# Patient Record
Sex: FEMALE | Race: WHITE | NOT HISPANIC OR LATINO | Employment: UNEMPLOYED | URBAN - METROPOLITAN AREA
[De-identification: names, ages, dates, MRNs, and addresses within clinical notes are randomized per-mention and may not be internally consistent; named-entity substitution may affect disease eponyms.]

---

## 2018-06-28 ENCOUNTER — DOCUMENTATION ONLY (OUTPATIENT)
Dept: TRANSPLANT | Facility: CLINIC | Age: 53
End: 2018-06-28

## 2018-06-28 NOTE — LETTER
June 28, 2018    Gabi Newton  [address] [facility] [address]      Dear Gabi Newton:    Your doctor has referred you to the [facility]. You will be contacted by our office and an initial appointment will then be scheduled for you.    We look forward to seeing you soon. If you have any further questions, please contact us at [phone].       Sincerely,        [facility]   [address] [place] [id number]  [phone]

## 2018-06-28 NOTE — NURSING
Pt records reviewed.  Pt will be referred to Hepatology due to fatty liver  Initial referral received  from Dr.Diana Bowling  Referral letter sent to provider and patient.

## 2018-07-02 PROBLEM — R10.9 ABDOMINAL PAIN: Status: ACTIVE | Noted: 2018-07-02

## 2018-07-24 ENCOUNTER — INITIAL CONSULT (OUTPATIENT)
Dept: HEPATOLOGY | Facility: CLINIC | Age: 53
End: 2018-07-24
Payer: MEDICAID

## 2018-07-24 VITALS
BODY MASS INDEX: 32.1 KG/M2 | RESPIRATION RATE: 18 BRPM | HEART RATE: 81 BPM | TEMPERATURE: 98 F | SYSTOLIC BLOOD PRESSURE: 131 MMHG | WEIGHT: 170 LBS | HEIGHT: 61 IN | DIASTOLIC BLOOD PRESSURE: 63 MMHG | OXYGEN SATURATION: 98 %

## 2018-07-24 DIAGNOSIS — K76.0 FATTY LIVER: ICD-10-CM

## 2018-07-24 DIAGNOSIS — E78.5 HYPERLIPIDEMIA, UNSPECIFIED HYPERLIPIDEMIA TYPE: ICD-10-CM

## 2018-07-24 DIAGNOSIS — I10 HYPERTENSION, UNSPECIFIED TYPE: ICD-10-CM

## 2018-07-24 DIAGNOSIS — R16.1 SPLENOMEGALY: ICD-10-CM

## 2018-07-24 DIAGNOSIS — K59.00 CONSTIPATION, UNSPECIFIED CONSTIPATION TYPE: ICD-10-CM

## 2018-07-24 PROCEDURE — 99214 OFFICE O/P EST MOD 30 MIN: CPT | Mod: PBBFAC,PN | Performed by: INTERNAL MEDICINE

## 2018-07-24 PROCEDURE — 99999 PR PBB SHADOW E&M-EST. PATIENT-LVL IV: CPT | Mod: PBBFAC,,, | Performed by: INTERNAL MEDICINE

## 2018-07-24 PROCEDURE — 99204 OFFICE O/P NEW MOD 45 MIN: CPT | Mod: S$PBB,,, | Performed by: INTERNAL MEDICINE

## 2018-07-24 NOTE — PROGRESS NOTES
HEPATOLOGY CONSULTATION    Referring Physician: Yanet Yeung MD  Current Corresponding Physician: Yanet Yeung MD    Reason for Consultation: Consultation for evaluation of Fatty Liver and splenomegaly    History of Present Illness: Gabi Newton is a 52 y.o. female who presents for evaluation of underwent a cholecystecomty in 2016 for RUQ pain. Since then she has had continued abdominal discomfort in the periumbilical area (different than the pain that led to her gallbladder surgery). As part of the w/u for this pain she has had imaging done:  CT scan 6/11/18: normal liver but the spleen is slightly enlarged.  MRI 6/19/18: Diffuse hepatic steatosis without evidence of suspicious focal lesions and splenomegaly but no asictes.    Liver enzymes are normal.In addition her Tbil, albumin are normal. Plts are borderline (169, 193) The patient does not drink alcohol heavily and has no history of DM. Her BMI is 32. She has not been screened for HCV (she was born in 1965 and is a babyboomer). The patient denies any symptoms of decompensated cirrhosis, including no ascites or edema, cognitive problems that would suggest hepatic encephalopathy, or GI bleeding from varices.     Chief Complaint   Patient presents with    Fatty Liver    splenomegaly       Past Medical History:   Diagnosis Date    Abdominal pain 2018    Back pain     Fatty liver 7/24/2018    GERD (gastroesophageal reflux disease)     Hyperlipidemia     Hypertension      Outpatient Encounter Prescriptions as of 7/24/2018   Medication Sig Dispense Refill    aspirin 81 MG Chew aspirin 81 mg chewable tablet   Chew 1 tablet every day by oral route.      lisinopril 10 MG tablet lisinopril 10 mg tablet   Take 1 tablet every day by oral route.      methadone (DOLOPHINE) 10 MG tablet Take 40 mg by mouth.       pantoprazole (PROTONIX) 40 MG tablet pantoprazole 40 mg tablet,delayed release      simvastatin (ZOCOR) 40 MG tablet Take 40 mg by mouth every  evening.       [DISCONTINUED] amLODIPine (NORVASC) 5 MG tablet Take 5 mg by mouth once daily.      [DISCONTINUED] gemfibrozil (LOPID) 600 MG tablet Take 600 mg by mouth 2 (two) times daily before meals.      [DISCONTINUED] isosorbide mononitrate (IMDUR) 60 MG 24 hr tablet Take 60 mg by mouth once daily.       No facility-administered encounter medications on file as of 7/24/2018.      Review of patient's allergies indicates:   Allergen Reactions    Zithromax [azithromycin] Anaphylaxis     Family History   Problem Relation Age of Onset    Breast cancer Maternal Grandfather     Dementia Mother     Aneurysm Father        Social History     Social History    Marital status:      Spouse name: N/A    Number of children: N/A    Years of education: N/A     Occupational History    Not on file.     Social History Main Topics    Smoking status: Current Every Day Smoker     Packs/day: 1.00     Types: Cigarettes    Smokeless tobacco: Never Used    Alcohol use No    Drug use: No      Comment: former opioid addiction  quit 8 yrs ago    Sexual activity: Not on file     Other Topics Concern    Not on file     Social History Narrative    No narrative on file     Review of Systems   Constitutional: Negative.    HENT: Negative.    Eyes: Negative.    Respiratory: Negative.    Cardiovascular: Negative.    Gastrointestinal: Positive for abdominal pain and constipation.   Genitourinary: Negative.    Musculoskeletal: Negative.    Skin: Negative.    Neurological: Negative.    Psychiatric/Behavioral: Negative.      Vitals:    07/24/18 1004   BP: 131/63   Pulse: 81   Resp: 18   Temp: 98 °F (36.7 °C)       Physical Exam   Constitutional: She is oriented to person, place, and time. She appears well-developed and well-nourished.   HENT:   Head: Normocephalic and atraumatic.   Eyes: Conjunctivae and EOM are normal. Pupils are equal, round, and reactive to light. No scleral icterus.   Neck: Normal range of motion. Neck  supple. No thyromegaly present.   Cardiovascular: Normal rate, regular rhythm and normal heart sounds.    Pulmonary/Chest: Effort normal and breath sounds normal. She has no rales.   Abdominal: Soft. Bowel sounds are normal. She exhibits no distension and no mass. There is no tenderness.   Musculoskeletal: Normal range of motion. She exhibits no edema.   Neurological: She is alert and oriented to person, place, and time.   Skin: Skin is warm and dry. No rash noted.   Psychiatric: She has a normal mood and affect.   Vitals reviewed.    MELD-Na score: 6 at 7/24/2018 11:36 AM  MELD score: 6 at 7/24/2018 11:36 AM  Calculated from:  Serum Creatinine: 0.7 mg/dL (Rounded to 1) at 7/24/2018 11:36 AM  Serum Sodium: 137 mmol/L at 7/24/2018 11:36 AM  Total Bilirubin: 0.7 mg/dL (Rounded to 1) at 7/24/2018 11:36 AM  INR(ratio): 0.9 (Rounded to 1) at 7/24/2018 11:36 AM  Age: 52 years    Lab Results   Component Value Date     06/10/2018    BUN 12 06/10/2018    CREATININE 0.8 06/10/2018    CALCIUM 9.1 06/10/2018     06/10/2018    K 3.8 06/10/2018     06/10/2018    PROT 7.0 06/10/2018    CO2 30 (H) 06/10/2018    ANIONGAP 6 (L) 06/10/2018    WBC 7.20 06/10/2018    RBC 4.47 06/10/2018    HGB 13.5 06/10/2018    HCT 40.0 06/10/2018    MCV 90 06/10/2018    MCH 30.2 06/10/2018    MCHC 33.7 06/10/2018     Lab Results   Component Value Date    RDW 15.1 (H) 06/10/2018     06/10/2018    MPV 8.6 (L) 06/10/2018    GRAN 6.5 06/10/2018    GRAN 89.7 (H) 06/10/2018    LYMPH 0.4 (L) 06/10/2018    LYMPH 6.2 (L) 06/10/2018    MONO 0.1 (L) 06/10/2018    MONO 1.8 (L) 06/10/2018    EOSINOPHIL 2.1 06/10/2018    BASOPHIL 0.2 06/10/2018    EOS 0.2 06/10/2018    BASO 0.00 06/10/2018    ALBUMIN 3.6 06/10/2018    AST 21 06/10/2018    ALT 19 06/10/2018    ALKPHOS 86 06/10/2018       Assessment and Plan:  Gabi Newton is a 52 y.o. female with Fatty Liver and splenomegaly  The concern is whether or not she has significant fibrosis or  cirrhosis given the finding of splenomegaly and borderline platelets. My current recommendations:  1. Fatty liver. I have recommended a fibroscan to evaluate for fibrosis. [facility] will do this at [facility]. Should this suggest significant fibrosis or cirrhosis then she will need HCC screening every 6 months with an US and ASP and I will monitor liver function every 4-6 months for decompensation. I will refer her to clinical trials for tx of OSBORNE if this is the case as well. I will also screen her for other etiologies of liver disease including HCV since she is a babyboomer. She will need to be vaccinated for HBV and HAV if she is not immune. I have ordered these serologies as well.  Return after fibroscan and bloodwork.

## 2018-07-24 NOTE — LETTER
July 24, 2018      Yanet Yeung MD  [address] [facility]  [address]  [place] [address]           [facility]  [address]  [place] [address]  Phone: [phone]  Fax: [phone]          Patient: Gabi Newton   MR Number: [id number]   YOB: 1965   Date of Visit: 7/24/2018       Dear Dr. Yanet Yeung:    Thank you for referring Gabi Newton to me for evaluation. Attached you will find relevant portions of my assessment and plan of care.    If you have questions, please do not hesitate to call me. I look forward to following Gabi Newton along with you.    Sincerely,    Melanie Cedeno MD    Enclosure  CC:  No Recipients    If you would like to receive this communication electronically, please contact [email or web address] or [phone] to request more information on [employer] Link access.    For providers and/or their staff who would like to refer a patient to [facility], please contact us through our one-stop-shop provider referral line, [facility], at [phone].    If you feel you have received this communication in error or would no longer like to receive these types of communications, please e-mail [email or web address]

## 2018-07-25 ENCOUNTER — TELEPHONE (OUTPATIENT)
Dept: TRANSPLANT | Facility: CLINIC | Age: 53
End: 2018-07-25

## 2018-07-25 DIAGNOSIS — K76.0 FATTY LIVER: Primary | ICD-10-CM

## 2018-07-30 ENCOUNTER — TELEPHONE (OUTPATIENT)
Dept: HEPATOLOGY | Facility: CLINIC | Age: 53
End: 2018-07-30

## 2018-07-31 ENCOUNTER — TELEPHONE (OUTPATIENT)
Dept: HEPATOLOGY | Facility: CLINIC | Age: 53
End: 2018-07-31

## 2018-07-31 NOTE — TELEPHONE ENCOUNTER
----- Message from Enriqueta Reeder MD sent at 7/30/2018  5:57 PM CDT -----  Please inform thee patient that she does not have evidence of other causes of liver disease outside of fatty liver.  Recommended for hepatitis A and B vaccination.

## 2018-07-31 NOTE — TELEPHONE ENCOUNTER
MA called patient inform her of her results below she understood and stated that she will do her vaccines locally.

## 2018-08-08 ENCOUNTER — CLINICAL SUPPORT (OUTPATIENT)
Dept: INFECTIOUS DISEASES | Facility: CLINIC | Age: 53
End: 2018-08-08
Payer: MEDICAID

## 2018-08-08 ENCOUNTER — PROCEDURE VISIT (OUTPATIENT)
Dept: HEPATOLOGY | Facility: CLINIC | Age: 53
End: 2018-08-08
Attending: INTERNAL MEDICINE
Payer: MEDICAID

## 2018-08-08 DIAGNOSIS — K76.0 FATTY LIVER: ICD-10-CM

## 2018-08-08 PROCEDURE — 91200 LIVER ELASTOGRAPHY: CPT | Mod: PBBFAC | Performed by: INTERNAL MEDICINE

## 2018-08-08 PROCEDURE — 91200 LIVER ELASTOGRAPHY: CPT | Mod: 26,S$PBB,, | Performed by: INTERNAL MEDICINE

## 2018-08-08 PROCEDURE — 90471 IMMUNIZATION ADMIN: CPT | Mod: PBBFAC

## 2018-08-08 NOTE — PROGRESS NOTES
Pt received the Hepatitis A/B vaccination. Pt tolerated the injection well. Pt left the unit in [place]. Return appt provided.

## 2018-08-10 NOTE — PROCEDURES
Gabi Newton is a 52 y.o. female patient.            Procedures Fibroscan Procedure     Name: Gabi Newton  Date of Procedure : 08/10/2018   :: Melanie Cedeno MD  Diagnosis: NAFLD    Probe: XL    Fibroscan reading: 3.5 KPa    Fibrosis:F 0-1     CAP readin dB/m    Steatosis: :S3       Melanie Cedeno  8/10/2018

## 2018-08-14 ENCOUNTER — OFFICE VISIT (OUTPATIENT)
Dept: HEPATOLOGY | Facility: CLINIC | Age: 53
End: 2018-08-14
Payer: MEDICAID

## 2018-08-14 VITALS
HEIGHT: 61 IN | BODY MASS INDEX: 32.85 KG/M2 | HEART RATE: 75 BPM | SYSTOLIC BLOOD PRESSURE: 135 MMHG | OXYGEN SATURATION: 98 % | WEIGHT: 174 LBS | RESPIRATION RATE: 18 BRPM | DIASTOLIC BLOOD PRESSURE: 75 MMHG | TEMPERATURE: 98 F

## 2018-08-14 DIAGNOSIS — E61.1 IRON DEFICIENCY: Primary | ICD-10-CM

## 2018-08-14 PROCEDURE — 99214 OFFICE O/P EST MOD 30 MIN: CPT | Mod: PBBFAC,PN | Performed by: INTERNAL MEDICINE

## 2018-08-14 PROCEDURE — 99999 PR PBB SHADOW E&M-EST. PATIENT-LVL IV: CPT | Mod: PBBFAC,,, | Performed by: INTERNAL MEDICINE

## 2018-08-14 PROCEDURE — 99214 OFFICE O/P EST MOD 30 MIN: CPT | Mod: S$PBB,,, | Performed by: INTERNAL MEDICINE

## 2018-08-14 RX ORDER — DOCUSATE SODIUM 100 MG/1
100 CAPSULE, LIQUID FILLED ORAL DAILY
COMMUNITY
End: 2019-10-14

## 2018-08-14 RX ORDER — GEMFIBROZIL 600 MG/1
600 TABLET, FILM COATED ORAL
COMMUNITY
End: 2019-10-14

## 2018-08-14 NOTE — PATIENT INSTRUCTIONS
1. Blood tests for celiac sprue  2. Return 1 year with US and blood tests and fibroscan  3. Complete vaccination

## 2018-08-14 NOTE — PROGRESS NOTES
HEPATOLOGY FOLLOW UP    Referring Physician: Yanet Anderson MD  Current Corresponding Physician: Yanet Anderson MD    Gabi Newton is here for follow up of Fatty Liver    HPI  She is a 52 y.o. female who underwent a cholecystecomty in 2016 for RUQ pain. Since then she has had continued abdominal discomfort in the periumbilical area (different than the pain that led to her gallbladder surgery). As part of the w/u for this pain she has had imaging done:  CT scan 6/11/18: normal liver but the spleen slightly enlarged.  MRI 6/19/18: Diffuse hepatic steatosis without evidence of suspicious focal lesions and splenomegaly but no asictes.     Liver enzymes are normal. In addition her Tbil, albumin are normal. Plts are borderline (169, 193) The patient does not drink alcohol heavily and has no history of DM. Her BMI is 32. She has not been screened for HCV (she was born in 1965 and is a babyboomer). The patient denies any symptoms of decompensated cirrhosis, including no ascites or edema, cognitive problems that would suggest hepatic encephalopathy, or GI bleeding from varices.     Interval History  Since Gabi Newton's last visit she underwent a fibroscan. This did reveal significant steatosis (S3=>67% fat) but only F0/1 fibrosis.   Serologies were done:  Negative for hepatitis B and C. Should get vaccinated for both A and B.  Autoimmune markers negative  alpha1 antitrypsin level normal  Iron studies: no overload; actually deficient in iron    She did have an EGD in July but no small bowel biopsies were done to evaluate iron deficiency. Colonoscopy was done ~2 years ago and therefore was not repeated.    Outpatient Encounter Medications as of 8/14/2018   Medication Sig Dispense Refill    aspirin 81 MG Chew aspirin 81 mg chewable tablet   Chew 1 tablet every day by oral route.      lisinopril 10 MG tablet lisinopril 10 mg tablet   Take 1 tablet every day by oral route.      methadone (DOLOPHINE) 10 MG tablet Take 40  mg by mouth.       pantoprazole (PROTONIX) 40 MG tablet pantoprazole 40 mg tablet,delayed release      simvastatin (ZOCOR) 40 MG tablet Take 40 mg by mouth every evening.       [DISCONTINUED] amLODIPine (NORVASC) 5 MG tablet Take 5 mg by mouth once daily.      [DISCONTINUED] gemfibrozil (LOPID) 600 MG tablet Take 600 mg by mouth 2 (two) times daily before meals.      [DISCONTINUED] isosorbide mononitrate (IMDUR) 60 MG 24 hr tablet Take 60 mg by mouth once daily.       No facility-administered encounter medications on file as of 8/14/2018.      Review of patient's allergies indicates:   Allergen Reactions    Zithromax [azithromycin] Anaphylaxis     Past Medical History:   Diagnosis Date    Abdominal pain 2018    Back pain     Fatty liver 7/24/2018    GERD (gastroesophageal reflux disease)     Hyperlipidemia     Hypertension     Splenomegaly 7/24/2018       Review of Systems   Constitutional: Negative.    HENT: Negative.    Eyes: Negative.    Respiratory: Negative.    Cardiovascular: Negative.    Gastrointestinal: Positive for abdominal distention and constipation.   Genitourinary: Negative.    Musculoskeletal: Negative.    Skin: Negative.    Neurological: Negative.    Psychiatric/Behavioral: Negative.      There were no vitals filed for this visit.    Physical Exam   Constitutional: She is oriented to person, place, and time. She appears well-developed and well-nourished.   HENT:   Head: Normocephalic and atraumatic.   Eyes: Conjunctivae and EOM are normal. Pupils are equal, round, and reactive to light. No scleral icterus.   Neck: Normal range of motion. Neck supple. No thyromegaly present.   Cardiovascular: Normal rate, regular rhythm and normal heart sounds.   Pulmonary/Chest: Effort normal and breath sounds normal. She has no rales.   Abdominal: Soft. Bowel sounds are normal. She exhibits no distension and no mass. There is no tenderness.   Musculoskeletal: Normal range of motion. She exhibits no  edema.   Neurological: She is alert and oriented to person, place, and time.   Skin: Skin is warm and dry. No rash noted.   Psychiatric: She has a normal mood and affect.   Vitals reviewed.      MELD-Na score: 6 at 7/24/2018 11:36 AM  MELD score: 6 at 7/24/2018 11:36 AM  Calculated from:  Serum Creatinine: 0.7 mg/dL (Rounded to 1 mg/dL) at 7/24/2018 11:36 AM  Serum Sodium: 137 mmol/L at 7/24/2018 11:36 AM  Total Bilirubin: 0.7 mg/dL (Rounded to 1 mg/dL) at 7/24/2018 11:36 AM  INR(ratio): 0.9 (Rounded to 1) at 7/24/2018 11:36 AM  Age: 52 years    Lab Results   Component Value Date     07/24/2018    BUN 9 07/24/2018    CREATININE 0.7 07/24/2018    CALCIUM 9.1 07/24/2018     07/24/2018    K 4.0 07/24/2018    CL 99 (L) 07/24/2018    PROT 7.5 07/24/2018    CO2 28 07/24/2018    ANIONGAP 10 07/24/2018    WBC 4.40 07/24/2018    RBC 4.59 07/24/2018    HGB 13.9 07/24/2018    HCT 41.3 07/24/2018    MCV 90 07/24/2018    MCH 30.2 07/24/2018    MCHC 33.5 07/24/2018     Lab Results   Component Value Date    RDW 15.4 (H) 07/24/2018     07/24/2018    MPV 8.8 (L) 07/24/2018    GRAN 3.3 07/24/2018    GRAN 76.3 (H) 07/24/2018    LYMPH 0.5 (L) 07/24/2018    LYMPH 12.5 (L) 07/24/2018    MONO 0.2 (L) 07/24/2018    MONO 5.4 07/24/2018    EOSINOPHIL 5.1 07/24/2018    BASOPHIL 0.7 07/24/2018    EOS 0.2 07/24/2018    BASO 0.00 07/24/2018    ALBUMIN 4.0 07/24/2018    AST 22 07/24/2018    ALT 18 07/24/2018    ALKPHOS 98 07/24/2018    LABPROT 9.6 (L) 07/24/2018    INR 0.9 07/24/2018       Assessment and Plan:  Patient Active Problem List   Diagnosis    Abdominal pain    Fatty liver    Hypertension    Hyperlipidemia    Constipation    Splenomegaly     Gabi Newton is a 52 y.o. female with Fatty Liver  The fibroscan suggests she has minimal fibrosis. Her spleen is not markedly enlarged and her plts remain normal. Her liver enzymes are normal and US and other blood tests otherwise do not suggest significant fibrosis. Taken  together, she appears to have minimal disease. She has started the vaccination series for hepatitis A and B. I am recommending that she return in one year with a repeat US, blood tests and fibroscan. She should complete her vaccination series.    For the iron deficiency, I will screen her for celiac sprue with at TTG IgA and IgA level.  Return one year.

## 2018-08-20 ENCOUNTER — TELEPHONE (OUTPATIENT)
Dept: HEPATOLOGY | Facility: CLINIC | Age: 53
End: 2018-08-20

## 2018-09-11 ENCOUNTER — CLINICAL SUPPORT (OUTPATIENT)
Dept: INFECTIOUS DISEASES | Facility: CLINIC | Age: 53
End: 2018-09-11
Payer: MEDICAID

## 2018-09-11 DIAGNOSIS — K76.0 FATTY LIVER: ICD-10-CM

## 2018-09-11 PROCEDURE — 90636 HEP A/HEP B VACC ADULT IM: CPT | Mod: PBBFAC

## 2018-09-11 NOTE — PROGRESS NOTES
Ms. Newton received 2nd dose Hep A/B vaccine and tolerated it well. She scheduled followed up and left the unit in [facility].

## 2018-10-16 PROBLEM — I87.2 CHRONIC VENOUS INSUFFICIENCY: Status: ACTIVE | Noted: 2018-10-16

## 2018-10-16 PROBLEM — R60.0 PERIPHERAL EDEMA: Status: ACTIVE | Noted: 2018-10-16

## 2019-02-05 ENCOUNTER — CLINICAL SUPPORT (OUTPATIENT)
Dept: INFECTIOUS DISEASES | Facility: CLINIC | Age: 54
End: 2019-02-05
Payer: MEDICAID

## 2019-02-05 DIAGNOSIS — K76.0 FATTY LIVER: ICD-10-CM

## 2019-02-05 PROCEDURE — 90471 IMMUNIZATION ADMIN: CPT | Mod: PBBFAC

## 2019-07-08 ENCOUNTER — TELEPHONE (OUTPATIENT)
Dept: HEPATOLOGY | Facility: CLINIC | Age: 54
End: 2019-07-08

## 2019-07-08 NOTE — TELEPHONE ENCOUNTER
----- Message from Melissa Lanza sent at 7/8/2019  8:52 AM CDT -----  Regarding: RE: External referral  This is an ep of Dr. Cedeno. I will send to her staff for he to josefa her a f/u appt  ----- Message -----  From: Ingrid Felder  Sent: 7/8/2019   8:41 AM  To: [facility] Referral Pool  Subject: External referral                                Good morning,    Patient being referred to Hepatology for steatosis of liver. Referral and records scanned into .    Thanks!  Ingrid

## 2019-07-08 NOTE — TELEPHONE ENCOUNTER
MA called patient to inform her that we have schedule her follow up visit to see Dr. Cedeno at [facility] on  7/16/19 at 4:00 pm. She is unable to reached left her VM to please give us a callback.

## 2019-07-16 ENCOUNTER — OFFICE VISIT (OUTPATIENT)
Dept: HEPATOLOGY | Facility: CLINIC | Age: 54
End: 2019-07-16
Payer: MEDICAID

## 2019-07-16 VITALS
BODY MASS INDEX: 35.78 KG/M2 | WEIGHT: 194.44 LBS | OXYGEN SATURATION: 97 % | RESPIRATION RATE: 18 BRPM | HEIGHT: 62 IN | TEMPERATURE: 97 F | HEART RATE: 79 BPM | DIASTOLIC BLOOD PRESSURE: 72 MMHG | SYSTOLIC BLOOD PRESSURE: 123 MMHG

## 2019-07-16 DIAGNOSIS — R16.1 SPLENOMEGALY: ICD-10-CM

## 2019-07-16 DIAGNOSIS — R60.0 PERIPHERAL EDEMA: ICD-10-CM

## 2019-07-16 DIAGNOSIS — K76.0 FATTY LIVER: Primary | ICD-10-CM

## 2019-07-16 PROCEDURE — 99999 PR PBB SHADOW E&M-EST. PATIENT-LVL IV: ICD-10-PCS | Mod: PBBFAC,,, | Performed by: INTERNAL MEDICINE

## 2019-07-16 PROCEDURE — 99999 PR PBB SHADOW E&M-EST. PATIENT-LVL IV: CPT | Mod: PBBFAC,,, | Performed by: INTERNAL MEDICINE

## 2019-07-16 PROCEDURE — 99214 OFFICE O/P EST MOD 30 MIN: CPT | Mod: S$PBB,,, | Performed by: INTERNAL MEDICINE

## 2019-07-16 PROCEDURE — 99214 PR OFFICE/OUTPT VISIT, EST, LEVL IV, 30-39 MIN: ICD-10-PCS | Mod: S$PBB,,, | Performed by: INTERNAL MEDICINE

## 2019-07-16 PROCEDURE — 99214 OFFICE O/P EST MOD 30 MIN: CPT | Mod: PBBFAC,PN | Performed by: INTERNAL MEDICINE

## 2019-07-16 RX ORDER — NITROGLYCERIN 0.4 MG/1
0.4 TABLET SUBLINGUAL EVERY 5 MIN PRN
COMMUNITY

## 2019-07-16 NOTE — PROGRESS NOTES
HEPATOLOGY FOLLOW UP    Referring Physician: Yanet Anderson MD  Current Corresponding Physician: Yanet Anderson MD    Gabi Newton is here for follow up of Fatty Liver    HPI  She is a 52 y.o. female who underwent a cholecystecomty in 2016 for RUQ pain. Since then she has had continued abdominal discomfort in the periumbilical area (different than the pain that led to her gallbladder surgery). As part of the w/u for this pain she has had imaging done:  CT scan 6/11/18: normal liver but the spleen slightly enlarged.  MRI 6/19/18: Diffuse hepatic steatosis without evidence of suspicious focal lesions and splenomegaly but no asictes.     Liver enzymes are normal. In addition her Tbil, albumin are normal. Plts are borderline (169, 193) The patient does not drink alcohol heavily and has no history of DM. BMI 32.     Interval History   Gabi Newton had a fibroscan 8/10/18. This did reveal significant steatosis (S3=>67% fat) but only F0/1 fibrosis.   Serologies were done previously:  Negative for hepatitis B and C. Should get vaccinated for both A and B.  Autoimmune markers negative  alpha1 antitrypsin level normal  Iron studies: no overload; actually deficient in iron; serologies for celiac sprue were negative;     EGD in July/18: no EV. She has completed vaccination for hepatitis A and B.    She denies any cognitive issues that would suggest HE. She has had lower extremity swelling that is thought to be due to venous insufficiency. 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 Initially her edema improved but has recurred. She now sees providers at an outside vein clinic. The edema causes significant pruritus. She still complains of abdominal distention. Abdominal imaging a year ago did not reveal ascites.    Outpatient Encounter Medications as of 7/16/2019   Medication Sig Dispense Refill    aspirin 81 MG Chew aspirin 81 mg chewable tablet   Chew 1 tablet every day by oral  route.      gemfibrozil (LOPID) 600 MG tablet Take 600 mg by mouth 2 (two) times daily before meals.      lisinopril 10 MG tablet lisinopril 10 mg tablet   Take 1 tablet every day by oral route.      methadone (DOLOPHINE) 10 MG tablet Take 40 mg by mouth.       nitroGLYCERIN (NITROSTAT) 0.4 MG SL tablet Place 0.4 mg under the tongue every 5 (five) minutes as needed for Chest pain.      rivaroxaban (XARELTO) 20 mg Tab Take 20 mg by mouth daily with dinner or evening meal.      simvastatin (ZOCOR) 40 MG tablet Take 40 mg by mouth every evening.       docusate sodium (COLACE) 100 MG capsule Take 100 mg by mouth once daily.      furosemide (LASIX) 20 MG tablet Take 20 mg by mouth once daily.      polyethylene glycol (GLYCOLAX) 17 gram/dose powder Miralax 17 gram/dose oral powder   1 capful mixed with 8oz of water every day      potassium chloride (KLOR-CON) 20 mEq Pack Take 20 mEq by mouth once.      [DISCONTINUED] amLODIPine (NORVASC) 5 MG tablet Take 5 mg by mouth once daily.      [DISCONTINUED] isosorbide mononitrate (IMDUR) 60 MG 24 hr tablet Take 60 mg by mouth once daily.       No facility-administered encounter medications on file as of 7/16/2019.      Review of patient's allergies indicates:   Allergen Reactions    Zithromax [azithromycin] Anaphylaxis     Past Medical History:   Diagnosis Date    Abdominal pain 2018    Back pain     Fatty liver 7/24/2018    GERD (gastroesophageal reflux disease)     Hyperlipidemia     Hypertension     Splenomegaly 7/24/2018       Review of Systems   Constitutional: Negative.    HENT: Negative.    Eyes: Negative.    Respiratory: Negative.    Cardiovascular: Negative.    Gastrointestinal: Positive for abdominal distention and constipation.   Genitourinary: Negative.    Musculoskeletal: Negative.    Skin: Negative.    Neurological: Negative.    Psychiatric/Behavioral: Negative.      Vitals:    07/16/19 1359   BP: 123/72   Pulse: 79   Resp: 18   Temp: 97.4 °F  (36.3 °C)       Physical Exam   Constitutional: She is oriented to person, place, and time. She appears well-developed and well-nourished.   HENT:   Head: Normocephalic and atraumatic.   Eyes: Pupils are equal, round, and reactive to light. Conjunctivae and EOM are normal. No scleral icterus.   Neck: Normal range of motion. Neck supple. No thyromegaly present.   Cardiovascular: Normal rate, regular rhythm and normal heart sounds.   Pulmonary/Chest: Effort normal and breath sounds normal. She has no rales.   Abdominal: Soft. Bowel sounds are normal. She exhibits no distension and no mass. There is no tenderness.   Musculoskeletal: Normal range of motion. She exhibits no edema.   Neurological: She is alert and oriented to person, place, and time.   Skin: Skin is warm and dry. No rash noted.   Psychiatric: She has a normal mood and affect.   Vitals reviewed.      MELD-Na score: 6 at 10/15/2018  1:46 PM  MELD score: 6 at 10/15/2018  1:46 PM  Calculated from:  Serum Creatinine: 0.8 mg/dL (Rounded to 1 mg/dL) at 10/15/2018  1:46 PM  Serum Sodium: 137 mmol/L at 10/15/2018  1:46 PM  Total Bilirubin: 0.6 mg/dL (Rounded to 1 mg/dL) at 10/15/2018  1:46 PM  INR(ratio): 0.9 (Rounded to 1) at 10/15/2018  1:46 PM  Age: 52 years    Lab Results   Component Value Date    GLU 96 10/15/2018    BUN 7 10/15/2018    CREATININE 0.8 10/15/2018    CALCIUM 9.0 10/15/2018     10/15/2018    K 4.2 10/15/2018    CL 98 (L) 10/15/2018    PROT 7.5 10/15/2018    CO2 31 (H) 10/15/2018    ANIONGAP 8 10/15/2018    WBC 4.90 10/15/2018    RBC 4.49 10/15/2018    HGB 13.4 10/15/2018    HCT 40.7 10/15/2018    MCV 91 10/15/2018    MCH 29.8 10/15/2018    MCHC 32.9 10/15/2018     Lab Results   Component Value Date    RDW 14.8 (H) 10/15/2018     10/15/2018    MPV 8.1 (L) 10/15/2018    GRAN 3.7 10/15/2018    GRAN 75.6 (H) 10/15/2018    LYMPH 0.7 (L) 10/15/2018    LYMPH 13.9 (L) 10/15/2018    MONO 0.2 (L) 10/15/2018    MONO 4.7 10/15/2018     EOSINOPHIL 5.1 10/15/2018    BASOPHIL 0.7 10/15/2018    EOS 0.3 10/15/2018    BASO 0.00 10/15/2018    BNP <10 08/30/2018    ALBUMIN 3.9 10/15/2018    AST 28 10/15/2018    ALT 28 10/15/2018    ALKPHOS 106 10/15/2018    MG 1.9 10/15/2018    LABPROT 9.4 (L) 10/15/2018    INR 0.9 10/15/2018       Assessment and Plan:    Gabi Newton is a 53 y.o. female with Fatty Liver  The fibroscan suggests she has minimal fibrosis. Her spleen is not markedly enlarged and her plts remain normal. Her liver enzymes are normal and US and other blood tests otherwise do not suggest significant fibrosis. Taken together, she appears to have minimal disease. However, she is having recurrent lower extremity edema. I would like to confirm we are not missing cirrhosis. I will repeat labs and US. I may elect to proceed with MRE to noninvasively evaluate for fibrosis. I will check bile acids since she has pruritus.    Return 4-6 weeks.    .

## 2019-07-16 NOTE — PATIENT INSTRUCTIONS
1. Labs today  2. US abdomen  3. Sign release to get EGD report from Dr Bryant (within the year)  Return 4-6 weeks

## 2019-08-20 ENCOUNTER — OFFICE VISIT (OUTPATIENT)
Dept: HEPATOLOGY | Facility: CLINIC | Age: 54
End: 2019-08-20
Payer: MEDICAID

## 2019-08-20 VITALS
HEIGHT: 61 IN | SYSTOLIC BLOOD PRESSURE: 128 MMHG | HEART RATE: 94 BPM | BODY MASS INDEX: 37.12 KG/M2 | DIASTOLIC BLOOD PRESSURE: 86 MMHG | WEIGHT: 196.63 LBS | TEMPERATURE: 98 F | OXYGEN SATURATION: 95 % | RESPIRATION RATE: 18 BRPM

## 2019-08-20 DIAGNOSIS — R16.1 SPLENOMEGALY: ICD-10-CM

## 2019-08-20 DIAGNOSIS — R60.0 PERIPHERAL EDEMA: ICD-10-CM

## 2019-08-20 DIAGNOSIS — K76.0 FATTY LIVER: Primary | ICD-10-CM

## 2019-08-20 DIAGNOSIS — R60.9 EDEMA, UNSPECIFIED TYPE: ICD-10-CM

## 2019-08-20 PROCEDURE — 99999 PR PBB SHADOW E&M-EST. PATIENT-LVL IV: CPT | Mod: PBBFAC,,, | Performed by: INTERNAL MEDICINE

## 2019-08-20 PROCEDURE — 99214 PR OFFICE/OUTPT VISIT, EST, LEVL IV, 30-39 MIN: ICD-10-PCS | Mod: S$PBB,,, | Performed by: INTERNAL MEDICINE

## 2019-08-20 PROCEDURE — 99214 OFFICE O/P EST MOD 30 MIN: CPT | Mod: PBBFAC,PN | Performed by: INTERNAL MEDICINE

## 2019-08-20 PROCEDURE — 99999 PR PBB SHADOW E&M-EST. PATIENT-LVL IV: ICD-10-PCS | Mod: PBBFAC,,, | Performed by: INTERNAL MEDICINE

## 2019-08-20 PROCEDURE — 99214 OFFICE O/P EST MOD 30 MIN: CPT | Mod: S$PBB,,, | Performed by: INTERNAL MEDICINE

## 2019-08-20 RX ORDER — FUROSEMIDE 20 MG/1
40 TABLET ORAL DAILY
Qty: 60 TABLET | Refills: 11 | Status: SHIPPED | OUTPATIENT
Start: 2019-08-20 | End: 2019-10-14

## 2019-08-20 RX ORDER — LORAZEPAM 1 MG/1
2 TABLET ORAL ONCE
Qty: 2 TABLET | Refills: 0 | Status: SHIPPED | OUTPATIENT
Start: 2019-08-20 | End: 2019-10-14

## 2019-08-20 RX ORDER — POTASSIUM CHLORIDE 20 MEQ/15ML
20 SOLUTION ORAL DAILY
Qty: 3800 ML | Refills: 3 | Status: SHIPPED | OUTPATIENT
Start: 2019-08-20 | End: 2019-10-14

## 2019-08-20 NOTE — PATIENT INSTRUCTIONS
1. MRE to r/o cirrhosis  2. Return after MRE  3. Increase lasix to 40 mg daily and continue potassium; labs in 2 weeks and 4 weeks

## 2019-08-20 NOTE — PROGRESS NOTES
HEPATOLOGY FOLLOW UP    Referring Physician: Yanet Anderson MD  Current Corresponding Physician: Yanet Anderson MD    Gabi Newton is here for follow up of Fatty Liver    HPI  She is a 52 y.o. female who underwent a cholecystecomty in 2016 for RUQ pain. Since then she has had continued abdominal discomfort in the periumbilical area (different than the pain that led to her gallbladder surgery). As part of the w/u for this pain she has had imaging done:  CT scan 6/11/18: normal liver but the spleen slightly enlarged.  MRI 6/19/18: Diffuse hepatic steatosis without evidence of suspicious focal lesions and splenomegaly but no asictes.     Liver enzymes are normal. In addition her Tbil, albumin are normal. Plts are borderline (169, 193) The patient does not drink alcohol heavily and has no history of DM. BMI 32.     Interval History   Gabi Newton had a fibroscan 8/10/18. This did reveal significant steatosis (S3=>67% fat) but only F0/1 fibrosis.   Serologies were done previously:  Negative for hepatitis B and C. Should get vaccinated for both A and B.  Autoimmune markers negative  alpha1 antitrypsin level normal  Iron studies: no overload; actually deficient in iron; serologies for celiac sprue were negative;     EGD in July/18: no EV. She has completed vaccination for hepatitis A and B.    She denies any cognitive issues that would suggest HE. She has had lower extremity swelling that is thought to be due to venous insufficiency.    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 Initially her edema improved but has recurred. And now she has been diagnosd with a lower extremity DVT and is on xarelto. She still has edema. The edema causes significant pruritus.     An US done 8/19/19 did not suggest obvious cirrhosis.    Outpatient Encounter Medications as of 8/20/2019   Medication Sig Dispense Refill    aspirin 81 MG Chew aspirin 81 mg chewable tablet   Chew 1 tablet every day  by oral route.      docusate sodium (COLACE) 100 MG capsule Take 100 mg by mouth once daily.      furosemide (LASIX) 20 MG tablet Take 20 mg by mouth once daily.      gemfibrozil (LOPID) 600 MG tablet Take 600 mg by mouth 2 (two) times daily before meals.      lisinopril 10 MG tablet lisinopril 10 mg tablet   Take 1 tablet every day by oral route.      methadone (DOLOPHINE) 10 MG tablet Take 40 mg by mouth.       nitroGLYCERIN (NITROSTAT) 0.4 MG SL tablet Place 0.4 mg under the tongue every 5 (five) minutes as needed for Chest pain.      polyethylene glycol (GLYCOLAX) 17 gram/dose powder Miralax 17 gram/dose oral powder   1 capful mixed with 8oz of water every day      potassium chloride (KLOR-CON) 20 mEq Pack Take 20 mEq by mouth once.      rivaroxaban (XARELTO) 20 mg Tab Take 20 mg by mouth daily with dinner or evening meal.      simvastatin (ZOCOR) 40 MG tablet Take 40 mg by mouth every evening.       [DISCONTINUED] amLODIPine (NORVASC) 5 MG tablet Take 5 mg by mouth once daily.      [DISCONTINUED] isosorbide mononitrate (IMDUR) 60 MG 24 hr tablet Take 60 mg by mouth once daily.       No facility-administered encounter medications on file as of 8/20/2019.      Review of patient's allergies indicates:   Allergen Reactions    Zithromax [azithromycin] Anaphylaxis     Past Medical History:   Diagnosis Date    Abdominal pain 2018    Back pain     Fatty liver 7/24/2018    GERD (gastroesophageal reflux disease)     Hyperlipidemia     Hypertension     Splenomegaly 7/24/2018       Review of Systems   Constitutional: Negative.    HENT: Negative.    Eyes: Negative.    Respiratory: Negative.    Cardiovascular: Negative.    Gastrointestinal: Positive for abdominal distention and constipation.   Genitourinary: Negative.    Musculoskeletal: Negative.    Skin: Negative.    Neurological: Negative.    Psychiatric/Behavioral: Negative.      Vitals:    08/20/19 1429   BP: 128/86   Pulse: 94   Resp: 18   Temp:  98.4 °F (36.9 °C)       Physical Exam   Constitutional: She is oriented to person, place, and time. She appears well-developed and well-nourished.   HENT:   Head: Normocephalic and atraumatic.   Eyes: Pupils are equal, round, and reactive to light. Conjunctivae and EOM are normal. No scleral icterus.   Neck: Normal range of motion. Neck supple. No thyromegaly present.   Cardiovascular: Normal rate, regular rhythm and normal heart sounds.   Pulmonary/Chest: Effort normal and breath sounds normal. She has no rales.   Abdominal: Soft. Bowel sounds are normal. She exhibits no distension and no mass. There is no tenderness.   Musculoskeletal: Normal range of motion. She exhibits no edema.   Neurological: She is alert and oriented to person, place, and time.   Skin: Skin is warm and dry. No rash noted.   Psychiatric: She has a normal mood and affect.   Vitals reviewed.      MELD-Na score: 6 at 7/16/2019  2:54 PM  MELD score: 6 at 7/16/2019  2:54 PM  Calculated from:  Serum Creatinine: 0.8 mg/dL (Rounded to 1 mg/dL) at 7/16/2019  2:54 PM  Serum Sodium: 140 mmol/L (Rounded to 137 mmol/L) at 7/16/2019  2:54 PM  Total Bilirubin: 0.4 mg/dL (Rounded to 1 mg/dL) at 7/16/2019  2:54 PM  INR(ratio): 1.0 at 7/16/2019  2:54 PM  Age: 53 years    Lab Results   Component Value Date    GLU 99 07/30/2019    BUN 7 07/30/2019    CREATININE 0.8 07/30/2019    CALCIUM 8.8 07/30/2019     07/30/2019    K 3.6 07/30/2019    CL 99 (L) 07/30/2019    PROT 7.1 07/30/2019    CO2 33 (H) 07/30/2019    ANIONGAP 8 07/30/2019    WBC 4.40 07/30/2019    RBC 4.44 07/30/2019    HGB 12.7 07/30/2019    HCT 38.3 07/30/2019    MCV 86 07/30/2019    MCH 28.7 07/30/2019    MCHC 33.2 07/30/2019     Lab Results   Component Value Date    RDW 15.9 (H) 07/30/2019     07/30/2019    MPV 8.7 (L) 07/30/2019    GRAN 3.4 07/30/2019    GRAN 75.3 (H) 07/30/2019    LYMPH 0.5 (L) 07/30/2019    LYMPH 12.3 (L) 07/30/2019    MONO 0.3 07/30/2019    MONO 5.8 07/30/2019     EOSINOPHIL 6.3 07/30/2019    BASOPHIL 0.3 07/30/2019    EOS 0.3 07/30/2019    BASO 0.00 07/30/2019    BNP 54 07/30/2019    ALBUMIN 3.7 07/30/2019    AST 29 07/30/2019    ALT 24 07/30/2019    ALKPHOS 98 07/30/2019    MG 1.9 10/15/2018    LABPROT 11.1 07/16/2019    INR 1.0 07/16/2019       Assessment and Plan:    Gabi Newton is a 53 y.o. female with Fatty Liver  The fibroscan suggests she has minimal fibrosis. Her spleen is not markedly enlarged and her plts remain normal. Her liver enzymes are normal and US and other blood tests otherwise do not suggest significant fibrosis. Taken together, she appears to have minimal disease. However, she is having recurrent lower extremity edema. I would like to confirm we are not missing cirrhosis. US did not reveal any cirrhosis. I am now recommending MRE to noninvasively evaluate for fibrosis. She may increase lasix to 40 mg daily and continue K. cmp will be checked in 2 and 4 weeks.  Return 4 weeks    Return 4-6 weeks.    . .

## 2019-09-24 ENCOUNTER — TELEPHONE (OUTPATIENT)
Dept: HEPATOLOGY | Facility: CLINIC | Age: 54
End: 2019-09-24

## 2019-09-24 ENCOUNTER — HOSPITAL ENCOUNTER (OUTPATIENT)
Dept: RADIOLOGY | Facility: HOSPITAL | Age: 54
Discharge: HOME | End: 2019-09-24
Attending: INTERNAL MEDICINE
Payer: MEDICAID

## 2019-09-24 DIAGNOSIS — R91.8 ABNORMAL FINDING ON LUNG IMAGING: Primary | ICD-10-CM

## 2019-09-24 DIAGNOSIS — K76.0 FATTY LIVER: ICD-10-CM

## 2019-09-24 PROCEDURE — 74181 MRI ABDOMEN WITHOUT CONTRAST: ICD-10-PCS | Mod: 26,,, | Performed by: RADIOLOGY

## 2019-09-24 PROCEDURE — 74181 MRI ABDOMEN W/O CONTRAST: CPT | Mod: TC

## 2019-09-24 PROCEDURE — 74181 MRI ABDOMEN W/O CONTRAST: CPT | Mod: 26,,, | Performed by: RADIOLOGY

## 2019-09-25 DIAGNOSIS — R19.8 ABNORMAL FINDINGS ON ESOPHAGOGASTRODUODENOSCOPY (EGD): Primary | ICD-10-CM

## 2019-09-27 ENCOUNTER — TELEPHONE (OUTPATIENT)
Dept: TRANSPLANT | Facility: CLINIC | Age: 54
End: 2019-09-27

## 2019-09-27 DIAGNOSIS — R91.8 LUNG MASS: Primary | ICD-10-CM

## 2019-09-27 NOTE — TELEPHONE ENCOUNTER
Needs urgent pulmonary evaluation for lung mass. Called pt- report on ct from 2017- mass appears to have grown. Pt will get old films and take to pulmonary evaluation.

## 2019-10-04 ENCOUNTER — OFFICE VISIT (OUTPATIENT)
Dept: PULMONOLOGY | Facility: CLINIC | Age: 54
End: 2019-10-04
Payer: MEDICAID

## 2019-10-04 VITALS — WEIGHT: 194.44 LBS | OXYGEN SATURATION: 93 % | HEIGHT: 61 IN | BODY MASS INDEX: 36.71 KG/M2 | RESPIRATION RATE: 16 BRPM

## 2019-10-04 DIAGNOSIS — R06.02 SHORTNESS OF BREATH: ICD-10-CM

## 2019-10-04 DIAGNOSIS — F17.200 SMOKER: Primary | ICD-10-CM

## 2019-10-04 DIAGNOSIS — R91.1 LUNG NODULE: ICD-10-CM

## 2019-10-04 PROCEDURE — 99204 OFFICE O/P NEW MOD 45 MIN: CPT | Mod: S$PBB,,, | Performed by: NURSE PRACTITIONER

## 2019-10-04 PROCEDURE — 99999 PR PBB SHADOW E&M-EST. PATIENT-LVL III: ICD-10-PCS | Mod: PBBFAC,,, | Performed by: NURSE PRACTITIONER

## 2019-10-04 PROCEDURE — 99204 PR OFFICE/OUTPT VISIT, NEW, LEVL IV, 45-59 MIN: ICD-10-PCS | Mod: S$PBB,,, | Performed by: NURSE PRACTITIONER

## 2019-10-04 PROCEDURE — 99213 OFFICE O/P EST LOW 20 MIN: CPT | Mod: PBBFAC,PN | Performed by: NURSE PRACTITIONER

## 2019-10-04 PROCEDURE — 99999 PR PBB SHADOW E&M-EST. PATIENT-LVL III: CPT | Mod: PBBFAC,,, | Performed by: NURSE PRACTITIONER

## 2019-10-04 RX ORDER — METHADONE HYDROCHLORIDE 40 MG/1
40 TABLET ORAL DAILY
COMMUNITY

## 2019-10-04 RX ORDER — TRIAMCINOLONE ACETONIDE 1 MG/G
OINTMENT TOPICAL
COMMUNITY
End: 2020-03-17

## 2019-10-04 RX ORDER — CLOTRIMAZOLE AND BETAMETHASONE DIPROPIONATE 10; .64 MG/G; MG/G
CREAM TOPICAL
COMMUNITY
End: 2020-03-17

## 2019-10-04 NOTE — ASSESSMENT & PLAN NOTE
Will review scans for comparison and consider referral to IR or t Dr. Quintero for bronch.  Unsure of next course of action at this time.

## 2019-10-04 NOTE — PROGRESS NOTES
Subjective:       Patient ID: Gabi Newton is a 53 y.o. female.    Chief Complaint: Pulmonary Nodules    HPI:   Gabi Newton is a 53 y.o. female who presents with evaluation for pulmonary nodules tat have been present for many years.  Patient of Dr. Cedeno  Has blood clots in RLE- On Xarelto. Followed by hepatology  for liver lesion. Current daily smoker.  Had chest pain in 2016 and they found a nodule that was supposed to be folowed annually.  Followed by Dr. Yeung.  It was a 45 minute scan at [facility].  Had multiple scans.  No lung problems as a child  No hospitalizations for lung problems  Started smoking at age 15 and smoked a pack a day, has tried patches with no help, has chantix and had some nausea.  Tapered her dose down   Reports decreased activity since her    Worked as a  for many years  Lasix- not currently on.  Tried liquid form of KCl as she was unable to swallow the pills but stopped.Has not needed her NTG tabs  Had a cardiac cath that showed some narrowing of valves.   Did have ANDRZEJ lap many years ago after an abnormal PAP smear  Did have calcifications in left breast with biopsy    Review of Systems   Constitutional: Positive for weight gain and night sweats. Negative for fever, activity change and fatigue.   HENT: Positive for postnasal drip, trouble swallowing (reports that it may be r/t to dental issue) and congestion. Negative for rhinorrhea.    Eyes: Negative for itching.   Respiratory: Positive for cough (occasional), choking, shortness of breath and dyspnea on extertion. Negative for hemoptysis, sputum production, chest tightness, wheezing and use of rescue inhaler.    Cardiovascular: Positive for leg swelling. Negative for chest pain and palpitations.   Genitourinary: Negative for difficulty urinating.   Endocrine: Negative for cold intolerance and heat intolerance.    Musculoskeletal: Negative for arthralgias.   Skin: Positive for rash.   Gastrointestinal: Positive for  abdominal distention. Negative for nausea, vomiting and acid reflux.   Neurological: Negative for dizziness and light-headedness.   Hematological: Negative for adenopathy.   Psychiatric/Behavioral: Negative for sleep disturbance.         Social History     Tobacco Use    Smoking status: Current Every Day Smoker     Packs/day: 1.00     Types: Cigarettes    Smokeless tobacco: Never Used   Substance Use Topics    Alcohol use: No       Review of patient's allergies indicates:   Allergen Reactions    Azithromycin Anaphylaxis     Other reaction(s): swelling to entire body  Swelling (tongue / lips)^      Ciprofloxacin Swelling     Throat swells    Codeine      Swelling (throat)^    Tolerates Morphine       Past Medical History:   Diagnosis Date    Abdominal pain 2018    Back pain     Fatty liver 2018    GERD (gastroesophageal reflux disease)     Hyperlipidemia     Hypertension     Splenomegaly 2018     Past Surgical History:   Procedure Laterality Date    CARDIAC CATHETERIZATION      CARDIAC CATHETERIZATION  2018    had chest pains . states vessels at the bottom of heart collapsed     SECTION      x3    CHOLECYSTECTOMY      COLONOSCOPY      ESOPHAGOGASTRODUODENOSCOPY      ESOPHAGOGASTRODUODENOSCOPY N/A 2018    Procedure: EGD (ESOPHAGOGASTRODUODENOSCOPY);  Surgeon: Ant Camejo MD;  [place] [facility] [place];  Service: Endoscopy;  Laterality: N/A;    HYSTERECTOMY      PHLEBOGRAPHY Bilateral 10/16/2018    Procedure: VENOGRAM;  Surgeon: Colin Mathis MD;  [place] [facility] [place];  Service: Cardiology;  Laterality: Bilateral;    RHINOPLASTY      TUBAL LIGATION      venogram Bilateral 10/16/2018     Current Outpatient Medications on File Prior to Visit   Medication Sig    clotrimazole-betamethasone 1-0.05% (LOTRISONE) cream clotrimazole-betamethasone 1 %-0.05 % topical cream    methadone (METHADOSE) 40 mg disintegrating tablet methadone 40 mg soluble tablet   Take 1 tablet every  day by oral route.    potassium chloride 10% (KAYCIEL) 20 mEq/15 mL oral solution Take 15 mLs (20 mEq total) by mouth once daily.    rivaroxaban (XARELTO) 20 mg Tab Take 20 mg by mouth daily with dinner or evening meal.    simvastatin (ZOCOR) 40 MG tablet Take 40 mg by mouth every evening.     triamcinolone acetonide 0.1% (KENALOG) 0.1 % ointment triamcinolone acetonide 0.1 % topical ointment   APPLY A THIN LAYER TO THE AFFECTED AREA(S) BY TOPICAL ROUTE 2 TIMES PER DAY    [DISCONTINUED] methadone (DOLOPHINE) 10 MG tablet Take 40 mg by mouth.     aspirin 81 MG Chew aspirin 81 mg chewable tablet   Chew 1 tablet every day by oral route.    docusate sodium (COLACE) 100 MG capsule Take 100 mg by mouth once daily.    furosemide (LASIX) 20 MG tablet Take 2 tablets (40 mg total) by mouth once daily. (Patient not taking: Reported on 10/4/2019)    gemfibrozil (LOPID) 600 MG tablet Take 600 mg by mouth 2 (two) times daily before meals.    lisinopril 10 MG tablet lisinopril 10 mg tablet   Take 1 tablet every day by oral route.    LORazepam (ATIVAN) 1 MG tablet Take 2 tablets (2 mg total) by mouth once. for 1 dose    nitroGLYCERIN (NITROSTAT) 0.4 MG SL tablet Place 0.4 mg under the tongue every 5 (five) minutes as needed for Chest pain.    polyethylene glycol (GLYCOLAX) 17 gram/dose powder Miralax 17 gram/dose oral powder   1 capful mixed with 8oz of water every day    [DISCONTINUED] amLODIPine (NORVASC) 5 MG tablet Take 5 mg by mouth once daily.    [DISCONTINUED] isosorbide mononitrate (IMDUR) 60 MG 24 hr tablet Take 60 mg by mouth once daily.     No current facility-administered medications on file prior to visit.        Objective:      Vitals:    10/04/19 0852   Resp: 16     Physical Exam   Constitutional: She is oriented to person, place, and time. She appears well-developed and well-nourished. No distress.   HENT:   Head: Normocephalic.   Neck: Normal range of motion. Neck supple.   Cardiovascular: Normal  rate and regular rhythm.   No murmur heard.  Pulmonary/Chest: Normal expansion, symmetric chest wall expansion and effort normal. No respiratory distress. She has decreased breath sounds. She has no wheezes. She has no rhonchi. She has no rales.   Abdominal: Soft. She exhibits distension. There is no hepatosplenomegaly. There is tenderness.   Musculoskeletal: Normal range of motion. She exhibits edema.   Lymphadenopathy:     She has no cervical adenopathy.   Neurological: She is alert and oriented to person, place, and time. Gait normal.   Skin: Skin is warm and dry. No cyanosis. Nails show no clubbing.   Psychiatric: She has a normal mood and affect.   Vitals reviewed.    Personal Diagnostic Review    Imaging personally reviewed with patient CT 9/25/19        Assessment:     Problem List Items Addressed This Visit        Pulmonary    Lung nodule    Overview     complex spiculated mass seen in the right lower lobe which measures 27.8 x 26.4 mm  There are few strand-like pleural extensions noted from the mass.  A small patchy infiltrate is seen in the right upper lobe, has been followed for many years.  Has images with her today.         Current Assessment & Plan     Will review scans for comparison and consider referral to IR or t Dr. Quintero for bronch.  Unsure of next course of action at this time.            Other    Smoker - Primary    Overview     Pack a day smoker since age 15.           Current Assessment & Plan     Must quit today.  Will refer to smoking cessation.         Shortness of breath    Overview     Ongoing.  Current daily smoker.          Current Assessment & Plan     Await PFTs to assess lung function

## 2019-10-04 NOTE — LETTER
October 4, 2019      Melanie Cedeno MD  [address] [facility] [address]           [facility]  [address], [facility] [address]  [facility] [address]  Phone: [phone]  Fax: [phone]          Patient: Gabi Newton   MR Number: [id number]   YOB: 1965   Date of Visit: 10/4/2019       Dear Dr. Melanie Cedeno:    Thank you for referring Gabi Newton to me for evaluation. Attached you will find relevant portions of my assessment and plan of care.    If you have questions, please do not hesitate to call me. I look forward to following Gabi Newton along with you.    Sincerely,    Shana Witt, [facility]    Enclosure  CC:  No Recipients    If you would like to receive this communication electronically, please contact [email or web address] or [phone] to request more information on [employer] Link access.    For providers and/or their staff who would like to refer a patient to [facility], please contact us through our one-stop-shop provider referral line, [facility], at [phone].    If you feel you have received this communication in error or would no longer like to receive these types of communications, please e-mail [email or web address]

## 2019-10-07 ENCOUNTER — PATIENT MESSAGE (OUTPATIENT)
Dept: PULMONOLOGY | Facility: CLINIC | Age: 54
End: 2019-10-07

## 2019-10-08 ENCOUNTER — PATIENT MESSAGE (OUTPATIENT)
Dept: PULMONOLOGY | Facility: CLINIC | Age: 54
End: 2019-10-08

## 2019-10-08 DIAGNOSIS — R91.1 LUNG NODULE: Primary | ICD-10-CM

## 2019-10-11 ENCOUNTER — CLINICAL SUPPORT (OUTPATIENT)
Dept: SMOKING CESSATION | Facility: CLINIC | Age: 54
End: 2019-10-11
Payer: COMMERCIAL

## 2019-10-11 DIAGNOSIS — F17.200 NICOTINE DEPENDENCE: Primary | ICD-10-CM

## 2019-10-11 PROCEDURE — 99404 PR PREVENT COUNSEL,INDIV,60 MIN: ICD-10-PCS | Mod: S$GLB,,, | Performed by: FAMILY MEDICINE

## 2019-10-11 PROCEDURE — 99404 PREV MED CNSL INDIV APPRX 60: CPT | Mod: S$GLB,,, | Performed by: FAMILY MEDICINE

## 2019-10-11 RX ORDER — IBUPROFEN 200 MG
1 TABLET ORAL DAILY
Qty: 14 PATCH | Refills: 0 | Status: SHIPPED | OUTPATIENT
Start: 2019-10-11 | End: 2019-11-25 | Stop reason: SDUPTHER

## 2019-10-11 RX ORDER — VARENICLINE TARTRATE 0.5 (11)-1
KIT ORAL
Qty: 1 PACKAGE | Refills: 0 | Status: SHIPPED | OUTPATIENT
Start: 2019-10-11 | End: 2019-11-25 | Stop reason: ALTCHOICE

## 2019-10-11 NOTE — Clinical Note
Pt seen at intake today. She currently smokes 20 cigs/day. Discussed tobacco cessation medication of chantix starter pack and 21 mg nicotine patch QD. Pt started on rate reduction and wait time of 15 min prior to smoking. Exhaled carbon monoxide level was 21 (0-6 non-smoker). Will see pt back in office in 1 wk.

## 2019-10-15 ENCOUNTER — TELEPHONE (OUTPATIENT)
Dept: ENDOSCOPY | Facility: HOSPITAL | Age: 54
End: 2019-10-15

## 2019-10-15 ENCOUNTER — TELEPHONE (OUTPATIENT)
Dept: PULMONOLOGY | Facility: CLINIC | Age: 54
End: 2019-10-15

## 2019-10-15 DIAGNOSIS — R91.1 LUNG NODULE: Primary | ICD-10-CM

## 2019-10-15 PROBLEM — K21.9 GERD (GASTROESOPHAGEAL REFLUX DISEASE): Status: ACTIVE | Noted: 2019-10-15

## 2019-10-15 NOTE — TELEPHONE ENCOUNTER
Had EGD today- was not told to hold Xarelto. Would be able to have CT done at [facility] if it is late morning.  Will call to schedule tomorrow as she had EGD today. Discussed PFT results

## 2019-10-15 NOTE — TELEPHONE ENCOUNTER
Disregard my earlier message. She decided to come to [place]. She is scheduled for 11/15 at 10:30 pending ok to hold Xarelto

## 2019-10-15 NOTE — TELEPHONE ENCOUNTER
Spoke with patient in regards to scheduling EGD/EUS. She has issues with transportation and would like to come on 10/29.. However, she is on Xarelto. [facility] [phone] in [place].

## 2019-10-15 NOTE — TELEPHONE ENCOUNTER
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     Bella- Please arrange for EGD/EUS in next 1-2 weeks. [facility] or [place]. 60 min case.

## 2019-10-18 ENCOUNTER — TELEPHONE (OUTPATIENT)
Dept: ENDOSCOPY | Facility: HOSPITAL | Age: 54
End: 2019-10-18

## 2019-10-25 ENCOUNTER — TELEPHONE (OUTPATIENT)
Dept: ENDOSCOPY | Facility: HOSPITAL | Age: 54
End: 2019-10-25

## 2019-10-25 NOTE — TELEPHONE ENCOUNTER
Called pt to inform ok for xarelto hold. Voicemail message and call back number provided.    Received request to schedule patient for EUS/EGD on 11/15/19. Spoke with Patient. Reviewed medical history and medications. Instructed on procedure and prep. Patient verbalized understanding of instructions. mailed copy of instructions to address in chart.

## 2019-10-28 ENCOUNTER — PATIENT MESSAGE (OUTPATIENT)
Dept: PULMONOLOGY | Facility: CLINIC | Age: 54
End: 2019-10-28

## 2019-10-28 NOTE — TELEPHONE ENCOUNTER
Spoke with patient, informed her that I have received her message. Patient states that ADRIANA Saldana put in for her to have a CT Scan done. Patient states that she thought she was scheduled for CT Scan. I advised patient that she was not scheduled to have a CT Scan done, however I do see a order for a CT Scan in her chart. I advised patient that I will forward her message to ADRIANA Saldana to review/advise. Patient verbalizes that she understand.

## 2019-10-29 ENCOUNTER — TELEPHONE (OUTPATIENT)
Dept: HEPATOLOGY | Facility: CLINIC | Age: 54
End: 2019-10-29

## 2019-10-29 NOTE — TELEPHONE ENCOUNTER
Call to pt to find out when she will have EUS done. Message left on both numbers listed. Left her my cell phone to call me.

## 2019-11-01 ENCOUNTER — CLINICAL SUPPORT (OUTPATIENT)
Dept: SMOKING CESSATION | Facility: CLINIC | Age: 54
End: 2019-11-01
Payer: COMMERCIAL

## 2019-11-01 DIAGNOSIS — F17.200 NICOTINE DEPENDENCE: ICD-10-CM

## 2019-11-01 PROCEDURE — 99402 PREV MED CNSL INDIV APPRX 30: CPT | Mod: S$GLB,,, | Performed by: FAMILY MEDICINE

## 2019-11-01 PROCEDURE — 99402 PR PREVENT COUNSEL,INDIV,30 MIN: ICD-10-PCS | Mod: S$GLB,,, | Performed by: FAMILY MEDICINE

## 2019-11-01 NOTE — PROGRESS NOTES
Individual Follow-Up Form    11/1/2019    Clinical Status of Patient: Outpatient    Length of Service: 30 minutes    Continuing Medication: yes  Chantix or Patches    Other Medications: none     Target Symptoms: Withdrawal and medication side effects. The following were rated moderate (3) to severe (4) on TCRS:  · Moderate (3): desire/crave tobacco  · Severe (4): none    Comments:  Pt seen in office today. She continues to smoke 20 cigs/day. Pt remains on tobacco cessation medication of chantix 1 mg BID and 21 mg nicotine patch QD. No adverse effects/mental changes noted at this time. She wished to adjust her rate reduction plan to better fit her schedule. Worked it out with her (4 cigs from 7-12, 4 cigs from 12-5, 4 cigs from 5-10 and 4 cigs from 10-2). Reviewed coping strategies/habitual behavior/relapse prevention with patient. Exhaled carbon monoxide level was 13 ppm per Smokerlyzer (0-6 non-smoker). Will see pt back in office in 1 wk.     Diagnosis: F17.200    Next Visit: 1 week

## 2019-11-01 NOTE — Clinical Note
Pt seen in office today. She continues to smoke 20 cigs/day. Pt remains on tobacco cessation medication of chantix 1 mg BID and 21 mg nicotine patch QD. No adverse effects/mental changes noted at this time. She wished to adjust her rate reduction plan to better fit her schedule. Worked it out with her (4 cigs from 7-12, 4 cigs from 12-5, 4 cigs from 5-10 and 4 cigs from 10-2). Reviewed coping strategies/habitual behavior/relapse prevention with patient. Exhaled carbon monoxide level was 13 ppm per Smokerlyzer (0-6 non-smoker). Will see pt back in office in 1 wk.

## 2019-11-04 ENCOUNTER — TELEPHONE (OUTPATIENT)
Dept: PULMONOLOGY | Facility: CLINIC | Age: 54
End: 2019-11-04

## 2019-11-04 NOTE — TELEPHONE ENCOUNTER
Spoke with patient informed her that I have received her message. Patient states that she is not feeling well so she would like to reschedule her CT Scan. I advised patient that I will reschedule her CT Scan, patient verbalizes that she understand. Patient CT Scan appointment was reschedule to 11/18/19 for 1:00 pm in [facility]. Patient excepted and confirmed appointment.

## 2019-11-04 NOTE — TELEPHONE ENCOUNTER
"Spoke with Dr. Bell for peer to peer.  He stated   "If the facility did not do the appropriate amount of cuts then that is their issue.  We did not approve it because they need to repeat the test using the authorization number from the initial CT (9/25/19)."  Explained to MD that the cuts were 5 mm.  He scoffed, stating that 3 mm was standard.  CT approved.   "

## 2019-11-04 NOTE — TELEPHONE ENCOUNTER
----- Message from Lorelei Ashford sent at 11/4/2019  1:23 PM CST -----  Contact: patient   Please call pt at [phone]    Patient is calling to reschedule her CT scan     Thank you

## 2019-11-11 ENCOUNTER — TELEPHONE (OUTPATIENT)
Dept: PULMONOLOGY | Facility: CLINIC | Age: 54
End: 2019-11-11

## 2019-11-11 ENCOUNTER — PATIENT MESSAGE (OUTPATIENT)
Dept: PULMONOLOGY | Facility: CLINIC | Age: 54
End: 2019-11-11

## 2019-11-11 ENCOUNTER — PATIENT MESSAGE (OUTPATIENT)
Dept: ENDOSCOPY | Facility: HOSPITAL | Age: 54
End: 2019-11-11

## 2019-11-11 NOTE — TELEPHONE ENCOUNTER
Spoke with patient, informed her that I have received her message. Patient states that she have received a denial letter from her insurance company for her CT Scan that is scheduled to be done in [facility] on 11/18/19. Patient states that her insurance company advised her that the CT Test does not meet with there guidelines and that they need a reason why a tissue sample or biopsy is not being done before the CT Scan. Patient states her insurance company does not think the CT Scan is necessary. I verbalized to patient that I understand and will forward her message to ADRIANA Saldana to review/advise. Patient verbalizes that she understand.

## 2019-11-15 ENCOUNTER — HOSPITAL ENCOUNTER (OUTPATIENT)
Facility: HOSPITAL | Age: 54
Discharge: HOME | End: 2019-11-15
Attending: INTERNAL MEDICINE | Admitting: INTERNAL MEDICINE
Payer: MEDICAID

## 2019-11-15 ENCOUNTER — ANESTHESIA (OUTPATIENT)
Dept: ENDOSCOPY | Facility: HOSPITAL | Age: 54
End: 2019-11-15
Payer: MEDICAID

## 2019-11-15 ENCOUNTER — ANESTHESIA EVENT (OUTPATIENT)
Dept: ENDOSCOPY | Facility: HOSPITAL | Age: 54
End: 2019-11-15
Payer: MEDICAID

## 2019-11-15 VITALS
TEMPERATURE: 97 F | RESPIRATION RATE: 18 BRPM | HEART RATE: 73 BPM | HEIGHT: 61 IN | DIASTOLIC BLOOD PRESSURE: 94 MMHG | SYSTOLIC BLOOD PRESSURE: 126 MMHG | BODY MASS INDEX: 32.1 KG/M2 | WEIGHT: 170 LBS | OXYGEN SATURATION: 98 %

## 2019-11-15 DIAGNOSIS — D21.4 GIST (GASTROINTESTINAL STROMAL TUMOR), NON-MALIGNANT: ICD-10-CM

## 2019-11-15 PROCEDURE — 88305 TISSUE EXAM BY PATHOLOGIST: CPT | Mod: 26,,, | Performed by: PATHOLOGY

## 2019-11-15 PROCEDURE — 88342 IMHCHEM/IMCYTCHM 1ST ANTB: CPT | Mod: 59 | Performed by: PATHOLOGY

## 2019-11-15 PROCEDURE — 88342 CHG IMMUNOCYTOCHEMISTRY: ICD-10-PCS | Mod: 26,59,, | Performed by: PATHOLOGY

## 2019-11-15 PROCEDURE — D9220A PRA ANESTHESIA: Mod: ANES,,, | Performed by: ANESTHESIOLOGY

## 2019-11-15 PROCEDURE — D9220A PRA ANESTHESIA: ICD-10-PCS | Mod: ANES,,, | Performed by: ANESTHESIOLOGY

## 2019-11-15 PROCEDURE — 88173 PR  INTERPRETATION OF FNA SMEAR: ICD-10-PCS | Mod: 26,,, | Performed by: PATHOLOGY

## 2019-11-15 PROCEDURE — 88173 CYTOPATH EVAL FNA REPORT: CPT | Performed by: PATHOLOGY

## 2019-11-15 PROCEDURE — 88177 PR  EVALUATION OF FNA SMEAR TO DETERMINE ADEQUACY, EA ADD EVAL: ICD-10-PCS | Mod: 26,,, | Performed by: PATHOLOGY

## 2019-11-15 PROCEDURE — 88305 TISSUE EXAM BY PATHOLOGIST: ICD-10-PCS | Mod: 26,,, | Performed by: PATHOLOGY

## 2019-11-15 PROCEDURE — 88172 PR  EVALUATION OF FNA SMEAR TO DETERMINE ADEQUACY, FIRST EVAL: ICD-10-PCS | Mod: 26,,, | Performed by: PATHOLOGY

## 2019-11-15 PROCEDURE — 88365 PR  TISSUE HYBRIDIZATION: ICD-10-PCS | Mod: 26,,, | Performed by: PATHOLOGY

## 2019-11-15 PROCEDURE — D9220A PRA ANESTHESIA: Mod: CRNA,,, | Performed by: NURSE ANESTHETIST, CERTIFIED REGISTERED

## 2019-11-15 PROCEDURE — 43242 EGD US FINE NEEDLE BX/ASPIR: CPT | Performed by: INTERNAL MEDICINE

## 2019-11-15 PROCEDURE — 88341 IMHCHEM/IMCYTCHM EA ADD ANTB: CPT | Mod: 59 | Performed by: PATHOLOGY

## 2019-11-15 PROCEDURE — 88189 PR  FLOWCYTOMETRY/READ, 16 & > MARKERS: ICD-10-PCS | Mod: ,,, | Performed by: PATHOLOGY

## 2019-11-15 PROCEDURE — 43242 EGD US FINE NEEDLE BX/ASPIR: CPT | Mod: ,,, | Performed by: INTERNAL MEDICINE

## 2019-11-15 PROCEDURE — 37000009 HC ANESTHESIA EA ADD 15 MINS: Performed by: INTERNAL MEDICINE

## 2019-11-15 PROCEDURE — 88173 CYTOPATH EVAL FNA REPORT: CPT | Mod: 26,,, | Performed by: PATHOLOGY

## 2019-11-15 PROCEDURE — 88342 IMHCHEM/IMCYTCHM 1ST ANTB: CPT | Mod: 26,59,, | Performed by: PATHOLOGY

## 2019-11-15 PROCEDURE — 88305 TISSUE EXAM BY PATHOLOGIST: CPT | Performed by: PATHOLOGY

## 2019-11-15 PROCEDURE — 00731 ANES UPR GI NDSC PX NOS: CPT | Performed by: INTERNAL MEDICINE

## 2019-11-15 PROCEDURE — D9220A PRA ANESTHESIA: ICD-10-PCS | Mod: CRNA,,, | Performed by: NURSE ANESTHETIST, CERTIFIED REGISTERED

## 2019-11-15 PROCEDURE — 88185 FLOWCYTOMETRY/TC ADD-ON: CPT | Mod: 59 | Performed by: PATHOLOGY

## 2019-11-15 PROCEDURE — 88184 FLOWCYTOMETRY/ TC 1 MARKER: CPT | Performed by: PATHOLOGY

## 2019-11-15 PROCEDURE — 63600175 PHARM REV CODE 636 W HCPCS: Performed by: INTERNAL MEDICINE

## 2019-11-15 PROCEDURE — 88341 PR IHC OR ICC EACH ADD'L SINGLE ANTIBODY  STAINPR: ICD-10-PCS | Mod: 26,59,, | Performed by: PATHOLOGY

## 2019-11-15 PROCEDURE — 88177 CYTP FNA EVAL EA ADDL: CPT | Performed by: PATHOLOGY

## 2019-11-15 PROCEDURE — 88189 FLOWCYTOMETRY/READ 16 & >: CPT | Mod: ,,, | Performed by: PATHOLOGY

## 2019-11-15 PROCEDURE — 88172 CYTP DX EVAL FNA 1ST EA SITE: CPT | Performed by: PATHOLOGY

## 2019-11-15 PROCEDURE — 88172 CYTP DX EVAL FNA 1ST EA SITE: CPT | Mod: 26,,, | Performed by: PATHOLOGY

## 2019-11-15 PROCEDURE — 63600175 PHARM REV CODE 636 W HCPCS: Performed by: NURSE ANESTHETIST, CERTIFIED REGISTERED

## 2019-11-15 PROCEDURE — 88341 IMHCHEM/IMCYTCHM EA ADD ANTB: CPT | Mod: 26,59,, | Performed by: PATHOLOGY

## 2019-11-15 PROCEDURE — 88177 CYTP FNA EVAL EA ADDL: CPT | Mod: 26,,, | Performed by: PATHOLOGY

## 2019-11-15 PROCEDURE — 37000008 HC ANESTHESIA 1ST 15 MINUTES: Performed by: INTERNAL MEDICINE

## 2019-11-15 PROCEDURE — 43242 PR UPGI ENDOSCOPY,FN NEEDLE BX,GUIDED: ICD-10-PCS | Mod: ,,, | Performed by: INTERNAL MEDICINE

## 2019-11-15 PROCEDURE — 88365 INSITU HYBRIDIZATION (FISH): CPT | Mod: 26,,, | Performed by: PATHOLOGY

## 2019-11-15 PROCEDURE — 88365 INSITU HYBRIDIZATION (FISH): CPT | Performed by: PATHOLOGY

## 2019-11-15 PROCEDURE — 27202131 HC NEEDLE, FNB SINGLE (ANY): Performed by: INTERNAL MEDICINE

## 2019-11-15 RX ORDER — PROPOFOL 10 MG/ML
VIAL (ML) INTRAVENOUS
Status: DISCONTINUED | OUTPATIENT
Start: 2019-11-15 | End: 2019-11-15

## 2019-11-15 RX ORDER — FENTANYL CITRATE 50 UG/ML
25 INJECTION, SOLUTION INTRAMUSCULAR; INTRAVENOUS EVERY 5 MIN PRN
Status: DISCONTINUED | OUTPATIENT
Start: 2019-11-15 | End: 2019-11-15 | Stop reason: HOSPADM

## 2019-11-15 RX ORDER — LIDOCAINE HCL/PF 100 MG/5ML
SYRINGE (ML) INTRAVENOUS
Status: DISCONTINUED | OUTPATIENT
Start: 2019-11-15 | End: 2019-11-15

## 2019-11-15 RX ORDER — HYDROMORPHONE HYDROCHLORIDE 1 MG/ML
0.2 INJECTION, SOLUTION INTRAMUSCULAR; INTRAVENOUS; SUBCUTANEOUS EVERY 5 MIN PRN
Status: DISCONTINUED | OUTPATIENT
Start: 2019-11-15 | End: 2019-11-15 | Stop reason: HOSPADM

## 2019-11-15 RX ORDER — SODIUM CHLORIDE 0.9 % (FLUSH) 0.9 %
10 SYRINGE (ML) INJECTION
Status: DISCONTINUED | OUTPATIENT
Start: 2019-11-15 | End: 2019-11-15 | Stop reason: HOSPADM

## 2019-11-15 RX ORDER — PROPOFOL 10 MG/ML
VIAL (ML) INTRAVENOUS CONTINUOUS PRN
Status: DISCONTINUED | OUTPATIENT
Start: 2019-11-15 | End: 2019-11-15

## 2019-11-15 RX ORDER — SODIUM CHLORIDE 9 MG/ML
INJECTION, SOLUTION INTRAVENOUS CONTINUOUS
Status: DISCONTINUED | OUTPATIENT
Start: 2019-11-15 | End: 2019-11-15 | Stop reason: HOSPADM

## 2019-11-15 RX ORDER — DIPHENHYDRAMINE HYDROCHLORIDE 50 MG/ML
25 INJECTION INTRAMUSCULAR; INTRAVENOUS EVERY 6 HOURS PRN
Status: DISCONTINUED | OUTPATIENT
Start: 2019-11-15 | End: 2019-11-15 | Stop reason: HOSPADM

## 2019-11-15 RX ADMIN — PROPOFOL 100 MG: 10 INJECTION, EMULSION INTRAVENOUS at 11:11

## 2019-11-15 RX ADMIN — PROPOFOL 100 MG: 10 INJECTION, EMULSION INTRAVENOUS at 10:11

## 2019-11-15 RX ADMIN — LIDOCAINE HYDROCHLORIDE 50 MG: 20 INJECTION, SOLUTION INTRAVENOUS at 10:11

## 2019-11-15 RX ADMIN — SODIUM CHLORIDE: 0.9 INJECTION, SOLUTION INTRAVENOUS at 10:11

## 2019-11-15 RX ADMIN — PROPOFOL 50 MG: 10 INJECTION, EMULSION INTRAVENOUS at 10:11

## 2019-11-15 RX ADMIN — PROPOFOL 150 MCG/KG/MIN: 10 INJECTION, EMULSION INTRAVENOUS at 10:11

## 2019-11-15 NOTE — TRANSFER OF CARE
"Anesthesia Transfer of Care Note    Patient: Gabi Newton    Procedure(s) Performed: Procedure(s) (LRB):  ULTRASOUND, UPPER GI TRACT, ENDOSCOPIC (Left)  EGD (ESOPHAGOGASTRODUODENOSCOPY) (N/A)    Patient location: [facility]    Anesthesia Type: general    Transport from OR: Transported from OR on room air with adequate spontaneous ventilation    Post pain: adequate analgesia    Post assessment: no apparent anesthetic complications and tolerated procedure well    Post vital signs: stable    Level of consciousness: awake, alert and oriented    Nausea/Vomiting: no nausea/vomiting    Complications: none          Last vitals:   Visit Vitals  BP (!) 140/72 (Patient Position: Lying)   Pulse 78   Temp 36.7 °C (98.1 °F) (Temporal)   Resp 14   Ht 5' 1" (1.549 m)   Wt 77.1 kg (170 lb)   LMP 02/11/2015 (Approximate)   SpO2 97%   Breastfeeding? No   BMI 32.12 kg/m²     "

## 2019-11-15 NOTE — [employer] PATIENT INSTRUCTIONS
Discharge Summary/Instructions after an Endoscopic Procedure  Patient Name: Gabi Newton  Patient MRN: [id number]  Patient YOB: 1965  Friday, November 15, 2019  Mike Seay MD  RESTRICTIONS:  During your procedure today, you received medications for sedation.  These   medications may affect your judgment, balance and coordination.  Therefore,   for 24 hours, you have the following restrictions:   - DO NOT drive a car, operate machinery, make legal/financial decisions,   sign important papers or drink alcohol.    ACTIVITY:  Today: no heavy lifting, straining or running due to procedural   sedation/anesthesia.  The following day: return to full activity including work.  DIET:  Eat and drink normally unless instructed otherwise.     TREATMENT FOR COMMON SIDE EFFECTS:  - Mild abdominal pain, nausea, belching, bloating or excessive gas:  rest,   eat lightly and use a heating pad.  - Sore Throat: treat with throat lozenges and/or gargle with warm salt   water.  - Because air was used during the procedure, expelling large amounts of air   from your rectum or belching is normal.  - If a bowel prep was taken, you may not have a bowel movement for 1-3 days.    This is normal.  SYMPTOMS TO WATCH FOR AND REPORT TO YOUR PHYSICIAN:  1. Abdominal pain or bloating, other than gas cramps.  2. Chest pain.  3. Back pain.  4. Signs of infection such as: chills or fever occurring within 24 hours   after the procedure.  5. Rectal bleeding, which would show as bright red, maroon, or black stools.   (A tablespoon of blood from the rectum is not serious, especially if   hemorrhoids are present.)  6. Vomiting.  7. Weakness or dizziness.  GO DIRECTLY TO THE NEAREST EMERGENCY ROOM IF YOU HAVE ANY OF THE FOLLOWING:      Difficulty breathing              Chills and/or fever over 101 F   Persistent vomiting and/or vomiting blood   Severe abdominal pain   Severe chest pain   Black, tarry stools   Bleeding- more than one  tablespoon   Any other symptom or condition that you feel may need urgent attention  Your doctor recommends these additional instructions:  If any biopsies were taken, your doctors clinic will contact you in 1 to 2   weeks with any results.  - Discharge patient to home.   - Resume previous diet.   - Await cytology results.   - Return to primary care physician at appointment to be scheduled.  For questions, problems or results please call your physician - Mike Seay MD at Work:  [phone].  [facility], EMERGENCY ROOM PHONE NUMBER: [phone]  IF A COMPLICATION OR EMERGENCY SITUATION ARISES AND YOU ARE UNABLE TO REACH   YOUR PHYSICIAN - GO DIRECTLY TO THE EMERGENCY ROOM.  Mike Seay MD  11/15/2019 11:35:58 AM  This report has been verified and signed electronically.  [employer]

## 2019-11-15 NOTE — DISCHARGE INSTRUCTIONS
Upper GI Endoscopy     During endoscopy, a long, flexible tube is used to view the inside of your upper GI tract.      Upper GI endoscopy allows your healthcare provider to look directly into the beginning of your gastrointestinal (GI) tract. The esophagus, stomach, and duodenum (the first part of the small intestine) make up the upper GI tract.   Before the exam  Follow these and any other instructions you are given before your endoscopy. If you dont follow the healthcare providers instructions carefully, the test may need to be canceled or done over:  · Don't eat or drink anything after midnight the night before your exam. If your exam is in the afternoon, drink only clear liquids in the morning. Don't eat or drink anything for 8 hours before the exam. In some cases, you may be able to take medicines with sips of water until 2 hours before the procedure. Speak with your healthcare provider about this.   · Bring your X-rays and any other test results you have.  · Because you will be sedated, arrange for an adult to drive you home after the exam.  · Tell your healthcare provider before the exam if you are taking any medicines or have any medical problems.  The procedure  Here is what to expect:  · You will lie on the endoscopy table. Usually patients lie on the left side.  · You will be monitored and given oxygen.  · Your throat may be numbed with a spray or gargle. You are given medicine through an intravenous (IV) line that will help you relax and remain comfortable. You may be awake or asleep during the procedure.  · The healthcare provider will put the endoscope in your mouth and down your esophagus. It is thinner than most pieces of food that you swallow. It will not affect your breathing. The medicine helps keep you from gagging.  · Air is put into your GI tract to expand it. It can make you burp.  · During the procedure, the healthcare provider can take biopsies (tissue samples), remove abnormalities,  such as polyps, or treat abnormalities through a variety of devices placed through the endoscope. You will not feel this.   · The endoscope carries images of your upper GI tract to a video screen. If you are awake, you may be able to look at the images.  · After the procedure is done, you will rest for a time. An adult must drive you home.  When to call your healthcare provider  Contact your healthcare provider if you have:  · Black or tarry stools, or blood in your stool  · Fever  · Pain in your belly that does not go away  · Nausea and vomiting, or vomiting blood   Date Last Reviewed: 7/1/2016 © 2000-2017 [employer]. [address] [place] [address], [place] [address]. All rights reserved. This information is not intended as a substitute for professional medical care. Always follow your healthcare professional's instructions.        Endoscopic Ultrasound (EUS)    An endoscopic ultrasound (EUS) is a test to look at the inside of your gastrointestinal (GI) tract. It's commonly used to look for cancers or growths in the esophagus, stomach, pancreas, liver, and rectum. It can help to stage cancer (see how advanced a cancer is). EUS may also be used to help diagnose certain diseases or to drain cysts or abscesses.  What is EUS?  EUS shows both ultrasound images and live video of the GI tract. During the test, a flexible tube called an endoscope (scope) is used. At the end of the scope is a tiny video camera and light. The video camera sends live images to a monitor. The scope also contains a very small ultrasound device. This uses sound waves to create images and send them to a monitor.  A needle is passed through the scope. The needle can be used take a small sample of tissue for testing. This is called a biopsy. The needle can be used to take a sample of fluid. This is called fine-needle aspiration (FNA).  Risks and possible complications of EUS  Risks and possible complications include the  following:  · Bleeding  · Infection  · A perforation (hole) in the digestive tract   · Risks of sedation or anesthesia   Before the test  Be prepared prior to the test:  · Tell your healthcare provider what medicine you take. This includes vitamins, herbs, and over-the-counter medicine. It also includes any blood thinners, such as warfarin, clopidogrel, ibuprofen, or daily aspirin. Ask your healthcare provider if you need to stop taking some or all of them before the test.  · You may be prescribed antibiotics to take before or after the test. This depends on the area being studied and what is done during the test. These medicines help prevent infection.  · Carefully follow the instructions for preparing for the test to make sure results are accurate. Instructions may include:  ¨ If youre having an EUS of the upper GI tract (esophagus, stomach, duodenum, pancreas, liver):  § Do not eat or drink for 6 hours before the test.  ¨ If youre having an EUS of the lower GI tract (rectum):  § Before the test, do bowel prep as instructed to clean your rectum of stool. This may involve a clear liquid diet and using a laxative (liquid or pills) the night before the test. Or it may mean doing one or more enemas the morning of the test.  § Do not eat or drink for 6 hours before the test.  · Be sure to arrive on time at the facility. Bring your identification and health insurance card. Leave valuables at home. If you have them, bring X-rays or other test results with you.  Let the healthcare provider know  For your safety, tell the healthcare provider if you:  · Take insulin. Your dose may need to be changed on the day of your test.  · Are allergic to latex.  · Have any other allergies.  · Are taking blood thinners.   During the test  An endoscopic ultrasound usually takes place in a hospital. The procedure itself may take 1 to 2 hours. You will likely go home soon afterward. During the test:  · You lie on your left side on an  exam table.  · An intravenous (IV) line will be put into a vein in your arm or hand. This line supplies fluids and medicines. To keep you comfortable during the test, you will be given a sedative medicine. This medicine prevents discomfort and will make you sleepy.  · If you are having an EUS of the upper GI tract, local anesthetic may be sprayed in your throat. This will help you be more comfortable as the healthcare provider inserts the scope. The healthcare provider then gently puts the flexible scope into your mouth or nose and down your throat.  · If youre having an EUS of the lower GI tract, the healthcare provider gently puts the flexible scope into your anus.  · During the test, the scope sends live video and ultrasound images from inside your body to nearby monitors. These are used to examine your GI tract. Specialized procedures, such as drainage, are done as needed.  · The healthcare provider may discuss the results with you soon after the test. Biopsy results take several  days.  · In most cases, you can go home within a few hours of the test. When you leave the facility, have an adult family member or friend drive you, even if you don't feel that sleepy.  After the test  Here is what to expect after the test:  · You may feel tired from the sedative. This should wear off by the end of the day.  · If you had an upper digestive endoscopy, your throat may feel sore for a day or two. Over-the-counter sore throat lozenges and spray should help.  · You can eat and drink normally as soon as the test is done.  When to call the healthcare provider  Call your healthcare provider if you notice any of the following:  · Fever of 100.4°F (38.0°C) or higher, or as advised by your healthcare provider  · Shortness of breath  · Vomiting blood, blood in stool, or black stools  · Coughing or hoarse voice that wont go away   Date Last Reviewed: 7/1/2016  © 0009-5691 [employer]. [address] [place] [address],  [place] [address]. All rights reserved. This information is not intended as a substitute for professional medical care. Always follow your healthcare professional's instructions.

## 2019-11-15 NOTE — H&P
Short Stay Endoscopy History and Physical    PCP - Yanet Anderson MD  Referring Physician - Mike Seay MD  [address] [facility]  [address]  [place] [address]    Procedure - eus  ASA - per anesthesia  Mallampati - per anesthesia  History of Anesthesia problems - no  Family history Anesthesia problems -  no   Plan of anesthesia - General    HPI:  This is a 54 y.o. female here for evaluation of: gist    Reflux - no  Dysphagia - no  Abdominal pain - no  Diarrhea - no    ROS:  Constitutional: No fevers, chills, No weight loss  CV: No chest pain  Pulm: No cough, No shortness of breath  Ophtho: No vision changes  GI: see HPI  Derm: No rash    Medical History:  has a past medical history of Abdominal pain (), Back pain, Fatty liver (2018), GERD (gastroesophageal reflux disease), Hyperlipidemia, Hypertension, and Splenomegaly (2018).    Surgical History:  has a past surgical history that includes Hysterectomy; Cholecystectomy;  section; Esophagogastroduodenoscopy; Colonoscopy; Tubal ligation; Esophagogastroduodenoscopy (N/A, 2018); Cardiac catheterization; Cardiac catheterization (2018); Rhinoplasty; venogram (Bilateral, 10/16/2018); Phlebography (Bilateral, 10/16/2018); *pr rt/lt heart catheters (Left); and Esophagogastroduodenoscopy (N/A, 10/15/2019).    Family History: family history includes Aneurysm in her father; Breast cancer in her maternal grandfather; Dementia in her mother..    Social History:  reports that she has been smoking cigarettes. She has been smoking about 1.00 pack per day. She has never used smokeless tobacco. She reports that she does not drink alcohol or use drugs.    Review of patient's allergies indicates:   Allergen Reactions    Azithromycin Anaphylaxis     Other reaction(s): swelling to entire body  Swelling (tongue / lips)^      Ciprofloxacin Swelling     Throat swells    Codeine      Swelling (throat)^    Tolerates Morphine         Medications:    Medications Prior to Admission   Medication Sig Dispense Refill Last Dose    methadone (METHADOSE) 40 mg disintegrating tablet methadone 40 mg soluble tablet   Take 1 tablet every day by oral route.   11/15/2019 at Unknown time    simvastatin (ZOCOR) 40 MG tablet Take 40 mg by mouth every evening.    11/14/2019 at Unknown time    clotrimazole-betamethasone 1-0.05% (LOTRISONE) cream clotrimazole-betamethasone 1 %-0.05 % topical cream   Not Taking    lisinopril 10 MG tablet lisinopril 10 mg tablet   Take 1 tablet every day by oral route.   More than a month at Unknown time    nicotine (NICODERM CQ) 21 mg/24 hr Place 1 patch onto the skin once daily. (Patient not taking: Reported on 10/18/2019) 14 patch 0 Not Taking    nitroGLYCERIN (NITROSTAT) 0.4 MG SL tablet Place 0.4 mg under the tongue every 5 (five) minutes as needed for Chest pain.   Taking    rivaroxaban (XARELTO) 20 mg Tab Take 20 mg by mouth daily with dinner or evening meal.   11/12/2019    triamcinolone acetonide 0.1% (KENALOG) 0.1 % ointment triamcinolone acetonide 0.1 % topical ointment   APPLY A THIN LAYER TO THE AFFECTED AREA(S) BY TOPICAL ROUTE 2 TIMES PER DAY   Not Taking    varenicline (CHANTIX STARTING MONTH BOX) 0.5 mg (11)- 1 mg (42) tablet Take one 0.5mg tab by mouth once daily X3 days,then increase to one 0.5mg tab twice daily X4 days,then increase to one 1mg tab twice daily (Patient not taking: Reported on 10/18/2019) 1 Package 0 Not Taking       Physical Exam:    Vital Signs:   Vitals:    11/15/19 0957   BP: (!) 140/72   Pulse: 78   Resp: 14   Temp: 98.1 °F (36.7 °C)       General Appearance: Well appearing in no acute distress    Labs:  Lab Results   Component Value Date    WBC 4.40 07/30/2019    HGB 12.7 07/30/2019    HCT 38.3 07/30/2019     07/30/2019    ALT 20 09/23/2019    AST 19 09/23/2019     09/23/2019    K 3.8 09/23/2019     (L) 09/23/2019    CREATININE 0.8 09/23/2019    BUN 9 09/23/2019    CO2 32 (H)  09/23/2019    INR 1.0 07/16/2019       I have explained the risks and benefits of this endoscopic procedure to the patient including but not limited to bleeding, inflammation, infection, perforation, and death.      Mike Seay MD

## 2019-11-15 NOTE — OP NOTE
Specimen to path was cancelled. No biopsy taken for Specimen to path. Other bx's taken for cytology and on site path.

## 2019-11-15 NOTE — ANESTHESIA POSTPROCEDURE EVALUATION
Anesthesia Post Evaluation    Patient: Gabi Newton    Procedure(s) Performed: Procedure(s) (LRB):  ULTRASOUND, UPPER GI TRACT, ENDOSCOPIC (Left)  EGD (ESOPHAGOGASTRODUODENOSCOPY) (N/A)    Final Anesthesia Type: general    Patient location during evaluation: PACU  Patient participation: Yes- Able to Participate  Level of consciousness: awake and alert  Post-procedure vital signs: reviewed and stable  Pain management: adequate  Airway patency: patent    PONV status at discharge: No PONV  Anesthetic complications: no      Cardiovascular status: hemodynamically stable  Respiratory status: unassisted  Hydration status: euvolemic  Follow-up not needed.          Vitals Value Taken Time   /94 11/15/2019 12:14 PM   Temp 36.3 °C (97.3 °F) 11/15/2019 12:14 PM   Pulse 73 11/15/2019 12:14 PM   Resp 18 11/15/2019 12:14 PM   SpO2 98 % 11/15/2019 12:14 PM         No case tracking events are documented in the log.      Pain/Mao Score: Mao Score: 10 (11/15/2019 11:43 AM)

## 2019-11-15 NOTE — ANESTHESIA PREPROCEDURE EVALUATION
11/15/2019  Gabi Newton is a 54 y.o., female.  Patient Active Problem List   Diagnosis    Abdominal pain    Fatty liver    Hypertension    Hyperlipidemia    Constipation    Splenomegaly    Venous insufficiency (chronic) (peripheral)    Peripheral edema    Lung nodule    Smoker    Shortness of breath    GERD (gastroesophageal reflux disease)    GIST (gastrointestinal stromal tumor), non-malignant         Anesthesia Evaluation         Review of Systems      Physical Exam  General:  Well nourished    Airway/Jaw/Neck:  Airway Findings: Mouth Opening: Normal Tongue: Normal  General Airway Assessment: Adult  Mallampati: II  Improves to II with phonation.  TM Distance: Normal, at least 6 cm      Dental:  Poor dentition with several very loose, broken teeth.    Chest/Lungs:  Chest/Lungs Findings: Expiratory Wheezes, Mild     Heart/Vascular:  Heart Findings: Rate: Normal  Rhythm: Regular Rhythm  Sounds: Normal        Mental Status:  Mental Status Findings:  Cooperative, Alert and Oriented         Anesthesia Plan  Type of Anesthesia, risks & benefits discussed:  Anesthesia Type:  general  Patient's Preference: General  Intra-op Monitoring Plan: standard ASA monitors  Intra-op Monitoring Plan Comments: Standard ASA monitors.   Post Op Pain Control Plan: per primary service following discharge from PACU  Post Op Pain Control Plan Comments: Per primary service.     Induction:   IV  Beta Blocker:  Patient is not currently on a Beta-Blocker (No further documentation required).       Informed Consent: Patient understands risks and agrees with Anesthesia plan.  Questions answered. Anesthesia consent signed with patient.  ASA Score: 3     Day of Surgery Review of History & Physical:    H&P update referred to the surgeon.     Anesthesia Plan Notes: Chart reviewed, patient interviewed and examined.  The plan for  general anesthesia was explained.  Questions were answered and the consent was signed.  Len JIANG         Ready For Surgery From Anesthesia Perspective.

## 2019-11-18 LAB
FLOW CYTOMETRY ANTIBODIES ANALYZED - TISSUE: NORMAL
FLOW CYTOMETRY COMMENT - TISSUE: NORMAL
FLOW CYTOMETRY INTERPRETATION - TISSUE: NORMAL
SPECIMEN TYPE - TISSUE: NORMAL

## 2019-11-20 ENCOUNTER — TELEPHONE (OUTPATIENT)
Dept: GASTROENTEROLOGY | Facility: HOSPITAL | Age: 54
End: 2019-11-20

## 2019-11-20 ENCOUNTER — TELEPHONE (OUTPATIENT)
Dept: TRANSPLANT | Facility: CLINIC | Age: 54
End: 2019-11-20

## 2019-11-20 LAB
CD4 COMMENT: ABNORMAL
FINAL PATHOLOGIC DIAGNOSIS: ABNORMAL
Lab: ABNORMAL

## 2019-11-20 NOTE — TELEPHONE ENCOUNTER
We discussed the FNA findings of lymphoma.  And discussed the need to follow up with heme onc for initiation of therapy

## 2019-11-25 ENCOUNTER — CLINICAL SUPPORT (OUTPATIENT)
Dept: SMOKING CESSATION | Facility: CLINIC | Age: 54
End: 2019-11-25
Payer: COMMERCIAL

## 2019-11-25 ENCOUNTER — TELEPHONE (OUTPATIENT)
Dept: SMOKING CESSATION | Facility: CLINIC | Age: 54
End: 2019-11-25

## 2019-11-25 DIAGNOSIS — F17.200 NICOTINE DEPENDENCE: Primary | ICD-10-CM

## 2019-11-25 DIAGNOSIS — F17.200 NICOTINE DEPENDENCE: ICD-10-CM

## 2019-11-25 PROCEDURE — 99407 PR TOBACCO USE CESSATION INTENSIVE >10 MINUTES: ICD-10-PCS | Mod: S$GLB,,, | Performed by: FAMILY MEDICINE

## 2019-11-25 PROCEDURE — 99407 BEHAV CHNG SMOKING > 10 MIN: CPT | Mod: S$GLB,,, | Performed by: FAMILY MEDICINE

## 2019-11-25 RX ORDER — IBUPROFEN 200 MG
1 TABLET ORAL DAILY
Qty: 28 PATCH | Refills: 0 | Status: SHIPPED | OUTPATIENT
Start: 2019-11-25 | End: 2019-12-20 | Stop reason: SDUPTHER

## 2019-11-25 RX ORDER — VARENICLINE TARTRATE 1 MG/1
1 TABLET, FILM COATED ORAL 2 TIMES DAILY
Qty: 60 TABLET | Refills: 0 | Status: SHIPPED | OUTPATIENT
Start: 2019-11-25 | End: 2019-12-20 | Stop reason: SDUPTHER

## 2019-12-02 NOTE — PROGRESS NOTES
PATIENT: Gabi Newton  MRN: [id number]  DATE: 12/5/2019      Diagnosis:   1. Marginal zone B-cell lymphoma    2. Right lower lobe lung mass    3. Epigastric pain    4. Deep vein thrombosis (DVT) of right lower extremity, unspecified chronicity, unspecified vein    5. Back pain, unspecified back location, unspecified back pain laterality, unspecified chronicity    6. Heavy cigarette smoker        Chief Complaint: No chief complaint on file.    Ms. Newton is a 54-year-old female with a past medical history of abdominal pain for the last 3-4 years with unknown etiology, significant smoking history for 40 years, back pain, hypertension presented to the Hematology [facility] for newly diagnosed marginal zone B-cell lymphoma    Patient complained of chronic epigastric/periumbilical abdominal pain for the last 3 years and has been receiving multiple abdominal imaging at [facility].  In 2016 her abdominal pain was thought to be from her gallbladder and she had underwent a cholecystectomy however she continued to have abdominal pain. A CT abdomen done in June 11, 2018 revealed wall thickening of the anterior gastric body suspicious for infectious or inflammatory but is concerning for soft tissue mass and a EGD was a recommended. MRI on June 19, 2018 which revealed diffuse hepatic steatosis without evidence of suspicious focal lesions, splenomegaly but no ascites.  She has been following  Dr. Cedeno since 2018 for her abdominal pain, however liver was not suspected to be the cause for her abdominal pain. She had negative workup for hepatitis-B, C, negative workup for autoimmune disease, negative for alpha 1 antitrypsin disease, negative for celiac sprue and her hepatitis panel has been negative so far.  MRI of the abdomen was done on September 24, 2019 which revealed multifocal areas of asymmetric gastric wall thickening.  Recommend further evaluation with direct visualization to exclude neoplasm.  EGD was  done on 10/15/2019 and Gastric tumor in the gastric fundus was found however no specimen was collected as she was on anticoagulation.  The EUS done on 11/15/2019 revealed gastric tumor in the gastric fundus with many abnormal lymph nodes visualized in the lower paraesophageal mediastinum (level 8L) and gastrohepatic ligament (level 18). Fine needle biopsy performed and pathology revealed marginal zone B cell lymphoma.    She had chest pain in 2016 and the imaging done at that time revealed pulmonary nodule and has been following pulmonary, Dr. Yeung at [facility].  She has a currently daily smoker, has been smoking for almost 40 years.  On the CT chest done in November 2017 revealed 1.7 x 1.3 cm lesion in the right lower lobe and 2 x 1 cm lesion in the anterior portion of the right lower lobe.  A CT scan done on September 25, 2019 revealed a complex poorly defined mass in the right lower lobe measuring 2.78 x 2.64 cm, suspicious for primary lung carcinoma    She was diagnosed with right lower extremity DVT 6 months ago and has been on Xarelto since then.  As per the patient she was hospitalized at that time and underwent procedure for her right lower extremity for venous insufficiency.  It appears that the right lower extremity DVT is a provoked clot.  She had history of bilateral iliac vein stenting 10/16/18 for venous outflow obsturction.    Today patient complained of epigastric abdominal pain about 3/10 severity, accompanied by nausea.  She complained of vomiting last week which has been intermittent.  She has chronic back pain and has been taking methadone.  She denied of any chest pain, shortness of breath, fever, chills or weight loss. She complained of chronic lower extremity edema and has been on diuretics    Subjective:    Initial History: Ms. Newton is a 54 y.o. female who returns for follow up.     Past Medical History:   Past Medical History:   Diagnosis Date    Abdominal pain 2018    Back pain     Fatty  liver 2018    Gastric tumor     GERD (gastroesophageal reflux disease)     Hyperlipidemia     Hypertension     Splenomegaly 2018       Past Surgical HIstory:   Past Surgical History:   Procedure Laterality Date    CARDIAC CATHETERIZATION      CARDIAC CATHETERIZATION  2018    had chest pains . states vessels at the bottom of heart collapsed     SECTION      x3    CHOLECYSTECTOMY      COLONOSCOPY      ENDOSCOPIC ULTRASOUND OF UPPER GASTROINTESTINAL TRACT Left 11/15/2019    Procedure: ULTRASOUND, UPPER GI TRACT, ENDOSCOPIC;  Surgeon: Mike Seay MD;  Location: [facility] (2ND FLR);  Service: Endoscopy;  Laterality: Left;  Ok to hold xarelto per protocol per Dr. Lloyd see media file 10/25/19-tb    ESOPHAGOGASTRODUODENOSCOPY      ESOPHAGOGASTRODUODENOSCOPY N/A 2018    Procedure: EGD (ESOPHAGOGASTRODUODENOSCOPY);  Surgeon: Ant Camejo MD;  Location: [facility];  Service: Endoscopy;  Laterality: N/A;    ESOPHAGOGASTRODUODENOSCOPY N/A 10/15/2019    Procedure: EGD (ESOPHAGOGASTRODUODENOSCOPY);  Surgeon: Melanie Cedeno MD;  Location: [facility];  Service: Endoscopy;  Laterality: N/A;    ESOPHAGOGASTRODUODENOSCOPY N/A 11/15/2019    Procedure: EGD (ESOPHAGOGASTRODUODENOSCOPY);  Surgeon: Mike Seay MD;  Location: [facility] ([address] [place] [address]);  Service: Endoscopy;  Laterality: N/A;    HYSTERECTOMY      PHLEBOGRAPHY Bilateral 10/16/2018    Procedure: VENOGRAM;  Surgeon: Colin Mathis MD;  Location: [facility] CATH LAB;  Service: Cardiology;  Laterality: Bilateral;    GA RT/LT HEART CATHETERS Left     Coronary Arteriogram-2 Cath    RHINOPLASTY      TUBAL LIGATION      venogram Bilateral 10/16/2018       Family History:   Family History   Problem Relation Age of Onset    Breast cancer Maternal Grandfather     Dementia Mother     Aneurysm Father        Social History:  reports that she has been smoking cigarettes. She has been smoking about 1.00 pack per day. She has never used smokeless  tobacco. She reports that she does not drink alcohol or use drugs.    Allergies:  Review of patient's allergies indicates:   Allergen Reactions    Azithromycin Anaphylaxis     Other reaction(s): swelling to entire body  Swelling (tongue / lips)^      Ciprofloxacin Swelling     Throat swells    Codeine      Swelling (throat)^    Tolerates Morphine         Medications:  Current Outpatient Medications   Medication Sig Dispense Refill    clotrimazole-betamethasone 1-0.05% (LOTRISONE) cream clotrimazole-betamethasone 1 %-0.05 % topical cream      lisinopril 10 MG tablet lisinopril 10 mg tablet   Take 1 tablet every day by oral route.      methadone (METHADOSE) 40 mg disintegrating tablet methadone 40 mg soluble tablet   Take 1 tablet every day by oral route.      nicotine (NICODERM CQ) 21 mg/24 hr Place 1 patch onto the skin once daily. 28 patch 0    nitroGLYCERIN (NITROSTAT) 0.4 MG SL tablet Place 0.4 mg under the tongue every 5 (five) minutes as needed for Chest pain.      ondansetron (ZOFRAN) 8 MG tablet Take 1 tablet (8 mg total) by mouth every 8 (eight) hours as needed for Nausea. 30 tablet 11    rivaroxaban (XARELTO) 20 mg Tab Take 20 mg by mouth daily with dinner or evening meal.      simvastatin (ZOCOR) 40 MG tablet Take 40 mg by mouth every evening.       varenicline (CHANTIX) 1 mg Tab Take 1 tablet (1 mg total) by mouth 2 (two) times daily. 60 tablet 0    allopurinol (ZYLOPRIM) 300 MG tablet Take 1 tablet (300 mg total) by mouth once daily. 30 tablet 5    LORazepam (ATIVAN) 0.5 MG tablet Take 1 tablet (0.5 mg total) by mouth every 4 (four) hours as needed for Anxiety (For PET/CT). 5 tablet 0    triamcinolone acetonide 0.1% (KENALOG) 0.1 % ointment triamcinolone acetonide 0.1 % topical ointment   APPLY A THIN LAYER TO THE AFFECTED AREA(S) BY TOPICAL ROUTE 2 TIMES PER DAY       No current facility-administered medications for this visit.        Review of Systems   Constitutional: Negative for  "appetite change, chills, diaphoresis, fever and unexpected weight change.   HENT: Negative for mouth sores, nosebleeds and sore throat.    Respiratory: Negative for cough, chest tightness and shortness of breath.    Cardiovascular: Negative for chest pain.   Gastrointestinal: Positive for abdominal pain and nausea. Negative for vomiting.   Genitourinary: Negative for dysuria, flank pain, frequency and hematuria.   Musculoskeletal: Positive for back pain. Negative for myalgias.   Skin: Negative for rash.   Neurological: Negative for seizures, syncope and headaches.   Hematological: Negative for adenopathy. Does not bruise/bleed easily.   Psychiatric/Behavioral: Negative for agitation and behavioral problems.       ECOG Performance Status: 1   Objective:      Vitals:   Vitals:    12/03/19 1415   BP: (!) 149/94   Pulse: 87   Resp: 16   Temp: 97.9 °F (36.6 °C)   TempSrc: Oral   SpO2: 96%   Weight: 86.9 kg (191 lb 9.3 oz)   Height: 5' 1" (1.549 m)     BMI: Body mass index is 36.2 kg/m².    Physical Exam   Constitutional: She is oriented to person, place, and time. She appears well-developed and well-nourished.   HENT:   Head: Normocephalic and atraumatic.   Eyes: Pupils are equal, round, and reactive to light. Conjunctivae and EOM are normal.   Neck: Normal range of motion. Neck supple. No JVD present.   Cardiovascular: Normal rate, regular rhythm and normal heart sounds.   Pulmonary/Chest: Effort normal and breath sounds normal. No respiratory distress.   Abdominal: Soft. Bowel sounds are normal. She exhibits no distension. There is no tenderness.   Musculoskeletal: Normal range of motion. She exhibits edema. She exhibits no tenderness or deformity.   Lymphadenopathy:     She has no cervical adenopathy.   Neurological: She is alert and oriented to person, place, and time. No cranial nerve deficit or sensory deficit.   Skin: Skin is warm and dry. Capillary refill takes 2 to 3 seconds. No pallor.   Psychiatric: She has a " normal mood and affect. Her behavior is normal.       Laboratory Data:  Lab Visit on 12/03/2019   Component Date Value Ref Range Status    WBC 12/03/2019 4.87  3.90 - 12.70 K/uL Final    RBC 12/03/2019 4.76  4.00 - 5.40 M/uL Final    Hemoglobin 12/03/2019 13.1  12.0 - 16.0 g/dL Final    Hematocrit 12/03/2019 43.5  37.0 - 48.5 % Final    Mean Corpuscular Volume 12/03/2019 91  82 - 98 fL Final    Mean Corpuscular Hemoglobin 12/03/2019 27.5  27.0 - 31.0 pg Final    Mean Corpuscular Hemoglobin Conc 12/03/2019 30.1* 32.0 - 36.0 g/dL Final    RDW 12/03/2019 13.2  11.5 - 14.5 % Final    Platelets 12/03/2019 192  150 - 350 K/uL Final    MPV 12/03/2019 10.8  9.2 - 12.9 fL Final    Immature Granulocytes 12/03/2019 0.4  0.0 - 0.5 % Final    Gran # (ANC) 12/03/2019 4.0  1.8 - 7.7 K/uL Final    Immature Grans (Abs) 12/03/2019 0.02  0.00 - 0.04 K/uL Final    Comment: Mild elevation in immature granulocytes is non specific and   can be seen in a variety of conditions including stress response,   acute inflammation, trauma and pregnancy. Correlation with other   laboratory and clinical findings is essential.      Lymph # 12/03/2019 0.4* 1.0 - 4.8 K/uL Final    Mono # 12/03/2019 0.3  0.3 - 1.0 K/uL Final    Eos # 12/03/2019 0.2  0.0 - 0.5 K/uL Final    Baso # 12/03/2019 0.02  0.00 - 0.20 K/uL Final    nRBC 12/03/2019 0  0 /100 WBC Final    Gran% 12/03/2019 81.2* 38.0 - 73.0 % Final    Lymph% 12/03/2019 8.8* 18.0 - 48.0 % Final    Mono% 12/03/2019 5.3  4.0 - 15.0 % Final    Eosinophil% 12/03/2019 3.9  0.0 - 8.0 % Final    Basophil% 12/03/2019 0.4  0.0 - 1.9 % Final    Differential Method 12/03/2019 Automated   Final    Sodium 12/03/2019 141  136 - 145 mmol/L Final    Potassium 12/03/2019 4.5  3.5 - 5.1 mmol/L Final    Chloride 12/03/2019 102  95 - 110 mmol/L Final    CO2 12/03/2019 31* 23 - 29 mmol/L Final    Glucose 12/03/2019 74  70 - 110 mg/dL Final    BUN, Bld 12/03/2019 7  6 - 20 mg/dL Final     Creatinine 12/03/2019 0.9  0.5 - 1.4 mg/dL Final    Calcium 12/03/2019 9.3  8.7 - 10.5 mg/dL Final    Total Protein 12/03/2019 7.6  6.0 - 8.4 g/dL Final    Albumin 12/03/2019 3.7  3.5 - 5.2 g/dL Final    Total Bilirubin 12/03/2019 0.4  0.1 - 1.0 mg/dL Final    Comment: For infants and newborns, interpretation of results should be based  on gestational age, weight and in agreement with clinical  observations.  Premature Infant recommended reference ranges:  Up to 24 hours.............<8.0 mg/dL  Up to 48 hours............<12.0 mg/dL  3-5 days..................<15.0 mg/dL  6-29 days.................<15.0 mg/dL      Alkaline Phosphatase 12/03/2019 103  55 - 135 U/L Final    AST 12/03/2019 19  10 - 40 U/L Final    ALT 12/03/2019 21  10 - 44 U/L Final    Anion Gap 12/03/2019 8  8 - 16 mmol/L Final    eGFR if African American 12/03/2019 >60.0  >60 mL/min/1.73 m^2 Final    eGFR if non African American 12/03/2019 >60.0  >60 mL/min/1.73 m^2 Final    Comment: Calculation used to obtain the estimated glomerular filtration  rate (eGFR) is the CKD-EPI equation.       Hep B S Ab 12/03/2019 Negative   Final    Hepatitis B Surface Ag 12/03/2019 Negative  Negative Final    Hep B C IgM 12/03/2019 Negative  Negative Final    Hepatitis C Ab 12/03/2019 Negative  Negative Final    LD 12/03/2019 301* 110 - 260 U/L Final    Results are increased in hemolyzed samples.    Uric Acid 12/03/2019 6.8* 2.4 - 5.7 mg/dL Final    Beta-2 Microglobulin 12/03/2019 2.8* 0.0 - 2.5 ug/mL Final         Imaging:    Assessment:       1. Marginal zone B-cell lymphoma    2. Right lower lobe lung mass    3. Epigastric pain    4. Deep vein thrombosis (DVT) of right lower extremity, unspecified chronicity, unspecified vein    5. Back pain, unspecified back location, unspecified back pain laterality, unspecified chronicity    6. Heavy cigarette smoker      1.  Gastric Marginal zone B-cell lymphoma:   - MALT IPI score- 1 or 2 based on stage. LDH  elevated at 301, age less than 70,  Stage- yet to be staged  Patient complained of chronic epigastric/periumbilical abdominal pain for the last 3 years  - September 24, 2019-  MRI of the abdomen revealed multifocal areas of asymmetric gastric wall thickening.  Recommend further evaluation with direct visualization to exclude neoplasm.    - October 15, 2019- EGD revealed Gastric tumor in the gastric fundus was found however no specimen was collected as she was on anticoagulation.    - November 15, 2019- EUS revealed gastric tumor in the gastric fundus with many abnormal lymph nodes visualized in the lower paraesophageal mediastinum (level 8L) and gastrohepatic ligament (level 18). Fine needle biopsy performed and pathology revealed marginal zone B cell lymphoma.    2.  Pulmonary mass in the right lower lobe suspicious for either primary lung carcinoma in view of significant smoking history, with a remote possibility of metastasis of her marginal zone B-cell lymphoma.  - She had chest pain in 2016 and the imaging done at that time revealed pulmonary nodule and has been following pulmonary, Dr. Yeung at [facility].    - She has a currently daily smoker, has been smoking for almost 40 years.    - On the CT chest done in November 2017 revealed 1.7 x 1.3 cm lesion in the right lower lobe and 2 x 1 cm lesion in the anterior portion of the right lower lobe.    - A CT scan done on September 25, 2019 revealed a complex poorly defined mass in the right lower lobe measuring 2.78 x 2.64 cm, suspicious for primary lung carcinoma    3.  Right lower extremity DVT, likely provoked: on Xarelto  4.  Bilateral lower extremity venous insufficiency status post stent placement  5. Chronic active smoker  6.  Chronic back pain on methadone  7.  Anxiety     Plan:     1.  We will get a PET CT for staging purposes.  Will hold off on bone marrow biopsy for now  2.  Based on the PET-CT findings patient will either need bronchoscopy with EBUS versus  intervention Radiology lung biopsy  3.  We will get basic labs today CBC, CMP, LDH, beta 2 microglobulin and uric acid.  Will also get hepatitis panel, as she might need rituximab therapy based on her PET-CT/biopsy findings and clinical findings   4.  Patient requested for Ativan prior to the PET-CT for anxiety.  Scripts sent to her pharmacy  5.  Allopurinol script sent to her pharmacy  6.  Continue Xarelto for now, will discuss need for thrombophilia workup during her next appointment    Follow up in 2 weeks with PET-CT results    Plan of care discussed with Dr. Mellissa Ann MD PGY-5  Hematology and Oncology  Pager:[phone]  ;

## 2019-12-03 ENCOUNTER — OFFICE VISIT (OUTPATIENT)
Dept: HEPATOLOGY | Facility: CLINIC | Age: 54
End: 2019-12-03
Payer: MEDICAID

## 2019-12-03 ENCOUNTER — LAB VISIT (OUTPATIENT)
Dept: LAB | Facility: HOSPITAL | Age: 54
End: 2019-12-03
Payer: MEDICAID

## 2019-12-03 ENCOUNTER — INITIAL CONSULT (OUTPATIENT)
Dept: HEMATOLOGY/ONCOLOGY | Facility: CLINIC | Age: 54
End: 2019-12-03
Payer: MEDICAID

## 2019-12-03 VITALS
OXYGEN SATURATION: 95 % | WEIGHT: 191.81 LBS | SYSTOLIC BLOOD PRESSURE: 118 MMHG | RESPIRATION RATE: 18 BRPM | DIASTOLIC BLOOD PRESSURE: 82 MMHG | BODY MASS INDEX: 35.3 KG/M2 | HEIGHT: 62 IN | TEMPERATURE: 99 F | HEART RATE: 92 BPM

## 2019-12-03 VITALS
HEIGHT: 61 IN | DIASTOLIC BLOOD PRESSURE: 94 MMHG | WEIGHT: 191.56 LBS | HEART RATE: 87 BPM | BODY MASS INDEX: 36.17 KG/M2 | OXYGEN SATURATION: 96 % | RESPIRATION RATE: 16 BRPM | SYSTOLIC BLOOD PRESSURE: 149 MMHG | TEMPERATURE: 98 F

## 2019-12-03 DIAGNOSIS — K76.0 FATTY LIVER: ICD-10-CM

## 2019-12-03 DIAGNOSIS — R91.8 RIGHT LOWER LOBE LUNG MASS: ICD-10-CM

## 2019-12-03 DIAGNOSIS — C85.80 MARGINAL ZONE B-CELL LYMPHOMA: ICD-10-CM

## 2019-12-03 DIAGNOSIS — M54.9 BACK PAIN, UNSPECIFIED BACK LOCATION, UNSPECIFIED BACK PAIN LATERALITY, UNSPECIFIED CHRONICITY: ICD-10-CM

## 2019-12-03 DIAGNOSIS — C85.99 GASTRIC LYMPHOMA: ICD-10-CM

## 2019-12-03 DIAGNOSIS — C85.80 MARGINAL ZONE B-CELL LYMPHOMA: Primary | ICD-10-CM

## 2019-12-03 DIAGNOSIS — F17.210 HEAVY CIGARETTE SMOKER: ICD-10-CM

## 2019-12-03 DIAGNOSIS — R10.13 EPIGASTRIC PAIN: ICD-10-CM

## 2019-12-03 DIAGNOSIS — R11.0 NAUSEA: Primary | ICD-10-CM

## 2019-12-03 DIAGNOSIS — I82.401 DEEP VEIN THROMBOSIS (DVT) OF RIGHT LOWER EXTREMITY, UNSPECIFIED CHRONICITY, UNSPECIFIED VEIN: ICD-10-CM

## 2019-12-03 LAB
ALBUMIN SERPL BCP-MCNC: 3.7 G/DL (ref 3.5–5.2)
ALP SERPL-CCNC: 103 U/L (ref 55–135)
ALT SERPL W/O P-5'-P-CCNC: 21 U/L (ref 10–44)
ANION GAP SERPL CALC-SCNC: 8 MMOL/L (ref 8–16)
AST SERPL-CCNC: 19 U/L (ref 10–40)
B2 MICROGLOB SERPL-MCNC: 2.8 UG/ML (ref 0–2.5)
BASOPHILS # BLD AUTO: 0.02 K/UL (ref 0–0.2)
BASOPHILS NFR BLD: 0.4 % (ref 0–1.9)
BILIRUB SERPL-MCNC: 0.4 MG/DL (ref 0.1–1)
BUN SERPL-MCNC: 7 MG/DL (ref 6–20)
CALCIUM SERPL-MCNC: 9.3 MG/DL (ref 8.7–10.5)
CHLORIDE SERPL-SCNC: 102 MMOL/L (ref 95–110)
CO2 SERPL-SCNC: 31 MMOL/L (ref 23–29)
CREAT SERPL-MCNC: 0.9 MG/DL (ref 0.5–1.4)
DIFFERENTIAL METHOD: ABNORMAL
EOSINOPHIL # BLD AUTO: 0.2 K/UL (ref 0–0.5)
EOSINOPHIL NFR BLD: 3.9 % (ref 0–8)
ERYTHROCYTE [DISTWIDTH] IN BLOOD BY AUTOMATED COUNT: 13.2 % (ref 11.5–14.5)
EST. GFR  (AFRICAN AMERICAN): >60 ML/MIN/1.73 M^2
EST. GFR  (NON AFRICAN AMERICAN): >60 ML/MIN/1.73 M^2
GLUCOSE SERPL-MCNC: 74 MG/DL (ref 70–110)
HCT VFR BLD AUTO: 43.5 % (ref 37–48.5)
HGB BLD-MCNC: 13.1 G/DL (ref 12–16)
IMM GRANULOCYTES # BLD AUTO: 0.02 K/UL (ref 0–0.04)
IMM GRANULOCYTES NFR BLD AUTO: 0.4 % (ref 0–0.5)
LDH SERPL L TO P-CCNC: 301 U/L (ref 110–260)
LYMPHOCYTES # BLD AUTO: 0.4 K/UL (ref 1–4.8)
LYMPHOCYTES NFR BLD: 8.8 % (ref 18–48)
MCH RBC QN AUTO: 27.5 PG (ref 27–31)
MCHC RBC AUTO-ENTMCNC: 30.1 G/DL (ref 32–36)
MCV RBC AUTO: 91 FL (ref 82–98)
MONOCYTES # BLD AUTO: 0.3 K/UL (ref 0.3–1)
MONOCYTES NFR BLD: 5.3 % (ref 4–15)
NEUTROPHILS # BLD AUTO: 4 K/UL (ref 1.8–7.7)
NEUTROPHILS NFR BLD: 81.2 % (ref 38–73)
NRBC BLD-RTO: 0 /100 WBC
PLATELET # BLD AUTO: 192 K/UL (ref 150–350)
PMV BLD AUTO: 10.8 FL (ref 9.2–12.9)
POTASSIUM SERPL-SCNC: 4.5 MMOL/L (ref 3.5–5.1)
PROT SERPL-MCNC: 7.6 G/DL (ref 6–8.4)
RBC # BLD AUTO: 4.76 M/UL (ref 4–5.4)
SODIUM SERPL-SCNC: 141 MMOL/L (ref 136–145)
URATE SERPL-MCNC: 6.8 MG/DL (ref 2.4–5.7)
WBC # BLD AUTO: 4.87 K/UL (ref 3.9–12.7)

## 2019-12-03 PROCEDURE — 86803 HEPATITIS C AB TEST: CPT

## 2019-12-03 PROCEDURE — 99999 PR PBB SHADOW E&M-EST. PATIENT-LVL III: CPT | Mod: PBBFAC,,, | Performed by: INTERNAL MEDICINE

## 2019-12-03 PROCEDURE — 99214 OFFICE O/P EST MOD 30 MIN: CPT | Mod: PBBFAC | Performed by: STUDENT IN AN ORGANIZED HEALTH CARE EDUCATION/TRAINING PROGRAM

## 2019-12-03 PROCEDURE — 99214 OFFICE O/P EST MOD 30 MIN: CPT | Mod: S$PBB,,, | Performed by: INTERNAL MEDICINE

## 2019-12-03 PROCEDURE — 86706 HEP B SURFACE ANTIBODY: CPT

## 2019-12-03 PROCEDURE — 84550 ASSAY OF BLOOD/URIC ACID: CPT

## 2019-12-03 PROCEDURE — 36415 COLL VENOUS BLD VENIPUNCTURE: CPT

## 2019-12-03 PROCEDURE — 99204 OFFICE O/P NEW MOD 45 MIN: CPT | Mod: S$PBB,,, | Performed by: STUDENT IN AN ORGANIZED HEALTH CARE EDUCATION/TRAINING PROGRAM

## 2019-12-03 PROCEDURE — 99999 PR PBB SHADOW E&M-EST. PATIENT-LVL III: ICD-10-PCS | Mod: PBBFAC,,, | Performed by: INTERNAL MEDICINE

## 2019-12-03 PROCEDURE — 99999 PR PBB SHADOW E&M-EST. PATIENT-LVL IV: CPT | Mod: PBBFAC,,, | Performed by: STUDENT IN AN ORGANIZED HEALTH CARE EDUCATION/TRAINING PROGRAM

## 2019-12-03 PROCEDURE — 83615 LACTATE (LD) (LDH) ENZYME: CPT

## 2019-12-03 PROCEDURE — 99213 OFFICE O/P EST LOW 20 MIN: CPT | Mod: PBBFAC,27,PN | Performed by: INTERNAL MEDICINE

## 2019-12-03 PROCEDURE — 85025 COMPLETE CBC W/AUTO DIFF WBC: CPT

## 2019-12-03 PROCEDURE — 80053 COMPREHEN METABOLIC PANEL: CPT

## 2019-12-03 PROCEDURE — 99204 PR OFFICE/OUTPT VISIT, NEW, LEVL IV, 45-59 MIN: ICD-10-PCS | Mod: S$PBB,,, | Performed by: STUDENT IN AN ORGANIZED HEALTH CARE EDUCATION/TRAINING PROGRAM

## 2019-12-03 PROCEDURE — 87340 HEPATITIS B SURFACE AG IA: CPT

## 2019-12-03 PROCEDURE — 99999 PR PBB SHADOW E&M-EST. PATIENT-LVL IV: ICD-10-PCS | Mod: PBBFAC,,, | Performed by: STUDENT IN AN ORGANIZED HEALTH CARE EDUCATION/TRAINING PROGRAM

## 2019-12-03 PROCEDURE — 86705 HEP B CORE ANTIBODY IGM: CPT

## 2019-12-03 PROCEDURE — 82232 ASSAY OF BETA-2 PROTEIN: CPT

## 2019-12-03 PROCEDURE — 99214 PR OFFICE/OUTPT VISIT, EST, LEVL IV, 30-39 MIN: ICD-10-PCS | Mod: S$PBB,,, | Performed by: INTERNAL MEDICINE

## 2019-12-03 RX ORDER — LORAZEPAM 0.5 MG/1
0.5 TABLET ORAL EVERY 4 HOURS PRN
Qty: 5 TABLET | Refills: 0 | Status: SHIPPED | OUTPATIENT
Start: 2019-12-03 | End: 2020-07-07

## 2019-12-03 RX ORDER — ONDANSETRON HYDROCHLORIDE 8 MG/1
8 TABLET, FILM COATED ORAL EVERY 8 HOURS PRN
Qty: 30 TABLET | Refills: 11 | Status: SHIPPED | OUTPATIENT
Start: 2019-12-03 | End: 2021-06-09 | Stop reason: SDUPTHER

## 2019-12-03 NOTE — PROGRESS NOTES
mzl in stomach; pet scan no marrow  Fu lung nodules on pet will likely need pulm vs ir biopsy and referral; fyi the pulm np   Basic ipi infection labs  Continue ac for provokoed clot until at least next appt; discuss need for tphilia reed at that time   Fu 2-3 weeks

## 2019-12-03 NOTE — Clinical Note
Labs today- CBC, CMP, LDH, Uric acid, Hep B and CPlease [facility] PET[facility]

## 2019-12-03 NOTE — Clinical Note
Labs today- CBC, CMP, LDH, Uric acid, Hep B and CPlease schedule PET/CT in the next weekFollow up in 2 weeks

## 2019-12-03 NOTE — PROGRESS NOTES
HEPATOLOGY FOLLOW UP    Referring Physician: Yanet Anderson MD  Current Corresponding Physician: Yanet Anderson MD    Gabi Newton is here for follow up of Fatty Liver    HPI  She is a 52 y.o. female who underwent a cholecystecomty in 2016 for RUQ pain. Since then she has had continued abdominal discomfort in the periumbilical area (different than the pain that led to her gallbladder surgery). As part of the w/u for this pain she has had imaging done:  CT scan 6/11/18: normal liver but the spleen slightly enlarged.  MRI 6/19/18: Diffuse hepatic steatosis without evidence of suspicious focal lesions and splenomegaly but no asictes.     Liver enzymes are normal. In addition her Tbil, albumin are normal. Plts are borderline (169, 193) The patient does not drink alcohol heavily and has no history of DM. BMI 32.     Interval History   Gabi Newton had a fibroscan 8/10/18. This did reveal significant steatosis (S3=>67% fat) but only F0/1 fibrosis.   Serologies were done previously:  Negative for hepatitis B and C. Should get vaccinated for both A and B.  Autoimmune markers negative  alpha1 antitrypsin level normal  Iron studies: no overload; actually deficient in iron; serologies for celiac sprue were negative;     EGD in July/18: no EV. She has completed vaccination for hepatitis A and B.    She denies any cognitive issues that would suggest HE. She has had lower extremity swelling that is thought to be due to venous insufficiency.    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 Initially her edema improved but has recurred. And now she has been diagnosd with a lower extremity DVT and is on xarelto. She still has edema. The edema causes significant pruritus.     An US done 8/19/19 did not suggest obvious cirrhosis.    Outpatient Encounter Medications as of 12/3/2019   Medication Sig Dispense Refill    clotrimazole-betamethasone 1-0.05% (LOTRISONE) cream  clotrimazole-betamethasone 1 %-0.05 % topical cream      lisinopril 10 MG tablet lisinopril 10 mg tablet   Take 1 tablet every day by oral route.      methadone (METHADOSE) 40 mg disintegrating tablet methadone 40 mg soluble tablet   Take 1 tablet every day by oral route.      nicotine (NICODERM CQ) 21 mg/24 hr Place 1 patch onto the skin once daily. 28 patch 0    nitroGLYCERIN (NITROSTAT) 0.4 MG SL tablet Place 0.4 mg under the tongue every 5 (five) minutes as needed for Chest pain.      rivaroxaban (XARELTO) 20 mg Tab Take 20 mg by mouth daily with dinner or evening meal.      simvastatin (ZOCOR) 40 MG tablet Take 40 mg by mouth every evening.       varenicline (CHANTIX) 1 mg Tab Take 1 tablet (1 mg total) by mouth 2 (two) times daily. 60 tablet 0    triamcinolone acetonide 0.1% (KENALOG) 0.1 % ointment triamcinolone acetonide 0.1 % topical ointment   APPLY A THIN LAYER TO THE AFFECTED AREA(S) BY TOPICAL ROUTE 2 TIMES PER DAY      [DISCONTINUED] amLODIPine (NORVASC) 5 MG tablet Take 5 mg by mouth once daily.      [DISCONTINUED] isosorbide mononitrate (IMDUR) 60 MG 24 hr tablet Take 60 mg by mouth once daily.       No facility-administered encounter medications on file as of 12/3/2019.      Review of patient's allergies indicates:   Allergen Reactions    Zithromax [azithromycin] Anaphylaxis     Past Medical History:   Diagnosis Date    Abdominal pain 2018    Back pain     Fatty liver 7/24/2018    Gastric tumor     GERD (gastroesophageal reflux disease)     Hyperlipidemia     Hypertension     Splenomegaly 7/24/2018       Review of Systems   Constitutional: Negative.    HENT: Negative.    Eyes: Negative.    Respiratory: Negative.    Cardiovascular: Negative.    Gastrointestinal: Positive for abdominal distention and constipation.   Genitourinary: Negative.    Musculoskeletal: Negative.    Skin: Negative.    Neurological: Negative.    Psychiatric/Behavioral: Negative.      Vitals:    12/03/19 1158    BP: 118/82   Pulse: 92   Resp: 18   Temp: 98.6 °F (37 °C)       Physical Exam   Constitutional: She is oriented to person, place, and time. She appears well-developed and well-nourished.   HENT:   Head: Normocephalic and atraumatic.   Eyes: Pupils are equal, round, and reactive to light. Conjunctivae and EOM are normal. No scleral icterus.   Neck: Normal range of motion. Neck supple. No thyromegaly present.   Cardiovascular: Normal rate, regular rhythm and normal heart sounds.   Pulmonary/Chest: Effort normal and breath sounds normal. She has no rales.   Abdominal: Soft. Bowel sounds are normal. She exhibits no distension and no mass. There is no tenderness.   Musculoskeletal: Normal range of motion. She exhibits no edema.   Neurological: She is alert and oriented to person, place, and time.   Skin: Skin is warm and dry. No rash noted.   Psychiatric: She has a normal mood and affect.   Vitals reviewed.      MELD-Na score: 6 at 7/16/2019  2:54 PM  MELD score: 6 at 7/16/2019  2:54 PM  Calculated from:  Serum Creatinine: 0.8 mg/dL (Rounded to 1 mg/dL) at 7/16/2019  2:54 PM  Serum Sodium: 140 mmol/L (Rounded to 137 mmol/L) at 7/16/2019  2:54 PM  Total Bilirubin: 0.4 mg/dL (Rounded to 1 mg/dL) at 7/16/2019  2:54 PM  INR(ratio): 1.0 at 7/16/2019  2:54 PM  Age: 53 years    Lab Results   Component Value Date     (H) 09/23/2019    BUN 9 09/23/2019    CREATININE 0.8 09/23/2019    CALCIUM 8.8 09/23/2019     09/23/2019    K 3.8 09/23/2019     (L) 09/23/2019    PROT 7.1 09/23/2019    CO2 32 (H) 09/23/2019    ANIONGAP 7 (L) 09/23/2019    WBC 4.40 07/30/2019    RBC 4.44 07/30/2019    HGB 12.7 07/30/2019    HCT 38.3 07/30/2019    MCV 86 07/30/2019    MCH 28.7 07/30/2019    MCHC 33.2 07/30/2019     Lab Results   Component Value Date    RDW 15.9 (H) 07/30/2019     07/30/2019    MPV 8.7 (L) 07/30/2019    GRAN 3.4 07/30/2019    GRAN 75.3 (H) 07/30/2019    LYMPH 0.5 (L) 07/30/2019    LYMPH 12.3 (L) 07/30/2019     MONO 0.3 07/30/2019    MONO 5.8 07/30/2019    EOSINOPHIL 6.3 07/30/2019    BASOPHIL 0.3 07/30/2019    EOS 0.3 07/30/2019    BASO 0.00 07/30/2019    BNP 54 07/30/2019    ALBUMIN 3.6 09/23/2019    AST 19 09/23/2019    ALT 20 09/23/2019    ALKPHOS 95 09/23/2019    MG 1.9 10/15/2018    LABPROT 11.1 07/16/2019    INR 1.0 07/16/2019       Assessment and Plan:    Gabi Newton is a 54 y.o. female with Fatty Liver  The fibroscan suggests she has minimal fibrosis. Her spleen is not markedly enlarged and her plts remain normal. Her liver enzymes are normal and US and other blood tests otherwise do not suggest significant fibrosis. Taken together, she appears to have minimal disease. However, she is having recurrent lower extremity edema. I would like to confirm we are not missing cirrhosis. US did not reveal any cirrhosis. I am now recommending MRE to noninvasively evaluate for fibrosis. She may increase lasix to 40 mg daily and continue K. cmp will be checked in 2 and 4 weeks.  Return 4 weeks    Return 4-6 weeks.    . . .

## 2019-12-04 ENCOUNTER — TELEPHONE (OUTPATIENT)
Dept: HEMATOLOGY/ONCOLOGY | Facility: CLINIC | Age: 54
End: 2019-12-04

## 2019-12-04 LAB
HBV CORE IGM SERPL QL IA: NEGATIVE
HBV SURFACE AB SER-ACNC: NEGATIVE M[IU]/ML
HBV SURFACE AG SERPL QL IA: NEGATIVE
HCV AB SERPL QL IA: NEGATIVE

## 2019-12-04 RX ORDER — ALLOPURINOL 300 MG/1
300 TABLET ORAL DAILY
Qty: 30 TABLET | Refills: 5 | Status: SHIPPED | OUTPATIENT
Start: 2019-12-04 | End: 2020-08-01 | Stop reason: SDUPTHER

## 2019-12-04 NOTE — TELEPHONE ENCOUNTER
----- Message from Bill Ann MD sent at 12/3/2019  3:57 PM CST -----  Labs today- CBC, CMP, LDH, Uric acid, Hep B and C  Please schedule PET/CT in the next week at [facility]  Follow up in 2 weeks

## 2019-12-04 NOTE — TELEPHONE ENCOUNTER
Called pt to schedule pet scan, the [facility] does not do them. Informed pt of this and she decided to come to main campus. Mailed out appt letter.

## 2019-12-05 ENCOUNTER — TELEPHONE (OUTPATIENT)
Dept: HEMATOLOGY/ONCOLOGY | Facility: CLINIC | Age: 54
End: 2019-12-05

## 2019-12-05 NOTE — TELEPHONE ENCOUNTER
----- Message from Fernanda Connor RN sent at 12/5/2019 10:48 AM CST -----  Contact: Alyson Russell      ----- Message -----  From: Dayo Goetz  Sent: 12/5/2019  10:42 AM CST  To: Seth Khan Staff    Alyson montoya pre/services  need you to sign order.She need to fax to insurance company.

## 2019-12-08 PROBLEM — C85.99 GASTRIC LYMPHOMA: Status: ACTIVE | Noted: 2019-12-08

## 2019-12-10 ENCOUNTER — HOSPITAL ENCOUNTER (OUTPATIENT)
Dept: RADIOLOGY | Facility: HOSPITAL | Age: 54
Discharge: HOME | End: 2019-12-10
Attending: STUDENT IN AN ORGANIZED HEALTH CARE EDUCATION/TRAINING PROGRAM
Payer: MEDICAID

## 2019-12-10 DIAGNOSIS — C85.80 MARGINAL ZONE B-CELL LYMPHOMA: ICD-10-CM

## 2019-12-10 DIAGNOSIS — R91.8 RIGHT LOWER LOBE LUNG MASS: ICD-10-CM

## 2019-12-10 LAB — POCT GLUCOSE: 83 MG/DL (ref 70–110)

## 2019-12-10 PROCEDURE — A9552 F18 FDG: HCPCS

## 2019-12-10 PROCEDURE — 78815 PET IMAGE W/CT SKULL-THIGH: CPT | Mod: 26,PS,, | Performed by: RADIOLOGY

## 2019-12-10 PROCEDURE — 78815 NM PET CT ROUTINE: ICD-10-PCS | Mod: 26,PS,, | Performed by: RADIOLOGY

## 2019-12-10 PROCEDURE — 78815 PET IMAGE W/CT SKULL-THIGH: CPT | Mod: TC

## 2019-12-11 ENCOUNTER — TELEPHONE (OUTPATIENT)
Dept: HEMATOLOGY/ONCOLOGY | Facility: CLINIC | Age: 54
End: 2019-12-11

## 2019-12-11 DIAGNOSIS — C85.80 MARGINAL ZONE B-CELL LYMPHOMA: Primary | ICD-10-CM

## 2019-12-11 DIAGNOSIS — M54.9 BACK PAIN, UNSPECIFIED BACK LOCATION, UNSPECIFIED BACK PAIN LATERALITY, UNSPECIFIED CHRONICITY: ICD-10-CM

## 2019-12-11 DIAGNOSIS — R91.8 RIGHT LOWER LOBE LUNG MASS: ICD-10-CM

## 2019-12-11 DIAGNOSIS — I82.401 DEEP VEIN THROMBOSIS (DVT) OF RIGHT LOWER EXTREMITY, UNSPECIFIED CHRONICITY, UNSPECIFIED VEIN: ICD-10-CM

## 2019-12-11 NOTE — TELEPHONE ENCOUNTER
Spoke to Ms. Newton. Updated the PET CT results. Next plan of care is to get IR core biopsy of her right lower lung mass and also to get Stool antigen for H.Pylori. She is on blood thinners and will need to hold her anticoagulation for 4-5 days prior to her lung biopsy. Answered all questions to her satisfaction

## 2019-12-17 ENCOUNTER — PATIENT MESSAGE (OUTPATIENT)
Dept: HEMATOLOGY/ONCOLOGY | Facility: CLINIC | Age: 54
End: 2019-12-17

## 2019-12-17 DIAGNOSIS — I82.401 DEEP VEIN THROMBOSIS (DVT) OF RIGHT LOWER EXTREMITY, UNSPECIFIED CHRONICITY, UNSPECIFIED VEIN: ICD-10-CM

## 2019-12-17 DIAGNOSIS — I82.401 DEEP VEIN THROMBOSIS (DVT) OF RIGHT LOWER EXTREMITY, UNSPECIFIED CHRONICITY, UNSPECIFIED VEIN: Primary | ICD-10-CM

## 2019-12-17 DIAGNOSIS — R91.8 RIGHT LOWER LOBE LUNG MASS: Primary | ICD-10-CM

## 2019-12-17 DIAGNOSIS — C85.80 MARGINAL ZONE B-CELL LYMPHOMA: ICD-10-CM

## 2019-12-20 ENCOUNTER — CLINICAL SUPPORT (OUTPATIENT)
Dept: SMOKING CESSATION | Facility: CLINIC | Age: 54
End: 2019-12-20
Payer: COMMERCIAL

## 2019-12-20 DIAGNOSIS — F17.200 NICOTINE DEPENDENCE: ICD-10-CM

## 2019-12-20 PROCEDURE — 99402 PR PREVENT COUNSEL,INDIV,30 MIN: ICD-10-PCS | Mod: S$GLB,,, | Performed by: FAMILY MEDICINE

## 2019-12-20 PROCEDURE — 99402 PREV MED CNSL INDIV APPRX 30: CPT | Mod: S$GLB,,, | Performed by: FAMILY MEDICINE

## 2019-12-20 RX ORDER — IBUPROFEN 200 MG
1 TABLET ORAL DAILY
Qty: 28 PATCH | Refills: 0 | Status: SHIPPED | OUTPATIENT
Start: 2019-12-20 | End: 2021-09-20

## 2019-12-20 RX ORDER — VARENICLINE TARTRATE 1 MG/1
1 TABLET, FILM COATED ORAL 2 TIMES DAILY
Qty: 60 TABLET | Refills: 0 | Status: SHIPPED | OUTPATIENT
Start: 2019-12-20 | End: 2020-01-19

## 2019-12-20 NOTE — Clinical Note
Pt seen in office today. She continues to smoke 18 cigs/day. She states she has had work schedule changes and will make appointments at each visit. Pt remains on tobacco cessation medication of chantix 1 mg BID and 21 mg nicotine patch QD. She states that cravings for tobacco are getting less. Stress remains high now. Encouraged her not to give up. No adverse effects/mental changes noted at this time. Pt asked to reduce current smoking rate by 3 cigs/day. Reviewed coping strategies/habitual behavior/relapse prevention with patient. Exhaled carbon monoxide level was 19 ppm per [employer]oker[employer]zer (0-6 non-smoker). Will see pt back in office in 1 wk

## 2019-12-20 NOTE — PROGRESS NOTES
Individual Follow-Up Form    12/20/2019    Clinical Status of Patient: Outpatient    Length of Service: 30 minutes    Continuing Medication: yes  Chantix or Patches    Other Medications: none     Target Symptoms: Withdrawal and medication side effects. The following were rated moderate (3) to severe (4) on TCRS:  · Moderate (3): desire/crave tobacco  · Severe (4): none    Comments:  Pt seen in office today. She continues to smoke 18 cigs/day. She states she has had work schedule changes and will make appointments at each visit. Pt remains on tobacco cessation medication of chantix 1 mg BID and 21 mg nicotine patch QD. She states that cravings for tobacco are getting less. Stress remains high now. Encouraged her not to give up. No adverse effects/mental changes noted at this time. Pt asked to reduce current smoking rate by 3 cigs/day. Reviewed coping strategies/habitual behavior/relapse prevention with patient. Exhaled carbon monoxide level was 19 ppm per Smoker[employer] (0-6 non-smoker). Will see pt back in office in 1 wk.     Diagnosis: F17.200    Next Visit: 1 week

## 2019-12-27 ENCOUNTER — PATIENT MESSAGE (OUTPATIENT)
Dept: PULMONOLOGY | Facility: CLINIC | Age: 54
End: 2019-12-27

## 2020-01-08 ENCOUNTER — OFFICE VISIT (OUTPATIENT)
Dept: INTERVENTIONAL RADIOLOGY/VASCULAR | Facility: CLINIC | Age: 55
End: 2020-01-08
Payer: MEDICAID

## 2020-01-08 ENCOUNTER — LAB VISIT (OUTPATIENT)
Dept: LAB | Facility: HOSPITAL | Age: 55
End: 2020-01-08
Payer: MEDICAID

## 2020-01-08 VITALS
DIASTOLIC BLOOD PRESSURE: 86 MMHG | BODY MASS INDEX: 36.55 KG/M2 | SYSTOLIC BLOOD PRESSURE: 161 MMHG | HEIGHT: 61 IN | WEIGHT: 193.56 LBS | HEART RATE: 80 BPM

## 2020-01-08 DIAGNOSIS — R91.1 LUNG NODULE: ICD-10-CM

## 2020-01-08 DIAGNOSIS — R91.8 RIGHT LOWER LOBE LUNG MASS: Primary | ICD-10-CM

## 2020-01-08 DIAGNOSIS — R91.1 LUNG NODULE: Primary | ICD-10-CM

## 2020-01-08 LAB
INR PPP: 1.1 (ref 0.8–1.2)
PROTHROMBIN TIME: 11 SEC (ref 9–12.5)

## 2020-01-08 PROCEDURE — 99999 PR PBB SHADOW E&M-EST. PATIENT-LVL III: CPT | Mod: PBBFAC,,, | Performed by: FAMILY MEDICINE

## 2020-01-08 PROCEDURE — 99214 PR OFFICE/OUTPT VISIT, EST, LEVL IV, 30-39 MIN: ICD-10-PCS | Mod: S$PBB,,, | Performed by: FAMILY MEDICINE

## 2020-01-08 PROCEDURE — 36415 COLL VENOUS BLD VENIPUNCTURE: CPT

## 2020-01-08 PROCEDURE — 99213 OFFICE O/P EST LOW 20 MIN: CPT | Mod: PBBFAC | Performed by: FAMILY MEDICINE

## 2020-01-08 PROCEDURE — 85610 PROTHROMBIN TIME: CPT

## 2020-01-08 PROCEDURE — 99999 PR PBB SHADOW E&M-EST. PATIENT-LVL III: ICD-10-PCS | Mod: PBBFAC,,, | Performed by: FAMILY MEDICINE

## 2020-01-08 PROCEDURE — 99214 OFFICE O/P EST MOD 30 MIN: CPT | Mod: S$PBB,,, | Performed by: FAMILY MEDICINE

## 2020-01-08 NOTE — Clinical Note
"Thank you for referring Ms. Newton to Interventional Radiology at the [facility]. Please don't hesitate to contact us if there are any questions regarding this evaluation at [phone]. If you have any other patients for whom you would like a consultation, please place an order for "Amb consult to [facility] Interventional Rad", and we will be happy to review their case.Sincerely,MICHELLE Barboza, FNPInterventional Radiology"

## 2020-01-15 ENCOUNTER — CLINICAL SUPPORT (OUTPATIENT)
Dept: SMOKING CESSATION | Facility: CLINIC | Age: 55
End: 2020-01-15
Payer: COMMERCIAL

## 2020-01-15 DIAGNOSIS — F17.200 NICOTINE DEPENDENCE: Primary | ICD-10-CM

## 2020-01-15 PROCEDURE — 99407 PR TOBACCO USE CESSATION INTENSIVE >10 MINUTES: ICD-10-PCS | Mod: S$GLB,,, | Performed by: INTERNAL MEDICINE

## 2020-01-15 PROCEDURE — 99407 BEHAV CHNG SMOKING > 10 MIN: CPT | Mod: S$GLB,,, | Performed by: INTERNAL MEDICINE

## 2020-01-15 NOTE — PROGRESS NOTES
Spoke with patient today in regard to smoking cessation progress for 3 month telephone follow up, she states not tobacco free. Patient states she has cut down on cigarette intake from 1 ppd to 12 cigs/day and using the prescribed tobacco cessation medication of chantix and nicotine patches. Commended patient on the accomplishment thus far. Informed patient of benefit period, future follow ups, and contact information if any further help or support is needed. Will complete smart form for 3 month follow up on Quit attempt #1.

## 2020-01-20 ENCOUNTER — HOSPITAL ENCOUNTER (OUTPATIENT)
Facility: HOSPITAL | Age: 55
Discharge: HOME | End: 2020-01-20
Attending: RADIOLOGY | Admitting: RADIOLOGY
Payer: MEDICAID

## 2020-01-20 VITALS
DIASTOLIC BLOOD PRESSURE: 60 MMHG | TEMPERATURE: 98 F | BODY MASS INDEX: 35.34 KG/M2 | OXYGEN SATURATION: 95 % | WEIGHT: 180 LBS | HEIGHT: 60 IN | HEART RATE: 65 BPM | RESPIRATION RATE: 15 BRPM | SYSTOLIC BLOOD PRESSURE: 122 MMHG

## 2020-01-20 DIAGNOSIS — I82.401 DEEP VEIN THROMBOSIS (DVT) OF RIGHT LOWER EXTREMITY, UNSPECIFIED CHRONICITY, UNSPECIFIED VEIN: ICD-10-CM

## 2020-01-20 DIAGNOSIS — R91.8 RIGHT LOWER LOBE LUNG MASS: ICD-10-CM

## 2020-01-20 DIAGNOSIS — C85.80 MARGINAL ZONE B-CELL LYMPHOMA: ICD-10-CM

## 2020-01-20 PROCEDURE — 94760 N-INVAS EAR/PLS OXIMETRY 1: CPT

## 2020-01-20 PROCEDURE — 63600175 PHARM REV CODE 636 W HCPCS: Performed by: RADIOLOGY

## 2020-01-20 RX ORDER — SODIUM CHLORIDE 9 MG/ML
500 INJECTION, SOLUTION INTRAVENOUS ONCE
Status: DISCONTINUED | OUTPATIENT
Start: 2020-01-20 | End: 2020-01-20 | Stop reason: HOSPADM

## 2020-01-20 RX ORDER — FENTANYL CITRATE 50 UG/ML
50 INJECTION, SOLUTION INTRAMUSCULAR; INTRAVENOUS
Status: DISCONTINUED | OUTPATIENT
Start: 2020-01-20 | End: 2020-01-20 | Stop reason: HOSPADM

## 2020-01-20 RX ORDER — MIDAZOLAM HYDROCHLORIDE 1 MG/ML
1 INJECTION INTRAMUSCULAR; INTRAVENOUS
Status: DISCONTINUED | OUTPATIENT
Start: 2020-01-20 | End: 2020-01-20 | Stop reason: HOSPADM

## 2020-01-20 RX ORDER — MIDAZOLAM HYDROCHLORIDE 1 MG/ML
INJECTION INTRAMUSCULAR; INTRAVENOUS CODE/TRAUMA/SEDATION MEDICATION
Status: COMPLETED | OUTPATIENT
Start: 2020-01-20 | End: 2020-01-20

## 2020-01-20 RX ORDER — FENTANYL CITRATE 50 UG/ML
INJECTION, SOLUTION INTRAMUSCULAR; INTRAVENOUS CODE/TRAUMA/SEDATION MEDICATION
Status: COMPLETED | OUTPATIENT
Start: 2020-01-20 | End: 2020-01-20

## 2020-01-20 RX ADMIN — FENTANYL CITRATE 50 MCG: 50 INJECTION, SOLUTION INTRAMUSCULAR; INTRAVENOUS at 09:01

## 2020-01-20 RX ADMIN — MIDAZOLAM HYDROCHLORIDE 1 MG: 1 INJECTION, SOLUTION INTRAMUSCULAR; INTRAVENOUS at 09:01

## 2020-01-20 RX ADMIN — FENTANYL CITRATE 50 MCG: 50 INJECTION, SOLUTION INTRAMUSCULAR; INTRAVENOUS at 08:01

## 2020-01-20 RX ADMIN — MIDAZOLAM HYDROCHLORIDE 1 MG: 1 INJECTION, SOLUTION INTRAMUSCULAR; INTRAVENOUS at 08:01

## 2020-01-20 NOTE — PROGRESS NOTES
Procedure complete. No samples taken, pt could not tolerate. Plan to rebook with anesthesia. Site clean, dry, intact, no bleeding, no hematoma. Pt to recover in ROCU before discharge in care of RN. Report to be given to ROCU RN at bedside. CXR in 2 hours per Dr. Brink, after approval, pt can be discharged. Site dressed with vaseline gauze dressing.

## 2020-01-20 NOTE — PROCEDURES
Patient came to IR and had right anterior chest prepped for attempted biopsy. After giving sedation and beginning procedure, patient unable to safely tolerate and aborted procedure for rescheduling with anesthesia.     .ITyler M.D. personally reviewed and agree with the above dictated note.

## 2020-01-20 NOTE — H&P
Radiology History & Physical      SUBJECTIVE:     Chief Complaint: Right lung nodule    History of Present Illness:  Gabi Newton is a 54 y.o. female with history of gastric marginal zone B-cell lymphoma and 40+ pack year smoking history who was found to have a hypermetabolic 2.7 cm RLL lesion. She presents to IR for lung biopsy of this lesion.       Past Medical History:   Diagnosis Date    Abdominal pain 2018    Back pain     Fatty liver 2018    Gastric lymphoma 2019    Gastric tumor     GERD (gastroesophageal reflux disease)     Hyperlipidemia     Hypertension     Splenomegaly 2018     Past Surgical History:   Procedure Laterality Date    CARDIAC CATHETERIZATION      CARDIAC CATHETERIZATION  2018    had chest pains . states vessels at the bottom of heart collapsed     SECTION      x3    CHOLECYSTECTOMY      COLONOSCOPY      ENDOSCOPIC ULTRASOUND OF UPPER GASTROINTESTINAL TRACT Left 11/15/2019    Procedure: ULTRASOUND, UPPER GI TRACT, ENDOSCOPIC;  Surgeon: Mike Seay MD;  Location: [facility] (2ND FLR);  Service: Endoscopy;  Laterality: Left;  Ok to hold xarelto per protocol per Dr. Lloyd see media file 10/25/19-tb    ESOPHAGOGASTRODUODENOSCOPY      ESOPHAGOGASTRODUODENOSCOPY N/A 2018    Procedure: EGD (ESOPHAGOGASTRODUODENOSCOPY);  Surgeon: Ant Camejo MD;  Location: [facility];  Service: Endoscopy;  Laterality: N/A;    ESOPHAGOGASTRODUODENOSCOPY N/A 10/15/2019    Procedure: EGD (ESOPHAGOGASTRODUODENOSCOPY);  Surgeon: Melanie Cedeno MD;  Location: [facility];  Service: Endoscopy;  Laterality: N/A;    ESOPHAGOGASTRODUODENOSCOPY N/A 11/15/2019    Procedure: EGD (ESOPHAGOGASTRODUODENOSCOPY);  Surgeon: Mike Seay MD;  Location: [facility] (2ND FLR);  Service: Endoscopy;  Laterality: N/A;    HYSTERECTOMY      PHLEBOGRAPHY Bilateral 10/16/2018    Procedure: VENOGRAM;  Surgeon: Colin Mathis MD;  Location: [facility] CATH LAB;  Service: Cardiology;  Laterality:  Bilateral;    RI RT/LT HEART CATHETERS Left     Coronary Arteriogram-2 Cath    RHINOPLASTY      TUBAL LIGATION      venogram Bilateral 10/16/2018       Home Meds:   Prior to Admission medications    Medication Sig Start Date End Date Taking? Authorizing Provider   allopurinol (ZYLOPRIM) 300 MG tablet Take 1 tablet (300 mg total) by mouth once daily. 12/4/19 12/3/20 Yes Bill Ann MD   methadone (METHADOSE) 40 mg disintegrating tablet methadone 40 mg soluble tablet   Take 1 tablet every day by oral route.   Yes Historical Provider, MD   nicotine (NICODERM CQ) 21 mg/24 hr Place 1 patch onto the skin once daily. 12/20/19  Yes Gonzalo Macias MD   rivaroxaban (XARELTO) 20 mg Tab Take 20 mg by mouth daily with dinner or evening meal.   Yes Historical Provider, MD   simvastatin (ZOCOR) 40 MG tablet Take 40 mg by mouth every evening.  7/21/14  Yes Historical Provider, MD   clotrimazole-betamethasone 1-0.05% (LOTRISONE) cream clotrimazole-betamethasone 1 %-0.05 % topical cream    Historical Provider, MD   lisinopril 10 MG tablet lisinopril 10 mg tablet   Take 1 tablet every day by oral route.    Historical Provider, MD   LORazepam (ATIVAN) 0.5 MG tablet Take 1 tablet (0.5 mg total) by mouth every 4 (four) hours as needed for Anxiety (For PET/CT). 12/3/19 12/8/19  Bill Ann MD   nitroGLYCERIN (NITROSTAT) 0.4 MG SL tablet Place 0.4 mg under the tongue every 5 (five) minutes as needed for Chest pain.    Historical Provider, MD   ondansetron (ZOFRAN) 8 MG tablet Take 1 tablet (8 mg total) by mouth every 8 (eight) hours as needed for Nausea. 12/3/19   Melanie Cedeno MD   triamcinolone acetonide 0.1% (KENALOG) 0.1 % ointment triamcinolone acetonide 0.1 % topical ointment   APPLY A THIN LAYER TO THE AFFECTED AREA(S) BY TOPICAL ROUTE 2 TIMES PER DAY    Historical Provider, MD   varenicline (CHANTIX) 1 mg Tab Take 1 tablet (1 mg total) by mouth 2 (two) times daily. 12/20/19 1/19/20  Gonzalo Macias MD    amLODIPine (NORVASC) 5 MG tablet Take 5 mg by mouth once daily.  11/8/17  Historical Provider, MD   isosorbide mononitrate (IMDUR) 60 MG 24 hr tablet Take 60 mg by mouth once daily.  11/8/17  Historical Provider, MD     Anticoagulants/Antiplatelets: Xarelto - stopped 5 days ago    Allergies:   Review of patient's allergies indicates:   Allergen Reactions    Azithromycin Anaphylaxis     Other reaction(s): swelling to entire body  Swelling (tongue / lips)^      Ciprofloxacin Swelling     Throat swells    Codeine      Swelling (throat)^    Tolerates Morphine       Sedation History:  no adverse reactions    Review of Systems:   Hematological: no known coagulopathies  Respiratory: no shortness of breath  Cardiovascular: no chest pain  Gastrointestinal: no abdominal pain  Genito-Urinary: no dysuria  Musculoskeletal: negative  Neurological: no TIA or stroke symptoms         OBJECTIVE:     Vital Signs (Most Recent)  Temp: 97.9 °F (36.6 °C) (01/20/20 0746)  Pulse: 70 (01/20/20 0746)  Resp: 14 (01/20/20 0746)  BP: 134/69 (01/20/20 0747)  SpO2: 98 % (01/20/20 0746)    Physical Exam:  ASA: 3  Mallampati: 2    General: no acute distress  Mental Status: alert and oriented to person, place and time  HEENT: normocephalic, atraumatic  Chest: unlabored breathing  Heart: regular heart rate  Abdomen: mildly distended, nontender   Extremity: moves all extremities    Laboratory  Lab Results   Component Value Date    INR 1.1 01/08/2020       Lab Results   Component Value Date    WBC 4.80 12/20/2019    HGB 12.8 12/20/2019    HCT 39.9 12/20/2019    MCV 87 12/20/2019     12/20/2019      Lab Results   Component Value Date    GLU 93 12/20/2019     12/20/2019    K 3.9 12/20/2019    CL 99 (L) 12/20/2019    CO2 31 (H) 12/20/2019    BUN 8 12/20/2019    CREATININE 0.8 12/20/2019    CALCIUM 9.3 12/20/2019    MG 1.9 10/15/2018    ALT 22 12/20/2019    AST 19 12/20/2019    ALBUMIN 3.9 12/20/2019    BILITOT 0.4 12/20/2019        ASSESSMENT/PLAN:      54 y.o. female current smoker with history of gastric marginal zone B-cell lymphoma who was found to have a hypermetabolic 2.7 cm RLL lesion.     Sedation Plan: moderate  Patient will undergo CT guided lung biopsy of RLL lesion.     Lakeisha Patel MD  Department of Radiology, PGY-2  Pager: [phone]

## 2020-01-20 NOTE — DISCHARGE SUMMARY
Radiology Discharge Summary      Hospital Course: No complications    Admit Date: 1/20/2020  Discharge Date: 01/20/2020     Instructions Given to Patient: Yes  Diet: Resume prior diet  Activity: activity as tolerated    Description of Condition on Discharge: Stable  Vital Signs (Most Recent): Temp: 97.9 °F (36.6 °C) (01/20/20 0915)  Pulse: 66 (01/20/20 0945)  Resp: 14 (01/20/20 0945)  BP: 118/68 (01/20/20 0945)  SpO2: 97 % (01/20/20 0945)    Discharge Disposition: Home    Discharge Diagnosis: Lung lesion     Follow-up: with IR with anesthesia for repeat attempt    .ITyler M.D. personally reviewed and agree with the above dictated note.

## 2020-01-21 ENCOUNTER — TELEPHONE (OUTPATIENT)
Dept: INTERVENTIONAL RADIOLOGY/VASCULAR | Facility: HOSPITAL | Age: 55
End: 2020-01-21

## 2020-01-21 DIAGNOSIS — R91.8 RIGHT LOWER LOBE LUNG MASS: Primary | ICD-10-CM

## 2020-01-28 ENCOUNTER — TELEPHONE (OUTPATIENT)
Dept: TRANSFUSION MEDICINE | Facility: HOSPITAL | Age: 55
End: 2020-01-28

## 2020-01-28 DIAGNOSIS — C85.99 GASTRIC LYMPHOMA: Primary | ICD-10-CM

## 2020-01-28 NOTE — TELEPHONE ENCOUNTER
Ms. Newton is scheduled for lung biopsy on 2/4/20 with IR. Will schedule a BMT appointment with labs and path results on 2/18/20

## 2020-02-03 DIAGNOSIS — R91.8 RIGHT LOWER LOBE LUNG MASS: Primary | ICD-10-CM

## 2020-02-03 NOTE — PRE-PROCEDURE INSTRUCTIONS
PreOp Instructions given:   - Verbal medication information (what to hold and what to take)   - NPO guidelines   - Arrival place directions given;   - Bathing with antibacterial soap   - Don't wear any jewelry or bring any valuables AM of surgery   - No makeup or moisturizer to face   - No perfume/cologne, powder, lotions or aftershave   Pt. verbalized understanding.     Pt denies any h/o Anesthesia/Sedation complications or side effects.    ARRIVAL TO LAB - 0830, THEN TO [facility]  PT'S SISTER - TRAVIS ROSADO WILL BE PROVIDING TRANSPORTATION HOME UPON DISCHARGE.

## 2020-02-04 ENCOUNTER — ANESTHESIA EVENT (OUTPATIENT)
Dept: ENDOSCOPY | Facility: HOSPITAL | Age: 55
End: 2020-02-04
Payer: MEDICAID

## 2020-02-04 ENCOUNTER — ANESTHESIA (OUTPATIENT)
Dept: ENDOSCOPY | Facility: HOSPITAL | Age: 55
End: 2020-02-04
Payer: MEDICAID

## 2020-02-04 ENCOUNTER — HOSPITAL ENCOUNTER (OUTPATIENT)
Facility: HOSPITAL | Age: 55
Discharge: HOME | End: 2020-02-04
Attending: RADIOLOGY | Admitting: RADIOLOGY
Payer: MEDICAID

## 2020-02-04 VITALS
BODY MASS INDEX: 33.13 KG/M2 | DIASTOLIC BLOOD PRESSURE: 79 MMHG | WEIGHT: 180 LBS | SYSTOLIC BLOOD PRESSURE: 156 MMHG | TEMPERATURE: 98 F | OXYGEN SATURATION: 97 % | HEART RATE: 83 BPM | RESPIRATION RATE: 16 BRPM | HEIGHT: 62 IN

## 2020-02-04 DIAGNOSIS — R91.8 RIGHT LOWER LOBE LUNG MASS: ICD-10-CM

## 2020-02-04 PROCEDURE — 63600175 PHARM REV CODE 636 W HCPCS: Performed by: NURSE ANESTHETIST, CERTIFIED REGISTERED

## 2020-02-04 PROCEDURE — D9220A PRA ANESTHESIA: Mod: ANES,,, | Performed by: ANESTHESIOLOGY

## 2020-02-04 PROCEDURE — 88342 IMHCHEM/IMCYTCHM 1ST ANTB: CPT | Mod: 26,,, | Performed by: PATHOLOGY

## 2020-02-04 PROCEDURE — 88305 TISSUE EXAM BY PATHOLOGIST: ICD-10-PCS | Mod: 26,,, | Performed by: PATHOLOGY

## 2020-02-04 PROCEDURE — 81264 IGK REARRANGEABN CLONAL POP: CPT | Performed by: PATHOLOGY

## 2020-02-04 PROCEDURE — 25000003 PHARM REV CODE 250: Performed by: ANESTHESIOLOGY

## 2020-02-04 PROCEDURE — 25000003 PHARM REV CODE 250: Performed by: NURSE ANESTHETIST, CERTIFIED REGISTERED

## 2020-02-04 PROCEDURE — 88185 FLOWCYTOMETRY/TC ADD-ON: CPT | Mod: 59 | Performed by: PATHOLOGY

## 2020-02-04 PROCEDURE — 37000009 HC ANESTHESIA EA ADD 15 MINS

## 2020-02-04 PROCEDURE — 00522 ANES CLSD CH PX NDL BX PLEUR: CPT

## 2020-02-04 PROCEDURE — 88341 IMHCHEM/IMCYTCHM EA ADD ANTB: CPT | Performed by: PATHOLOGY

## 2020-02-04 PROCEDURE — 88341 IMHCHEM/IMCYTCHM EA ADD ANTB: CPT | Mod: 26,,, | Performed by: PATHOLOGY

## 2020-02-04 PROCEDURE — 88333 PATH CONSLTJ SURG CYTO XM 1: CPT | Mod: 26,,, | Performed by: PATHOLOGY

## 2020-02-04 PROCEDURE — 88189 FLOWCYTOMETRY/READ 16 & >: CPT | Mod: ,,, | Performed by: PATHOLOGY

## 2020-02-04 PROCEDURE — 37000008 HC ANESTHESIA 1ST 15 MINUTES

## 2020-02-04 PROCEDURE — 88341 PR IHC OR ICC EACH ADD'L SINGLE ANTIBODY  STAINPR: ICD-10-PCS | Mod: 26,,, | Performed by: PATHOLOGY

## 2020-02-04 PROCEDURE — 88342 IMHCHEM/IMCYTCHM 1ST ANTB: CPT | Mod: 59 | Performed by: PATHOLOGY

## 2020-02-04 PROCEDURE — 71000039 HC RECOVERY, EACH ADD'L HOUR

## 2020-02-04 PROCEDURE — 88305 TISSUE EXAM BY PATHOLOGIST: CPT | Performed by: PATHOLOGY

## 2020-02-04 PROCEDURE — 71000033 HC RECOVERY, INTIAL HOUR

## 2020-02-04 PROCEDURE — D9220A PRA ANESTHESIA: ICD-10-PCS | Mod: ANES,,, | Performed by: ANESTHESIOLOGY

## 2020-02-04 PROCEDURE — 63600175 PHARM REV CODE 636 W HCPCS: Performed by: ANESTHESIOLOGY

## 2020-02-04 PROCEDURE — D9220A PRA ANESTHESIA: ICD-10-PCS | Mod: CRNA,,, | Performed by: NURSE ANESTHETIST, CERTIFIED REGISTERED

## 2020-02-04 PROCEDURE — 88184 FLOWCYTOMETRY/ TC 1 MARKER: CPT | Performed by: PATHOLOGY

## 2020-02-04 PROCEDURE — 88342 CHG IMMUNOCYTOCHEMISTRY: ICD-10-PCS | Mod: 26,,, | Performed by: PATHOLOGY

## 2020-02-04 PROCEDURE — D9220A PRA ANESTHESIA: Mod: CRNA,,, | Performed by: NURSE ANESTHETIST, CERTIFIED REGISTERED

## 2020-02-04 PROCEDURE — 63600175 PHARM REV CODE 636 W HCPCS

## 2020-02-04 PROCEDURE — 88333 PATH CONSLTJ SURG CYTO XM 1: CPT | Performed by: PATHOLOGY

## 2020-02-04 PROCEDURE — 63600175 PHARM REV CODE 636 W HCPCS: Performed by: FAMILY MEDICINE

## 2020-02-04 PROCEDURE — 88305 TISSUE EXAM BY PATHOLOGIST: CPT | Mod: 26,,, | Performed by: PATHOLOGY

## 2020-02-04 PROCEDURE — 81261 IGH GENE REARRANGE AMP METH: CPT | Performed by: PATHOLOGY

## 2020-02-04 PROCEDURE — 88333 PR  INTRAOPERATIVE CYTO PATH CONSULT, INITIAL SITE: ICD-10-PCS | Mod: 26,,, | Performed by: PATHOLOGY

## 2020-02-04 PROCEDURE — 88189 PR  FLOWCYTOMETRY/READ, 16 & > MARKERS: ICD-10-PCS | Mod: ,,, | Performed by: PATHOLOGY

## 2020-02-04 RX ORDER — DEXAMETHASONE SODIUM PHOSPHATE 4 MG/ML
INJECTION, SOLUTION INTRA-ARTICULAR; INTRALESIONAL; INTRAMUSCULAR; INTRAVENOUS; SOFT TISSUE
Status: DISCONTINUED | OUTPATIENT
Start: 2020-02-04 | End: 2020-02-04

## 2020-02-04 RX ORDER — FENTANYL CITRATE 50 UG/ML
INJECTION, SOLUTION INTRAMUSCULAR; INTRAVENOUS
Status: DISCONTINUED | OUTPATIENT
Start: 2020-02-04 | End: 2020-02-04

## 2020-02-04 RX ORDER — HYDROMORPHONE HYDROCHLORIDE 1 MG/ML
0.2 INJECTION, SOLUTION INTRAMUSCULAR; INTRAVENOUS; SUBCUTANEOUS EVERY 5 MIN PRN
Status: DISCONTINUED | OUTPATIENT
Start: 2020-02-04 | End: 2020-02-04 | Stop reason: HOSPADM

## 2020-02-04 RX ORDER — ONDANSETRON 2 MG/ML
INJECTION INTRAMUSCULAR; INTRAVENOUS
Status: DISCONTINUED | OUTPATIENT
Start: 2020-02-04 | End: 2020-02-04

## 2020-02-04 RX ORDER — FENTANYL CITRATE 50 UG/ML
25 INJECTION, SOLUTION INTRAMUSCULAR; INTRAVENOUS EVERY 5 MIN PRN
Status: DISCONTINUED | OUTPATIENT
Start: 2020-02-04 | End: 2020-02-04 | Stop reason: HOSPADM

## 2020-02-04 RX ORDER — MIDAZOLAM HYDROCHLORIDE 1 MG/ML
INJECTION, SOLUTION INTRAMUSCULAR; INTRAVENOUS
Status: DISCONTINUED | OUTPATIENT
Start: 2020-02-04 | End: 2020-02-04

## 2020-02-04 RX ORDER — SODIUM CHLORIDE 9 MG/ML
500 INJECTION, SOLUTION INTRAVENOUS ONCE
Status: COMPLETED | OUTPATIENT
Start: 2020-02-04 | End: 2020-02-04

## 2020-02-04 RX ORDER — HYDROMORPHONE HYDROCHLORIDE 1 MG/ML
INJECTION, SOLUTION INTRAMUSCULAR; INTRAVENOUS; SUBCUTANEOUS
Status: COMPLETED
Start: 2020-02-04 | End: 2020-02-04

## 2020-02-04 RX ORDER — OXYCODONE HYDROCHLORIDE 5 MG/1
5 TABLET ORAL
Status: DISCONTINUED | OUTPATIENT
Start: 2020-02-04 | End: 2020-02-04 | Stop reason: HOSPADM

## 2020-02-04 RX ORDER — PROPOFOL 10 MG/ML
VIAL (ML) INTRAVENOUS
Status: DISCONTINUED | OUTPATIENT
Start: 2020-02-04 | End: 2020-02-04

## 2020-02-04 RX ORDER — ROCURONIUM BROMIDE 10 MG/ML
INJECTION, SOLUTION INTRAVENOUS
Status: DISCONTINUED | OUTPATIENT
Start: 2020-02-04 | End: 2020-02-04

## 2020-02-04 RX ORDER — LIDOCAINE HCL/PF 100 MG/5ML
SYRINGE (ML) INTRAVENOUS
Status: DISCONTINUED | OUTPATIENT
Start: 2020-02-04 | End: 2020-02-04

## 2020-02-04 RX ADMIN — ROCURONIUM BROMIDE 30 MG: 10 INJECTION, SOLUTION INTRAVENOUS at 10:02

## 2020-02-04 RX ADMIN — DEXAMETHASONE SODIUM PHOSPHATE 8 MG: 4 INJECTION, SOLUTION INTRAMUSCULAR; INTRAVENOUS at 11:02

## 2020-02-04 RX ADMIN — FENTANYL CITRATE 100 MCG: 50 INJECTION, SOLUTION INTRAMUSCULAR; INTRAVENOUS at 10:02

## 2020-02-04 RX ADMIN — SUGAMMADEX 200 MG: 100 INJECTION, SOLUTION INTRAVENOUS at 11:02

## 2020-02-04 RX ADMIN — LIDOCAINE HYDROCHLORIDE 100 MG: 20 INJECTION, SOLUTION INTRAVENOUS at 10:02

## 2020-02-04 RX ADMIN — HYDROMORPHONE HYDROCHLORIDE 0.2 MG: 1 INJECTION, SOLUTION INTRAMUSCULAR; INTRAVENOUS; SUBCUTANEOUS at 01:02

## 2020-02-04 RX ADMIN — OXYCODONE HYDROCHLORIDE 5 MG: 5 TABLET ORAL at 01:02

## 2020-02-04 RX ADMIN — ONDANSETRON 4 MG: 2 INJECTION INTRAMUSCULAR; INTRAVENOUS at 11:02

## 2020-02-04 RX ADMIN — PROPOFOL 200 MG: 10 INJECTION, EMULSION INTRAVENOUS at 10:02

## 2020-02-04 RX ADMIN — MIDAZOLAM HYDROCHLORIDE 2 MG: 1 INJECTION, SOLUTION INTRAMUSCULAR; INTRAVENOUS at 10:02

## 2020-02-04 RX ADMIN — SODIUM CHLORIDE: 0.9 INJECTION, SOLUTION INTRAVENOUS at 10:02

## 2020-02-04 NOTE — ANESTHESIA POSTPROCEDURE EVALUATION
Anesthesia Post Evaluation    Patient: Gabi Newton    Procedure(s) Performed: Procedure(s) (LRB):  [id number] (N/A)    Final Anesthesia Type: general    Patient location during evaluation: PACU  Patient participation: Yes- Able to Participate  Level of consciousness: awake and alert and oriented  Post-procedure vital signs: reviewed and stable  Pain management: adequate  Airway patency: patent    PONV status at discharge: No PONV  Anesthetic complications: no      Cardiovascular status: stable  Respiratory status: unassisted  Hydration status: euvolemic  Follow-up not needed.          Vitals Value Taken Time   /72 2/4/2020  1:33 PM   Temp 36.4 °C (97.5 °F) 2/4/2020  1:15 PM   Pulse 74 2/4/2020  1:41 PM   Resp 30 2/4/2020  1:41 PM   SpO2 98 % 2/4/2020  1:41 PM   Vitals shown include unvalidated device data.      No case tracking events are documented in the log.      Pain/Mao Score: Pain Rating Prior to Med Admin: 7 (2/4/2020  1:21 PM)  Mao Score: 10 (2/4/2020  1:15 PM)

## 2020-02-04 NOTE — PLAN OF CARE
Pt arrived to ct for CT guided lung biopsy of RLL nodule. Pt oriented to unit and staff. Plan of care reviewed with patient, patient verbalizes understanding. Comfort measures utilized. Pt safely transferred from stretcher to procedural table. Fall risk reviewed with patient, fall risk interventions maintained. Safety strap applied, positioner pillows utilized to minimize pressure points. Blankets applied. Pt prepped and draped utilizing standard sterile technique. Patient placed on continuous monitoring, as required by sedation policy. Timeouts completed utilizing standard universal time-out, per department and facility policy. RN to remain at bedside, continuous monitoring maintained. Pt resting comfortably. Denies pain/discomfort. Will continue to monitor. See flow sheets for monitoring, medication administration, and updates.

## 2020-02-04 NOTE — PLAN OF CARE
Patient tolerated procedure well. Biopsy site clean dry and intact with bandaid in place.  Lung sounds clear.   VSS, complaints of pain well-controlled with interventions, tolerating po.  Preparing for discharge.  AVS reviewed with patient and family at the bedside. Verbalized understanding of S/S of complications, site care, activity and bathing restrictions, pain control, follow up and general recovery.  IV to be removed prior to discharge.  CXR complete in PACU, with order to D/C home.  Patient to restart Xarelto tomorrow, per IR.

## 2020-02-04 NOTE — TRANSFER OF CARE
"Anesthesia Transfer of Care Note    Patient: Gabi Newton    Procedure(s) Performed: Procedure(s) (LRB):  [id number] (N/A)    Patient location: PACU    Anesthesia Type: general    Transport from OR: Transported from OR on 6-10 L/min O2 by face mask with adequate spontaneous ventilation    Post pain: adequate analgesia    Post assessment: no apparent anesthetic complications and tolerated procedure well    Post vital signs: stable    Level of consciousness: awake    Nausea/Vomiting: no nausea/vomiting    Complications: none    Transfer of care protocol was followed      Last vitals:   Visit Vitals  BP (!) 117/55   Pulse 72   Temp 36.7 °C (98 °F) (Oral)   Resp 13   Ht 5' 2" (1.575 m)   Wt 81.6 kg (180 lb)   LMP 02/11/2015 (Approximate)   SpO2 96%   Breastfeeding? No   BMI 32.92 kg/m²     "

## 2020-02-04 NOTE — PLAN OF CARE
CXR image viewed per KORIN Bernard resident; patient released for discharge. Patient alert without complaints to Phase II for discharge.

## 2020-02-04 NOTE — H&P
Radiology History & Physical      SUBJECTIVE:     Chief Complaint: RLL nodule    History of Present Illness:  Gabi Newton is a 54 y.o. female who presents for CT guided lung biopsy of RLL nodule  Past Medical History:   Diagnosis Date    Abdominal pain 2018    Back pain     Fatty liver 2018    Gastric lymphoma 2019    Gastric tumor     GERD (gastroesophageal reflux disease)     Hyperlipidemia     Hypertension     Splenomegaly 2018     Past Surgical History:   Procedure Laterality Date    CARDIAC CATHETERIZATION      CARDIAC CATHETERIZATION  2018    had chest pains . states vessels at the bottom of heart collapsed     SECTION      x3    CHOLECYSTECTOMY      COLONOSCOPY      ENDOSCOPIC ULTRASOUND OF UPPER GASTROINTESTINAL TRACT Left 11/15/2019    Procedure: ULTRASOUND, UPPER GI TRACT, ENDOSCOPIC;  Surgeon: Mike Seay MD;  Location: [facility] (2ND FLR);  Service: Endoscopy;  Laterality: Left;  Ok to hold xarelto per protocol per Dr. Lloyd see media file 10/25/19-tb    ESOPHAGOGASTRODUODENOSCOPY      ESOPHAGOGASTRODUODENOSCOPY N/A 2018    Procedure: EGD (ESOPHAGOGASTRODUODENOSCOPY);  Surgeon: Ant Camejo MD;  Location: [facility];  Service: Endoscopy;  Laterality: N/A;    ESOPHAGOGASTRODUODENOSCOPY N/A 10/15/2019    Procedure: EGD (ESOPHAGOGASTRODUODENOSCOPY);  Surgeon: Melanie Cedeno MD;  Location: [facility];  Service: Endoscopy;  Laterality: N/A;    ESOPHAGOGASTRODUODENOSCOPY N/A 11/15/2019    Procedure: EGD (ESOPHAGOGASTRODUODENOSCOPY);  Surgeon: Mike Seay MD;  Location: [facility] ([address] [place] [address]);  Service: Endoscopy;  Laterality: N/A;    HYSTERECTOMY      PHLEBOGRAPHY Bilateral 10/16/2018    Procedure: VENOGRAM;  Surgeon: Colin Mathis MD;  Location[facility] CATH LAB;  Service: Cardiology;  Laterality: Bilateral;    CT RT/LT HEART CATHETERS Left     Coronary Arteriogram-2 Cath    RHINOPLASTY      TUBAL LIGATION      venogram Bilateral 10/16/2018        Home Meds:   Prior to Admission medications    Medication Sig Start Date End Date Taking? Authorizing Provider   allopurinol (ZYLOPRIM) 300 MG tablet Take 1 tablet (300 mg total) by mouth once daily.  Patient taking differently: Take 300 mg by mouth every evening.  12/4/19 12/3/20 Yes Bill Ann MD   methadone (METHADOSE) 40 mg disintegrating tablet Take 40 mg by mouth once daily.    Yes Historical Provider, MD   ondansetron (ZOFRAN) 8 MG tablet Take 1 tablet (8 mg total) by mouth every 8 (eight) hours as needed for Nausea. 12/3/19  Yes Melanie Cedeno MD   simvastatin (ZOCOR) 40 MG tablet Take 40 mg by mouth every evening.  7/21/14  Yes Historical Provider, MD   clotrimazole-betamethasone 1-0.05% (LOTRISONE) cream clotrimazole-betamethasone 1 %-0.05 % topical cream    Historical Provider, MD   lisinopril 10 MG tablet lisinopril 10 mg tablet   Take 1 tablet every day by oral route.    Historical Provider, MD   LORazepam (ATIVAN) 0.5 MG tablet Take 1 tablet (0.5 mg total) by mouth every 4 (four) hours as needed for Anxiety (For PET/CT). 12/3/19 12/8/19  Bill Ann MD   nicotine (NICODERM CQ) 21 mg/24 hr Place 1 patch onto the skin once daily. 12/20/19   Gonzalo Macias MD   nitroGLYCERIN (NITROSTAT) 0.4 MG SL tablet Place 0.4 mg under the tongue every 5 (five) minutes as needed for Chest pain.    Historical Provider, MD   rivaroxaban (XARELTO) 20 mg Tab Take 20 mg by mouth daily with dinner or evening meal.    Historical Provider, MD   triamcinolone acetonide 0.1% (KENALOG) 0.1 % ointment triamcinolone acetonide 0.1 % topical ointment   APPLY A THIN LAYER TO THE AFFECTED AREA(S) BY TOPICAL ROUTE 2 TIMES PER DAY    Historical Provider, MD   amLODIPine (NORVASC) 5 MG tablet Take 5 mg by mouth once daily.  11/8/17  Historical Provider, MD   isosorbide mononitrate (IMDUR) 60 MG 24 hr tablet Take 60 mg by mouth once daily.  11/8/17  Historical Provider, MD     Anticoagulants/Antiplatelets:  xarelto    Allergies:   Review of patient's allergies indicates:   Allergen Reactions    Azithromycin Anaphylaxis     Other reaction(s): swelling to entire body  Swelling (tongue / lips)^      Ciprofloxacin Swelling     Throat swells    Codeine      Swelling (throat)^    Tolerates Morphine       Sedation History:  no adverse reactions    Review of Systems:   Hematological: no known coagulopathies  Respiratory: no shortness of breath  Cardiovascular: no chest pain  Gastrointestinal: no abdominal pain  Genito-Urinary: no dysuria  Musculoskeletal: negative  Neurological: no TIA or stroke symptoms         OBJECTIVE:     Vital Signs (Most Recent)       Physical Exam:  ASA: per anesthesia  Mallampati: per anesthesia    General: no acute distress  Mental Status: alert and oriented to person, place and time  HEENT: normocephalic, atraumatic  Chest: unlabored breathing  Heart: regular heart rate  Abdomen: nondistended  Extremity: moves all extremities    Laboratory  Lab Results   Component Value Date    INR 1.1 01/08/2020       Lab Results   Component Value Date    WBC 4.80 12/20/2019    HGB 12.8 12/20/2019    HCT 39.9 12/20/2019    MCV 87 12/20/2019     12/20/2019      Lab Results   Component Value Date    GLU 93 12/20/2019     12/20/2019    K 3.9 12/20/2019    CL 99 (L) 12/20/2019    CO2 31 (H) 12/20/2019    BUN 8 12/20/2019    CREATININE 0.8 12/20/2019    CALCIUM 9.3 12/20/2019    MG 1.9 10/15/2018    ALT 22 12/20/2019    AST 19 12/20/2019    ALBUMIN 3.9 12/20/2019    BILITOT 0.4 12/20/2019       ASSESSMENT/PLAN:     Sedation Plan: per anesthesia  Patient will undergo CT guided lung biopsy.    Marco Antonio Goff M.D.  PGY-II Radiology  Pager: [phone]

## 2020-02-04 NOTE — DISCHARGE INSTRUCTIONS
CT-Guided Lung Biopsy  CT-guided lung biopsy is a procedure to collect small tissue samples from an abnormal area in your lung. During the procedure, an imaging method called CT (computed tomography) is used to show live pictures of your lung. Then a thin needle is used to remove the tissue samples. The samples are tested in a lab for cancer and other problems.     For the biopsy, a thin needle is used to remove samples of tissue from an abnormal area in the lung.   Getting ready for your procedure  Follow any instructions from your healthcare provider.  Tell your provider about any medicines you are taking. It is important for your provider to know if you are taking any blood-thinning medicines or have a bleeding disorder.  You may need to stop taking all or some medicine before the procedure. This includes:  · All prescription medicines  · Blood-thinning medicines (anticoagulants)  · Over-the-counter medicines such as aspirin or ibuprofen  · Street drugs  · Herbs, vitamins, and other supplements  Also tell your provider if you:  · Are pregnant or think you may be pregnant  · Are breastfeeding  · Are allergic to or have intolerances to any medicines  · Have a chronic cough, new cough, or other illness  · Use oxygen therapy at home  · Smoke or drink alcohol on a regular basis  Follow any directions youre given for not eating or drinking before the procedure.  The day of your procedure  The procedure takes about 60 minutes. The entire procedure (including time to prepare and recover) takes several hours. Youll likely go home the same day.  Before the procedure begins:  · An IV (intravenous) line may be put into a vein in your hand or arm. This line supplies fluids and medicines.  · To keep you free of pain during the procedure, you may be given anesthesia. Depending on the type of anesthesia used, you may be awake, drowsy, or in a deep sleep for the procedure.  During the procedure:  · Youll lie on a CT scan  table. You may be on your back, side, or stomach. Pictures of your lung are then taken using the CT scanner. This helps your provider find the best place to position the needle in your lungs.  · A chani is made on your skin where the needle will be inserted (biopsy site). The site is injected with numbing medicine.  · Using the CT pictures as a guide, the needle is passed through the numbed skin between your ribs and into your lung. Samples of tissue are then removed from the abnormal area in your lung. The samples are sent to a lab to be checked for problems.  · When the procedure is complete, the needle is removed. Pressure is applied to the biopsy site to help stop any bleeding. The site is then bandaged.  After the procedure:  · Youll be taken to a room to rest until the anesthesia wears off.  · A chest X-ray may be done. This is to make sure there was no damage to your lungs or the area where the needle was placed.  · When its time for you to go home, have an adult family member or friend ready to drive you.  Recovering at home  You may cough up a small amount of blood shortly after the procedure. Later, you may also have some soreness around the biopsy site. Once at home, follow any instructions youre given. Be sure to:  · Take all medicines as directed.  · Care for the biopsy site as instructed.  · Check for signs of infection at the biopsy site (see below).  · Do not bathe or shower until your provider says it's OK. If you wish, you may wash with a sponge or washcloth.  · Avoid heavy lifting and other strenuous activities as directed.  · If you plan any air travel, ask your provider when you can do so. You may be told not to fly for a few weeks. This is because pressure changes may affect your lungs.  When to call your provider  Call 911 or your local emergency number if you have sudden, severe difficulty breathing. This could be a life-threatening emergency.  Call your healthcare provider for less severe  symptoms that are still concerning. If you are unable to speak with your provider, go to the emergency room if you have any of the following symptoms:  · Fever of 100.4°F (38°C) or higher, or as directed by your provider  · Sudden chest pain, shortness of breath, or fainting  · Coughing up increasing amounts of blood  · Signs of infection at the biopsy site, such as increased redness or swelling, warmth, more pain, bleeding, or bad-smelling drainage   Follow-up  The provider who ordered your test will discuss the biopsy results with you during a follow-up visit. Results are usually ready within 1 to 2 weeks. If more tests or treatments are needed, your provider will discuss these with you.  Risks and possible complications  All procedures have some risk. Possible risks of this procedure include:  · An air leak in your lung (pneumothorax). This may require a stay in the hospital and treatment to re-inflate the lung.  · Bleeding into or around the lung  · Infection in the skin or lung  · Injury to other structures in the chest  · Risks of anesthesia. This will be discussed with you before the procedure.   Date Last Reviewed: 6/11/2015  © 8642-2067 [employer]. [address] [place] [address], [place] [address]. All rights reserved. This information is not intended as a substitute for professional medical care. Always follow your healthcare professional's instructions.

## 2020-02-04 NOTE — ANESTHESIA PROCEDURE NOTES
Intubation  Performed by: Sivan Solorio CRNA  Authorized by: Mikael Levy MD     Intubation:     Induction:  Intravenous    Intubated:  Postinduction    Mask Ventilation:  Easy mask    Attempts:  1    Attempted By:  Staff anesthesiologist    Blade:  Gee 2    Laryngeal View Grade: Grade I - full view of chords      Difficult Airway Encountered?: No      Complications:  None    Airway Device:  Oral endotracheal tube    Airway Device Size:  7.5    Style/Cuff Inflation:  Cuffed    Secured at:  The lips    Placement Verified By:  Capnometry    Complicating Factors:  None    Findings Post-Intubation:  BS equal bilateral

## 2020-02-04 NOTE — ANESTHESIA PREPROCEDURE EVALUATION
02/04/2020  Gabi Newton is a 54 y.o., female.  Patient Active Problem List   Diagnosis    Abdominal pain    Fatty liver    Hypertension    Hyperlipidemia    Constipation    Splenomegaly    Venous insufficiency (chronic) (peripheral)    Peripheral edema    Lung nodule    Smoker    Shortness of breath    GERD (gastroesophageal reflux disease)    GIST (gastrointestinal stromal tumor), non-malignant    Gastric lymphoma    Right lower lobe lung mass         Pre-op Assessment         Review of Systems    Physical Exam  General:  Well nourished    Airway/Jaw/Neck:  Airway Findings: Mouth Opening: Normal Tongue: Normal  General Airway Assessment: Adult  Mallampati: II  Improves to II with phonation.  TM Distance: Normal, at least 6 cm      Dental:  Poor dentition with several very loose, broken teeth.    Chest/Lungs:  Chest/Lungs Findings: Expiratory Wheezes, Mild     Heart/Vascular:  Heart Findings: Rate: Normal  Rhythm: Regular Rhythm  Sounds: Normal        Mental Status:  Mental Status Findings:  Cooperative, Alert and Oriented         Anesthesia Plan  Type of Anesthesia, risks & benefits discussed:  Anesthesia Type:  general, MAC  Patient's Preference:   Intra-op Monitoring Plan: standard ASA monitors  Intra-op Monitoring Plan Comments:   Post Op Pain Control Plan:   Post Op Pain Control Plan Comments:   Induction:   IV  Beta Blocker:  Patient is not currently on a Beta-Blocker (No further documentation required).       Informed Consent: Patient understands risks and agrees with Anesthesia plan.  Questions answered. Anesthesia consent signed with patient.  ASA Score: 3     Day of Surgery Review of History & Physical:    H&P update referred to the surgeon.         Ready For Surgery From Anesthesia Perspective.

## 2020-02-05 NOTE — DISCHARGE SUMMARY
Radiology Discharge Summary      Hospital Course: No complications    Admit Date: 2/4/2020  Discharge Date: 02/05/2020     Instructions Given to Patient: Yes  Diet: Resume prior diet  Activity: activity as tolerated    Description of Condition on Discharge: Stable  Vital Signs (Most Recent): Temp: 98 °F (36.7 °C) (02/04/20 1352)  Pulse: 83 (02/04/20 1352)  Resp: 16 (02/04/20 1352)  BP: (!) 156/79 (02/04/20 1352)  SpO2: 97 % (02/04/20 1352)    Discharge Disposition: Home    Discharge Diagnosis: lung mass     Follow-up: with referring provider    Marco Antonio Goff MD  PGY-II Radiology

## 2020-02-21 LAB
COMMENT: NORMAL
FINAL PATHOLOGIC DIAGNOSIS: NORMAL
GROSS: NORMAL
Lab: NORMAL
MICROSCOPIC EXAM: NORMAL
SUPPLEMENTAL DIAGNOSIS: NORMAL

## 2020-03-03 ENCOUNTER — LAB VISIT (OUTPATIENT)
Dept: LAB | Facility: HOSPITAL | Age: 55
End: 2020-03-03
Payer: MEDICAID

## 2020-03-03 ENCOUNTER — OFFICE VISIT (OUTPATIENT)
Dept: HEMATOLOGY/ONCOLOGY | Facility: CLINIC | Age: 55
End: 2020-03-03
Payer: MEDICAID

## 2020-03-03 VITALS
SYSTOLIC BLOOD PRESSURE: 138 MMHG | RESPIRATION RATE: 16 BRPM | OXYGEN SATURATION: 95 % | DIASTOLIC BLOOD PRESSURE: 85 MMHG | BODY MASS INDEX: 36.25 KG/M2 | TEMPERATURE: 98 F | WEIGHT: 192 LBS | HEART RATE: 77 BPM | HEIGHT: 61 IN

## 2020-03-03 DIAGNOSIS — M54.9 BACK PAIN, UNSPECIFIED BACK LOCATION, UNSPECIFIED BACK PAIN LATERALITY, UNSPECIFIED CHRONICITY: ICD-10-CM

## 2020-03-03 DIAGNOSIS — C85.89 MARGINAL ZONE LYMPHOMA OF EXTRANODAL AND SOLID ORGAN SITES: ICD-10-CM

## 2020-03-03 DIAGNOSIS — F17.210 HEAVY CIGARETTE SMOKER: ICD-10-CM

## 2020-03-03 DIAGNOSIS — C85.99 GASTRIC LYMPHOMA: ICD-10-CM

## 2020-03-03 DIAGNOSIS — C88.4 EXTRANODAL MARGINAL ZONE B-CELL LYMPHOMA OF MUCOSA-ASSOCIATED LYMPHOID TISSUE (MALT): Primary | ICD-10-CM

## 2020-03-03 DIAGNOSIS — R10.13 EPIGASTRIC PAIN: ICD-10-CM

## 2020-03-03 DIAGNOSIS — N28.89 RENAL MASS, RIGHT: ICD-10-CM

## 2020-03-03 DIAGNOSIS — I82.401 DEEP VEIN THROMBOSIS (DVT) OF RIGHT LOWER EXTREMITY, UNSPECIFIED CHRONICITY, UNSPECIFIED VEIN: ICD-10-CM

## 2020-03-03 PROBLEM — C88.40 EXTRANODAL MARGINAL ZONE B-CELL LYMPHOMA OF MUCOSA-ASSOCIATED LYMPHOID TISSUE (MALT): Status: ACTIVE | Noted: 2020-03-03

## 2020-03-03 LAB
ALBUMIN SERPL BCP-MCNC: 3.6 G/DL (ref 3.5–5.2)
ALP SERPL-CCNC: 127 U/L (ref 55–135)
ALT SERPL W/O P-5'-P-CCNC: 26 U/L (ref 10–44)
ANION GAP SERPL CALC-SCNC: 7 MMOL/L (ref 8–16)
AST SERPL-CCNC: 21 U/L (ref 10–40)
BASOPHILS # BLD AUTO: 0.03 K/UL (ref 0–0.2)
BASOPHILS NFR BLD: 0.6 % (ref 0–1.9)
BILIRUB SERPL-MCNC: 0.3 MG/DL (ref 0.1–1)
BUN SERPL-MCNC: 6 MG/DL (ref 6–20)
CALCIUM SERPL-MCNC: 9.7 MG/DL (ref 8.7–10.5)
CHLORIDE SERPL-SCNC: 101 MMOL/L (ref 95–110)
CO2 SERPL-SCNC: 32 MMOL/L (ref 23–29)
CREAT SERPL-MCNC: 0.9 MG/DL (ref 0.5–1.4)
DIFFERENTIAL METHOD: ABNORMAL
EOSINOPHIL # BLD AUTO: 0.2 K/UL (ref 0–0.5)
EOSINOPHIL NFR BLD: 4.9 % (ref 0–8)
ERYTHROCYTE [DISTWIDTH] IN BLOOD BY AUTOMATED COUNT: 13.5 % (ref 11.5–14.5)
EST. GFR  (AFRICAN AMERICAN): >60 ML/MIN/1.73 M^2
EST. GFR  (NON AFRICAN AMERICAN): >60 ML/MIN/1.73 M^2
GLUCOSE SERPL-MCNC: 105 MG/DL (ref 70–110)
HCT VFR BLD AUTO: 42.8 % (ref 37–48.5)
HGB BLD-MCNC: 12.8 G/DL (ref 12–16)
IMM GRANULOCYTES # BLD AUTO: 0.02 K/UL (ref 0–0.04)
IMM GRANULOCYTES NFR BLD AUTO: 0.4 % (ref 0–0.5)
LDH SERPL L TO P-CCNC: 290 U/L (ref 110–260)
LYMPHOCYTES # BLD AUTO: 0.5 K/UL (ref 1–4.8)
LYMPHOCYTES NFR BLD: 9.4 % (ref 18–48)
MCH RBC QN AUTO: 27.1 PG (ref 27–31)
MCHC RBC AUTO-ENTMCNC: 29.9 G/DL (ref 32–36)
MCV RBC AUTO: 91 FL (ref 82–98)
MONOCYTES # BLD AUTO: 0.3 K/UL (ref 0.3–1)
MONOCYTES NFR BLD: 6.1 % (ref 4–15)
NEUTROPHILS # BLD AUTO: 3.8 K/UL (ref 1.8–7.7)
NEUTROPHILS NFR BLD: 78.6 % (ref 38–73)
NRBC BLD-RTO: 0 /100 WBC
PLATELET # BLD AUTO: 161 K/UL (ref 150–350)
PMV BLD AUTO: 10 FL (ref 9.2–12.9)
POTASSIUM SERPL-SCNC: 4.5 MMOL/L (ref 3.5–5.1)
PROT SERPL-MCNC: 7.5 G/DL (ref 6–8.4)
RBC # BLD AUTO: 4.73 M/UL (ref 4–5.4)
SODIUM SERPL-SCNC: 140 MMOL/L (ref 136–145)
URATE SERPL-MCNC: 4.9 MG/DL (ref 2.4–5.7)
WBC # BLD AUTO: 4.89 K/UL (ref 3.9–12.7)

## 2020-03-03 PROCEDURE — 99215 OFFICE O/P EST HI 40 MIN: CPT | Mod: S$PBB,,, | Performed by: STUDENT IN AN ORGANIZED HEALTH CARE EDUCATION/TRAINING PROGRAM

## 2020-03-03 PROCEDURE — 99999 PR PBB SHADOW E&M-EST. PATIENT-LVL IV: ICD-10-PCS | Mod: PBBFAC,,, | Performed by: STUDENT IN AN ORGANIZED HEALTH CARE EDUCATION/TRAINING PROGRAM

## 2020-03-03 PROCEDURE — 83615 LACTATE (LD) (LDH) ENZYME: CPT

## 2020-03-03 PROCEDURE — 85025 COMPLETE CBC W/AUTO DIFF WBC: CPT

## 2020-03-03 PROCEDURE — 80053 COMPREHEN METABOLIC PANEL: CPT

## 2020-03-03 PROCEDURE — 99214 OFFICE O/P EST MOD 30 MIN: CPT | Mod: PBBFAC | Performed by: STUDENT IN AN ORGANIZED HEALTH CARE EDUCATION/TRAINING PROGRAM

## 2020-03-03 PROCEDURE — 36415 COLL VENOUS BLD VENIPUNCTURE: CPT

## 2020-03-03 PROCEDURE — 99999 PR PBB SHADOW E&M-EST. PATIENT-LVL IV: CPT | Mod: PBBFAC,,, | Performed by: STUDENT IN AN ORGANIZED HEALTH CARE EDUCATION/TRAINING PROGRAM

## 2020-03-03 PROCEDURE — 99215 PR OFFICE/OUTPT VISIT, EST, LEVL V, 40-54 MIN: ICD-10-PCS | Mod: S$PBB,,, | Performed by: STUDENT IN AN ORGANIZED HEALTH CARE EDUCATION/TRAINING PROGRAM

## 2020-03-03 PROCEDURE — 84550 ASSAY OF BLOOD/URIC ACID: CPT

## 2020-03-03 NOTE — Clinical Note
New diagnosis of marginal zone lymphoma- Rituxan every week for 4 weeks for 4 cycles - plan placed for insurance approvalFollow up in 2 weeks for blood work, appointment and cycle 1 of Rituxan

## 2020-03-03 NOTE — Clinical Note
Please schedule for lower extremity ultrasoundPlease schedule a referral to urology for right kidney mass

## 2020-03-03 NOTE — PLAN OF CARE
START ON PATHWAY REGIMEN - Lymphoma and CLL    [id number]        Rituximab (Rituxan(R))           Additional Orders: Hepatitis B&C testing recommended prior to rituximab   use on all patients. Final concentration of rituximab must be between 1 and 4   mg/ml.    **Always confirm dose/schedule in your pharmacy ordering system**    Patient Characteristics:  Marginal Zone Lymphoma, Systemic, First Line, Symptomatic  Disease Type: Marginal Zone Lymphoma  Disease Type: Not Applicable  Disease Type: Not Applicable  Localized or Systemic Disease<= Systemic  [place] Stage: IV  Line of Therapy: First Line  Asymptomatic or Symptomatic<= Symptomatic  Intent of Therapy:  Curative Intent, Discussed with Patient

## 2020-03-06 ENCOUNTER — TELEPHONE (OUTPATIENT)
Dept: UROLOGY | Facility: CLINIC | Age: 55
End: 2020-03-06

## 2020-03-06 NOTE — TELEPHONE ENCOUNTER
----- Message from Shannon Toussaint sent at 3/5/2020  8:57 AM CST -----  Regarding: referral to urology   urology for right kidney mass

## 2020-03-11 ENCOUNTER — HOSPITAL ENCOUNTER (OUTPATIENT)
Dept: RADIOLOGY | Facility: HOSPITAL | Age: 55
Discharge: HOME | End: 2020-03-11
Attending: STUDENT IN AN ORGANIZED HEALTH CARE EDUCATION/TRAINING PROGRAM
Payer: MEDICAID

## 2020-03-11 DIAGNOSIS — C88.4 EXTRANODAL MARGINAL ZONE B-CELL LYMPHOMA OF MUCOSA-ASSOCIATED LYMPHOID TISSUE (MALT): ICD-10-CM

## 2020-03-11 PROCEDURE — 93970 US LOWER EXTREMITY VEINS BILATERAL: ICD-10-PCS | Mod: 26,,, | Performed by: RADIOLOGY

## 2020-03-11 PROCEDURE — 93970 EXTREMITY STUDY: CPT | Mod: 26,,, | Performed by: RADIOLOGY

## 2020-03-11 PROCEDURE — 93970 EXTREMITY STUDY: CPT | Mod: TC

## 2020-03-17 ENCOUNTER — PATIENT MESSAGE (OUTPATIENT)
Dept: HEMATOLOGY/ONCOLOGY | Facility: CLINIC | Age: 55
End: 2020-03-17

## 2020-03-17 ENCOUNTER — LAB VISIT (OUTPATIENT)
Dept: LAB | Facility: HOSPITAL | Age: 55
End: 2020-03-17
Payer: MEDICAID

## 2020-03-17 ENCOUNTER — OFFICE VISIT (OUTPATIENT)
Dept: HEMATOLOGY/ONCOLOGY | Facility: CLINIC | Age: 55
End: 2020-03-17
Payer: MEDICAID

## 2020-03-17 VITALS
HEIGHT: 61 IN | SYSTOLIC BLOOD PRESSURE: 138 MMHG | TEMPERATURE: 99 F | RESPIRATION RATE: 16 BRPM | DIASTOLIC BLOOD PRESSURE: 71 MMHG | BODY MASS INDEX: 36.96 KG/M2 | OXYGEN SATURATION: 95 % | WEIGHT: 195.75 LBS | HEART RATE: 87 BPM

## 2020-03-17 DIAGNOSIS — R10.13 EPIGASTRIC PAIN: ICD-10-CM

## 2020-03-17 DIAGNOSIS — M54.9 BACK PAIN, UNSPECIFIED BACK LOCATION, UNSPECIFIED BACK PAIN LATERALITY, UNSPECIFIED CHRONICITY: ICD-10-CM

## 2020-03-17 DIAGNOSIS — C85.89 MARGINAL ZONE LYMPHOMA OF EXTRANODAL AND SOLID ORGAN SITES: ICD-10-CM

## 2020-03-17 DIAGNOSIS — C85.89 MARGINAL ZONE LYMPHOMA OF EXTRANODAL AND SOLID ORGAN SITES: Primary | ICD-10-CM

## 2020-03-17 DIAGNOSIS — N28.89 RENAL MASS, RIGHT: ICD-10-CM

## 2020-03-17 DIAGNOSIS — C88.4 EXTRANODAL MARGINAL ZONE B-CELL LYMPHOMA OF MUCOSA-ASSOCIATED LYMPHOID TISSUE (MALT): ICD-10-CM

## 2020-03-17 LAB
ALBUMIN SERPL BCP-MCNC: 3.2 G/DL (ref 3.5–5.2)
ALP SERPL-CCNC: 124 U/L (ref 55–135)
ALT SERPL W/O P-5'-P-CCNC: 27 U/L (ref 10–44)
ANION GAP SERPL CALC-SCNC: 5 MMOL/L (ref 8–16)
AST SERPL-CCNC: 21 U/L (ref 10–40)
BASOPHILS # BLD AUTO: 0.01 K/UL (ref 0–0.2)
BASOPHILS NFR BLD: 0.2 % (ref 0–1.9)
BILIRUB SERPL-MCNC: 0.4 MG/DL (ref 0.1–1)
BUN SERPL-MCNC: 6 MG/DL (ref 6–20)
CALCIUM SERPL-MCNC: 9.3 MG/DL (ref 8.7–10.5)
CHLORIDE SERPL-SCNC: 100 MMOL/L (ref 95–110)
CO2 SERPL-SCNC: 34 MMOL/L (ref 23–29)
CREAT SERPL-MCNC: 0.9 MG/DL (ref 0.5–1.4)
DIFFERENTIAL METHOD: ABNORMAL
EOSINOPHIL # BLD AUTO: 0.3 K/UL (ref 0–0.5)
EOSINOPHIL NFR BLD: 4.4 % (ref 0–8)
ERYTHROCYTE [DISTWIDTH] IN BLOOD BY AUTOMATED COUNT: 13.7 % (ref 11.5–14.5)
EST. GFR  (AFRICAN AMERICAN): >60 ML/MIN/1.73 M^2
EST. GFR  (NON AFRICAN AMERICAN): >60 ML/MIN/1.73 M^2
GLUCOSE SERPL-MCNC: 159 MG/DL (ref 70–110)
HCT VFR BLD AUTO: 40.2 % (ref 37–48.5)
HGB BLD-MCNC: 11.8 G/DL (ref 12–16)
IMM GRANULOCYTES # BLD AUTO: 0.02 K/UL (ref 0–0.04)
IMM GRANULOCYTES NFR BLD AUTO: 0.4 % (ref 0–0.5)
LDH SERPL L TO P-CCNC: 311 U/L (ref 110–260)
LYMPHOCYTES # BLD AUTO: 0.4 K/UL (ref 1–4.8)
LYMPHOCYTES NFR BLD: 6.4 % (ref 18–48)
MCH RBC QN AUTO: 26.6 PG (ref 27–31)
MCHC RBC AUTO-ENTMCNC: 29.4 G/DL (ref 32–36)
MCV RBC AUTO: 91 FL (ref 82–98)
MONOCYTES # BLD AUTO: 0.2 K/UL (ref 0.3–1)
MONOCYTES NFR BLD: 4.3 % (ref 4–15)
NEUTROPHILS # BLD AUTO: 4.8 K/UL (ref 1.8–7.7)
NEUTROPHILS NFR BLD: 84.3 % (ref 38–73)
NRBC BLD-RTO: 0 /100 WBC
PLATELET # BLD AUTO: 158 K/UL (ref 150–350)
PMV BLD AUTO: 10.4 FL (ref 9.2–12.9)
POTASSIUM SERPL-SCNC: 4.1 MMOL/L (ref 3.5–5.1)
PROT SERPL-MCNC: 6.8 G/DL (ref 6–8.4)
RBC # BLD AUTO: 4.43 M/UL (ref 4–5.4)
SODIUM SERPL-SCNC: 139 MMOL/L (ref 136–145)
URATE SERPL-MCNC: 3.1 MG/DL (ref 2.4–5.7)
WBC # BLD AUTO: 5.63 K/UL (ref 3.9–12.7)

## 2020-03-17 PROCEDURE — 83615 LACTATE (LD) (LDH) ENZYME: CPT

## 2020-03-17 PROCEDURE — 99999 PR PBB SHADOW E&M-EST. PATIENT-LVL IV: ICD-10-PCS | Mod: PBBFAC,,, | Performed by: INTERNAL MEDICINE

## 2020-03-17 PROCEDURE — 99214 OFFICE O/P EST MOD 30 MIN: CPT | Mod: PBBFAC | Performed by: INTERNAL MEDICINE

## 2020-03-17 PROCEDURE — 99215 OFFICE O/P EST HI 40 MIN: CPT | Mod: S$PBB,,, | Performed by: INTERNAL MEDICINE

## 2020-03-17 PROCEDURE — 80053 COMPREHEN METABOLIC PANEL: CPT

## 2020-03-17 PROCEDURE — 99215 PR OFFICE/OUTPT VISIT, EST, LEVL V, 40-54 MIN: ICD-10-PCS | Mod: S$PBB,,, | Performed by: INTERNAL MEDICINE

## 2020-03-17 PROCEDURE — 36415 COLL VENOUS BLD VENIPUNCTURE: CPT

## 2020-03-17 PROCEDURE — 99999 PR PBB SHADOW E&M-EST. PATIENT-LVL IV: CPT | Mod: PBBFAC,,, | Performed by: INTERNAL MEDICINE

## 2020-03-17 PROCEDURE — 84550 ASSAY OF BLOOD/URIC ACID: CPT

## 2020-03-17 PROCEDURE — 85025 COMPLETE CBC W/AUTO DIFF WBC: CPT

## 2020-03-17 RX ORDER — MEPERIDINE HYDROCHLORIDE 50 MG/ML
25 INJECTION INTRAMUSCULAR; INTRAVENOUS; SUBCUTANEOUS
Status: CANCELLED | OUTPATIENT
Start: 2020-03-17

## 2020-03-17 RX ORDER — NITROFURANTOIN 25; 75 MG/1; MG/1
CAPSULE ORAL
COMMUNITY
Start: 2020-03-09 | End: 2020-09-29

## 2020-03-17 RX ORDER — ACETAMINOPHEN 325 MG/1
650 TABLET ORAL
Status: CANCELLED | OUTPATIENT
Start: 2020-03-17

## 2020-03-17 RX ORDER — SODIUM CHLORIDE 0.9 % (FLUSH) 0.9 %
10 SYRINGE (ML) INJECTION
Status: CANCELLED | OUTPATIENT
Start: 2020-03-17

## 2020-03-17 RX ORDER — HEPARIN 100 UNIT/ML
500 SYRINGE INTRAVENOUS
Status: CANCELLED | OUTPATIENT
Start: 2020-03-17

## 2020-03-17 RX ORDER — HYDROXYZINE HYDROCHLORIDE 25 MG/1
25 TABLET, FILM COATED ORAL NIGHTLY
COMMUNITY
Start: 2020-03-04

## 2020-03-17 RX ORDER — FAMOTIDINE 10 MG/ML
20 INJECTION INTRAVENOUS
Status: CANCELLED | OUTPATIENT
Start: 2020-03-17

## 2020-03-17 NOTE — PROGRESS NOTES
Subjective:       Patient ID: Gabi Newton is a 54 y.o. female.    Chief Complaint: Lymphoma    Gabi has a recent diagnosis of Stage IV Marginal Zone Lymphoma (Gastric, Pulmonary, and Marrow).  Here today for first cycle of single agent Rituximab for plan of 4 weekly doses for induction followed by maintenance therapy.    ROS is negative today. Gabi is slightly nervous about her first treatment.  She did not sleep well last night.      HPI  Review of Systems   Constitutional: Negative.    HENT: Negative.    Eyes: Negative.    Respiratory: Negative.    Cardiovascular: Negative.    Gastrointestinal: Negative.    Endocrine: Negative.    Genitourinary: Negative.    Musculoskeletal: Negative.    Skin: Negative.    Allergic/Immunologic: Negative for environmental allergies, food allergies and immunocompromised state.   Neurological: Negative.    Hematological: Negative for adenopathy. Does not bruise/bleed easily.   Psychiatric/Behavioral: Negative.        Objective:      Physical Exam   Constitutional: She is oriented to person, place, and time. She appears well-developed and well-nourished.   HENT:   Head: Normocephalic and atraumatic.   Eyes: Conjunctivae are normal. No scleral icterus.   Neck: Normal range of motion. Neck supple.   Cardiovascular: Normal rate and intact distal pulses.   Pulmonary/Chest: Effort normal. No respiratory distress.   Abdominal: Soft. She exhibits no distension. There is no tenderness.   Musculoskeletal: Normal range of motion. She exhibits no edema.   Neurological: She is alert and oriented to person, place, and time. No cranial nerve deficit.   Skin: Skin is warm and dry.   Psychiatric: She has a normal mood and affect. Her behavior is normal.   Nursing note and vitals reviewed.      Assessment:       1. 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 Renal mass, right        Plan:       Needs Rituxan scheduled weekly for next 3 weeks  Return visit in 4 weeks with   Seth in Tuesday afternoon fellows clinic with CBC, CMP and LDH and PET scan    Needs urology visit scheduled for renal mass

## 2020-03-17 NOTE — Clinical Note
Needs Rituxan scheduled weekly for next 3 weeksReturn visit in 4 weeks with Dr Ann in Tuesday afternoon fellows clinic with CBC, CMP and LDH and PET scanNeeds urology visit scheduled for renal mass

## 2020-03-19 DIAGNOSIS — C85.89 MARGINAL ZONE LYMPHOMA OF EXTRANODAL AND SOLID ORGAN SITES: ICD-10-CM

## 2020-03-19 DIAGNOSIS — C88.4 EXTRANODAL MARGINAL ZONE B-CELL LYMPHOMA OF MUCOSA-ASSOCIATED LYMPHOID TISSUE (MALT): ICD-10-CM

## 2020-03-24 ENCOUNTER — INFUSION (OUTPATIENT)
Dept: INFUSION THERAPY | Facility: HOSPITAL | Age: 55
End: 2020-03-24
Attending: INTERNAL MEDICINE
Payer: MEDICAID

## 2020-03-24 VITALS
DIASTOLIC BLOOD PRESSURE: 67 MMHG | SYSTOLIC BLOOD PRESSURE: 108 MMHG | RESPIRATION RATE: 18 BRPM | TEMPERATURE: 98 F | HEART RATE: 82 BPM

## 2020-03-24 DIAGNOSIS — C88.4 EXTRANODAL MARGINAL ZONE B-CELL LYMPHOMA OF MUCOSA-ASSOCIATED LYMPHOID TISSUE (MALT): Primary | ICD-10-CM

## 2020-03-24 DIAGNOSIS — C85.89 MARGINAL ZONE LYMPHOMA OF EXTRANODAL AND SOLID ORGAN SITES: ICD-10-CM

## 2020-03-24 LAB — HBV CORE AB SERPL QL IA: NEGATIVE

## 2020-03-24 PROCEDURE — 96415 CHEMO IV INFUSION ADDL HR: CPT

## 2020-03-24 PROCEDURE — 25000003 PHARM REV CODE 250: Performed by: INTERNAL MEDICINE

## 2020-03-24 PROCEDURE — S0028 INJECTION, FAMOTIDINE, 20 MG: HCPCS | Performed by: INTERNAL MEDICINE

## 2020-03-24 PROCEDURE — 63600175 PHARM REV CODE 636 W HCPCS: Performed by: INTERNAL MEDICINE

## 2020-03-24 PROCEDURE — 96375 TX/PRO/DX INJ NEW DRUG ADDON: CPT

## 2020-03-24 PROCEDURE — 96413 CHEMO IV INFUSION 1 HR: CPT

## 2020-03-24 PROCEDURE — 86704 HEP B CORE ANTIBODY TOTAL: CPT

## 2020-03-24 PROCEDURE — 96367 TX/PROPH/DG ADDL SEQ IV INF: CPT

## 2020-03-24 RX ORDER — MEPERIDINE HYDROCHLORIDE 50 MG/ML
25 INJECTION INTRAMUSCULAR; INTRAVENOUS; SUBCUTANEOUS
Status: DISCONTINUED | OUTPATIENT
Start: 2020-03-24 | End: 2020-03-24 | Stop reason: HOSPADM

## 2020-03-24 RX ORDER — ONDANSETRON HCL IN 0.9 % NACL 8 MG/50 ML
8 INTRAVENOUS SOLUTION, PIGGYBACK (ML) INTRAVENOUS
Status: DISCONTINUED | OUTPATIENT
Start: 2020-03-24 | End: 2020-03-24 | Stop reason: HOSPADM

## 2020-03-24 RX ORDER — ONDANSETRON 2 MG/ML
8 INJECTION INTRAMUSCULAR; INTRAVENOUS ONCE
Status: DISCONTINUED | OUTPATIENT
Start: 2020-03-24 | End: 2020-03-24 | Stop reason: HOSPADM

## 2020-03-24 RX ORDER — FAMOTIDINE 10 MG/ML
20 INJECTION INTRAVENOUS
Status: COMPLETED | OUTPATIENT
Start: 2020-03-24 | End: 2020-03-24

## 2020-03-24 RX ORDER — HEPARIN 100 UNIT/ML
500 SYRINGE INTRAVENOUS
Status: DISCONTINUED | OUTPATIENT
Start: 2020-03-24 | End: 2020-03-24 | Stop reason: HOSPADM

## 2020-03-24 RX ORDER — PROCHLORPERAZINE EDISYLATE 5 MG/ML
10 INJECTION INTRAMUSCULAR; INTRAVENOUS
Status: COMPLETED | OUTPATIENT
Start: 2020-03-24 | End: 2020-03-24

## 2020-03-24 RX ORDER — ACETAMINOPHEN 325 MG/1
650 TABLET ORAL
Status: COMPLETED | OUTPATIENT
Start: 2020-03-24 | End: 2020-03-24

## 2020-03-24 RX ORDER — SODIUM CHLORIDE 0.9 % (FLUSH) 0.9 %
10 SYRINGE (ML) INJECTION
Status: DISCONTINUED | OUTPATIENT
Start: 2020-03-24 | End: 2020-03-24 | Stop reason: HOSPADM

## 2020-03-24 RX ORDER — ONDANSETRON 2 MG/ML
8 INJECTION INTRAMUSCULAR; INTRAVENOUS ONCE
Status: COMPLETED | OUTPATIENT
Start: 2020-03-24 | End: 2020-03-24

## 2020-03-24 RX ADMIN — RITUXIMAB 700 MG: 10 INJECTION, SOLUTION INTRAVENOUS at 09:03

## 2020-03-24 RX ADMIN — HYDROCORTISONE SODIUM SUCCINATE 100 MG: 100 INJECTION, POWDER, FOR SOLUTION INTRAMUSCULAR; INTRAVENOUS at 11:03

## 2020-03-24 RX ADMIN — SODIUM CHLORIDE: 9 INJECTION, SOLUTION INTRAVENOUS at 08:03

## 2020-03-24 RX ADMIN — DIPHENHYDRAMINE HYDROCHLORIDE 50 MG: 50 INJECTION, SOLUTION INTRAMUSCULAR; INTRAVENOUS at 08:03

## 2020-03-24 RX ADMIN — ONDANSETRON 8 MG: 2 INJECTION INTRAMUSCULAR; INTRAVENOUS at 11:03

## 2020-03-24 RX ADMIN — PROCHLORPERAZINE EDISYLATE 10 MG: 5 INJECTION INTRAMUSCULAR; INTRAVENOUS at 11:03

## 2020-03-24 RX ADMIN — ACETAMINOPHEN 650 MG: 325 TABLET ORAL at 08:03

## 2020-03-24 RX ADMIN — FAMOTIDINE 20 MG: 10 INJECTION INTRAVENOUS at 09:03

## 2020-03-24 NOTE — PLAN OF CARE
Patient began to vomit at 1100, Rituxan paused and per Dr. Malloy give zofran and continue treatment. Zofran given. Patient had no relief and began vomiting again. Compazine given at 1116 per Dr. Malloy. Patient stated she had some relief but began vomiting again around 1130. Hydrocortisone given per Dr. Velez. Symptoms subsided and Rituxan infusion restarted at 1155. Patient tolerated remainder of Rituxan with no complications. VSS. Pt instructed to call MD with any problems. NAD. Pt discharged home independently around 1455, patient refused to have someone come and pick her up and insisted that she drive herself.

## 2020-03-25 DIAGNOSIS — C88.4 EXTRANODAL MARGINAL ZONE B-CELL LYMPHOMA OF MUCOSA-ASSOCIATED LYMPHOID TISSUE (MALT): Primary | ICD-10-CM

## 2020-03-26 RX ORDER — FAMOTIDINE 10 MG/ML
20 INJECTION INTRAVENOUS
Status: CANCELLED | OUTPATIENT
Start: 2020-03-26

## 2020-03-26 RX ORDER — ACETAMINOPHEN 325 MG/1
650 TABLET ORAL
Status: CANCELLED | OUTPATIENT
Start: 2020-03-26

## 2020-03-26 RX ORDER — MEPERIDINE HYDROCHLORIDE 50 MG/ML
25 INJECTION INTRAMUSCULAR; INTRAVENOUS; SUBCUTANEOUS
Status: CANCELLED | OUTPATIENT
Start: 2020-04-08

## 2020-03-26 RX ORDER — HEPARIN 100 UNIT/ML
500 SYRINGE INTRAVENOUS
Status: CANCELLED | OUTPATIENT
Start: 2020-04-01

## 2020-03-26 RX ORDER — HEPARIN 100 UNIT/ML
500 SYRINGE INTRAVENOUS
Status: CANCELLED | OUTPATIENT
Start: 2020-04-08

## 2020-03-26 RX ORDER — MEPERIDINE HYDROCHLORIDE 50 MG/ML
25 INJECTION INTRAMUSCULAR; INTRAVENOUS; SUBCUTANEOUS
Status: CANCELLED | OUTPATIENT
Start: 2020-04-01

## 2020-03-26 RX ORDER — HEPARIN 100 UNIT/ML
500 SYRINGE INTRAVENOUS
Status: CANCELLED | OUTPATIENT
Start: 2020-03-26

## 2020-03-26 RX ORDER — SODIUM CHLORIDE 0.9 % (FLUSH) 0.9 %
10 SYRINGE (ML) INJECTION
Status: CANCELLED | OUTPATIENT
Start: 2020-03-26

## 2020-03-26 RX ORDER — FAMOTIDINE 10 MG/ML
20 INJECTION INTRAVENOUS
Status: CANCELLED | OUTPATIENT
Start: 2020-04-08

## 2020-03-26 RX ORDER — MEPERIDINE HYDROCHLORIDE 50 MG/ML
25 INJECTION INTRAMUSCULAR; INTRAVENOUS; SUBCUTANEOUS
Status: CANCELLED | OUTPATIENT
Start: 2020-03-26

## 2020-03-26 RX ORDER — SODIUM CHLORIDE 0.9 % (FLUSH) 0.9 %
10 SYRINGE (ML) INJECTION
Status: CANCELLED | OUTPATIENT
Start: 2020-04-01

## 2020-03-26 RX ORDER — ACETAMINOPHEN 325 MG/1
650 TABLET ORAL
Status: CANCELLED | OUTPATIENT
Start: 2020-04-01

## 2020-03-26 RX ORDER — ACETAMINOPHEN 325 MG/1
650 TABLET ORAL
Status: CANCELLED | OUTPATIENT
Start: 2020-04-08

## 2020-03-26 RX ORDER — SODIUM CHLORIDE 0.9 % (FLUSH) 0.9 %
10 SYRINGE (ML) INJECTION
Status: CANCELLED | OUTPATIENT
Start: 2020-04-08

## 2020-03-26 RX ORDER — FAMOTIDINE 10 MG/ML
20 INJECTION INTRAVENOUS
Status: CANCELLED | OUTPATIENT
Start: 2020-04-01

## 2020-03-31 ENCOUNTER — INFUSION (OUTPATIENT)
Dept: INFUSION THERAPY | Facility: HOSPITAL | Age: 55
End: 2020-03-31
Attending: STUDENT IN AN ORGANIZED HEALTH CARE EDUCATION/TRAINING PROGRAM
Payer: MEDICAID

## 2020-03-31 VITALS
SYSTOLIC BLOOD PRESSURE: 125 MMHG | HEART RATE: 88 BPM | DIASTOLIC BLOOD PRESSURE: 66 MMHG | RESPIRATION RATE: 18 BRPM | TEMPERATURE: 98 F

## 2020-03-31 DIAGNOSIS — C85.89 MARGINAL ZONE LYMPHOMA OF EXTRANODAL AND SOLID ORGAN SITES: ICD-10-CM

## 2020-03-31 DIAGNOSIS — C88.4 EXTRANODAL MARGINAL ZONE B-CELL LYMPHOMA OF MUCOSA-ASSOCIATED LYMPHOID TISSUE (MALT): Primary | ICD-10-CM

## 2020-03-31 PROCEDURE — 96375 TX/PRO/DX INJ NEW DRUG ADDON: CPT

## 2020-03-31 PROCEDURE — 25000003 PHARM REV CODE 250: Performed by: INTERNAL MEDICINE

## 2020-03-31 PROCEDURE — S0028 INJECTION, FAMOTIDINE, 20 MG: HCPCS | Performed by: INTERNAL MEDICINE

## 2020-03-31 PROCEDURE — 96415 CHEMO IV INFUSION ADDL HR: CPT

## 2020-03-31 PROCEDURE — 96413 CHEMO IV INFUSION 1 HR: CPT

## 2020-03-31 PROCEDURE — 63600175 PHARM REV CODE 636 W HCPCS: Performed by: INTERNAL MEDICINE

## 2020-03-31 RX ORDER — FAMOTIDINE 10 MG/ML
20 INJECTION INTRAVENOUS
Status: COMPLETED | OUTPATIENT
Start: 2020-03-31 | End: 2020-03-31

## 2020-03-31 RX ORDER — MEPERIDINE HYDROCHLORIDE 50 MG/ML
25 INJECTION INTRAMUSCULAR; INTRAVENOUS; SUBCUTANEOUS
Status: DISCONTINUED | OUTPATIENT
Start: 2020-03-31 | End: 2020-03-31 | Stop reason: HOSPADM

## 2020-03-31 RX ORDER — SODIUM CHLORIDE 0.9 % (FLUSH) 0.9 %
10 SYRINGE (ML) INJECTION
Status: DISCONTINUED | OUTPATIENT
Start: 2020-03-31 | End: 2020-03-31 | Stop reason: HOSPADM

## 2020-03-31 RX ORDER — ACETAMINOPHEN 325 MG/1
650 TABLET ORAL
Status: COMPLETED | OUTPATIENT
Start: 2020-03-31 | End: 2020-03-31

## 2020-03-31 RX ADMIN — ACETAMINOPHEN 650 MG: 325 TABLET ORAL at 08:03

## 2020-03-31 RX ADMIN — SODIUM CHLORIDE: 0.9 INJECTION, SOLUTION INTRAVENOUS at 08:03

## 2020-03-31 RX ADMIN — HYDROCORTISONE SODIUM SUCCINATE 100 MG: 100 INJECTION, POWDER, FOR SOLUTION INTRAMUSCULAR; INTRAVENOUS at 08:03

## 2020-03-31 RX ADMIN — FAMOTIDINE 20 MG: 10 INJECTION INTRAVENOUS at 08:03

## 2020-03-31 RX ADMIN — RITUXIMAB 700 MG: 10 INJECTION, SOLUTION INTRAVENOUS at 09:03

## 2020-03-31 NOTE — PLAN OF CARE
Pt tolerated Rituxan today. IV Push Hydrocortisone 100mg given as a pre med instead of Benadryl PER MD YUNG per Adverse reaction last infusion (Vomiting) VSS. NAD. PIV flushed and removed. declined AVS. Uses my [facility]. Discharged home. Ambulated independently.

## 2020-03-31 NOTE — NURSING
0810 Per MD YUNG, pt to not get Benadryl due to previous reaction last Rituxan of vomiting. Hydrocortisone IVPush 100mg given instead as a pre med. Will continue to monitor pt.

## 2020-04-07 ENCOUNTER — INFUSION (OUTPATIENT)
Dept: INFUSION THERAPY | Facility: HOSPITAL | Age: 55
End: 2020-04-07
Attending: INTERNAL MEDICINE
Payer: MEDICAID

## 2020-04-07 ENCOUNTER — TELEPHONE (OUTPATIENT)
Dept: HEMATOLOGY/ONCOLOGY | Facility: CLINIC | Age: 55
End: 2020-04-07

## 2020-04-07 VITALS
TEMPERATURE: 98 F | SYSTOLIC BLOOD PRESSURE: 111 MMHG | HEART RATE: 66 BPM | DIASTOLIC BLOOD PRESSURE: 59 MMHG | HEIGHT: 61 IN | RESPIRATION RATE: 18 BRPM | WEIGHT: 198.19 LBS | BODY MASS INDEX: 37.42 KG/M2

## 2020-04-07 DIAGNOSIS — C88.4 EXTRANODAL MARGINAL ZONE B-CELL LYMPHOMA OF MUCOSA-ASSOCIATED LYMPHOID TISSUE (MALT): Primary | ICD-10-CM

## 2020-04-07 DIAGNOSIS — C85.89 MARGINAL ZONE LYMPHOMA OF EXTRANODAL AND SOLID ORGAN SITES: ICD-10-CM

## 2020-04-07 PROCEDURE — 96415 CHEMO IV INFUSION ADDL HR: CPT

## 2020-04-07 PROCEDURE — 25000003 PHARM REV CODE 250: Performed by: INTERNAL MEDICINE

## 2020-04-07 PROCEDURE — 96375 TX/PRO/DX INJ NEW DRUG ADDON: CPT

## 2020-04-07 PROCEDURE — 63600175 PHARM REV CODE 636 W HCPCS: Mod: JG | Performed by: INTERNAL MEDICINE

## 2020-04-07 PROCEDURE — 96413 CHEMO IV INFUSION 1 HR: CPT

## 2020-04-07 PROCEDURE — S0028 INJECTION, FAMOTIDINE, 20 MG: HCPCS | Performed by: INTERNAL MEDICINE

## 2020-04-07 RX ORDER — FAMOTIDINE 10 MG/ML
20 INJECTION INTRAVENOUS
Status: COMPLETED | OUTPATIENT
Start: 2020-04-07 | End: 2020-04-07

## 2020-04-07 RX ORDER — MEPERIDINE HYDROCHLORIDE 50 MG/ML
25 INJECTION INTRAMUSCULAR; INTRAVENOUS; SUBCUTANEOUS
Status: DISCONTINUED | OUTPATIENT
Start: 2020-04-07 | End: 2020-04-07 | Stop reason: HOSPADM

## 2020-04-07 RX ORDER — SODIUM CHLORIDE 0.9 % (FLUSH) 0.9 %
10 SYRINGE (ML) INJECTION
Status: DISCONTINUED | OUTPATIENT
Start: 2020-04-07 | End: 2020-04-07 | Stop reason: HOSPADM

## 2020-04-07 RX ORDER — ACETAMINOPHEN 325 MG/1
650 TABLET ORAL
Status: COMPLETED | OUTPATIENT
Start: 2020-04-07 | End: 2020-04-07

## 2020-04-07 RX ORDER — HEPARIN 100 UNIT/ML
500 SYRINGE INTRAVENOUS
Status: DISCONTINUED | OUTPATIENT
Start: 2020-04-07 | End: 2020-04-07 | Stop reason: HOSPADM

## 2020-04-07 RX ADMIN — FAMOTIDINE 20 MG: 10 INJECTION INTRAVENOUS at 08:04

## 2020-04-07 RX ADMIN — SODIUM CHLORIDE: 0.9 INJECTION, SOLUTION INTRAVENOUS at 08:04

## 2020-04-07 RX ADMIN — HYDROCORTISONE SODIUM SUCCINATE 100 MG: 100 INJECTION, POWDER, FOR SOLUTION INTRAMUSCULAR; INTRAVENOUS at 08:04

## 2020-04-07 RX ADMIN — RITUXIMAB 700 MG: 10 INJECTION, SOLUTION INTRAVENOUS at 08:04

## 2020-04-07 RX ADMIN — ACETAMINOPHEN 650 MG: 325 TABLET ORAL at 08:04

## 2020-04-07 NOTE — TELEPHONE ENCOUNTER
----- Message from Mary Orta RN sent at 4/6/2020 10:03 PM CDT -----  Can I get a rituxan infusion the week of 4/13 (prob that Tuesday). The following week can we get labs, pet scan, and visit with dr ann. If he is still out with his baby then follow up should be with dr. velez  ----- Message -----  From: Ale Velez MD  Sent: 3/17/2020   8:58 AM CDT  To: Ruth Espinosa RN    Needs Rituxan scheduled weekly for next 3 weeks  Return visit in 4 weeks with Dr Ann in Tuesday afternoon fellows clinic with CBC, CMP and LDH and PET scan    Needs urology visit scheduled for renal mass

## 2020-04-07 NOTE — PLAN OF CARE
Pt refused Benadryl do to reacting to it during Cycle 1, hydrocortisone given as pre-med per Dr. Velez. Patient tolerated Rituxan with no complications. VSS. Pt instructed to call MD with any problems. NAD. Pt discharged home independently.

## 2020-04-14 ENCOUNTER — INFUSION (OUTPATIENT)
Dept: INFUSION THERAPY | Facility: HOSPITAL | Age: 55
End: 2020-04-14
Attending: INTERNAL MEDICINE
Payer: MEDICAID

## 2020-04-14 VITALS
TEMPERATURE: 98 F | HEIGHT: 61 IN | DIASTOLIC BLOOD PRESSURE: 60 MMHG | BODY MASS INDEX: 37.42 KG/M2 | RESPIRATION RATE: 18 BRPM | HEART RATE: 71 BPM | WEIGHT: 198.19 LBS | SYSTOLIC BLOOD PRESSURE: 128 MMHG

## 2020-04-14 DIAGNOSIS — C85.90 LYMPHOMA: Primary | ICD-10-CM

## 2020-04-14 DIAGNOSIS — C88.4 EXTRANODAL MARGINAL ZONE B-CELL LYMPHOMA OF MUCOSA-ASSOCIATED LYMPHOID TISSUE (MALT): ICD-10-CM

## 2020-04-14 DIAGNOSIS — C85.89 MARGINAL ZONE LYMPHOMA OF EXTRANODAL AND SOLID ORGAN SITES: ICD-10-CM

## 2020-04-14 PROCEDURE — 96413 CHEMO IV INFUSION 1 HR: CPT

## 2020-04-14 PROCEDURE — 25000003 PHARM REV CODE 250: Performed by: INTERNAL MEDICINE

## 2020-04-14 PROCEDURE — 63600175 PHARM REV CODE 636 W HCPCS: Performed by: INTERNAL MEDICINE

## 2020-04-14 PROCEDURE — 96415 CHEMO IV INFUSION ADDL HR: CPT

## 2020-04-14 PROCEDURE — 96375 TX/PRO/DX INJ NEW DRUG ADDON: CPT

## 2020-04-14 PROCEDURE — S0028 INJECTION, FAMOTIDINE, 20 MG: HCPCS | Performed by: INTERNAL MEDICINE

## 2020-04-14 RX ORDER — ACETAMINOPHEN 325 MG/1
650 TABLET ORAL
Status: COMPLETED | OUTPATIENT
Start: 2020-04-14 | End: 2020-04-14

## 2020-04-14 RX ORDER — FAMOTIDINE 10 MG/ML
20 INJECTION INTRAVENOUS
Status: COMPLETED | OUTPATIENT
Start: 2020-04-14 | End: 2020-04-14

## 2020-04-14 RX ORDER — HEPARIN 100 UNIT/ML
500 SYRINGE INTRAVENOUS
Status: DISCONTINUED | OUTPATIENT
Start: 2020-04-14 | End: 2020-04-14 | Stop reason: HOSPADM

## 2020-04-14 RX ORDER — SODIUM CHLORIDE 0.9 % (FLUSH) 0.9 %
10 SYRINGE (ML) INJECTION
Status: DISCONTINUED | OUTPATIENT
Start: 2020-04-14 | End: 2020-04-14 | Stop reason: HOSPADM

## 2020-04-14 RX ORDER — MEPERIDINE HYDROCHLORIDE 50 MG/ML
25 INJECTION INTRAMUSCULAR; INTRAVENOUS; SUBCUTANEOUS
Status: DISCONTINUED | OUTPATIENT
Start: 2020-04-14 | End: 2020-04-14 | Stop reason: HOSPADM

## 2020-04-14 RX ADMIN — FAMOTIDINE 20 MG: 10 INJECTION INTRAVENOUS at 08:04

## 2020-04-14 RX ADMIN — RITUXIMAB 700 MG: 10 INJECTION, SOLUTION INTRAVENOUS at 08:04

## 2020-04-14 RX ADMIN — ACETAMINOPHEN 650 MG: 325 TABLET ORAL at 08:04

## 2020-04-14 RX ADMIN — HYDROCORTISONE SODIUM SUCCINATE 100 MG: 100 INJECTION, POWDER, FOR SOLUTION INTRAMUSCULAR; INTRAVENOUS at 08:04

## 2020-04-14 RX ADMIN — SODIUM CHLORIDE: 0.9 INJECTION, SOLUTION INTRAVENOUS at 08:04

## 2020-04-14 NOTE — PLAN OF CARE
Tolerated rituxan well.  No reactions noted.  No questions or concerns at this time. Ambulated off unit in NAD.

## 2020-04-26 ENCOUNTER — PATIENT MESSAGE (OUTPATIENT)
Dept: HEMATOLOGY/ONCOLOGY | Facility: CLINIC | Age: 55
End: 2020-04-26

## 2020-04-27 ENCOUNTER — TELEPHONE (OUTPATIENT)
Dept: HEMATOLOGY/ONCOLOGY | Facility: CLINIC | Age: 55
End: 2020-04-27

## 2020-04-29 ENCOUNTER — HOSPITAL ENCOUNTER (OUTPATIENT)
Dept: RADIOLOGY | Facility: HOSPITAL | Age: 55
Discharge: HOME | End: 2020-04-29
Attending: INTERNAL MEDICINE
Payer: MEDICAID

## 2020-04-29 DIAGNOSIS — C85.89 MARGINAL ZONE LYMPHOMA OF EXTRANODAL AND SOLID ORGAN SITES: ICD-10-CM

## 2020-04-29 LAB — POCT GLUCOSE: 78 MG/DL (ref 70–110)

## 2020-04-29 PROCEDURE — 78815 NM PET CT ROUTINE: ICD-10-PCS | Mod: 26,PS,, | Performed by: RADIOLOGY

## 2020-04-29 PROCEDURE — 78815 PET IMAGE W/CT SKULL-THIGH: CPT | Mod: TC

## 2020-04-29 PROCEDURE — A9552 F18 FDG: HCPCS

## 2020-04-29 PROCEDURE — 78815 PET IMAGE W/CT SKULL-THIGH: CPT | Mod: 26,PS,, | Performed by: RADIOLOGY

## 2020-05-04 ENCOUNTER — OFFICE VISIT (OUTPATIENT)
Dept: UROLOGY | Facility: CLINIC | Age: 55
End: 2020-05-04
Payer: MEDICAID

## 2020-05-04 DIAGNOSIS — N28.89 RENAL MASS, RIGHT: ICD-10-CM

## 2020-05-04 DIAGNOSIS — C85.89 MARGINAL ZONE LYMPHOMA OF EXTRANODAL AND SOLID ORGAN SITES: ICD-10-CM

## 2020-05-04 DIAGNOSIS — M54.9 BACK PAIN, UNSPECIFIED BACK LOCATION, UNSPECIFIED BACK PAIN LATERALITY, UNSPECIFIED CHRONICITY: ICD-10-CM

## 2020-05-04 DIAGNOSIS — C88.4 EXTRANODAL MARGINAL ZONE B-CELL LYMPHOMA OF MUCOSA-ASSOCIATED LYMPHOID TISSUE (MALT): ICD-10-CM

## 2020-05-04 DIAGNOSIS — N28.89 RIGHT RENAL MASS: Primary | ICD-10-CM

## 2020-05-04 PROCEDURE — 99442 PR PHYSICIAN TELEPHONE EVALUATION 11-20 MIN: ICD-10-PCS | Mod: 95,,, | Performed by: UROLOGY

## 2020-05-04 PROCEDURE — 99442 PR PHYSICIAN TELEPHONE EVALUATION 11-20 MIN: CPT | Mod: 95,,, | Performed by: UROLOGY

## 2020-05-04 NOTE — LETTER
May 4, 2020      Bill Ann MD  [address] [facility] [address]           [facility]  [address], [facility] [address]  [facility] [address]  Phone: [phone]  Fax: [phone]          Patient: Gabi Newton   MR Number: [id number]   YOB: 1965   Date of Visit: 5/4/2020       Dear Dr. Bill Ann:    Thank you for referring Gabi Newton to me for evaluation. Attached you will find relevant portions of my assessment and plan of care.    If you have questions, please do not hesitate to call me. I look forward to following Gabi Newton along with you.    Sincerely,    Chao Calero MD    Enclosure  CC:  No Recipients    If you would like to receive this communication electronically, please contact [email or web address] or [phone] to request more information on Mail[employer] Link access.    For providers and/or their staff who would like to refer a patient to [facility], please contact us through our one-stop-shop provider referral line, [facility], at [phone].    If you feel you have received this communication in error or would no longer like to receive these types of communications, please e-mail [email or web address]

## 2020-05-04 NOTE — PROGRESS NOTES
Established Patient - Audio Only Telehealth Visit     The patient location is: home  The chief complaint leading to consultation is: right renal mass  Visit type: Virtual visit with audio only (telephone)  Total time spent with patient: 15 min       The reason for the audio only service rather than synchronous audio and video virtual visit was related to technical difficulties or patient preference/necessity.     Each patient to whom I provide medical services by telemedicine is:  (1) informed of the relationship between the physician and patient and the respective role of any other health care provider with respect to management of the patient; and (2) notified that they may decline to receive medical services by telemedicine and may withdraw from such care at any time. Patient verbally consented to receive this service via voice-only telephone call.       HPI:  54-year-old woman on rituximab for lymphoma.  She has multifocal disease.  There is a lesion in the lower pole of the right kidney which is hypermetabolic on PET scan.  The lesion has increased slightly in the last few months.  Due to the COVID pandemic she prefers not to be seen in person    I reviewed her imaging.  There is an infiltrative lesion in the bottom of the right kidney which is intensely hypermetabolic.  Her PET scan report is copy below      Impression       1.  Findings consistent with partial treatment response with decreased size of the stomach wall and decreased SUV throughout the stomach.  Metabolic activity on the baseline scan was atypically prominent for marginal zone lymphoma.  If a Deauville score were to be assigned, the score would be 4.    2.  Decreased size and SUV of the patient's right lower lobe pulmonary biopsy-proven lymphoma.    3.  Questionable slight increase in size of the patient's right lower pole lesion again concerning for RCC or oncocytoma.  Extranodal lymphoma could have a similar appearance.    IGeorge MD,  attest that I reviewed and interpreted the images.    Electronically signed by resident: Hemanth Rosen  Date: 04/29/2020  Time: 15:22    Electronically signed by: George Michelle  Date: 04/30/2020  Time: 09:27          Assessment and plan:      Lymphoma    Right renal mass - we discussed the possibilities of this being lymphoma versus renal cell carcinoma or a benign renal mass.  Given the clinical situation, I favor this being lymphoma.  She will see me in 3 months with a renal ultrasound.  If there is continued growth, we will refer her to interventional Radiology for biopsy    Follow-up with Oncology to continue her lymphoma treatment and follow up with primary physician                        This service was not originating from a related E/M service provided within the previous 7 days nor will  to an E/M service or procedure within the next 24 hours or my soonest available appointment.  Prevailing standard of care was able to be met in this audio-only visit.

## 2020-05-05 ENCOUNTER — OFFICE VISIT (OUTPATIENT)
Dept: HEMATOLOGY/ONCOLOGY | Facility: CLINIC | Age: 55
End: 2020-05-05
Payer: MEDICAID

## 2020-05-05 DIAGNOSIS — C88.4 EXTRANODAL MARGINAL ZONE B-CELL LYMPHOMA OF MUCOSA-ASSOCIATED LYMPHOID TISSUE (MALT): Primary | ICD-10-CM

## 2020-05-05 DIAGNOSIS — I82.401 DEEP VEIN THROMBOSIS (DVT) OF RIGHT LOWER EXTREMITY, UNSPECIFIED CHRONICITY, UNSPECIFIED VEIN: ICD-10-CM

## 2020-05-05 DIAGNOSIS — F17.210 HEAVY CIGARETTE SMOKER: ICD-10-CM

## 2020-05-05 DIAGNOSIS — N28.89 RENAL MASS, RIGHT: ICD-10-CM

## 2020-05-05 DIAGNOSIS — C85.89 MARGINAL ZONE LYMPHOMA OF EXTRANODAL AND SOLID ORGAN SITES: ICD-10-CM

## 2020-05-05 PROCEDURE — 99214 PR OFFICE/OUTPT VISIT, EST, LEVL IV, 30-39 MIN: ICD-10-PCS | Mod: 95,,, | Performed by: STUDENT IN AN ORGANIZED HEALTH CARE EDUCATION/TRAINING PROGRAM

## 2020-05-05 PROCEDURE — 99214 OFFICE O/P EST MOD 30 MIN: CPT | Mod: 95,,, | Performed by: STUDENT IN AN ORGANIZED HEALTH CARE EDUCATION/TRAINING PROGRAM

## 2020-05-05 NOTE — Clinical Note
Please follow up visit on July 7, 2020 with CBC, CMP, LDH, Uric acid and cycle 1 of Maintenance Rituximab therapy.

## 2020-05-05 NOTE — PROGRESS NOTES
Established Patient - Audio Only Telehealth Visit     The patient location is: Home  The chief complaint leading to consultation is: Stage IV Marginal Zone Lymphoma s/p induction chemotherapy  Visit type: Virtual visit with audio only (telephone)  Total time spent with patient: 30 mins spent reviewing her chart, discussing her labs, imaging and formulating further plan of care       The reason for the audio only service rather than synchronous audio and video virtual visit was related to technical difficulties or patient preference/necessity.     Each patient to whom I provide medical services by telemedicine is:  (1) informed of the relationship between the physician and patient and the respective role of any other health care provider with respect to management of the patient; and (2) notified that they may decline to receive medical services by telemedicine and may withdraw from such care at any time. Patient verbally consented to receive this service via voice-only telephone call.       HPI:     PATIENT: Gabi Newton  MRN: [id number]  DATE: 5/5/2020      Diagnosis:   1. 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 Marginal zone lymphoma of extranodal and solid organ sites    3. Renal mass, right    4. Heavy cigarette smoker    5. Deep vein thrombosis (DVT) of right lower extremity, unspecified chronicity, unspecified vein        Chief Complaint: No chief complaint on file.    Ms. Newton is a 54 year old female with Stage IV Marginal Zone Lymphoma (Gastric, Pulmonary, and Marrow) s/p 4 cycles of single agent Rituximab induction therapy completed on 4/14/20 followed by PET CT on 4/29/20 consistent with partial treatment response. She is here for a follow up visit. She is due for maintenance therapy starting in July 2020    Her abdominal pain/discomfort, nausea have improved. Denied diarrhea, cough, fever or chills  Continues to smoke a pack a day, trying to cut down  She  was seen by Urologist Dr. Chao Calero on 5/4/20 for the right  renal mass who suspect this to be lymphoma.  However he would recommend to follow up in 3 months with a renal ultrasound and If there is continued growth, he will refer her to interventional Radiology for biopsy    Oncologic History  Ms. Newton is a 54-year-old female with a past medical history of abdominal pain for the last 3-4 years with unknown etiology, significant smoking history for 40 years, back pain, hypertension presented to the Hematology Clinic for newly diagnosed marginal zone B-cell lymphoma     Patient complained of chronic epigastric/periumbilical abdominal pain for the last 3 years and has been receiving multiple abdominal [facility] at [facility].  In 2016 her abdominal pain was thought to be from her gallbladder and she had underwent a cholecystectomy however she continued to have abdominal pain. A CT abdomen done in June 11, 2018 revealed wall thickening of the anterior gastric body suspicious for infectious or inflammatory but is concerning for soft tissue mass and a EGD was a recommended. MRI on June 19, 2018 which revealed diffuse hepatic steatosis without evidence of suspicious focal lesions, splenomegaly but no ascites.  She has been following  Dr. Cedeno since 2018 for her abdominal pain, however liver was not suspected to be the cause for her abdominal pain. She had negative workup for hepatitis-B, C, negative workup for autoimmune disease, negative for alpha 1 antitrypsin disease, negative for celiac sprue and her hepatitis panel has been negative so far.  MRI of the abdomen was done on September 24, 2019 which revealed multifocal areas of asymmetric gastric wall thickening.  Recommend further evaluation with direct visualization to exclude neoplasm.  EGD was done on 10/15/2019 and Gastric tumor in the gastric fundus was found however no specimen was collected as she was on anticoagulation.  The EUS done  on 11/15/2019 revealed gastric tumor in the gastric fundus with many abnormal lymph nodes visualized in the lower paraesophageal mediastinum (level 8L) and gastrohepatic ligament (level 18). Fine needle biopsy performed and pathology revealed marginal zone B cell lymphoma.     She had chest pain in 2016 and the imaging done at that time revealed pulmonary nodule and has been following pulmonary, Dr. Yeung at [facility].  She has a currently daily smoker, has been smoking for almost 40 years.  On the CT chest done in November 2017 revealed 1.7 x 1.3 cm lesion in the right lower lobe and 2 x 1 cm lesion in the anterior portion of the right lower lobe.  A CT scan done on September 25, 2019 revealed a complex poorly defined mass in the right lower lobe measuring 2.78 x 2.64 cm, suspicious for primary lung carcinoma     She was diagnosed with right lower extremity DVT 6 months ago and has been on Xarelto since then.  As per the patient she was hospitalized at that time and underwent procedure for her right lower extremity for venous insufficiency.  It appears that the right lower extremity DVT is a provoked clot.  She had history of bilateral iliac vein stenting 10/16/18 for venous outflow obsturction.     Today patient complained of epigastric abdominal pain about 3/10 severity, accompanied by nausea.  She complained of vomiting last week which has been intermittent.  She has chronic back pain and has been taking methadone.  She denied of any chest pain, shortness of breath, fever, chills or weight loss. She complained of chronic lower extremity edema and has been on diuretics      Subjective:    Initial History: Ms. Newton is a 54 y.o. female who returns for follow up.     Past Medical History:   Past Medical History:   Diagnosis Date    Abdominal pain 2018    Back pain     Fatty liver 7/24/2018    Gastric lymphoma 12/8/2019    Gastric tumor     GERD (gastroesophageal reflux disease)     Hyperlipidemia      Hypertension     Splenomegaly 2018       Past Surgical HIstory:   Past Surgical History:   Procedure Laterality Date    CARDIAC CATHETERIZATION      CARDIAC CATHETERIZATION  2018    had chest pains . states vessels at the bottom of heart collapsed     SECTION      x3    CHOLECYSTECTOMY      COLONOSCOPY      ENDOSCOPIC ULTRASOUND OF UPPER GASTROINTESTINAL TRACT Left 11/15/2019    Procedure: ULTRASOUND, UPPER GI TRACT, ENDOSCOPIC;  Surgeon: Mike Seay MD;  Location: [facility] (2ND FLR);  Service: Endoscopy;  Laterality: Left;  Ok to hold xarelto per protocol per Dr. Lloyd see media file 10/25/19-tb    ESOPHAGOGASTRODUODENOSCOPY      ESOPHAGOGASTRODUODENOSCOPY N/A 2018    Procedure: EGD (ESOPHAGOGASTRODUODENOSCOPY);  Surgeon: Ant Camejo MD;  Location: [facility];  Service: Endoscopy;  Laterality: N/A;    ESOPHAGOGASTRODUODENOSCOPY N/A 10/15/2019    Procedure: EGD (ESOPHAGOGASTRODUODENOSCOPY);  Surgeon: Melanie Cedeno MD;  Location: [facility];  Service: Endoscopy;  Laterality: N/A;    ESOPHAGOGASTRODUODENOSCOPY N/A 11/15/2019    Procedure: EGD (ESOPHAGOGASTRODUODENOSCOPY);  Surgeon: Mike Seay MD;  Location: [facility] (2ND FLR);  Service: Endoscopy;  Laterality: N/A;    HYSTERECTOMY      PHLEBOGRAPHY Bilateral 10/16/2018    Procedure: VENOGRAM;  Surgeon: Colin Mathis MD;  Location: [facility] CATH LAB;  Service: Cardiology;  Laterality: Bilateral;    [place] RT/LT HEART CATHETERS Left     Coronary Arteriogram-2 Cath    RHINOPLASTY      TUBAL LIGATION      venogram Bilateral 10/16/2018       Family History:   Family History   Problem Relation Age of Onset    Breast cancer Maternal Grandfather     Dementia Mother     Aneurysm Father        Social History:  reports that she has been smoking cigarettes. She has a 40.00 pack-year smoking history. She has never used smokeless tobacco. She reports that she does not drink alcohol or use drugs.    Allergies:  Review of patient's  allergies indicates:   Allergen Reactions    Azithromycin Anaphylaxis     Other reaction(s): swelling to entire body  Swelling (tongue / lips)^      Ciprofloxacin Swelling     Throat swells    Codeine      Swelling (throat)^    Tolerates Morphine      Codeine sulfate      Swelling (throat)^    Tolerates Morphine       Medications:  Current Outpatient Medications   Medication Sig Dispense Refill    allopurinol (ZYLOPRIM) 300 MG tablet Take 1 tablet (300 mg total) by mouth once daily. (Patient taking differently: Take 300 mg by mouth every evening. ) 30 tablet 5    hydroxyzine HCL (ATARAX) 25 MG tablet       lisinopril 10 MG tablet lisinopril 10 mg tablet   Take 1 tablet every day by oral route.      LORazepam (ATIVAN) 0.5 MG tablet Take 1 tablet (0.5 mg total) by mouth every 4 (four) hours as needed for Anxiety (For PET/CT). 5 tablet 0    methadone (METHADOSE) 40 mg disintegrating tablet Take 40 mg by mouth once daily.       nicotine (NICODERM CQ) 21 mg/24 hr Place 1 patch onto the skin once daily. 28 patch 0    nitrofurantoin, macrocrystal-monohydrate, (MACROBID) 100 MG capsule       nitroGLYCERIN (NITROSTAT) 0.4 MG SL tablet Place 0.4 mg under the tongue every 5 (five) minutes as needed for Chest pain.      ondansetron (ZOFRAN) 8 MG tablet Take 1 tablet (8 mg total) by mouth every 8 (eight) hours as needed for Nausea. 30 tablet 11    rivaroxaban (XARELTO) 20 mg Tab Take 20 mg by mouth daily with dinner or evening meal.      simvastatin (ZOCOR) 40 MG tablet Take 40 mg by mouth every evening.        No current facility-administered medications for this visit.        Review of Systems   Constitutional: Negative for appetite change, fatigue, fever and unexpected weight change.   HENT: Negative for nosebleeds and sore throat.    Respiratory: Negative for cough and shortness of breath.    Cardiovascular: Negative for chest pain and leg swelling.   Gastrointestinal: Negative for abdominal pain, blood in  stool, diarrhea, nausea and vomiting.   Genitourinary: Negative for flank pain, hematuria and vaginal bleeding.   Musculoskeletal: Positive for back pain.   Skin: Negative for rash.   Neurological: Negative for seizures and headaches.   Hematological: Negative for adenopathy.   Psychiatric/Behavioral: Negative for agitation.       ECOG Performance Status: 2   Objective:      Vitals: There were no vitals filed for this visit.  BMI: There is no height or weight on file to calculate BMI.    Physical Exam  Deferred due to virtual visit    Laboratory Data:  Hospital Outpatient Visit on 04/29/2020   Component Date Value Ref Range Status    POCT Glucose 04/29/2020 78  70 - 110 mg/dL Final   Lab Visit on 04/29/2020   Component Date Value Ref Range Status    WBC 04/29/2020 5.16  3.90 - 12.70 K/uL Final    RBC 04/29/2020 4.65  4.00 - 5.40 M/uL Final    Hemoglobin 04/29/2020 12.0  12.0 - 16.0 g/dL Final    Hematocrit 04/29/2020 41.0  37.0 - 48.5 % Final    Mean Corpuscular Volume 04/29/2020 88  82 - 98 fL Final    Mean Corpuscular Hemoglobin 04/29/2020 25.8* 27.0 - 31.0 pg Final    Mean Corpuscular Hemoglobin Conc 04/29/2020 29.3* 32.0 - 36.0 g/dL Final    RDW 04/29/2020 14.5  11.5 - 14.5 % Final    Platelets 04/29/2020 187  150 - 350 K/uL Final    MPV 04/29/2020 9.8  9.2 - 12.9 fL Final    Immature Granulocytes 04/29/2020 0.4  0.0 - 0.5 % Final    Gran # (ANC) 04/29/2020 3.7  1.8 - 7.7 K/uL Final    Immature Grans (Abs) 04/29/2020 0.02  0.00 - 0.04 K/uL Final    Comment: Mild elevation in immature granulocytes is non specific and   can be seen in a variety of conditions including stress response,   acute inflammation, trauma and pregnancy. Correlation with other   laboratory and clinical findings is essential.      Lymph # 04/29/2020 0.8* 1.0 - 4.8 K/uL Final    Mono # 04/29/2020 0.4  0.3 - 1.0 K/uL Final    Eos # 04/29/2020 0.3  0.0 - 0.5 K/uL Final    Baso # 04/29/2020 0.03  0.00 - 0.20 K/uL Final    nRBC  04/29/2020 0  0 /100 WBC Final    Gran% 04/29/2020 72.1  38.0 - 73.0 % Final    Lymph% 04/29/2020 14.5* 18.0 - 48.0 % Final    Mono% 04/29/2020 7.0  4.0 - 15.0 % Final    Eosinophil% 04/29/2020 5.4  0.0 - 8.0 % Final    Basophil% 04/29/2020 0.6  0.0 - 1.9 % Final    Differential Method 04/29/2020 Automated   Final    Sodium 04/29/2020 142  136 - 145 mmol/L Final    Potassium 04/29/2020 4.4  3.5 - 5.1 mmol/L Final    Chloride 04/29/2020 103  95 - 110 mmol/L Final    CO2 04/29/2020 34* 23 - 29 mmol/L Final    Glucose 04/29/2020 90  70 - 110 mg/dL Final    BUN, Bld 04/29/2020 8  6 - 20 mg/dL Final    Creatinine 04/29/2020 0.8  0.5 - 1.4 mg/dL Final    Calcium 04/29/2020 9.3  8.7 - 10.5 mg/dL Final    Total Protein 04/29/2020 7.4  6.0 - 8.4 g/dL Final    Albumin 04/29/2020 3.5  3.5 - 5.2 g/dL Final    Total Bilirubin 04/29/2020 0.3  0.1 - 1.0 mg/dL Final    Comment: For infants and newborns, interpretation of results should be based  on gestational age, weight and in agreement with clinical  observations.  Premature Infant recommended reference ranges:  Up to 24 hours.............<8.0 mg/dL  Up to 48 hours............<12.0 mg/dL  3-5 days..................<15.0 mg/dL  6-29 days.................<15.0 mg/dL      Alkaline Phosphatase 04/29/2020 128  55 - 135 U/L Final    AST 04/29/2020 29  10 - 40 U/L Final    ALT 04/29/2020 28  10 - 44 U/L Final    Anion Gap 04/29/2020 5* 8 - 16 mmol/L Final    eGFR if African American 04/29/2020 >60.0  >60 mL/min/1.73 m^2 Final    eGFR if non African American 04/29/2020 >60.0  >60 mL/min/1.73 m^2 Final    Comment: Calculation used to obtain the estimated glomerular filtration  rate (eGFR) is the CKD-EPI equation.       LD 04/29/2020 275* 110 - 260 U/L Final    Results are increased in hemolyzed samples.         Imaging:       PET CT- 4/29/2020  1.  Findings consistent with partial treatment response with decreased size of the stomach wall and decreased SUV  throughout the stomach.  Metabolic activity on the baseline scan was atypically prominent for marginal zone lymphoma.  If a Deauville score were to be assigned, the score would be 4.    2.  Decreased size and SUV of the patient's right lower lobe pulmonary biopsy-proven lymphoma.    3.  Questionable slight increase in size of the patient's right lower pole lesion again concerning for RCC or oncocytoma.  Extranodal lymphoma could have a similar appearance.    Assessment:       1. 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 Marginal zone lymphoma of extranodal and solid organ sites    3. Renal mass, right    4. Heavy cigarette smoker    5. Deep vein thrombosis (DVT) of right lower extremity, unspecified chronicity, unspecified vein      1.  Sha MZL with coexisting Gastric/pulmonary/diffuse marrow Marginal zone B-cell lymphoma:   - MALT IPI score- 2, LDH elevated at 301, age less than 70,  Dodge stage IV  - Patient complained of chronic epigastric/periumbilical abdominal pain for the last 3 years  - September 24, 2019-  MRI of the abdomen revealed multifocal areas of asymmetric gastric wall thickening.  Recommend further evaluation with direct visualization to exclude neoplasm.    - October 15, 2019- EGD revealed Gastric tumor in the gastric fundus was found however no specimen was collected as she was on anticoagulation.    - November 15, 2019- EUS revealed gastric tumor in the gastric fundus with many abnormal lymph nodes visualized in the lower paraesophageal mediastinum (level 8L) and gastrohepatic ligament (level 18). Fine needle biopsy performed and pathology revealed marginal zone B cell lymphoma.  PET CT-12/10/2019- revealed gastric marginal zone lymphoma, with pulmonary manifestations of marginal zone lymphoma in right lower lobe along with diffuse marrow uptake throughout the axial skeleton with more focal uptake identified at the right femoral greater  trochanter and left ischial tuberosity  - She has a currently daily smoker, has been smoking for almost 40 years.    4/14/20- s/p 4 cycles of single agent Rituximab induction therapy completed   4/29/20- PET/CT consistent with partial treatment response     2. Solid right renal mass: PET CT-Questionable slight increase in size of the patient's right lower pole lesion again concerning for RCC or oncocytoma  3.  Right lower extremity DVT, likely provoked: on Xarelto for about 9 months  4.  Bilateral lower extremity venous insufficiency status post stent placement  5. Chronic active smoker  6. Chronic back pain on methadone  7.  Anxiety       Plan:     1. We will continue with consolidation treatment with Rituximab 375 mg/m2 one dose every 12 weeks for 2 years, starting from July 7, 2020. Submitted for insurance approval   2. We believe that the right kidney lesion is less likely to be lymphoma as it has not responded to rituximab therapy and warrant further workup  3. Can discontinue Xarelto as her recent LE US is negative for DVT and has completed 9 months of Xarelto treatment, however patient is more comfortable to continue for another 3 months in view of her cancer diagnosis, which should be okay.   5. Counseled and educated to quit smoking, patient stated she is trying      Follow up in 2 months for blood work, appointment and cycle 1 of maintenance Rituxan on July 7, 2020     Plan of care discussed with Dr. Agustin Ann MD PGY-5  Hematology and Oncology  Pager:[phone]     This service was not originating from a related E/M service provided within the previous 7 days nor will  to an E/M service or procedure within the next 24 hours or my soonest available appointment.  Prevailing standard of care was able to be met in this audio-only visit.

## 2020-07-07 ENCOUNTER — OFFICE VISIT (OUTPATIENT)
Dept: HEMATOLOGY/ONCOLOGY | Facility: CLINIC | Age: 55
End: 2020-07-07
Payer: MEDICAID

## 2020-07-07 ENCOUNTER — INFUSION (OUTPATIENT)
Dept: INFUSION THERAPY | Facility: HOSPITAL | Age: 55
End: 2020-07-07
Attending: STUDENT IN AN ORGANIZED HEALTH CARE EDUCATION/TRAINING PROGRAM
Payer: MEDICAID

## 2020-07-07 VITALS
TEMPERATURE: 98 F | SYSTOLIC BLOOD PRESSURE: 121 MMHG | DIASTOLIC BLOOD PRESSURE: 57 MMHG | BODY MASS INDEX: 38.59 KG/M2 | HEART RATE: 79 BPM | RESPIRATION RATE: 18 BRPM | HEIGHT: 61 IN | WEIGHT: 204.38 LBS

## 2020-07-07 VITALS
SYSTOLIC BLOOD PRESSURE: 158 MMHG | RESPIRATION RATE: 18 BRPM | OXYGEN SATURATION: 93 % | TEMPERATURE: 99 F | DIASTOLIC BLOOD PRESSURE: 89 MMHG | HEIGHT: 61 IN | BODY MASS INDEX: 38.59 KG/M2 | HEART RATE: 99 BPM | WEIGHT: 204.38 LBS

## 2020-07-07 DIAGNOSIS — I82.401 DEEP VEIN THROMBOSIS (DVT) OF RIGHT LOWER EXTREMITY, UNSPECIFIED CHRONICITY, UNSPECIFIED VEIN: ICD-10-CM

## 2020-07-07 DIAGNOSIS — C85.89 MARGINAL ZONE LYMPHOMA OF EXTRANODAL AND SOLID ORGAN SITES: ICD-10-CM

## 2020-07-07 DIAGNOSIS — N28.89 RENAL MASS, RIGHT: ICD-10-CM

## 2020-07-07 DIAGNOSIS — F17.210 HEAVY CIGARETTE SMOKER: ICD-10-CM

## 2020-07-07 DIAGNOSIS — C88.4 EXTRANODAL MARGINAL ZONE B-CELL LYMPHOMA OF MUCOSA-ASSOCIATED LYMPHOID TISSUE (MALT): Primary | ICD-10-CM

## 2020-07-07 DIAGNOSIS — M54.9 BACK PAIN, UNSPECIFIED BACK LOCATION, UNSPECIFIED BACK PAIN LATERALITY, UNSPECIFIED CHRONICITY: ICD-10-CM

## 2020-07-07 DIAGNOSIS — R30.0 DYSURIA: ICD-10-CM

## 2020-07-07 LAB
BACTERIA #/AREA URNS AUTO: NORMAL /HPF
BILIRUB UR QL STRIP: NEGATIVE
CLARITY UR REFRACT.AUTO: CLEAR
COLOR UR AUTO: YELLOW
GLUCOSE UR QL STRIP: NEGATIVE
HGB UR QL STRIP: ABNORMAL
KETONES UR QL STRIP: NEGATIVE
LEUKOCYTE ESTERASE UR QL STRIP: ABNORMAL
MICROSCOPIC COMMENT: NORMAL
NITRITE UR QL STRIP: NEGATIVE
PH UR STRIP: 7 [PH] (ref 5–8)
PROT UR QL STRIP: NEGATIVE
RBC #/AREA URNS AUTO: 1 /HPF (ref 0–4)
SP GR UR STRIP: 1 (ref 1–1.03)
SQUAMOUS #/AREA URNS AUTO: 0 /HPF
URN SPEC COLLECT METH UR: ABNORMAL
WBC #/AREA URNS AUTO: 3 /HPF (ref 0–5)

## 2020-07-07 PROCEDURE — 99215 OFFICE O/P EST HI 40 MIN: CPT | Mod: S$PBB,,, | Performed by: STUDENT IN AN ORGANIZED HEALTH CARE EDUCATION/TRAINING PROGRAM

## 2020-07-07 PROCEDURE — 99999 PR PBB SHADOW E&M-EST. PATIENT-LVL IV: CPT | Mod: PBBFAC,,, | Performed by: STUDENT IN AN ORGANIZED HEALTH CARE EDUCATION/TRAINING PROGRAM

## 2020-07-07 PROCEDURE — A4216 STERILE WATER/SALINE, 10 ML: HCPCS | Performed by: INTERNAL MEDICINE

## 2020-07-07 PROCEDURE — 25000003 PHARM REV CODE 250: Performed by: INTERNAL MEDICINE

## 2020-07-07 PROCEDURE — 99215 PR OFFICE/OUTPT VISIT, EST, LEVL V, 40-54 MIN: ICD-10-PCS | Mod: S$PBB,,, | Performed by: STUDENT IN AN ORGANIZED HEALTH CARE EDUCATION/TRAINING PROGRAM

## 2020-07-07 PROCEDURE — S0028 INJECTION, FAMOTIDINE, 20 MG: HCPCS | Performed by: INTERNAL MEDICINE

## 2020-07-07 PROCEDURE — 99999 PR PBB SHADOW E&M-EST. PATIENT-LVL IV: ICD-10-PCS | Mod: PBBFAC,,, | Performed by: STUDENT IN AN ORGANIZED HEALTH CARE EDUCATION/TRAINING PROGRAM

## 2020-07-07 PROCEDURE — 96415 CHEMO IV INFUSION ADDL HR: CPT

## 2020-07-07 PROCEDURE — 96375 TX/PRO/DX INJ NEW DRUG ADDON: CPT

## 2020-07-07 PROCEDURE — 99214 OFFICE O/P EST MOD 30 MIN: CPT | Mod: PBBFAC,25 | Performed by: STUDENT IN AN ORGANIZED HEALTH CARE EDUCATION/TRAINING PROGRAM

## 2020-07-07 PROCEDURE — 96413 CHEMO IV INFUSION 1 HR: CPT

## 2020-07-07 PROCEDURE — 81001 URINALYSIS AUTO W/SCOPE: CPT

## 2020-07-07 PROCEDURE — 63600175 PHARM REV CODE 636 W HCPCS: Performed by: INTERNAL MEDICINE

## 2020-07-07 RX ORDER — HEPARIN 100 UNIT/ML
500 SYRINGE INTRAVENOUS
Status: CANCELLED | OUTPATIENT
Start: 2020-07-07

## 2020-07-07 RX ORDER — ACETAMINOPHEN 325 MG/1
650 TABLET ORAL
Status: CANCELLED | OUTPATIENT
Start: 2020-07-07

## 2020-07-07 RX ORDER — FAMOTIDINE 10 MG/ML
20 INJECTION INTRAVENOUS
Status: CANCELLED | OUTPATIENT
Start: 2020-07-07

## 2020-07-07 RX ORDER — SODIUM CHLORIDE 0.9 % (FLUSH) 0.9 %
10 SYRINGE (ML) INJECTION
Status: DISCONTINUED | OUTPATIENT
Start: 2020-07-07 | End: 2020-07-07 | Stop reason: HOSPADM

## 2020-07-07 RX ORDER — SODIUM CHLORIDE 0.9 % (FLUSH) 0.9 %
10 SYRINGE (ML) INJECTION
Status: CANCELLED | OUTPATIENT
Start: 2020-07-07

## 2020-07-07 RX ORDER — HEPARIN 100 UNIT/ML
500 SYRINGE INTRAVENOUS
Status: DISCONTINUED | OUTPATIENT
Start: 2020-07-07 | End: 2020-07-07 | Stop reason: HOSPADM

## 2020-07-07 RX ORDER — MEPERIDINE HYDROCHLORIDE 50 MG/ML
25 INJECTION INTRAMUSCULAR; INTRAVENOUS; SUBCUTANEOUS
Status: DISCONTINUED | OUTPATIENT
Start: 2020-07-07 | End: 2020-07-07 | Stop reason: HOSPADM

## 2020-07-07 RX ORDER — FAMOTIDINE 10 MG/ML
20 INJECTION INTRAVENOUS
Status: COMPLETED | OUTPATIENT
Start: 2020-07-07 | End: 2020-07-07

## 2020-07-07 RX ORDER — ACETAMINOPHEN 325 MG/1
650 TABLET ORAL
Status: COMPLETED | OUTPATIENT
Start: 2020-07-07 | End: 2020-07-07

## 2020-07-07 RX ADMIN — HYDROCORTISONE SODIUM SUCCINATE 100 MG: 100 INJECTION, POWDER, FOR SOLUTION INTRAMUSCULAR; INTRAVENOUS at 03:07

## 2020-07-07 RX ADMIN — SODIUM CHLORIDE: 0.9 INJECTION, SOLUTION INTRAVENOUS at 03:07

## 2020-07-07 RX ADMIN — RITUXIMAB 750 MG: 10 INJECTION, SOLUTION INTRAVENOUS at 03:07

## 2020-07-07 RX ADMIN — FAMOTIDINE 20 MG: 10 INJECTION INTRAVENOUS at 03:07

## 2020-07-07 RX ADMIN — Medication 10 ML: at 03:07

## 2020-07-07 RX ADMIN — ACETAMINOPHEN 650 MG: 325 TABLET ORAL at 03:07

## 2020-07-07 NOTE — Clinical Note
Okay for cycle 1 of Rituximab today  Please schedule for CBC, CMP, LDH, Uric acid, MD appt and cycle 2 Rituximab maintenance on 9/29/2020

## 2020-07-07 NOTE — PLAN OF CARE
Message sent to Dr Hines concerning pt refusing Benadryl IVPB c/o nausea vomiting previous tx. New order for hydrocortisone 100mg IVP given. Rituxan infusion complete pt tolerated well no adverse reactions PIV removed catheter tip intact site covered. D/c to home ambulatory NAD. Instructed to contact providers clinic with future questions/ concerns.

## 2020-07-07 NOTE — PROGRESS NOTES
HPI:     PATIENT: Gabi Newton  MRN: [id number]  DATE: 7/9/2020      Diagnosis:   1. 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 Renal mass, right    3. Heavy cigarette smoker    4. Deep vein thrombosis (DVT) of right lower extremity, unspecified chronicity, unspecified vein    5. Back pain, unspecified back location, unspecified back pain laterality, unspecified chronicity    6. Dysuria        Chief Complaint: No chief complaint on file.    Ms. Newton is a 54 year old female with Stage IV Marginal Zone Lymphoma (Gastric, Pulmonary, and Marrow) s/p 4 cycles of single agent Rituximab induction therapy completed on 4/14/20 followed by PET CT on 4/29/20 consistent with partial treatment response. She is here for a follow up visit. She is here to start maintenance therapy on July 7, 2020    Her abdominal pain/discomfort improved. Denied nausea, diarrhea, cough, fever or chills. C/o urgency but no dysuria   Continues to smoke a 1/2 a pack a day. Quit for 5 weeks and restarted  She was seen by Urologist Dr. Chao Calero on 5/4/20 for the right  renal mass who suspect this to be lymphoma.  However he would follow up in 3 months in August, 20202 with a renal ultrasound and If there is continued growth, he will refer her to interventional Radiology for biopsy  Mammogram done in March 2020 at [facility] and following [facility] physician    Oncologic History  Ms. Newton is a 54-year-old female with a past medical history of abdominal pain for the last 3-4 years with unknown etiology, significant smoking history for 40 years, back pain, hypertension presented to the Hematology Clinic for newly diagnosed marginal zone B-cell lymphoma     Patient complained of chronic epigastric/periumbilical abdominal pain for the last 3 years and has been receiving multiple abdominal imaging at [facility].  In 2016 her abdominal pain was thought to be from her gallbladder  and she had underwent a cholecystectomy however she continued to have abdominal pain. A CT abdomen done in June 11, 2018 revealed wall thickening of the anterior gastric body suspicious for infectious or inflammatory but is concerning for soft tissue mass and a EGD was a recommended. MRI on June 19, 2018 which revealed diffuse hepatic steatosis without evidence of suspicious focal lesions, splenomegaly but no ascites.  She has been following  Dr. Cedeno since 2018 for her abdominal pain, however liver was not suspected to be the cause for her abdominal pain. She had negative workup for hepatitis-B, C, negative workup for autoimmune disease, negative for alpha 1 antitrypsin disease, negative for celiac sprue and her hepatitis panel has been negative so far.  MRI of the abdomen was done on September 24, 2019 which revealed multifocal areas of asymmetric gastric wall thickening.  Recommend further evaluation with direct visualization to exclude neoplasm.  EGD was done on 10/15/2019 and Gastric tumor in the gastric fundus was found however no specimen was collected as she was on anticoagulation.  The EUS done on 11/15/2019 revealed gastric tumor in the gastric fundus with many abnormal lymph nodes visualized in the lower paraesophageal mediastinum (level 8L) and gastrohepatic ligament (level 18). Fine needle biopsy performed and pathology revealed marginal zone B cell lymphoma.     She had chest pain in 2016 and the imaging done at that time revealed pulmonary nodule and has been following pulmonary, Dr. Yeung at [facility].  She has a currently daily smoker, has been smoking for almost 40 years.  On the CT chest done in November 2017 revealed 1.7 x 1.3 cm lesion in the right lower lobe and 2 x 1 cm lesion in the anterior portion of the right lower lobe.  A CT scan done on September 25, 2019 revealed a complex poorly defined mass in the right lower lobe measuring 2.78 x 2.64 cm, suspicious for primary lung  carcinoma     She was diagnosed with right lower extremity DVT 6 months ago and has been on Xarelto since then.  As per the patient she was hospitalized at that time and underwent procedure for her right lower extremity for venous insufficiency.  It appears that the right lower extremity DVT is a provoked clot.  She had history of bilateral iliac vein stenting 10/16/18 for venous outflow obsturction.     Today patient complained of epigastric abdominal pain about 3/10 severity, accompanied by nausea.  She complained of vomiting last week which has been intermittent.  She has chronic back pain and has been taking methadone.  She denied of any chest pain, shortness of breath, fever, chills or weight loss. She complained of chronic lower extremity edema and has been on diuretics      Subjective:    Initial History: Ms. Newton is a 54 y.o. female who returns for follow up.     Past Medical History:   Past Medical History:   Diagnosis Date    Abdominal pain 2018    Back pain     Fatty liver 2018    Gastric lymphoma 2019    Gastric tumor     GERD (gastroesophageal reflux disease)     Hyperlipidemia     Hypertension     Splenomegaly 2018       Past Surgical HIstory:   Past Surgical History:   Procedure Laterality Date    CARDIAC CATHETERIZATION      CARDIAC CATHETERIZATION  2018    had chest pains . states vessels at the bottom of heart collapsed     SECTION      x3    CHOLECYSTECTOMY      COLONOSCOPY      ENDOSCOPIC ULTRASOUND OF UPPER GASTROINTESTINAL TRACT Left 11/15/2019    Procedure: ULTRASOUND, UPPER GI TRACT, ENDOSCOPIC;  Surgeon: Mike Seay MD;  Location: [facility] ([address] [place] [address]);  Service: Endoscopy;  Laterality: Left;  Ok to hold xarelto per protocol per Dr. Lloyd see media file 10/25/19-tb    ESOPHAGOGASTRODUODENOSCOPY      ESOPHAGOGASTRODUODENOSCOPY N/A 2018    Procedure: EGD (ESOPHAGOGASTRODUODENOSCOPY);  Surgeon: Ant Camejo MD;  Location: [facility] ENDO;  Service:  Endoscopy;  Laterality: N/A;    ESOPHAGOGASTRODUODENOSCOPY N/A 10/15/2019    Procedure: EGD (ESOPHAGOGASTRODUODENOSCOPY);  Surgeon: Melanie Cedeno MD;  Location: [facility] ENDO;  Service: Endoscopy;  Laterality: N/A;    ESOPHAGOGASTRODUODENOSCOPY N/A 11/15/2019    Procedure: EGD (ESOPHAGOGASTRODUODENOSCOPY);  Surgeon: Mike Seay MD;  Location: [facility] ENDO (2ND FLR);  Service: Endoscopy;  Laterality: N/A;    HYSTERECTOMY      PHLEBOGRAPHY Bilateral 10/16/2018    Procedure: VENOGRAM;  Surgeon: Colin Mathis MD;  Location: [facility] CATH LAB;  Service: Cardiology;  Laterality: Bilateral;    NV RT/LT HEART CATHETERS Left     Coronary Arteriogram-2 Cath    RHINOPLASTY      TUBAL LIGATION      venogram Bilateral 10/16/2018       Family History:   Family History   Problem Relation Age of Onset    Breast cancer Maternal Grandfather     Dementia Mother     Aneurysm Father        Social History:  reports that she has been smoking cigarettes. She has a 40.00 pack-year smoking history. She has never used smokeless tobacco. She reports that she does not drink alcohol or use drugs.    Allergies:  Review of patient's allergies indicates:   Allergen Reactions    Azithromycin Anaphylaxis     Other reaction(s): swelling to entire body  Swelling (tongue / lips)^      Ciprofloxacin Swelling     Throat swells    Codeine      Swelling (throat)^    Tolerates Morphine      Codeine sulfate      Swelling (throat)^    Tolerates Morphine       Medications:  Current Outpatient Medications   Medication Sig Dispense Refill    allopurinol (ZYLOPRIM) 300 MG tablet Take 1 tablet (300 mg total) by mouth once daily. (Patient taking differently: Take 300 mg by mouth every evening. ) 30 tablet 5    hydroxyzine HCL (ATARAX) 25 MG tablet       lisinopril 10 MG tablet lisinopril 10 mg tablet   Take 1 tablet every day by oral route.      methadone (METHADOSE) 40 mg disintegrating tablet Take 40 mg by mouth once daily.       nicotine  "(NICODERM CQ) 21 mg/24 hr Place 1 patch onto the skin once daily. 28 patch 0    nitrofurantoin, macrocrystal-monohydrate, (MACROBID) 100 MG capsule       nitroGLYCERIN (NITROSTAT) 0.4 MG SL tablet Place 0.4 mg under the tongue every 5 (five) minutes as needed for Chest pain.      ondansetron (ZOFRAN) 8 MG tablet Take 1 tablet (8 mg total) by mouth every 8 (eight) hours as needed for Nausea. 30 tablet 11    ranitidine (ZANTAC) 150 MG tablet Zantac 150 mg tablet   Take 1 tablet twice a day by oral route.      rivaroxaban (XARELTO) 20 mg Tab Take 20 mg by mouth daily with dinner or evening meal.      simvastatin (ZOCOR) 40 MG tablet Take 40 mg by mouth every evening.        No current facility-administered medications for this visit.        Review of Systems   Constitutional: Negative for appetite change, fatigue, fever and unexpected weight change.   HENT: Negative for nosebleeds and sore throat.    Respiratory: Negative for cough and shortness of breath.    Cardiovascular: Negative for chest pain and leg swelling.   Gastrointestinal: Negative for abdominal pain, blood in stool, diarrhea, nausea and vomiting.   Genitourinary: Positive for urgency. Negative for dysuria, flank pain, hematuria and vaginal bleeding.   Musculoskeletal: Positive for back pain.   Skin: Negative for rash.   Neurological: Negative for seizures and headaches.   Hematological: Negative for adenopathy.   Psychiatric/Behavioral: Negative for agitation.       ECOG Performance Status: 2   Objective:      Vitals:   Vitals:    07/07/20 1329   BP: (!) 158/89   Pulse: 99   Resp: 18   Temp: 98.5 °F (36.9 °C)   SpO2: (!) 93%   Weight: 92.7 kg (204 lb 5.9 oz)   Height: 5' 1" (1.549 m)     BMI: Body mass index is 38.61 kg/m².    Physical Exam  Constitutional:       Appearance: Normal appearance. She is obese.   HENT:      Head: Normocephalic and atraumatic.      Nose: Nose normal. No rhinorrhea.      Mouth/Throat:      Mouth: Mucous membranes are " moist.   Eyes:      Pupils: Pupils are equal, round, and reactive to light.   Neck:      Musculoskeletal: Normal range of motion and neck supple.   Cardiovascular:      Rate and Rhythm: Normal rate and regular rhythm.      Heart sounds: No murmur.   Pulmonary:      Effort: Pulmonary effort is normal.      Breath sounds: Normal breath sounds.   Abdominal:      General: Abdomen is flat. There is no distension.      Palpations: Abdomen is soft.   Musculoskeletal: Normal range of motion.         General: Swelling present.   Skin:     General: Skin is warm.      Capillary Refill: Capillary refill takes less than 2 seconds.   Neurological:      General: No focal deficit present.      Mental Status: She is alert and oriented to person, place, and time.   Psychiatric:         Mood and Affect: Mood normal.         Behavior: Behavior normal.       Deferred due to virtual visit    Laboratory Data:  Office Visit on 07/07/2020   Component Date Value Ref Range Status    Specimen UA 07/07/2020 Urine, Clean Catch   Final    Color, UA 07/07/2020 Yellow  Yellow, Straw, Kyung Final    Appearance, UA 07/07/2020 Clear  Clear Final    pH, UA 07/07/2020 7.0  5.0 - 8.0 Final    Specific [place], UA 07/07/2020 1.005  1.005 - 1.030 Final    Protein, UA 07/07/2020 Negative  Negative Final    Comment: Recommend a 24 hour urine protein or a urine   protein/creatinine ratio if globulin induced proteinuria is  clinically suspected.      Glucose, UA 07/07/2020 Negative  Negative Final    Ketones, UA 07/07/2020 Negative  Negative Final    Bilirubin (UA) 07/07/2020 Negative  Negative Final    Occult Blood UA 07/07/2020 1+* Negative Final    Nitrite, UA 07/07/2020 Negative  Negative Final    Leukocytes, UA 07/07/2020 Trace* Negative Final    RBC, UA 07/07/2020 1  0 - 4 /hpf Final    WBC, UA 07/07/2020 3  0 - 5 /hpf Final    Bacteria 07/07/2020 Rare  None-Occ /hpf Final    Squam Epithel, UA 07/07/2020 0  /hpf Final    Microscopic  Comment 07/07/2020 SEE COMMENT   Final    Comment: Other formed elements not mentioned in the report are not   present in the microscopic examination.      Lab Visit on 07/06/2020   Component Date Value Ref Range Status    WBC 07/06/2020 4.10  3.90 - 12.70 K/uL Final    RBC 07/06/2020 4.59  4.00 - 5.40 M/uL Final    Hemoglobin 07/06/2020 12.1  12.0 - 16.0 g/dL Final    Hematocrit 07/06/2020 38.0  37.0 - 48.5 % Final    Mean Corpuscular Volume 07/06/2020 83  82 - 98 fL Final    Mean Corpuscular Hemoglobin 07/06/2020 26.4* 27.0 - 31.0 pg Final    Mean Corpuscular Hemoglobin Conc 07/06/2020 31.8* 32.0 - 36.0 g/dL Final    RDW 07/06/2020 15.8* 11.5 - 14.5 % Final    Platelets 07/06/2020 172  150 - 350 K/uL Final    MPV 07/06/2020 8.3* 9.2 - 12.9 fL Final    Gran # (ANC) 07/06/2020 3.3  1.8 - 7.7 K/uL Final    Lymph # 07/06/2020 0.4* 1.0 - 4.8 K/uL Final    Mono # 07/06/2020 0.2* 0.3 - 1.0 K/uL Final    Eos # 07/06/2020 0.2  0.0 - 0.5 K/uL Final    Baso # 07/06/2020 0.00  0.00 - 0.20 K/uL Final    Gran% 07/06/2020 79.7* 38.0 - 73.0 % Final    Lymph% 07/06/2020 9.7* 18.0 - 48.0 % Final    Mono% 07/06/2020 5.5  4.0 - 15.0 % Final    Eosinophil% 07/06/2020 4.7  0.0 - 8.0 % Final    Basophil% 07/06/2020 0.4  0.0 - 1.9 % Final    Differential Method 07/06/2020 Automated   Final    Sodium 07/06/2020 139  136 - 145 mmol/L Final    Potassium 07/06/2020 3.8  3.5 - 5.1 mmol/L Final    Chloride 07/06/2020 99* 101 - 111 mmol/L Final    CO2 07/06/2020 31* 23 - 29 mmol/L Final    Glucose 07/06/2020 146* 74 - 118 mg/dL Final    BUN, Bld 07/06/2020 6  6 - 20 mg/dL Final    Creatinine 07/06/2020 0.8  0.5 - 1.4 mg/dL Final    Calcium 07/06/2020 8.8  8.6 - 10.0 mg/dL Final    Total Protein 07/06/2020 7.3  6.0 - 8.4 g/dL Final    Albumin 07/06/2020 3.7  3.5 - 5.2 g/dL Final    Total Bilirubin 07/06/2020 0.6  0.3 - 1.2 mg/dL Final    Comment: For infants and newborns, interpretation of results should be  based  on gestational age, weight and in agreement with clinical  observations.  Premature Infant recommended reference ranges:  Up to 24 hours.............<8.0 mg/dL  Up to 48 hours............<12.0 mg/dL  3-5 days..................<15.0 mg/dL  6-29 days.................<15.0 mg/dL      Alkaline Phosphatase 07/06/2020 116  38 - 126 U/L Final    AST 07/06/2020 36  15 - 41 U/L Final    ALT 07/06/2020 34  14 - 54 U/L Final    Anion Gap 07/06/2020 9  8 - 16 mmol/L Final    eGFR if African American 07/06/2020 >60.0  >60 mL/min/1.73 m^2 Final    eGFR if non African American 07/06/2020 >60.0  >60 mL/min/1.73 m^2 Final    Comment: Calculation used to obtain the estimated glomerular filtration  rate (eGFR) is the CKD-EPI equation.       LD 07/06/2020 196* 84 - 195 U/L Final    Results are increased in hemolyzed samples.    Uric Acid 07/06/2020 3.6  2.6 - 8.0 mg/dL Final         Imaging:       PET CT- 4/29/2020  1.  Findings consistent with partial treatment response with decreased size of the stomach wall and decreased SUV throughout the stomach.  Metabolic activity on the baseline scan was atypically prominent for marginal zone lymphoma.  If a Deauville score were to be assigned, the score would be 4.    2.  Decreased size and SUV of the patient's right lower lobe pulmonary biopsy-proven lymphoma.    3.  Questionable slight increase in size of the patient's right lower pole lesion again concerning for RCC or oncocytoma.  Extranodal lymphoma could have a similar appearance.    Assessment:       1. 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 Renal mass, right    3. Heavy cigarette smoker    4. Deep vein thrombosis (DVT) of right lower extremity, unspecified chronicity, unspecified vein    5. Back pain, unspecified back location, unspecified back pain laterality, unspecified chronicity    6. Dysuria      1.  Sha MZL with coexisting Gastric/pulmonary/diffuse marrow  Marginal zone B-cell lymphoma:   - MALT IPI score- 2, LDH elevated at 301, age less than 70,  [place] stage IV  - Patient complained of chronic epigastric/periumbilical abdominal pain for the last 3 years  - September 24, 2019-  MRI of the abdomen revealed multifocal areas of asymmetric gastric wall thickening.  Recommend further evaluation with direct visualization to exclude neoplasm.    - October 15, 2019- EGD revealed Gastric tumor in the gastric fundus was found however no specimen was collected as she was on anticoagulation.    - November 15, 2019- EUS revealed gastric tumor in the gastric fundus with many abnormal lymph nodes visualized in the lower paraesophageal mediastinum (level 8L) and gastrohepatic ligament (level 18). Fine needle biopsy performed and pathology revealed marginal zone B cell lymphoma.  PET CT-12/10/2019- revealed gastric marginal zone lymphoma, with pulmonary manifestations of marginal zone lymphoma in right lower lobe along with diffuse marrow uptake throughout the axial skeleton with more focal uptake identified at the right femoral greater trochanter and left ischial tuberosity  - She has a currently daily smoker, has been smoking for almost 40 years.    4/14/20- s/p 4 cycles of single agent Rituximab induction therapy completed    4/29/20- PET/CT consistent with partial treatment response   7/7/2020- Started maintenance cycle 1 Rituximab 375 mg/m2. Will schedule every 12 weeks for 2 years      2. Solid right renal mass: PET CT-Questionable slight increase in size of the patient's right lower pole lesion again concerning for RCC or oncocytoma. Following Urology  3.  Right lower extremity DVT, likely provoked: on Xarelto for about 12 months  4.  Bilateral lower extremity venous insufficiency status post stent placement  5. Chronic active smoker  6. Chronic back pain on methadone  7.  Anxiety       Plan:     1. Okay for cycle 1 of maintanence Rituximab 375 mg/m2 today.  2. We believe  that the right kidney lesion is less likely to be lymphoma as it has not responded to rituximab therapy and warrant further workup. Following Urology  3. Can discontinue Xarelto as her recent LE US is negative for DVT and has completed 12 months of Xarelto treatment  4. Counseled and educated to quit smoking    Please schedule for CBC, CMP, LDH, Uric acid, MD appt and cycle 2 Rituximab maintenance on 9/29/2020     Plan of care discussed with Dr. Agustin Ann MD PGY-5  Hematology and Oncology  Pager:[phone]

## 2020-07-15 NOTE — TELEPHONE ENCOUNTER
error   Bilobed Flap Text: The defect edges were debeveled with a #15 scalpel blade.  Given the location of the defect and the proximity to free margins a bilobe flap was deemed most appropriate.  Using a sterile surgical marker, an appropriate bilobe flap drawn around the defect.    The area thus outlined was incised deep to adipose tissue with a #15 scalpel blade.  The skin margins were undermined to an appropriate distance in all directions utilizing iris scissors.

## 2020-09-29 ENCOUNTER — INFUSION (OUTPATIENT)
Dept: INFUSION THERAPY | Facility: HOSPITAL | Age: 55
End: 2020-09-29
Attending: STUDENT IN AN ORGANIZED HEALTH CARE EDUCATION/TRAINING PROGRAM
Payer: MEDICAID

## 2020-09-29 ENCOUNTER — OFFICE VISIT (OUTPATIENT)
Dept: HEMATOLOGY/ONCOLOGY | Facility: CLINIC | Age: 55
End: 2020-09-29
Payer: MEDICAID

## 2020-09-29 VITALS
HEART RATE: 84 BPM | WEIGHT: 213.75 LBS | SYSTOLIC BLOOD PRESSURE: 145 MMHG | BODY MASS INDEX: 40.36 KG/M2 | TEMPERATURE: 98 F | DIASTOLIC BLOOD PRESSURE: 78 MMHG | RESPIRATION RATE: 17 BRPM | OXYGEN SATURATION: 96 % | HEIGHT: 61 IN

## 2020-09-29 VITALS
DIASTOLIC BLOOD PRESSURE: 84 MMHG | RESPIRATION RATE: 18 BRPM | HEART RATE: 68 BPM | TEMPERATURE: 98 F | SYSTOLIC BLOOD PRESSURE: 120 MMHG

## 2020-09-29 DIAGNOSIS — C85.89 MARGINAL ZONE LYMPHOMA OF EXTRANODAL AND SOLID ORGAN SITES: ICD-10-CM

## 2020-09-29 DIAGNOSIS — I82.401 DEEP VEIN THROMBOSIS (DVT) OF RIGHT LOWER EXTREMITY, UNSPECIFIED CHRONICITY, UNSPECIFIED VEIN: ICD-10-CM

## 2020-09-29 DIAGNOSIS — M54.9 BACK PAIN, UNSPECIFIED BACK LOCATION, UNSPECIFIED BACK PAIN LATERALITY, UNSPECIFIED CHRONICITY: ICD-10-CM

## 2020-09-29 DIAGNOSIS — F17.210 HEAVY CIGARETTE SMOKER: ICD-10-CM

## 2020-09-29 DIAGNOSIS — C88.4 EXTRANODAL MARGINAL ZONE B-CELL LYMPHOMA OF MUCOSA-ASSOCIATED LYMPHOID TISSUE (MALT): Primary | ICD-10-CM

## 2020-09-29 DIAGNOSIS — N28.89 RENAL MASS, RIGHT: ICD-10-CM

## 2020-09-29 PROCEDURE — 99999 PR PBB SHADOW E&M-EST. PATIENT-LVL IV: CPT | Mod: PBBFAC,,, | Performed by: STUDENT IN AN ORGANIZED HEALTH CARE EDUCATION/TRAINING PROGRAM

## 2020-09-29 PROCEDURE — 99214 OFFICE O/P EST MOD 30 MIN: CPT | Mod: PBBFAC,25 | Performed by: STUDENT IN AN ORGANIZED HEALTH CARE EDUCATION/TRAINING PROGRAM

## 2020-09-29 PROCEDURE — 96375 TX/PRO/DX INJ NEW DRUG ADDON: CPT

## 2020-09-29 PROCEDURE — 96415 CHEMO IV INFUSION ADDL HR: CPT

## 2020-09-29 PROCEDURE — 25000003 PHARM REV CODE 250: Performed by: INTERNAL MEDICINE

## 2020-09-29 PROCEDURE — S0028 INJECTION, FAMOTIDINE, 20 MG: HCPCS | Performed by: INTERNAL MEDICINE

## 2020-09-29 PROCEDURE — 99999 PR PBB SHADOW E&M-EST. PATIENT-LVL IV: ICD-10-PCS | Mod: PBBFAC,,, | Performed by: STUDENT IN AN ORGANIZED HEALTH CARE EDUCATION/TRAINING PROGRAM

## 2020-09-29 PROCEDURE — 96367 TX/PROPH/DG ADDL SEQ IV INF: CPT

## 2020-09-29 PROCEDURE — 99215 PR OFFICE/OUTPT VISIT, EST, LEVL V, 40-54 MIN: ICD-10-PCS | Mod: S$PBB,,, | Performed by: STUDENT IN AN ORGANIZED HEALTH CARE EDUCATION/TRAINING PROGRAM

## 2020-09-29 PROCEDURE — 99215 OFFICE O/P EST HI 40 MIN: CPT | Mod: S$PBB,,, | Performed by: STUDENT IN AN ORGANIZED HEALTH CARE EDUCATION/TRAINING PROGRAM

## 2020-09-29 PROCEDURE — 63600175 PHARM REV CODE 636 W HCPCS: Mod: JG | Performed by: INTERNAL MEDICINE

## 2020-09-29 PROCEDURE — 96413 CHEMO IV INFUSION 1 HR: CPT

## 2020-09-29 RX ORDER — HEPARIN 100 UNIT/ML
500 SYRINGE INTRAVENOUS
Status: CANCELLED | OUTPATIENT
Start: 2020-09-29

## 2020-09-29 RX ORDER — FAMOTIDINE 10 MG/ML
20 INJECTION INTRAVENOUS
Status: COMPLETED | OUTPATIENT
Start: 2020-09-29 | End: 2020-09-29

## 2020-09-29 RX ORDER — ACETAMINOPHEN 325 MG/1
650 TABLET ORAL
Status: COMPLETED | OUTPATIENT
Start: 2020-09-29 | End: 2020-09-29

## 2020-09-29 RX ORDER — HEPARIN 100 UNIT/ML
500 SYRINGE INTRAVENOUS
Status: DISCONTINUED | OUTPATIENT
Start: 2020-09-29 | End: 2020-09-29 | Stop reason: HOSPADM

## 2020-09-29 RX ORDER — GEMFIBROZIL 600 MG/1
600 TABLET, FILM COATED ORAL 2 TIMES DAILY
COMMUNITY
Start: 2020-09-09 | End: 2021-09-20

## 2020-09-29 RX ORDER — MEPERIDINE HYDROCHLORIDE 50 MG/ML
25 INJECTION INTRAMUSCULAR; INTRAVENOUS; SUBCUTANEOUS
Status: DISCONTINUED | OUTPATIENT
Start: 2020-09-29 | End: 2020-09-29 | Stop reason: HOSPADM

## 2020-09-29 RX ORDER — SODIUM CHLORIDE 0.9 % (FLUSH) 0.9 %
10 SYRINGE (ML) INJECTION
Status: CANCELLED | OUTPATIENT
Start: 2020-09-29

## 2020-09-29 RX ORDER — FUROSEMIDE 40 MG/1
40 TABLET ORAL DAILY
COMMUNITY
End: 2021-12-21 | Stop reason: SDUPTHER

## 2020-09-29 RX ORDER — SODIUM CHLORIDE 0.9 % (FLUSH) 0.9 %
10 SYRINGE (ML) INJECTION
Status: DISCONTINUED | OUTPATIENT
Start: 2020-09-29 | End: 2020-09-29 | Stop reason: HOSPADM

## 2020-09-29 RX ORDER — FAMOTIDINE 10 MG/ML
20 INJECTION INTRAVENOUS
Status: CANCELLED | OUTPATIENT
Start: 2020-09-29

## 2020-09-29 RX ORDER — ACETAMINOPHEN 325 MG/1
650 TABLET ORAL
Status: CANCELLED | OUTPATIENT
Start: 2020-09-29

## 2020-09-29 RX ADMIN — RITUXIMAB 765 MG: 10 INJECTION, SOLUTION INTRAVENOUS at 02:09

## 2020-09-29 RX ADMIN — SODIUM CHLORIDE: 9 INJECTION, SOLUTION INTRAVENOUS at 02:09

## 2020-09-29 RX ADMIN — SODIUM CHLORIDE: 9 INJECTION, SOLUTION INTRAVENOUS at 01:09

## 2020-09-29 RX ADMIN — ACETAMINOPHEN 650 MG: 325 TABLET ORAL at 01:09

## 2020-09-29 RX ADMIN — FAMOTIDINE 20 MG: 10 INJECTION, SOLUTION INTRAVENOUS at 02:09

## 2020-09-29 NOTE — PLAN OF CARE
Patient tolerated Rituxan well today. NAD noted upon discharge. PIV removed, catheter tip intact. Declined avs. Verbalized understanding to call MD for any questions/concerns. Discharged home, ambulated independently.

## 2020-09-29 NOTE — PLAN OF CARE
Problem: Adult Inpatient Plan of Care  Goal: Optimal Comfort and Wellbeing  Intervention: Provide Person-Centered Care  Flowsheets (Taken 9/29/2020 1420)  Trust Relationship/Rapport:   care explained   questions answered   choices provided   questions encouraged   emotional support provided   reassurance provided   empathic listening provided   thoughts/feelings acknowledged

## 2020-09-29 NOTE — Clinical Note
Please schedule for CBC, CMP, LDH, Uric acid, MD appt and cycle 3 Rituximab maintenance on 12/22/2020

## 2020-09-29 NOTE — PROGRESS NOTES
HPI:     PATIENT: Gabi Newton  MRN: [id number]  DATE: 9/29/2020      Diagnosis:   1. 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 Renal mass, right    3. Heavy cigarette smoker    4. Deep vein thrombosis (DVT) of right lower extremity, unspecified chronicity, unspecified vein    5. Back pain, unspecified back location, unspecified back pain laterality, unspecified chronicity        Chief Complaint: Edema (swelling in legs) and Low-back Pain    Ms. Newton is a 54 year old female with Stage IV Marginal Zone Lymphoma (Gastric, Pulmonary, and Marrow) s/p 4 cycles of single agent Rituximab induction therapy completed on 4/14/20 followed by PET CT on 4/29/20 consistent with partial treatment response. She is here for a follow up visit. She is here to start maintenance therapy on July 7, 2020    She is here for cycle 2 of Rituximab today.  Denied nausea, diarrhea, cough, fever or chills  Continues to smoke a 1/2 a pack a day.  She was seen by Urologist Dr. Chao Calero on 5/4/20 for the right  renal mass who suspect this to be lymphoma.  He would follow up in 3 months in August, 2020 with a renal ultrasound and If there is continued growth, he will refer her to interventional Radiology for biopsy.  US kidney on 8/10/20 revealed a 3.5 x 3.5 x 3.9 cm hypoechoic mass within the inferior pole of the right kidney, similar in size when compared to previous PET-CT   Mammogram done in March 2020 at [facility] and following [facility] physician    Oncologic History  Ms. Newton is a 54-year-old female with a past medical history of abdominal pain for the last 3-4 years with unknown etiology, significant smoking history for 40 years, back pain, hypertension presented to the Hematology Clinic for newly diagnosed marginal zone B-cell lymphoma     Patient complained of chronic epigastric/periumbilical abdominal pain for the last 3 years and has been receiving multiple abdominal imaging at  [facility].  In 2016 her abdominal pain was thought to be from her gallbladder and she had underwent a cholecystectomy however she continued to have abdominal pain. A CT abdomen done in June 11, 2018 revealed wall thickening of the anterior gastric body suspicious for infectious or inflammatory but is concerning for soft tissue mass and a EGD was a recommended. MRI on June 19, 2018 which revealed diffuse hepatic steatosis without evidence of suspicious focal lesions, splenomegaly but no ascites.  She has been following  Dr. Cedeno since 2018 for her abdominal pain, however liver was not suspected to be the cause for her abdominal pain. She had negative workup for hepatitis-B, C, negative workup for autoimmune disease, negative for alpha 1 antitrypsin disease, negative for celiac sprue and her hepatitis panel has been negative so far.  MRI of the abdomen was done on September 24, 2019 which revealed multifocal areas of asymmetric gastric wall thickening.  Recommend further evaluation with direct visualization to exclude neoplasm.  EGD was done on 10/15/2019 and Gastric tumor in the gastric fundus was found however no specimen was collected as she was on anticoagulation.  The EUS done on 11/15/2019 revealed gastric tumor in the gastric fundus with many abnormal lymph nodes visualized in the lower paraesophageal mediastinum (level 8L) and gastrohepatic ligament (level 18). Fine needle biopsy performed and pathology revealed marginal zone B cell lymphoma.     She had chest pain in 2016 and the imaging done at that time revealed pulmonary nodule and has been following pulmonary, Dr. Yeung at [facility].  She has a currently daily smoker, has been smoking for almost 40 years.  On the CT chest done in November 2017 revealed 1.7 x 1.3 cm lesion in the right lower lobe and 2 x 1 cm lesion in the anterior portion of the right lower lobe.  A CT scan done on September 25, 2019 revealed a complex poorly  defined mass in the right lower lobe measuring 2.78 x 2.64 cm, suspicious for primary lung carcinoma     She was diagnosed with right lower extremity DVT 6 months ago and has been on Xarelto since then.  As per the patient she was hospitalized at that time and underwent procedure for her right lower extremity for venous insufficiency.  It appears that the right lower extremity DVT is a provoked clot.  She had history of bilateral iliac vein stenting 10/16/18 for venous outflow obsturction.     Today patient complained of epigastric abdominal pain about 3/10 severity, accompanied by nausea.  She complained of vomiting last week which has been intermittent.  She has chronic back pain and has been taking methadone.  She denied of any chest pain, shortness of breath, fever, chills or weight loss. She complained of chronic lower extremity edema and has been on diuretics      Subjective:    Initial History: Ms. Newton is a 54 y.o. female who returns for follow up.     Past Medical History:   Past Medical History:   Diagnosis Date    Abdominal pain 2018    Back pain     Fatty liver 2018    Gastric lymphoma 2019    Gastric tumor     GERD (gastroesophageal reflux disease)     Hyperlipidemia     Hypertension     Splenomegaly 2018       Past Surgical HIstory:   Past Surgical History:   Procedure Laterality Date    CARDIAC CATHETERIZATION      CARDIAC CATHETERIZATION  2018    had chest pains . states vessels at the bottom of heart collapsed     SECTION      x3    CHOLECYSTECTOMY      COLONOSCOPY      ENDOSCOPIC ULTRASOUND OF UPPER GASTROINTESTINAL TRACT Left 11/15/2019    Procedure: ULTRASOUND, UPPER GI TRACT, ENDOSCOPIC;  Surgeon: Mike Seay MD;  Location: [facility] ([address] [place] [address]);  Service: Endoscopy;  Laterality: Left;  Ok to hold xarelto per protocol per Dr. Lloyd see media file 10/25/19-tb    ESOPHAGOGASTRODUODENOSCOPY      ESOPHAGOGASTRODUODENOSCOPY N/A 2018    Procedure: EGD  (ESOPHAGOGASTRODUODENOSCOPY);  Surgeon: Ant Camejo MD;  Location: [facility] ENDO;  Service: Endoscopy;  Laterality: N/A;    ESOPHAGOGASTRODUODENOSCOPY N/A 10/15/2019    Procedure: EGD (ESOPHAGOGASTRODUODENOSCOPY);  Surgeon: Melanie Cedeno MD;  Location: [facility] ENDO;  Service: Endoscopy;  Laterality: N/A;    ESOPHAGOGASTRODUODENOSCOPY N/A 11/15/2019    Procedure: EGD (ESOPHAGOGASTRODUODENOSCOPY);  Surgeon: Mike Seay MD;  Location: [facility] ENDO (2ND FLR);  Service: Endoscopy;  Laterality: N/A;    HYSTERECTOMY      PHLEBOGRAPHY Bilateral 10/16/2018    Procedure: VENOGRAM;  Surgeon: Colin Mathis MD;  Location: [facility] CATH LAB;  Service: Cardiology;  Laterality: Bilateral;    WI RT/LT HEART CATHETERS Left     Coronary Arteriogram-2 Cath    RHINOPLASTY      TUBAL LIGATION      venogram Bilateral 10/16/2018       Family History:   Family History   Problem Relation Age of Onset    Breast cancer Maternal Grandfather     Dementia Mother     Aneurysm Father        Social History:  reports that she has been smoking cigarettes. She has a 40.00 pack-year smoking history. She has never used smokeless tobacco. She reports that she does not drink alcohol or use drugs.    Allergies:  Review of patient's allergies indicates:   Allergen Reactions    Azithromycin Anaphylaxis     Other reaction(s): swelling to entire body  Swelling (tongue / lips)^      Ciprofloxacin Swelling     Throat swells    Codeine      Swelling (throat)^    Tolerates Morphine      Codeine sulfate      Swelling (throat)^    Tolerates Morphine       Medications:  Current Outpatient Medications   Medication Sig Dispense Refill    allopurinoL (ZYLOPRIM) 300 MG tablet Take 1 tablet (300 mg total) by mouth once daily. 30 tablet 5    gemfibroziL (LOPID) 600 MG tablet Take 600 mg by mouth 2 (two) times daily.      hydroxyzine HCL (ATARAX) 25 MG tablet       lisinopril 10 MG tablet lisinopril 10 mg tablet   Take 1 tablet every day by oral route.    "   methadone (METHADOSE) 40 mg disintegrating tablet Take 40 mg by mouth once daily.       nicotine (NICODERM CQ) 21 mg/24 hr Place 1 patch onto the skin once daily. 28 patch 0    nitroGLYCERIN (NITROSTAT) 0.4 MG SL tablet Place 0.4 mg under the tongue every 5 (five) minutes as needed for Chest pain.      ondansetron (ZOFRAN) 8 MG tablet Take 1 tablet (8 mg total) by mouth every 8 (eight) hours as needed for Nausea. 30 tablet 11    rivaroxaban (XARELTO) 20 mg Tab Take 20 mg by mouth daily with dinner or evening meal.      simvastatin (ZOCOR) 40 MG tablet Take 40 mg by mouth every evening.        No current facility-administered medications for this visit.        Review of Systems   Constitutional: Negative for appetite change, fatigue, fever and unexpected weight change.   HENT: Negative for nosebleeds and sore throat.    Respiratory: Negative for cough and shortness of breath.    Cardiovascular: Negative for chest pain and leg swelling.   Gastrointestinal: Negative for abdominal pain, blood in stool, diarrhea, nausea and vomiting.   Genitourinary: Negative for dysuria, flank pain, hematuria, urgency and vaginal bleeding.   Musculoskeletal: Positive for back pain.   Skin: Negative for rash.   Neurological: Negative for seizures and headaches.   Hematological: Negative for adenopathy.   Psychiatric/Behavioral: Negative for agitation.       ECOG Performance Status: 2   Objective:      Vitals:   Vitals:    09/29/20 1310   Weight: 97 kg (213 lb 11.8 oz)   Height: 5' 1" (1.549 m)     BMI: Body mass index is 40.39 kg/m².    Physical Exam  Constitutional:       Appearance: Normal appearance. She is obese.   HENT:      Head: Normocephalic and atraumatic.      Nose: Nose normal. No rhinorrhea.      Mouth/Throat:      Mouth: Mucous membranes are moist.   Eyes:      Pupils: Pupils are equal, round, and reactive to light.   Neck:      Musculoskeletal: Normal range of motion and neck supple.   Cardiovascular:      Rate and " Rhythm: Normal rate and regular rhythm.      Heart sounds: No murmur.   Pulmonary:      Effort: Pulmonary effort is normal.      Breath sounds: Normal breath sounds.   Abdominal:      General: Abdomen is flat. There is no distension.      Palpations: Abdomen is soft.   Musculoskeletal: Normal range of motion.         General: Swelling present.   Skin:     General: Skin is warm.      Capillary Refill: Capillary refill takes less than 2 seconds.   Neurological:      General: No focal deficit present.      Mental Status: She is alert and oriented to person, place, and time.   Psychiatric:         Mood and Affect: Mood normal.         Behavior: Behavior normal.       Deferred due to virtual visit    Laboratory Data:  Lab Visit on 09/28/2020   Component Date Value Ref Range Status    WBC 09/28/2020 4.30  3.90 - 12.70 K/uL Final    RBC 09/28/2020 4.26  4.00 - 5.40 M/uL Final    Hemoglobin 09/28/2020 11.2* 12.0 - 16.0 g/dL Final    Hematocrit 09/28/2020 35.0* 37.0 - 48.5 % Final    Mean Corpuscular Volume 09/28/2020 82  82 - 98 fL Final    Mean Corpuscular Hemoglobin 09/28/2020 26.2* 27.0 - 31.0 pg Final    Mean Corpuscular Hemoglobin Conc 09/28/2020 31.9* 32.0 - 36.0 g/dL Final    RDW 09/28/2020 16.1* 11.5 - 14.5 % Final    Platelets 09/28/2020 165  150 - 350 K/uL Final    MPV 09/28/2020 8.5* 9.2 - 12.9 fL Final    Gran # (ANC) 09/28/2020 3.4  1.8 - 7.7 K/uL Final    Lymph # 09/28/2020 0.4* 1.0 - 4.8 K/uL Final    Mono # 09/28/2020 0.3  0.3 - 1.0 K/uL Final    Eos # 09/28/2020 0.2  0.0 - 0.5 K/uL Final    Baso # 09/28/2020 0.00  0.00 - 0.20 K/uL Final    Gran% 09/28/2020 79.8* 38.0 - 73.0 % Final    Lymph% 09/28/2020 8.7* 18.0 - 48.0 % Final    Mono% 09/28/2020 6.3  4.0 - 15.0 % Final    Eosinophil% 09/28/2020 4.6  0.0 - 8.0 % Final    Basophil% 09/28/2020 0.6  0.0 - 1.9 % Final    Differential Method 09/28/2020 Automated   Final    Sodium 09/28/2020 139  136 - 145 mmol/L Final    Potassium  09/28/2020 4.0  3.5 - 5.1 mmol/L Final    Chloride 09/28/2020 100* 101 - 111 mmol/L Final    CO2 09/28/2020 29  23 - 29 mmol/L Final    Glucose 09/28/2020 135* 74 - 118 mg/dL Final    BUN, Bld 09/28/2020 9  6 - 20 mg/dL Final    Creatinine 09/28/2020 0.6  0.5 - 1.4 mg/dL Final    Calcium 09/28/2020 9.0  8.6 - 10.0 mg/dL Final    Total Protein 09/28/2020 7.4  6.0 - 8.4 g/dL Final    Albumin 09/28/2020 3.8  3.5 - 5.2 g/dL Final    Total Bilirubin 09/28/2020 0.5  0.3 - 1.2 mg/dL Final    Comment: For infants and newborns, interpretation of results should be based  on gestational age, weight and in agreement with clinical  observations.  Premature Infant recommended reference ranges:  Up to 24 hours.............<8.0 mg/dL  Up to 48 hours............<12.0 mg/dL  3-5 days..................<15.0 mg/dL  6-29 days.................<15.0 mg/dL      Alkaline Phosphatase 09/28/2020 106  38 - 126 U/L Final    AST 09/28/2020 29  15 - 41 U/L Final    ALT 09/28/2020 30  14 - 54 U/L Final    Anion Gap 09/28/2020 10  8 - 16 mmol/L Final    eGFR if African American 09/28/2020 >60.0  >60 mL/min/1.73 m^2 Final    eGFR if non African American 09/28/2020 >60.0  >60 mL/min/1.73 m^2 Final    Comment: Calculation used to obtain the estimated glomerular filtration  rate (eGFR) is the CKD-EPI equation.       LD 09/28/2020 193  84 - 195 U/L Final    Results are increased in hemolyzed samples.    Uric Acid 09/28/2020 2.9  2.6 - 8.0 mg/dL Final         Imaging:       PET CT- 4/29/2020  1.  Findings consistent with partial treatment response with decreased size of the stomach wall and decreased SUV throughout the stomach.  Metabolic activity on the baseline scan was atypically prominent for marginal zone lymphoma.  If a Deauville score were to be assigned, the score would be 4.    2.  Decreased size and SUV of the patient's right lower lobe pulmonary biopsy-proven lymphoma.    3.  Questionable slight increase in size of the  patient's right lower pole lesion again concerning for RCC or oncocytoma.  Extranodal lymphoma could have a similar appearance.    Assessment:       1. 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 Renal mass, right    3. Heavy cigarette smoker    4. Deep vein thrombosis (DVT) of right lower extremity, unspecified chronicity, unspecified vein    5. Back pain, unspecified back location, unspecified back pain laterality, unspecified chronicity      1.  Sha MZL with coexisting Gastric/pulmonary/diffuse marrow Marginal zone B-cell lymphoma:   - MALT IPI score- 2, LDH elevated at 301, age less than 70,  [place] stage IV  - Patient complained of chronic epigastric/periumbilical abdominal pain for the last 3 years  - September 24, 2019-  MRI of the abdomen revealed multifocal areas of asymmetric gastric wall thickening.  Recommend further evaluation with direct visualization to exclude neoplasm.    - October 15, 2019- EGD revealed Gastric tumor in the gastric fundus was found however no specimen was collected as she was on anticoagulation.    - November 15, 2019- EUS revealed gastric tumor in the gastric fundus with many abnormal lymph nodes visualized in the lower paraesophageal mediastinum (level 8L) and gastrohepatic ligament (level 18). Fine needle biopsy performed and pathology revealed marginal zone B cell lymphoma.  PET CT-12/10/2019- revealed gastric marginal zone lymphoma, with pulmonary manifestations of marginal zone lymphoma in right lower lobe along with diffuse marrow uptake throughout the axial skeleton with more focal uptake identified at the right femoral greater trochanter and left ischial tuberosity  - She has a currently daily smoker, has been smoking for almost 40 years.    4/14/20- s/p 4 cycles of single agent Rituximab induction therapy completed    4/29/20- PET/CT consistent with partial treatment response   7/7/2020- S/p maintenance cycle 1 Rituximab  375 mg/m2. Will schedule every 12 weeks for 2 years   9/29/2020- cycle 2 of maintenance Rituximab     2. Solid right renal mass: PET CT-Questionable slight increase in size of the patient's right lower pole lesion again concerning for RCC or oncocytoma. Following Urology. US kidney on 8/10/20 revealed a 3.5 x 3.5 x 3.9 cm hypoechoic mass within the inferior pole of the right kidney, similar in size when compared to previous PET-CT   3.  Right lower extremity DVT, likely provoked: on Xarelto for about 12 months  4.  Bilateral lower extremity venous insufficiency status post stent placement  5. Chronic active smoker: Planning to start Nicotine patches and chantix   6. Chronic back pain on methadone  7.  Anxiety       Plan:     1. Okay for cycle 2 of maintanence Rituximab 375 mg/m2 today.  2. We believe that the right kidney lesion is less likely to be lymphoma as it has not responded to rituximab therapy and warrant further workup. Following Urology, will make an appointment with Dr. Calero  3. Can discontinue Xarelto as her recent LE US is negative for DVT and has completed 12 months of Xarelto treatment, however patient is comfortable to continue for longer  4. Counseled and educated to quit smoking    Please schedule for CBC, CMP, LDH, Uric acid, MD appt and cycle 3 Rituximab maintenance on 12/22/2020     Plan of care discussed with Dr. Agustin Ann MD PGY-6  Hematology and Oncology  Pager:[phone]

## 2020-10-21 ENCOUNTER — TELEPHONE (OUTPATIENT)
Dept: UROLOGY | Facility: CLINIC | Age: 55
End: 2020-10-21

## 2020-10-21 NOTE — TELEPHONE ENCOUNTER
----- Message from Alexandra Adams MA sent at 10/21/2020  1:44 PM CDT -----  Regarding: FW: call back to f/u with results from test on right kidney    ----- Message -----  From: Cornell Leyva  Sent: 10/21/2020  10:51 AM CDT  To: [facility] Staff  Subject: call back to f/u with results from test on r#          The Pt states that she thought that she would have heard back from you to see what will need to be done with her right kidney.  She states that you ordered the test for her and would like to discuss it.  The earliest appt available I offered was 1/04/2021    Phone # [phone]

## 2020-10-28 ENCOUNTER — TELEPHONE (OUTPATIENT)
Dept: UROLOGY | Facility: CLINIC | Age: 55
End: 2020-10-28

## 2020-11-03 ENCOUNTER — TELEPHONE (OUTPATIENT)
Dept: UROLOGY | Facility: CLINIC | Age: 55
End: 2020-11-03

## 2020-11-03 NOTE — TELEPHONE ENCOUNTER
----- Message from Cece Lerner sent at 11/3/2020  9:59 AM CST -----  Please schedule   ----- Message -----  From: Trisha Bernal  Sent: 11/3/2020   9:40 AM CST  To: [facility] Staff      Please contact patient wants to reschedule her virtual appt, says she just got power back today would like to do the visit one day next week 10 or after  I have no available dates until January      Patient can be contacted @# [phone]

## 2020-11-10 ENCOUNTER — OFFICE VISIT (OUTPATIENT)
Dept: UROLOGY | Facility: CLINIC | Age: 55
End: 2020-11-10
Payer: MEDICAID

## 2020-11-10 DIAGNOSIS — C88.4 EXTRANODAL MARGINAL ZONE B-CELL LYMPHOMA OF MUCOSA-ASSOCIATED LYMPHOID TISSUE (MALT): ICD-10-CM

## 2020-11-10 DIAGNOSIS — N28.89 RIGHT RENAL MASS: Primary | ICD-10-CM

## 2020-11-10 PROCEDURE — 99212 PR OFFICE/OUTPT VISIT, EST, LEVL II, 10-19 MIN: ICD-10-PCS | Mod: 95,,, | Performed by: UROLOGY

## 2020-11-10 PROCEDURE — 99212 OFFICE O/P EST SF 10 MIN: CPT | Mod: 95,,, | Performed by: UROLOGY

## 2020-11-10 NOTE — PROGRESS NOTES
Subjective:      Gabi Newton is a 55 y.o. female who returns today regarding her     Follow-up for renal mass.  She is being treated for lymphoma and is on maintenance therapy.  Overall she is doing well.  No  symptoms.    The patient location is: home  The chief complaint leading to consultation is: renal mass    Visit type: audiovisual    Face to Face time with patient: 5min  5 minutes of total time spent on the encounter, which includes face to face time and non-face to face time preparing to see the patient (eg, review of tests), Obtaining and/or reviewing separately obtained history, Documenting clinical information in the electronic or other health record, Independently interpreting results (not separately reported) and communicating results to the patient/family/caregiver, or Care coordination (not separately reported).         Each patient to whom he or she provides medical services by telemedicine is:  (1) informed of the relationship between the physician and patient and the respective role of any other health care provider with respect to management of the patient; and (2) notified that he or she may decline to receive medical services by telemedicine and may withdraw from such care at any time.    Notes:       The following portions of the patient's history were reviewed and updated as appropriate: allergies, current medications, past family history, past medical history, past social history, past surgical history and problem list.    Review of Systems  Pertinent items are noted in HPI.  A comprehensive multipoint review of systems was negative except as otherwise stated in the HPI.     Objective:   Vitals: LMP 02/11/2015 (Approximate)     Physical Exam   General: alert and oriented, no acute distress  Respiratory: Symmetric expansion, non-labored breathing  Cardiovascular: normal to inspection  Abdomen: non distended   Skin: normal coloration and turgor, no rashes, no suspicious skin lesions  noted  Neuro: no gross deficits  Psych: normal judgment and insight, normal mood/affect and non-anxious    Physical Exam    Lab Review   Urinalysis demonstrates no specimen    Lab Results   Component Value Date    WBC 4.30 09/28/2020    HGB 11.2 (L) 09/28/2020    HCT 35.0 (L) 09/28/2020    MCV 82 09/28/2020     09/28/2020     Lab Results   Component Value Date    CREATININE 0.6 09/28/2020    BUN 9 09/28/2020       Imaging  US KIDNEY     CLINICAL HISTORY:  Other specified disorders of kidney and ureter     TECHNIQUE:  Ultrasound of the kidneys was performed including color flow and Doppler evaluation of the kidneys.     COMPARISON:  None.     FINDINGS:  Right kidney: The right kidney measures 12 cm. No cortical thinning. No loss of corticomedullary distinction. Resistive index measures 0.72.  There is a 3.5 x 3.5 x 3.9 cm hypoechoic mass within the inferior pole of the right kidney, similar in size when compared to previous PET-CT and corresponding with the patient's known neoplasm.  No renal stone. No hydronephrosis.     Left kidney: The left kidney measures 11 cm. No cortical thinning. No loss of corticomedullary distinction. Resistive index measures 0.66.  No mass. No renal stone. No hydronephrosis.     Incidental note is made of a splenule.  Visualized hepatic parenchyma demonstrates diffuse increased echogenicity in keeping with sequela of fatty infiltration.     Impression:     1. Solid, hypoechoic right renal mass corresponding with the patient's known neoplasm.        Electronically signed by: Tony Gupta MD  Date:                                            08/10/2020  Time:                                           15:58  Assessment and Plan:   Right renal mass  -     US Kidney; Future; Expected date: 01/14/2021   we discussed the possibilities of the renal mass being lymphoma versus renal cell carcinoma or a benign renal mass.  Given the clinical situation, I favor this being lymphoma.  She will  see me in 2 months for another video visit with a renal ultrasound.  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

## 2020-11-23 ENCOUNTER — CLINICAL SUPPORT (OUTPATIENT)
Dept: SMOKING CESSATION | Facility: CLINIC | Age: 55
End: 2020-11-23
Payer: COMMERCIAL

## 2020-11-23 DIAGNOSIS — F17.200 NICOTINE DEPENDENCE: Primary | ICD-10-CM

## 2020-11-23 PROCEDURE — 99407 BEHAV CHNG SMOKING > 10 MIN: CPT | Mod: S$GLB,,,

## 2020-11-23 PROCEDURE — 99407 PR TOBACCO USE CESSATION INTENSIVE >10 MINUTES: ICD-10-PCS | Mod: S$GLB,,,

## 2020-11-23 NOTE — PROGRESS NOTES
Spoke with patient today in regard to smoking cessation progress for 12 month phone follow up on Quit 1. Patient not tobacco free at this time. Patient has scheduled an appointment to return to the program for Quit attempt #2. Informed patient of benefit period, future follow ups, and contact information if any further help or support is needed. Will resolve episode and complete smart form for Quit attempt #1.

## 2020-12-22 ENCOUNTER — INFUSION (OUTPATIENT)
Dept: INFUSION THERAPY | Facility: HOSPITAL | Age: 55
End: 2020-12-22
Payer: MEDICAID

## 2020-12-22 ENCOUNTER — OFFICE VISIT (OUTPATIENT)
Dept: HEMATOLOGY/ONCOLOGY | Facility: CLINIC | Age: 55
End: 2020-12-22
Payer: MEDICAID

## 2020-12-22 VITALS
BODY MASS INDEX: 41.61 KG/M2 | WEIGHT: 220.38 LBS | DIASTOLIC BLOOD PRESSURE: 73 MMHG | HEIGHT: 61 IN | OXYGEN SATURATION: 94 % | HEART RATE: 98 BPM | RESPIRATION RATE: 16 BRPM | SYSTOLIC BLOOD PRESSURE: 136 MMHG

## 2020-12-22 VITALS — DIASTOLIC BLOOD PRESSURE: 72 MMHG | HEART RATE: 94 BPM | SYSTOLIC BLOOD PRESSURE: 129 MMHG | RESPIRATION RATE: 16 BRPM

## 2020-12-22 DIAGNOSIS — C85.89 MARGINAL ZONE LYMPHOMA OF EXTRANODAL AND SOLID ORGAN SITES: ICD-10-CM

## 2020-12-22 DIAGNOSIS — I82.401 DEEP VEIN THROMBOSIS (DVT) OF RIGHT LOWER EXTREMITY, UNSPECIFIED CHRONICITY, UNSPECIFIED VEIN: ICD-10-CM

## 2020-12-22 DIAGNOSIS — C88.4 EXTRANODAL MARGINAL ZONE B-CELL LYMPHOMA OF MUCOSA-ASSOCIATED LYMPHOID TISSUE (MALT): ICD-10-CM

## 2020-12-22 DIAGNOSIS — C85.89 MARGINAL ZONE LYMPHOMA OF EXTRANODAL AND SOLID ORGAN SITES: Primary | ICD-10-CM

## 2020-12-22 DIAGNOSIS — M54.9 BACK PAIN, UNSPECIFIED BACK LOCATION, UNSPECIFIED BACK PAIN LATERALITY, UNSPECIFIED CHRONICITY: ICD-10-CM

## 2020-12-22 DIAGNOSIS — C88.4 EXTRANODAL MARGINAL ZONE B-CELL LYMPHOMA OF MUCOSA-ASSOCIATED LYMPHOID TISSUE (MALT): Primary | ICD-10-CM

## 2020-12-22 DIAGNOSIS — F17.210 HEAVY CIGARETTE SMOKER: ICD-10-CM

## 2020-12-22 PROCEDURE — 99999 PR PBB SHADOW E&M-EST. PATIENT-LVL III: ICD-10-PCS | Mod: PBBFAC,,, | Performed by: STUDENT IN AN ORGANIZED HEALTH CARE EDUCATION/TRAINING PROGRAM

## 2020-12-22 PROCEDURE — 99215 OFFICE O/P EST HI 40 MIN: CPT | Mod: S$PBB,,, | Performed by: STUDENT IN AN ORGANIZED HEALTH CARE EDUCATION/TRAINING PROGRAM

## 2020-12-22 PROCEDURE — 96367 TX/PROPH/DG ADDL SEQ IV INF: CPT

## 2020-12-22 PROCEDURE — 99213 OFFICE O/P EST LOW 20 MIN: CPT | Mod: PBBFAC,25 | Performed by: STUDENT IN AN ORGANIZED HEALTH CARE EDUCATION/TRAINING PROGRAM

## 2020-12-22 PROCEDURE — 25000003 PHARM REV CODE 250: Performed by: INTERNAL MEDICINE

## 2020-12-22 PROCEDURE — 96413 CHEMO IV INFUSION 1 HR: CPT

## 2020-12-22 PROCEDURE — 63600175 PHARM REV CODE 636 W HCPCS: Performed by: INTERNAL MEDICINE

## 2020-12-22 PROCEDURE — 99215 PR OFFICE/OUTPT VISIT, EST, LEVL V, 40-54 MIN: ICD-10-PCS | Mod: S$PBB,,, | Performed by: STUDENT IN AN ORGANIZED HEALTH CARE EDUCATION/TRAINING PROGRAM

## 2020-12-22 PROCEDURE — 96375 TX/PRO/DX INJ NEW DRUG ADDON: CPT

## 2020-12-22 PROCEDURE — 99999 PR PBB SHADOW E&M-EST. PATIENT-LVL III: CPT | Mod: PBBFAC,,, | Performed by: STUDENT IN AN ORGANIZED HEALTH CARE EDUCATION/TRAINING PROGRAM

## 2020-12-22 RX ORDER — HEPARIN 100 UNIT/ML
500 SYRINGE INTRAVENOUS
Status: DISCONTINUED | OUTPATIENT
Start: 2020-12-22 | End: 2020-12-22 | Stop reason: HOSPADM

## 2020-12-22 RX ORDER — ACETAMINOPHEN 325 MG/1
650 TABLET ORAL
Status: COMPLETED | OUTPATIENT
Start: 2020-12-22 | End: 2020-12-22

## 2020-12-22 RX ORDER — HEPARIN 100 UNIT/ML
500 SYRINGE INTRAVENOUS
Status: CANCELLED | OUTPATIENT
Start: 2020-12-22

## 2020-12-22 RX ORDER — MEPERIDINE HYDROCHLORIDE 50 MG/ML
25 INJECTION INTRAMUSCULAR; INTRAVENOUS; SUBCUTANEOUS
Status: DISCONTINUED | OUTPATIENT
Start: 2020-12-22 | End: 2020-12-22 | Stop reason: HOSPADM

## 2020-12-22 RX ORDER — SODIUM CHLORIDE 0.9 % (FLUSH) 0.9 %
10 SYRINGE (ML) INJECTION
Status: DISCONTINUED | OUTPATIENT
Start: 2020-12-22 | End: 2020-12-22 | Stop reason: HOSPADM

## 2020-12-22 RX ORDER — SODIUM CHLORIDE 0.9 % (FLUSH) 0.9 %
10 SYRINGE (ML) INJECTION
Status: CANCELLED | OUTPATIENT
Start: 2020-12-22

## 2020-12-22 RX ORDER — FAMOTIDINE 10 MG/ML
20 INJECTION INTRAVENOUS
Status: COMPLETED | OUTPATIENT
Start: 2020-12-22 | End: 2020-12-22

## 2020-12-22 RX ORDER — ACETAMINOPHEN 325 MG/1
650 TABLET ORAL
Status: CANCELLED | OUTPATIENT
Start: 2020-12-22

## 2020-12-22 RX ORDER — FAMOTIDINE 10 MG/ML
20 INJECTION INTRAVENOUS
Status: CANCELLED | OUTPATIENT
Start: 2020-12-22

## 2020-12-22 RX ADMIN — ACETAMINOPHEN 650 MG: 325 TABLET ORAL at 02:12

## 2020-12-22 RX ADMIN — RITUXIMAB 776 MG: 10 INJECTION, SOLUTION INTRAVENOUS at 03:12

## 2020-12-22 RX ADMIN — SODIUM CHLORIDE: 0.9 INJECTION, SOLUTION INTRAVENOUS at 02:12

## 2020-12-22 RX ADMIN — SODIUM CHLORIDE: 9 INJECTION, SOLUTION INTRAVENOUS at 02:12

## 2020-12-22 RX ADMIN — FAMOTIDINE 20 MG: 10 INJECTION INTRAVENOUS at 02:12

## 2020-12-22 NOTE — PROGRESS NOTES
HPI:     PATIENT: Gabi Newton  MRN: [id number]  DATE: 12/22/2020      Diagnosis:   1. 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 Deep vein thrombosis (DVT) of right lower extremity, unspecified chronicity, unspecified vein    4. Heavy cigarette smoker    5. Back pain, unspecified back location, unspecified back pain laterality, unspecified chronicity        Chief Complaint: No chief complaint on file.    Ms. Newton is a 54 year old female with Stage IV Marginal Zone Lymphoma (Gastric, Pulmonary, and Marrow) s/p 4 cycles of single agent Rituximab induction therapy completed on 4/14/20 followed by PET CT on 4/29/20 consistent with partial treatment response. She is here for a follow up visit. She is on maintenance therapy since July 7, 2020    She is here for cycle 3 of maintenance Rituximab today.  Denied nausea, diarrhea, cough, fever or chills  Continues to smoke a 1/2 a pack a day, quit and restarted twice  She was seen by Urologist Dr. Chao Calero on 11/10/20 for the right renal mass who suspect this to be lymphoma.  He would follow up in 3 months with a renal ultrasound and If there is continued growth, he will refer her to interventional Radiology for biopsy.  US kidney is pending  M[facility] done in March 2020 at [facility] and following [facility] physician  Her Hb is 10.9, low MCV of 77, Low MCH, MCHC and high RDW. Absolute eosinophil count is 600    Oncologic History  Ms. Newton is a 54-year-old female with a past medical history of abdominal pain for the last 3-4 years with unknown etiology, significant smoking history for 40 years, back pain, hypertension presented to the Hematology Clinic for newly diagnosed marginal zone B-cell lymphoma     Patient complained of chronic epigastric/periumbilical abdominal pain for the last 3 years and has been receiving multiple abdominal imaging at [facility].  In 2016 her abdominal pain was thought to be from her gallbladder and she had underwent a cholecystectomy however she continued to have abdominal pain. A CT abdomen done in June 11, 2018 revealed wall thickening of the anterior gastric body suspicious for infectious or inflammatory but is concerning for soft tissue mass and a EGD was a recommended. MRI on June 19, 2018 which revealed diffuse hepatic steatosis without evidence of suspicious focal lesions, splenomegaly but no ascites.  She has been following  Dr. Cedeno since 2018 for her abdominal pain, however liver was not suspected to be the cause for her abdominal pain. She had negative workup for hepatitis-B, C, negative workup for autoimmune disease, negative for alpha 1 antitrypsin disease, negative for celiac sprue and her hepatitis panel has been negative so far.  MRI of the abdomen was done on September 24, 2019 which revealed multifocal areas of asymmetric gastric wall thickening.  Recommend further evaluation with direct visualization to exclude neoplasm.  EGD was done on 10/15/2019 and Gastric tumor in the gastric fundus was found however no specimen was collected as she was on anticoagulation.  The EUS done on 11/15/2019 revealed gastric tumor in the gastric fundus with many abnormal lymph nodes visualized in the lower paraesophageal mediastinum (level 8L) and gastrohepatic ligament (level 18). Fine needle biopsy performed and pathology revealed marginal zone B cell lymphoma.     She had chest pain in 2016 and the imaging done at that time revealed pulmonary nodule and has been following pulmonary, Dr. Yeung at [facility].  She has a currently daily smoker, has been smoking for almost 40 years.  On the CT chest done in November 2017 revealed 1.7 x 1.3 cm lesion in the right lower lobe and 2 x 1 cm lesion in the anterior portion of the right lower lobe.  A CT scan done on September 25, 2019 revealed a complex poorly defined mass in  the right lower lobe measuring 2.78 x 2.64 cm, suspicious for primary lung carcinoma     She was diagnosed with right lower extremity DVT 6 months ago and has been on Xarelto since then.  As per the patient she was hospitalized at that time and underwent procedure for her right lower extremity for venous insufficiency.  It appears that the right lower extremity DVT is a provoked clot.  She had history of bilateral iliac vein stenting 10/16/18 for venous outflow obsturction.     Today patient complained of epigastric abdominal pain about 3/10 severity, accompanied by nausea.  She complained of vomiting last week which has been intermittent.  She has chronic back pain and has been taking methadone.  She denied of any chest pain, shortness of breath, fever, chills or weight loss. She complained of chronic lower extremity edema and has been on diuretics      Subjective:    Initial History: Ms. Newton is a 55 y.o. female who returns for follow up.     Past Medical History:   Past Medical History:   Diagnosis Date    Abdominal pain 2018    Back pain     Fatty liver 2018    Gastric lymphoma 2019    Gastric tumor     GERD (gastroesophageal reflux disease)     Hyperlipidemia     Hypertension     Splenomegaly 2018       Past Surgical HIstory:   Past Surgical History:   Procedure Laterality Date    CARDIAC CATHETERIZATION      CARDIAC CATHETERIZATION  2018    had chest pains . states vessels at the bottom of heart collapsed     SECTION      x3    CHOLECYSTECTOMY      COLONOSCOPY      ENDOSCOPIC ULTRASOUND OF UPPER GASTROINTESTINAL TRACT Left 11/15/2019    Procedure: ULTRASOUND, UPPER GI TRACT, ENDOSCOPIC;  Surgeon: Mike Seay MD;  Location: [facility] ([address] [place] [address]);  Service: Endoscopy;  Laterality: Left;  Ok to hold xarelto per protocol per Dr. Lloyd see media file 10/25/19-tb    ESOPHAGOGASTRODUODENOSCOPY      ESOPHAGOGASTRODUODENOSCOPY N/A 2018    Procedure: EGD  (ESOPHAGOGASTRODUODENOSCOPY);  Surgeon: Ant Camejo MD;  Location: [facility] ENDO;  Service: Endoscopy;  Laterality: N/A;    ESOPHAGOGASTRODUODENOSCOPY N/A 10/15/2019    Procedure: EGD (ESOPHAGOGASTRODUODENOSCOPY);  Surgeon: Melanie Cedeno MD;  Location: [facility] ENDO;  Service: Endoscopy;  Laterality: N/A;    ESOPHAGOGASTRODUODENOSCOPY N/A 11/15/2019    Procedure: EGD (ESOPHAGOGASTRODUODENOSCOPY);  Surgeon: Mike Seay MD;  Location: [facility] ENDO (2ND FLR);  Service: Endoscopy;  Laterality: N/A;    HYSTERECTOMY      PHLEBOGRAPHY Bilateral 10/16/2018    Procedure: VENOGRAM;  Surgeon: Colin Mathis MD;  Location: [facility] CATH LAB;  Service: Cardiology;  Laterality: Bilateral;    LA RT/LT HEART CATHETERS Left     Coronary Arteriogram-2 Cath    RHINOPLASTY      TUBAL LIGATION      venogram Bilateral 10/16/2018       Family History:   Family History   Problem Relation Age of Onset    Breast cancer Maternal Grandfather     Dementia Mother     Aneurysm Father        Social History:  reports that she has been smoking cigarettes. She has a 40.00 pack-year smoking history. She has never used smokeless tobacco. She reports that she does not drink alcohol or use drugs.    Allergies:  Review of patient's allergies indicates:   Allergen Reactions    Azithromycin Anaphylaxis     Other reaction(s): swelling to entire body  Swelling (tongue / lips)^      Ciprofloxacin Swelling     Throat swells    Codeine      Swelling (throat)^    Tolerates Morphine      Codeine sulfate      Swelling (throat)^    Tolerates Morphine       Medications:  Current Outpatient Medications   Medication Sig Dispense Refill    allopurinoL (ZYLOPRIM) 300 MG tablet Take 1 tablet (300 mg total) by mouth once daily. 30 tablet 5    furosemide (LASIX) 40 MG tablet Take 40 mg by mouth once daily.      gemfibroziL (LOPID) 600 MG tablet Take 600 mg by mouth 2 (two) times daily.      hydroxyzine HCL (ATARAX) 25 MG tablet       lisinopril 10 MG tablet  lisinopril 10 mg tablet   Take 1 tablet every day by oral route.      methadone (METHADOSE) 40 mg disintegrating tablet Take 40 mg by mouth once daily.       nicotine (NICODERM CQ) 21 mg/24 hr Place 1 patch onto the skin once daily. 28 patch 0    nitroGLYCERIN (NITROSTAT) 0.4 MG SL tablet Place 0.4 mg under the tongue every 5 (five) minutes as needed for Chest pain.      ondansetron (ZOFRAN) 8 MG tablet Take 1 tablet (8 mg total) by mouth every 8 (eight) hours as needed for Nausea. 30 tablet 11    rivaroxaban (XARELTO) 20 mg Tab Take 20 mg by mouth daily with dinner or evening meal.      simvastatin (ZOCOR) 40 MG tablet Take 40 mg by mouth every evening.        No current facility-administered medications for this visit.      Facility-Administered Medications Ordered in Other Visits   Medication Dose Route Frequency Provider Last Rate Last Dose    alteplase injection 2 mg  2 mg Intra-Catheter PRN Ale Velez MD        heparin, porcine (PF) 100 unit/mL injection flush 500 Units  500 Units Intravenous PRN Ale Velez MD        meperidine injection 25 mg  25 mg Intravenous PRN Ale Velez MD        sodium chloride 0.9% flush 10 mL  10 mL Intravenous PRN Ale Velez MD           Review of Systems   Constitutional: Negative for appetite change, fatigue, fever and unexpected weight change.   HENT: Negative for nosebleeds and sore throat.    Respiratory: Negative for cough and shortness of breath.    Cardiovascular: Negative for chest pain and leg swelling.   Gastrointestinal: Negative for abdominal pain, blood in stool, diarrhea, nausea and vomiting.   Genitourinary: Negative for dysuria, flank pain, hematuria, urgency and vaginal bleeding.   Musculoskeletal: Positive for back pain.   Skin: Negative for rash.   Neurological: Negative for seizures and headaches.   Hematological: Negative for adenopathy.   Psychiatric/Behavioral: Negative for agitation.       ECOG Performance Status: 2   Objective:      Vitals:  "  Vitals:    12/22/20 1329   BP: 136/73   Pulse: 98   Resp: 16   SpO2: (!) 94%   Weight: 100 kg (220 lb 5.6 oz)   Height: 5' 1" (1.549 m)     BMI: Body mass index is 41.63 kg/m².    Physical Exam  Constitutional:       Appearance: Normal appearance. She is obese.   HENT:      Head: Normocephalic and atraumatic.      Nose: Nose normal. No rhinorrhea.      Mouth/Throat:      Mouth: Mucous membranes are moist.   Eyes:      Pupils: Pupils are equal, round, and reactive to light.   Neck:      Musculoskeletal: Normal range of motion and neck supple.   Cardiovascular:      Rate and Rhythm: Normal rate and regular rhythm.      Heart sounds: No murmur.   Pulmonary:      Effort: Pulmonary effort is normal.      Breath sounds: Normal breath sounds.   Abdominal:      General: Abdomen is flat. There is no distension.      Palpations: Abdomen is soft.   Musculoskeletal: Normal range of motion.         General: Swelling present.   Skin:     General: Skin is warm.      Capillary Refill: Capillary refill takes less than 2 seconds.   Neurological:      General: No focal deficit present.      Mental Status: She is alert and oriented to person, place, and time.   Psychiatric:         Mood and Affect: Mood normal.         Behavior: Behavior normal.       Deferred due to virtual visit    Laboratory Data:  Lab Visit on 12/21/2020   Component Date Value Ref Range Status    WBC 12/21/2020 6.00  3.90 - 12.70 K/uL Final    RBC 12/21/2020 4.50  4.00 - 5.40 M/uL Final    Hemoglobin 12/21/2020 10.9* 12.0 - 16.0 g/dL Final    Hematocrit 12/21/2020 34.8* 37.0 - 48.5 % Final    MCV 12/21/2020 77* 82 - 98 fL Final    MCH 12/21/2020 24.3* 27.0 - 31.0 pg Final    MCHC 12/21/2020 31.5* 32.0 - 36.0 g/dL Final    RDW 12/21/2020 17.6* 11.5 - 14.5 % Final    Platelets 12/21/2020 214  150 - 350 K/uL Final    MPV 12/21/2020 7.9* 9.2 - 12.9 fL Final    Gran # (ANC) 12/21/2020 4.5  1.8 - 7.7 K/uL Final    Lymph # 12/21/2020 0.5* 1.0 - 4.8 K/uL " Final    Mono # 12/21/2020 0.4  0.3 - 1.0 K/uL Final    Eos # 12/21/2020 0.6* 0.0 - 0.5 K/uL Final    Baso # 12/21/2020 0.00  0.00 - 0.20 K/uL Final    Gran % 12/21/2020 75.3* 38.0 - 73.0 % Final    Lymph % 12/21/2020 7.9* 18.0 - 48.0 % Final    Mono % 12/21/2020 6.8  4.0 - 15.0 % Final    Eosinophil % 12/21/2020 9.5* 0.0 - 8.0 % Final    Basophil % 12/21/2020 0.5  0.0 - 1.9 % Final    Differential Method 12/21/2020 Automated   Final    Sodium 12/21/2020 137  136 - 145 mmol/L Final    Potassium 12/21/2020 4.1  3.5 - 5.1 mmol/L Final    Chloride 12/21/2020 98* 101 - 111 mmol/L Final    CO2 12/21/2020 28  23 - 29 mmol/L Final    Glucose 12/21/2020 89  74 - 118 mg/dL Final    BUN 12/21/2020 13  6 - 20 mg/dL Final    Creatinine 12/21/2020 0.7  0.5 - 1.4 mg/dL Final    Calcium 12/21/2020 9.0  8.6 - 10.0 mg/dL Final    Total Protein 12/21/2020 7.9  6.0 - 8.4 g/dL Final    Albumin 12/21/2020 3.9  3.5 - 5.2 g/dL Final    Total Bilirubin 12/21/2020 0.5  0.3 - 1.2 mg/dL Final    Comment: For infants and newborns, interpretation of results should be based  on gestational age, weight and in agreement with clinical  observations.  Premature Infant recommended reference ranges:  Up to 24 hours.............<8.0 mg/dL  Up to 48 hours............<12.0 mg/dL  3-5 days..................<15.0 mg/dL  6-29 days.................<15.0 mg/dL      Alkaline Phosphatase 12/21/2020 130* 38 - 126 U/L Final    AST 12/21/2020 20  15 - 41 U/L Final    ALT 12/21/2020 24  14 - 54 U/L Final    Anion Gap 12/21/2020 11  8 - 16 mmol/L Final    eGFR if African American 12/21/2020 >60.0  >60 mL/min/1.73 m^2 Final    eGFR if non African American 12/21/2020 >60.0  >60 mL/min/1.73 m^2 Final    Comment: Calculation used to obtain the estimated glomerular filtration  rate (eGFR) is the CKD-EPI equation.       LD 12/21/2020 245* 84 - 195 U/L Final    Results are increased in hemolyzed samples.    Uric Acid 12/21/2020 3.0  2.6 - 8.0  mg/dL Final         Imaging:       PET CT- 4/29/2020  1.  Findings consistent with partial treatment response with decreased size of the stomach wall and decreased SUV throughout the stomach.  Metabolic activity on the baseline scan was atypically prominent for marginal zone lymphoma.  If a Deauville score were to be assigned, the score would be 4.    2.  Decreased size and SUV of the patient's right lower lobe pulmonary biopsy-proven lymphoma.    3.  Questionable slight increase in size of the patient's right lower pole lesion again concerning for RCC or oncocytoma.  Extranodal lymphoma could have a similar appearance.    Assessment:       1. 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 Deep vein thrombosis (DVT) of right lower extremity, unspecified chronicity, unspecified vein    4. Heavy cigarette smoker    5. Back pain, unspecified back location, unspecified back pain laterality, unspecified chronicity      1.  Sha MZL with coexisting Gastric/pulmonary/diffuse marrow Marginal zone B-cell lymphoma:    - MALT IPI score- 2, LDH elevated at 301, age less than 70,  Lawley stage IV  - Patient complained of chronic epigastric/periumbilical abdominal pain for the last 3 years  - September 24, 2019-  MRI of the abdomen revealed multifocal areas of asymmetric gastric wall thickening.  Recommend further evaluation with direct visualization to exclude neoplasm.    - October 15, 2019- EGD revealed Gastric tumor in the gastric fundus was found however no specimen was collected as she was on anticoagulation.    - November 15, 2019- EUS revealed gastric tumor in the gastric fundus with many abnormal lymph nodes visualized in the lower paraesophageal mediastinum (level 8L) and gastrohepatic ligament (level 18). Fine needle biopsy performed and pathology revealed marginal zone B cell lymphoma.  PET CT-12/10/2019-  revealed gastric marginal zone lymphoma, with pulmonary manifestations of marginal zone lymphoma in right lower lobe along with diffuse marrow uptake throughout the axial skeleton with more focal uptake identified at the right femoral greater trochanter and left ischial tuberosity  - She has a currently daily smoker, has been smoking for almost 40 years.    4/14/20- s/p 4 cycles of single agent Rituximab induction therapy completed    4/29/20- PET/CT consistent with partial treatment response   7/7/2020- S/p maintenance cycle 1 Rituximab 375 mg/m2. Will schedule every 12 weeks for 2 years   9/29/2020- cycle 2 of maintenance Rituximab  12/22/20- cycle 3 of maintenance Rituximab     2. Solid right renal mass: PET CT-Questionable slight increase in size of the patient's right lower pole lesion again concerning for RCC or oncocytoma. Following Urology.   3.  Right lower extremity DVT, likely provoked: on Xarelto for about 15 months  4.  Bilateral lower extremity venous insufficiency status post stent placement  5. Chronic active smoker: Planning to start Nicotine patches and chantix   6. Chronic back pain on methadone  7.  Anxiety       Plan:     1. Okay for cycle 3 of maintanence Rituximab 375 mg/m2 today.  2. We believe that the right kidney lesion is less likely to be lymphoma as it has not responded to rituximab therapy and warrant further workup. Following Urology, with Dr. Calero  3. Can discontinue Xarelto as her recent LE US is negative for DVT and has completed 12 months of Xarelto treatment, however patient is comfortable to continue for longer  4. Counseled and educated to quit smoking  5. Will get Iron studies today due to low MCV of 77.    Please schedule for CBC, CMP, LDH, Uric acid, MD appt and cycle 4 Rituximab maintenance on 3/16/2020     Plan of care discussed with Dr. Agustin Ann MD PGY-6  Hematology and Oncology  Pager:[phone]

## 2020-12-22 NOTE — Clinical Note
Please schedule for CBC, CMP, LDH, Uric acid, MD appt and cycle 4 Rituximab maintenance on 3/16/2020  Please add Iron studies -  Ferritin, Serum Iron and TIBC to her blood work from yesterday if possible

## 2020-12-23 DIAGNOSIS — C85.89 MARGINAL ZONE LYMPHOMA OF EXTRANODAL AND SOLID ORGAN SITES: Primary | ICD-10-CM

## 2020-12-23 DIAGNOSIS — C88.4 EXTRANODAL MARGINAL ZONE B-CELL LYMPHOMA OF MUCOSA-ASSOCIATED LYMPHOID TISSUE (MALT): ICD-10-CM

## 2020-12-24 ENCOUNTER — TELEPHONE (OUTPATIENT)
Dept: HEMATOLOGY/ONCOLOGY | Facility: CLINIC | Age: 55
End: 2020-12-24

## 2020-12-24 DIAGNOSIS — D50.9 IRON DEFICIENCY ANEMIA, UNSPECIFIED IRON DEFICIENCY ANEMIA TYPE: ICD-10-CM

## 2020-12-24 NOTE — TELEPHONE ENCOUNTER
Serum Ferritin of 9 decreased from 12, two years ago. Her MCV of 77, low MCH and MCHC with high RDW of 17.6 is consistent with Iron deficiency anemia. Will give 1 dose of IV Injectafer and recheck iron studies in 8 weeks

## 2021-01-05 ENCOUNTER — TELEPHONE (OUTPATIENT)
Dept: HEMATOLOGY/ONCOLOGY | Facility: CLINIC | Age: 56
End: 2021-01-05

## 2021-01-14 RX ORDER — FAMOTIDINE 10 MG/ML
20 INJECTION INTRAVENOUS
Status: CANCELLED | OUTPATIENT
Start: 2021-03-16

## 2021-01-14 RX ORDER — ACETAMINOPHEN 325 MG/1
650 TABLET ORAL
Status: CANCELLED | OUTPATIENT
Start: 2021-03-16

## 2021-01-14 RX ORDER — HEPARIN 100 UNIT/ML
500 SYRINGE INTRAVENOUS
Status: CANCELLED | OUTPATIENT
Start: 2021-01-14

## 2021-01-14 RX ORDER — HEPARIN 100 UNIT/ML
500 SYRINGE INTRAVENOUS
Status: CANCELLED | OUTPATIENT
Start: 2021-03-16

## 2021-01-14 RX ORDER — SODIUM CHLORIDE 0.9 % (FLUSH) 0.9 %
10 SYRINGE (ML) INJECTION
Status: CANCELLED | OUTPATIENT
Start: 2021-01-14

## 2021-01-14 RX ORDER — SODIUM CHLORIDE 0.9 % (FLUSH) 0.9 %
10 SYRINGE (ML) INJECTION
Status: CANCELLED | OUTPATIENT
Start: 2021-03-16

## 2021-01-15 ENCOUNTER — TELEPHONE (OUTPATIENT)
Dept: HEMATOLOGY/ONCOLOGY | Facility: CLINIC | Age: 56
End: 2021-01-15

## 2021-01-20 ENCOUNTER — TELEPHONE (OUTPATIENT)
Dept: HEMATOLOGY/ONCOLOGY | Facility: CLINIC | Age: 56
End: 2021-01-20

## 2021-01-22 ENCOUNTER — INFUSION (OUTPATIENT)
Dept: INFUSION THERAPY | Facility: HOSPITAL | Age: 56
End: 2021-01-22
Payer: MEDICAID

## 2021-01-22 VITALS
HEART RATE: 72 BPM | OXYGEN SATURATION: 99 % | SYSTOLIC BLOOD PRESSURE: 138 MMHG | DIASTOLIC BLOOD PRESSURE: 78 MMHG | TEMPERATURE: 98 F | RESPIRATION RATE: 18 BRPM

## 2021-01-22 DIAGNOSIS — D50.9 IRON DEFICIENCY ANEMIA, UNSPECIFIED IRON DEFICIENCY ANEMIA TYPE: Primary | ICD-10-CM

## 2021-01-22 PROCEDURE — 96365 THER/PROPH/DIAG IV INF INIT: CPT

## 2021-01-22 PROCEDURE — 25000003 PHARM REV CODE 250: Performed by: INTERNAL MEDICINE

## 2021-01-22 PROCEDURE — 63600175 PHARM REV CODE 636 W HCPCS: Mod: JG | Performed by: INTERNAL MEDICINE

## 2021-01-22 RX ORDER — SODIUM CHLORIDE 0.9 % (FLUSH) 0.9 %
10 SYRINGE (ML) INJECTION
Status: DISCONTINUED | OUTPATIENT
Start: 2021-01-22 | End: 2021-01-22 | Stop reason: HOSPADM

## 2021-01-22 RX ORDER — HEPARIN 100 UNIT/ML
500 SYRINGE INTRAVENOUS
Status: DISCONTINUED | OUTPATIENT
Start: 2021-01-22 | End: 2021-01-22 | Stop reason: HOSPADM

## 2021-01-22 RX ADMIN — SODIUM CHLORIDE: 9 INJECTION, SOLUTION INTRAVENOUS at 02:01

## 2021-01-22 RX ADMIN — FERRIC CARBOXYMALTOSE INJECTION 750 MG: 50 INJECTION, SOLUTION INTRAVENOUS at 02:01

## 2021-03-16 ENCOUNTER — OFFICE VISIT (OUTPATIENT)
Dept: HEMATOLOGY/ONCOLOGY | Facility: CLINIC | Age: 56
End: 2021-03-16
Payer: MEDICAID

## 2021-03-16 ENCOUNTER — INFUSION (OUTPATIENT)
Dept: INFUSION THERAPY | Facility: HOSPITAL | Age: 56
End: 2021-03-16
Payer: MEDICAID

## 2021-03-16 VITALS
DIASTOLIC BLOOD PRESSURE: 70 MMHG | WEIGHT: 244.5 LBS | RESPIRATION RATE: 16 BRPM | HEIGHT: 61 IN | SYSTOLIC BLOOD PRESSURE: 143 MMHG | OXYGEN SATURATION: 97 % | TEMPERATURE: 98 F | BODY MASS INDEX: 46.16 KG/M2 | HEART RATE: 82 BPM

## 2021-03-16 VITALS
HEART RATE: 60 BPM | DIASTOLIC BLOOD PRESSURE: 65 MMHG | HEIGHT: 61 IN | TEMPERATURE: 98 F | WEIGHT: 244.5 LBS | BODY MASS INDEX: 46.16 KG/M2 | SYSTOLIC BLOOD PRESSURE: 132 MMHG | RESPIRATION RATE: 16 BRPM

## 2021-03-16 DIAGNOSIS — C85.89 MARGINAL ZONE LYMPHOMA OF EXTRANODAL AND SOLID ORGAN SITES: Primary | ICD-10-CM

## 2021-03-16 DIAGNOSIS — C85.89 MARGINAL ZONE LYMPHOMA OF EXTRANODAL AND SOLID ORGAN SITES: ICD-10-CM

## 2021-03-16 DIAGNOSIS — I82.401 DEEP VEIN THROMBOSIS (DVT) OF RIGHT LOWER EXTREMITY, UNSPECIFIED CHRONICITY, UNSPECIFIED VEIN: ICD-10-CM

## 2021-03-16 DIAGNOSIS — D50.9 IRON DEFICIENCY ANEMIA, UNSPECIFIED IRON DEFICIENCY ANEMIA TYPE: ICD-10-CM

## 2021-03-16 DIAGNOSIS — F17.210 HEAVY CIGARETTE SMOKER: ICD-10-CM

## 2021-03-16 DIAGNOSIS — C88.4 EXTRANODAL MARGINAL ZONE B-CELL LYMPHOMA OF MUCOSA-ASSOCIATED LYMPHOID TISSUE (MALT): ICD-10-CM

## 2021-03-16 DIAGNOSIS — C88.4 EXTRANODAL MARGINAL ZONE B-CELL LYMPHOMA OF MUCOSA-ASSOCIATED LYMPHOID TISSUE (MALT): Primary | ICD-10-CM

## 2021-03-16 PROCEDURE — 96415 CHEMO IV INFUSION ADDL HR: CPT

## 2021-03-16 PROCEDURE — 96367 TX/PROPH/DG ADDL SEQ IV INF: CPT

## 2021-03-16 PROCEDURE — 63600175 PHARM REV CODE 636 W HCPCS: Performed by: INTERNAL MEDICINE

## 2021-03-16 PROCEDURE — 99999 PR PBB SHADOW E&M-EST. PATIENT-LVL IV: CPT | Mod: PBBFAC,,, | Performed by: STUDENT IN AN ORGANIZED HEALTH CARE EDUCATION/TRAINING PROGRAM

## 2021-03-16 PROCEDURE — 99215 OFFICE O/P EST HI 40 MIN: CPT | Mod: S$PBB,,, | Performed by: STUDENT IN AN ORGANIZED HEALTH CARE EDUCATION/TRAINING PROGRAM

## 2021-03-16 PROCEDURE — 25000003 PHARM REV CODE 250: Performed by: INTERNAL MEDICINE

## 2021-03-16 PROCEDURE — 96413 CHEMO IV INFUSION 1 HR: CPT

## 2021-03-16 PROCEDURE — 99999 PR PBB SHADOW E&M-EST. PATIENT-LVL IV: ICD-10-PCS | Mod: PBBFAC,,, | Performed by: STUDENT IN AN ORGANIZED HEALTH CARE EDUCATION/TRAINING PROGRAM

## 2021-03-16 PROCEDURE — 96375 TX/PRO/DX INJ NEW DRUG ADDON: CPT

## 2021-03-16 PROCEDURE — 99214 OFFICE O/P EST MOD 30 MIN: CPT | Mod: PBBFAC,25 | Performed by: STUDENT IN AN ORGANIZED HEALTH CARE EDUCATION/TRAINING PROGRAM

## 2021-03-16 PROCEDURE — 99215 PR OFFICE/OUTPT VISIT, EST, LEVL V, 40-54 MIN: ICD-10-PCS | Mod: S$PBB,,, | Performed by: STUDENT IN AN ORGANIZED HEALTH CARE EDUCATION/TRAINING PROGRAM

## 2021-03-16 RX ORDER — ACETAMINOPHEN 325 MG/1
650 TABLET ORAL
Status: COMPLETED | OUTPATIENT
Start: 2021-03-16 | End: 2021-03-16

## 2021-03-16 RX ORDER — FAMOTIDINE 10 MG/ML
20 INJECTION INTRAVENOUS
Status: COMPLETED | OUTPATIENT
Start: 2021-03-16 | End: 2021-03-16

## 2021-03-16 RX ORDER — CITALOPRAM 20 MG/1
20 TABLET, FILM COATED ORAL DAILY
COMMUNITY
Start: 2021-02-24 | End: 2021-09-20

## 2021-03-16 RX ORDER — HEPARIN 100 UNIT/ML
500 SYRINGE INTRAVENOUS
Status: DISCONTINUED | OUTPATIENT
Start: 2021-03-16 | End: 2021-03-16 | Stop reason: HOSPADM

## 2021-03-16 RX ORDER — SODIUM CHLORIDE 0.9 % (FLUSH) 0.9 %
10 SYRINGE (ML) INJECTION
Status: DISCONTINUED | OUTPATIENT
Start: 2021-03-16 | End: 2021-03-16 | Stop reason: HOSPADM

## 2021-03-16 RX ADMIN — RITUXIMAB 818 MG: 10 INJECTION, SOLUTION INTRAVENOUS at 02:03

## 2021-03-16 RX ADMIN — FAMOTIDINE 20 MG: 10 INJECTION INTRAVENOUS at 02:03

## 2021-03-16 RX ADMIN — SODIUM CHLORIDE: 9 INJECTION, SOLUTION INTRAVENOUS at 02:03

## 2021-03-16 RX ADMIN — ACETAMINOPHEN 650 MG: 325 TABLET ORAL at 02:03

## 2021-04-26 ENCOUNTER — PATIENT MESSAGE (OUTPATIENT)
Dept: RESEARCH | Facility: HOSPITAL | Age: 56
End: 2021-04-26

## 2021-05-27 ENCOUNTER — TELEPHONE (OUTPATIENT)
Dept: UROLOGY | Facility: CLINIC | Age: 56
End: 2021-05-27

## 2021-05-27 ENCOUNTER — TELEPHONE (OUTPATIENT)
Dept: UROLOGY | Facility: HOSPITAL | Age: 56
End: 2021-05-27

## 2021-05-27 DIAGNOSIS — N28.89 RIGHT RENAL MASS: Primary | ICD-10-CM

## 2021-06-04 ENCOUNTER — TELEPHONE (OUTPATIENT)
Dept: UROLOGY | Facility: CLINIC | Age: 56
End: 2021-06-04

## 2021-06-07 ENCOUNTER — OFFICE VISIT (OUTPATIENT)
Dept: UROLOGY | Facility: CLINIC | Age: 56
End: 2021-06-07
Payer: MEDICAID

## 2021-06-07 VITALS
SYSTOLIC BLOOD PRESSURE: 126 MMHG | HEART RATE: 90 BPM | HEIGHT: 61 IN | DIASTOLIC BLOOD PRESSURE: 76 MMHG | BODY MASS INDEX: 46.07 KG/M2 | WEIGHT: 244 LBS

## 2021-06-07 DIAGNOSIS — N28.89 RIGHT RENAL MASS: Primary | ICD-10-CM

## 2021-06-07 DIAGNOSIS — C88.4 EXTRANODAL MARGINAL ZONE B-CELL LYMPHOMA OF MUCOSA-ASSOCIATED LYMPHOID TISSUE (MALT): ICD-10-CM

## 2021-06-07 DIAGNOSIS — Z79.01 CHRONIC ANTICOAGULATION: ICD-10-CM

## 2021-06-07 PROCEDURE — 99214 OFFICE O/P EST MOD 30 MIN: CPT | Mod: S$PBB,,, | Performed by: UROLOGY

## 2021-06-07 PROCEDURE — 99999 PR PBB SHADOW E&M-EST. PATIENT-LVL III: ICD-10-PCS | Mod: PBBFAC,,, | Performed by: UROLOGY

## 2021-06-07 PROCEDURE — 99213 OFFICE O/P EST LOW 20 MIN: CPT | Mod: PBBFAC,PN | Performed by: UROLOGY

## 2021-06-07 PROCEDURE — 99214 PR OFFICE/OUTPT VISIT, EST, LEVL IV, 30-39 MIN: ICD-10-PCS | Mod: S$PBB,,, | Performed by: UROLOGY

## 2021-06-07 PROCEDURE — 99999 PR PBB SHADOW E&M-EST. PATIENT-LVL III: CPT | Mod: PBBFAC,,, | Performed by: UROLOGY

## 2021-06-08 ENCOUNTER — OFFICE VISIT (OUTPATIENT)
Dept: HEMATOLOGY/ONCOLOGY | Facility: CLINIC | Age: 56
End: 2021-06-08
Payer: MEDICAID

## 2021-06-08 ENCOUNTER — INFUSION (OUTPATIENT)
Dept: INFUSION THERAPY | Facility: HOSPITAL | Age: 56
End: 2021-06-08
Payer: MEDICAID

## 2021-06-08 ENCOUNTER — PATIENT MESSAGE (OUTPATIENT)
Dept: HEMATOLOGY/ONCOLOGY | Facility: CLINIC | Age: 56
End: 2021-06-08

## 2021-06-08 VITALS
RESPIRATION RATE: 16 BRPM | DIASTOLIC BLOOD PRESSURE: 73 MMHG | HEIGHT: 61 IN | OXYGEN SATURATION: 94 % | TEMPERATURE: 98 F | HEART RATE: 80 BPM | WEIGHT: 224.19 LBS | BODY MASS INDEX: 42.33 KG/M2 | SYSTOLIC BLOOD PRESSURE: 150 MMHG

## 2021-06-08 VITALS
HEART RATE: 68 BPM | BODY MASS INDEX: 42.33 KG/M2 | HEIGHT: 61 IN | RESPIRATION RATE: 18 BRPM | TEMPERATURE: 99 F | WEIGHT: 224.19 LBS | OXYGEN SATURATION: 99 % | SYSTOLIC BLOOD PRESSURE: 120 MMHG | DIASTOLIC BLOOD PRESSURE: 60 MMHG

## 2021-06-08 DIAGNOSIS — C85.89 MARGINAL ZONE LYMPHOMA OF EXTRANODAL AND SOLID ORGAN SITES: ICD-10-CM

## 2021-06-08 DIAGNOSIS — C88.4 EXTRANODAL MARGINAL ZONE B-CELL LYMPHOMA OF MUCOSA-ASSOCIATED LYMPHOID TISSUE (MALT): Primary | ICD-10-CM

## 2021-06-08 DIAGNOSIS — C88.4 EXTRANODAL MARGINAL ZONE B-CELL LYMPHOMA OF MUCOSA-ASSOCIATED LYMPHOID TISSUE (MALT): ICD-10-CM

## 2021-06-08 DIAGNOSIS — F17.210 HEAVY CIGARETTE SMOKER: ICD-10-CM

## 2021-06-08 DIAGNOSIS — R11.0 NAUSEA: ICD-10-CM

## 2021-06-08 DIAGNOSIS — D50.9 IRON DEFICIENCY ANEMIA, UNSPECIFIED IRON DEFICIENCY ANEMIA TYPE: ICD-10-CM

## 2021-06-08 DIAGNOSIS — I82.401 DEEP VEIN THROMBOSIS (DVT) OF RIGHT LOWER EXTREMITY, UNSPECIFIED CHRONICITY, UNSPECIFIED VEIN: ICD-10-CM

## 2021-06-08 DIAGNOSIS — C85.89 MARGINAL ZONE LYMPHOMA OF EXTRANODAL AND SOLID ORGAN SITES: Primary | ICD-10-CM

## 2021-06-08 PROCEDURE — 96375 TX/PRO/DX INJ NEW DRUG ADDON: CPT

## 2021-06-08 PROCEDURE — 99215 OFFICE O/P EST HI 40 MIN: CPT | Mod: PBBFAC,25 | Performed by: STUDENT IN AN ORGANIZED HEALTH CARE EDUCATION/TRAINING PROGRAM

## 2021-06-08 PROCEDURE — 99999 PR PBB SHADOW E&M-EST. PATIENT-LVL V: CPT | Mod: PBBFAC,,, | Performed by: STUDENT IN AN ORGANIZED HEALTH CARE EDUCATION/TRAINING PROGRAM

## 2021-06-08 PROCEDURE — 96415 CHEMO IV INFUSION ADDL HR: CPT

## 2021-06-08 PROCEDURE — 99215 PR OFFICE/OUTPT VISIT, EST, LEVL V, 40-54 MIN: ICD-10-PCS | Mod: S$PBB,,, | Performed by: STUDENT IN AN ORGANIZED HEALTH CARE EDUCATION/TRAINING PROGRAM

## 2021-06-08 PROCEDURE — 99999 PR PBB SHADOW E&M-EST. PATIENT-LVL V: ICD-10-PCS | Mod: PBBFAC,,, | Performed by: STUDENT IN AN ORGANIZED HEALTH CARE EDUCATION/TRAINING PROGRAM

## 2021-06-08 PROCEDURE — 96413 CHEMO IV INFUSION 1 HR: CPT

## 2021-06-08 PROCEDURE — 63600175 PHARM REV CODE 636 W HCPCS: Mod: JG | Performed by: INTERNAL MEDICINE

## 2021-06-08 PROCEDURE — 99215 OFFICE O/P EST HI 40 MIN: CPT | Mod: S$PBB,,, | Performed by: STUDENT IN AN ORGANIZED HEALTH CARE EDUCATION/TRAINING PROGRAM

## 2021-06-08 PROCEDURE — 25000003 PHARM REV CODE 250: Performed by: INTERNAL MEDICINE

## 2021-06-08 RX ORDER — HEPARIN 100 UNIT/ML
500 SYRINGE INTRAVENOUS
Status: CANCELLED | OUTPATIENT
Start: 2021-06-08

## 2021-06-08 RX ORDER — SODIUM CHLORIDE 0.9 % (FLUSH) 0.9 %
10 SYRINGE (ML) INJECTION
Status: CANCELLED | OUTPATIENT
Start: 2021-06-08

## 2021-06-08 RX ORDER — FAMOTIDINE 10 MG/ML
20 INJECTION INTRAVENOUS
Status: CANCELLED | OUTPATIENT
Start: 2021-06-08

## 2021-06-08 RX ORDER — SODIUM CHLORIDE 0.9 % (FLUSH) 0.9 %
10 SYRINGE (ML) INJECTION
Status: DISCONTINUED | OUTPATIENT
Start: 2021-06-08 | End: 2021-06-08 | Stop reason: HOSPADM

## 2021-06-08 RX ORDER — ACETAMINOPHEN 325 MG/1
650 TABLET ORAL
Status: COMPLETED | OUTPATIENT
Start: 2021-06-08 | End: 2021-06-08

## 2021-06-08 RX ORDER — FAMOTIDINE 10 MG/ML
20 INJECTION INTRAVENOUS
Status: COMPLETED | OUTPATIENT
Start: 2021-06-08 | End: 2021-06-08

## 2021-06-08 RX ORDER — ACETAMINOPHEN 325 MG/1
650 TABLET ORAL
Status: CANCELLED | OUTPATIENT
Start: 2021-06-08

## 2021-06-08 RX ORDER — HEPARIN 100 UNIT/ML
500 SYRINGE INTRAVENOUS
Status: DISCONTINUED | OUTPATIENT
Start: 2021-06-08 | End: 2021-06-08 | Stop reason: HOSPADM

## 2021-06-08 RX ADMIN — SODIUM CHLORIDE: 9 INJECTION, SOLUTION INTRAVENOUS at 02:06

## 2021-06-08 RX ADMIN — ACETAMINOPHEN 650 MG: 325 TABLET ORAL at 02:06

## 2021-06-08 RX ADMIN — FAMOTIDINE 20 MG: 10 INJECTION INTRAVENOUS at 02:06

## 2021-06-08 RX ADMIN — RITUXIMAB 784 MG: 10 INJECTION, SOLUTION INTRAVENOUS at 02:06

## 2021-06-09 RX ORDER — ONDANSETRON HYDROCHLORIDE 8 MG/1
8 TABLET, FILM COATED ORAL EVERY 8 HOURS PRN
Qty: 30 TABLET | Refills: 3 | Status: SHIPPED | OUTPATIENT
Start: 2021-06-09 | End: 2022-01-20 | Stop reason: SDUPTHER

## 2021-06-10 ENCOUNTER — TELEPHONE (OUTPATIENT)
Dept: GASTROENTEROLOGY | Facility: CLINIC | Age: 56
End: 2021-06-10

## 2021-06-25 RX ORDER — SODIUM CHLORIDE 0.9 % (FLUSH) 0.9 %
10 SYRINGE (ML) INJECTION
Status: CANCELLED | OUTPATIENT
Start: 2021-06-25

## 2021-06-25 RX ORDER — HEPARIN 100 UNIT/ML
500 SYRINGE INTRAVENOUS
Status: CANCELLED | OUTPATIENT
Start: 2021-06-25

## 2021-07-28 ENCOUNTER — PATIENT MESSAGE (OUTPATIENT)
Dept: HEMATOLOGY/ONCOLOGY | Facility: CLINIC | Age: 56
End: 2021-07-28

## 2021-07-29 DIAGNOSIS — C85.89 MARGINAL ZONE LYMPHOMA OF EXTRANODAL AND SOLID ORGAN SITES: Primary | ICD-10-CM

## 2021-08-04 ENCOUNTER — PATIENT MESSAGE (OUTPATIENT)
Dept: HEMATOLOGY/ONCOLOGY | Facility: CLINIC | Age: 56
End: 2021-08-04

## 2021-08-04 ENCOUNTER — TELEPHONE (OUTPATIENT)
Dept: UROLOGY | Facility: CLINIC | Age: 56
End: 2021-08-04

## 2021-08-05 ENCOUNTER — TELEPHONE (OUTPATIENT)
Dept: UROLOGY | Facility: CLINIC | Age: 56
End: 2021-08-05

## 2021-08-20 ENCOUNTER — INFUSION (OUTPATIENT)
Dept: INFUSION THERAPY | Facility: HOSPITAL | Age: 56
End: 2021-08-20
Payer: MEDICAID

## 2021-08-20 VITALS
DIASTOLIC BLOOD PRESSURE: 72 MMHG | SYSTOLIC BLOOD PRESSURE: 146 MMHG | TEMPERATURE: 98 F | RESPIRATION RATE: 18 BRPM | HEART RATE: 75 BPM

## 2021-08-20 DIAGNOSIS — D50.9 IRON DEFICIENCY ANEMIA, UNSPECIFIED IRON DEFICIENCY ANEMIA TYPE: Primary | ICD-10-CM

## 2021-08-20 DIAGNOSIS — C88.4 EXTRANODAL MARGINAL ZONE B-CELL LYMPHOMA OF MUCOSA-ASSOCIATED LYMPHOID TISSUE (MALT): ICD-10-CM

## 2021-08-20 PROCEDURE — 63600175 PHARM REV CODE 636 W HCPCS: Mod: JG

## 2021-08-20 PROCEDURE — 63600175 PHARM REV CODE 636 W HCPCS: Mod: JG | Performed by: INTERNAL MEDICINE

## 2021-08-20 PROCEDURE — 25000003 PHARM REV CODE 250: Performed by: INTERNAL MEDICINE

## 2021-08-20 PROCEDURE — 96374 THER/PROPH/DIAG INJ IV PUSH: CPT

## 2021-08-20 PROCEDURE — 25000003 PHARM REV CODE 250

## 2021-08-20 RX ORDER — HEPARIN 100 UNIT/ML
500 SYRINGE INTRAVENOUS
Status: CANCELLED | OUTPATIENT
Start: 2021-08-27

## 2021-08-20 RX ORDER — HEPARIN 100 UNIT/ML
500 SYRINGE INTRAVENOUS
Status: DISCONTINUED | OUTPATIENT
Start: 2021-08-20 | End: 2021-08-20 | Stop reason: HOSPADM

## 2021-08-20 RX ORDER — HEPARIN 100 UNIT/ML
500 SYRINGE INTRAVENOUS
Status: CANCELLED | OUTPATIENT
Start: 2021-08-20

## 2021-08-20 RX ORDER — SODIUM CHLORIDE 0.9 % (FLUSH) 0.9 %
10 SYRINGE (ML) INJECTION
Status: CANCELLED | OUTPATIENT
Start: 2021-08-27

## 2021-08-20 RX ORDER — SODIUM CHLORIDE 0.9 % (FLUSH) 0.9 %
10 SYRINGE (ML) INJECTION
Status: CANCELLED | OUTPATIENT
Start: 2021-08-20

## 2021-08-20 RX ORDER — SODIUM CHLORIDE 0.9 % (FLUSH) 0.9 %
10 SYRINGE (ML) INJECTION
Status: DISCONTINUED | OUTPATIENT
Start: 2021-08-20 | End: 2021-08-20 | Stop reason: HOSPADM

## 2021-08-20 RX ADMIN — FERRIC CARBOXYMALTOSE INJECTION 750 MG: 50 INJECTION, SOLUTION INTRAVENOUS at 09:08

## 2021-08-20 RX ADMIN — SODIUM CHLORIDE: 0.9 INJECTION, SOLUTION INTRAVENOUS at 09:08

## 2021-08-27 ENCOUNTER — INFUSION (OUTPATIENT)
Dept: INFUSION THERAPY | Facility: HOSPITAL | Age: 56
End: 2021-08-27
Payer: MEDICAID

## 2021-08-27 VITALS — SYSTOLIC BLOOD PRESSURE: 143 MMHG | HEART RATE: 69 BPM | RESPIRATION RATE: 18 BRPM | DIASTOLIC BLOOD PRESSURE: 74 MMHG

## 2021-08-27 DIAGNOSIS — D50.9 IRON DEFICIENCY ANEMIA, UNSPECIFIED IRON DEFICIENCY ANEMIA TYPE: Primary | ICD-10-CM

## 2021-08-27 DIAGNOSIS — C88.4 EXTRANODAL MARGINAL ZONE B-CELL LYMPHOMA OF MUCOSA-ASSOCIATED LYMPHOID TISSUE (MALT): ICD-10-CM

## 2021-08-27 PROCEDURE — 63600175 PHARM REV CODE 636 W HCPCS: Mod: JG | Performed by: INTERNAL MEDICINE

## 2021-08-27 PROCEDURE — 25000003 PHARM REV CODE 250: Performed by: INTERNAL MEDICINE

## 2021-08-27 PROCEDURE — 96365 THER/PROPH/DIAG IV INF INIT: CPT

## 2021-08-27 RX ORDER — HEPARIN 100 UNIT/ML
500 SYRINGE INTRAVENOUS
Status: DISCONTINUED | OUTPATIENT
Start: 2021-08-27 | End: 2021-08-27 | Stop reason: HOSPADM

## 2021-08-27 RX ORDER — SODIUM CHLORIDE 0.9 % (FLUSH) 0.9 %
10 SYRINGE (ML) INJECTION
Status: DISCONTINUED | OUTPATIENT
Start: 2021-08-27 | End: 2021-08-27 | Stop reason: HOSPADM

## 2021-08-27 RX ADMIN — SODIUM CHLORIDE: 9 INJECTION, SOLUTION INTRAVENOUS at 02:08

## 2021-08-27 RX ADMIN — FERRIC CARBOXYMALTOSE INJECTION 750 MG: 50 INJECTION, SOLUTION INTRAVENOUS at 02:08

## 2021-09-02 RX ORDER — PREDNISONE 20 MG/1
20 TABLET ORAL
Status: CANCELLED
Start: 2021-09-21 | End: 2021-09-02

## 2021-09-02 RX ORDER — SODIUM CHLORIDE 0.9 % (FLUSH) 0.9 %
10 SYRINGE (ML) INJECTION
Status: CANCELLED | OUTPATIENT
Start: 2021-09-21

## 2021-09-02 RX ORDER — HEPARIN 100 UNIT/ML
500 SYRINGE INTRAVENOUS
Status: CANCELLED | OUTPATIENT
Start: 2021-09-21

## 2021-09-02 RX ORDER — FAMOTIDINE 20 MG/1
20 TABLET, FILM COATED ORAL
Status: CANCELLED
Start: 2021-09-21

## 2021-09-02 RX ORDER — ACETAMINOPHEN 325 MG/1
650 TABLET ORAL
Status: CANCELLED | OUTPATIENT
Start: 2021-09-21

## 2021-09-13 ENCOUNTER — PATIENT MESSAGE (OUTPATIENT)
Dept: UROLOGY | Facility: CLINIC | Age: 56
End: 2021-09-13

## 2021-09-13 ENCOUNTER — PATIENT MESSAGE (OUTPATIENT)
Dept: HEMATOLOGY/ONCOLOGY | Facility: CLINIC | Age: 56
End: 2021-09-13

## 2021-09-14 ENCOUNTER — PATIENT MESSAGE (OUTPATIENT)
Dept: UROLOGY | Facility: CLINIC | Age: 56
End: 2021-09-14

## 2021-09-14 DIAGNOSIS — N28.89 RIGHT RENAL MASS: Primary | ICD-10-CM

## 2021-09-19 ENCOUNTER — PATIENT MESSAGE (OUTPATIENT)
Dept: HEMATOLOGY/ONCOLOGY | Facility: CLINIC | Age: 56
End: 2021-09-19

## 2021-09-20 ENCOUNTER — OFFICE VISIT (OUTPATIENT)
Dept: UROLOGY | Facility: CLINIC | Age: 56
End: 2021-09-20
Payer: MEDICAID

## 2021-09-20 VITALS
BODY MASS INDEX: 42.33 KG/M2 | HEART RATE: 92 BPM | DIASTOLIC BLOOD PRESSURE: 74 MMHG | SYSTOLIC BLOOD PRESSURE: 120 MMHG | HEIGHT: 61 IN | WEIGHT: 224.19 LBS

## 2021-09-20 DIAGNOSIS — C88.4 EXTRANODAL MARGINAL ZONE B-CELL LYMPHOMA OF MUCOSA-ASSOCIATED LYMPHOID TISSUE (MALT): ICD-10-CM

## 2021-09-20 DIAGNOSIS — C85.89 MARGINAL ZONE LYMPHOMA OF EXTRANODAL AND SOLID ORGAN SITES: Primary | ICD-10-CM

## 2021-09-20 DIAGNOSIS — Z79.01 CHRONIC ANTICOAGULATION: ICD-10-CM

## 2021-09-20 DIAGNOSIS — N28.89 RIGHT RENAL MASS: Primary | ICD-10-CM

## 2021-09-20 PROCEDURE — 99213 OFFICE O/P EST LOW 20 MIN: CPT | Mod: PBBFAC,PN | Performed by: UROLOGY

## 2021-09-20 PROCEDURE — 99999 PR PBB SHADOW E&M-EST. PATIENT-LVL III: ICD-10-PCS | Mod: PBBFAC,,, | Performed by: UROLOGY

## 2021-09-20 PROCEDURE — 99214 PR OFFICE/OUTPT VISIT, EST, LEVL IV, 30-39 MIN: ICD-10-PCS | Mod: S$PBB,,, | Performed by: UROLOGY

## 2021-09-20 PROCEDURE — 99999 PR PBB SHADOW E&M-EST. PATIENT-LVL III: CPT | Mod: PBBFAC,,, | Performed by: UROLOGY

## 2021-09-20 PROCEDURE — 99214 OFFICE O/P EST MOD 30 MIN: CPT | Mod: S$PBB,,, | Performed by: UROLOGY

## 2021-09-20 RX ORDER — ASPIRIN 81 MG/1
81 TABLET ORAL NIGHTLY
Status: ON HOLD | COMMUNITY
End: 2022-06-03 | Stop reason: SDUPTHER

## 2021-09-21 ENCOUNTER — INFUSION (OUTPATIENT)
Dept: INFUSION THERAPY | Facility: HOSPITAL | Age: 56
End: 2021-09-21
Payer: MEDICAID

## 2021-09-21 ENCOUNTER — OFFICE VISIT (OUTPATIENT)
Dept: HEMATOLOGY/ONCOLOGY | Facility: CLINIC | Age: 56
End: 2021-09-21
Payer: MEDICAID

## 2021-09-21 VITALS
RESPIRATION RATE: 18 BRPM | DIASTOLIC BLOOD PRESSURE: 65 MMHG | HEART RATE: 80 BPM | TEMPERATURE: 99 F | SYSTOLIC BLOOD PRESSURE: 128 MMHG

## 2021-09-21 VITALS
HEIGHT: 60 IN | RESPIRATION RATE: 12 BRPM | TEMPERATURE: 99 F | HEART RATE: 85 BPM | WEIGHT: 227.5 LBS | BODY MASS INDEX: 44.66 KG/M2 | SYSTOLIC BLOOD PRESSURE: 135 MMHG | OXYGEN SATURATION: 96 % | DIASTOLIC BLOOD PRESSURE: 69 MMHG

## 2021-09-21 DIAGNOSIS — C85.89 MARGINAL ZONE LYMPHOMA OF EXTRANODAL AND SOLID ORGAN SITES: Primary | ICD-10-CM

## 2021-09-21 DIAGNOSIS — C88.4 EXTRANODAL MARGINAL ZONE B-CELL LYMPHOMA OF MUCOSA-ASSOCIATED LYMPHOID TISSUE (MALT): Primary | ICD-10-CM

## 2021-09-21 DIAGNOSIS — C85.89 MARGINAL ZONE LYMPHOMA OF EXTRANODAL AND SOLID ORGAN SITES: ICD-10-CM

## 2021-09-21 DIAGNOSIS — Z87.891 PERSONAL HISTORY OF NICOTINE DEPENDENCE: ICD-10-CM

## 2021-09-21 PROCEDURE — 99999 PR PBB SHADOW E&M-EST. PATIENT-LVL V: ICD-10-PCS | Mod: PBBFAC,,, | Performed by: INTERNAL MEDICINE

## 2021-09-21 PROCEDURE — 25000003 PHARM REV CODE 250: Performed by: INTERNAL MEDICINE

## 2021-09-21 PROCEDURE — 99999 PR PBB SHADOW E&M-EST. PATIENT-LVL V: CPT | Mod: PBBFAC,,, | Performed by: INTERNAL MEDICINE

## 2021-09-21 PROCEDURE — 99215 PR OFFICE/OUTPT VISIT, EST, LEVL V, 40-54 MIN: ICD-10-PCS | Mod: S$PBB,,, | Performed by: INTERNAL MEDICINE

## 2021-09-21 PROCEDURE — 99215 OFFICE O/P EST HI 40 MIN: CPT | Mod: PBBFAC | Performed by: INTERNAL MEDICINE

## 2021-09-21 PROCEDURE — 63600175 PHARM REV CODE 636 W HCPCS: Performed by: INTERNAL MEDICINE

## 2021-09-21 PROCEDURE — 96401 CHEMO ANTI-NEOPL SQ/IM: CPT

## 2021-09-21 PROCEDURE — 99215 OFFICE O/P EST HI 40 MIN: CPT | Mod: S$PBB,,, | Performed by: INTERNAL MEDICINE

## 2021-09-21 RX ORDER — DEXAMETHASONE 1.5 MG/1
3 TABLET ORAL
Status: DISCONTINUED | OUTPATIENT
Start: 2021-09-21 | End: 2021-09-21 | Stop reason: DRUGHIGH

## 2021-09-21 RX ORDER — FAMOTIDINE 20 MG/1
20 TABLET, FILM COATED ORAL
Status: COMPLETED | OUTPATIENT
Start: 2021-09-21 | End: 2021-09-21

## 2021-09-21 RX ORDER — ACETAMINOPHEN 325 MG/1
650 TABLET ORAL
Status: COMPLETED | OUTPATIENT
Start: 2021-09-21 | End: 2021-09-21

## 2021-09-21 RX ORDER — DEXAMETHASONE 4 MG/1
4 TABLET ORAL ONCE
Status: COMPLETED | OUTPATIENT
Start: 2021-09-21 | End: 2021-09-21

## 2021-09-21 RX ORDER — PREDNISONE 20 MG/1
20 TABLET ORAL
Status: DISCONTINUED | OUTPATIENT
Start: 2021-09-21 | End: 2021-09-21 | Stop reason: HOSPADM

## 2021-09-21 RX ADMIN — DEXAMETHASONE 4 MG: 4 TABLET ORAL at 02:09

## 2021-09-21 RX ADMIN — RITUXIMAB AND HYALURONIDASE 1400 MG: 120; 2000 INJECTION, SOLUTION SUBCUTANEOUS at 03:09

## 2021-09-21 RX ADMIN — FAMOTIDINE 20 MG: 20 TABLET, FILM COATED ORAL at 02:09

## 2021-09-21 RX ADMIN — ACETAMINOPHEN 650 MG: 325 TABLET ORAL at 02:09

## 2021-09-23 ENCOUNTER — DOCUMENTATION ONLY (OUTPATIENT)
Dept: UROLOGY | Facility: HOSPITAL | Age: 56
End: 2021-09-23

## 2021-09-23 DIAGNOSIS — C64.1 MALIGNANT NEOPLASM OF RIGHT KIDNEY: ICD-10-CM

## 2021-09-23 PROBLEM — N28.89 RIGHT RENAL MASS: Status: ACTIVE | Noted: 2021-09-23

## 2021-09-23 PROBLEM — C64.9 CANCER OF KIDNEY: Status: ACTIVE | Noted: 2021-09-23

## 2021-09-24 ENCOUNTER — TELEPHONE (OUTPATIENT)
Dept: UROLOGY | Facility: CLINIC | Age: 56
End: 2021-09-24

## 2021-10-04 ENCOUNTER — TELEPHONE (OUTPATIENT)
Dept: ENDOSCOPY | Facility: HOSPITAL | Age: 56
End: 2021-10-04

## 2021-10-04 ENCOUNTER — TELEPHONE (OUTPATIENT)
Dept: GASTROENTEROLOGY | Facility: CLINIC | Age: 56
End: 2021-10-04

## 2021-10-06 ENCOUNTER — HOSPITAL ENCOUNTER (OUTPATIENT)
Dept: RADIOLOGY | Facility: HOSPITAL | Age: 56
Discharge: HOME | End: 2021-10-06
Attending: INTERNAL MEDICINE
Payer: MEDICAID

## 2021-10-06 DIAGNOSIS — Z87.891 PERSONAL HISTORY OF NICOTINE DEPENDENCE: ICD-10-CM

## 2021-10-06 PROCEDURE — G1004 CT CHEST LUNG SCREENING LOW DOSE: ICD-10-PCS | Mod: ,,, | Performed by: RADIOLOGY

## 2021-10-06 PROCEDURE — 71271 CT THORAX LUNG CANCER SCR C-: CPT | Mod: 26,MG,, | Performed by: RADIOLOGY

## 2021-10-06 PROCEDURE — G1004 CDSM NDSC: HCPCS | Mod: ,,, | Performed by: RADIOLOGY

## 2021-10-06 PROCEDURE — G1004 CDSM NDSC: HCPCS

## 2021-10-06 PROCEDURE — 71271 CT THORAX LUNG CANCER SCR C-: CPT | Mod: TC,MG

## 2021-10-06 PROCEDURE — 71271 CT CHEST LUNG SCREENING LOW DOSE: ICD-10-PCS | Mod: 26,MG,, | Performed by: RADIOLOGY

## 2021-10-07 ENCOUNTER — TELEPHONE (OUTPATIENT)
Dept: GASTROENTEROLOGY | Facility: CLINIC | Age: 56
End: 2021-10-07

## 2021-10-15 ENCOUNTER — OFFICE VISIT (OUTPATIENT)
Dept: GASTROENTEROLOGY | Facility: CLINIC | Age: 56
End: 2021-10-15
Payer: MEDICAID

## 2021-10-15 VITALS
SYSTOLIC BLOOD PRESSURE: 148 MMHG | WEIGHT: 230.06 LBS | DIASTOLIC BLOOD PRESSURE: 79 MMHG | HEIGHT: 60 IN | BODY MASS INDEX: 45.17 KG/M2 | OXYGEN SATURATION: 97 % | HEART RATE: 88 BPM

## 2021-10-15 DIAGNOSIS — Z86.2 HISTORY OF ANEMIA: Primary | ICD-10-CM

## 2021-10-15 DIAGNOSIS — C85.99 GASTRIC LYMPHOMA: ICD-10-CM

## 2021-10-15 PROCEDURE — 99213 OFFICE O/P EST LOW 20 MIN: CPT | Mod: PBBFAC,PN | Performed by: NURSE PRACTITIONER

## 2021-10-15 PROCEDURE — 99204 OFFICE O/P NEW MOD 45 MIN: CPT | Mod: S$PBB,,, | Performed by: NURSE PRACTITIONER

## 2021-10-15 PROCEDURE — 99204 PR OFFICE/OUTPT VISIT, NEW, LEVL IV, 45-59 MIN: ICD-10-PCS | Mod: S$PBB,,, | Performed by: NURSE PRACTITIONER

## 2021-10-15 PROCEDURE — 99999 PR PBB SHADOW E&M-EST. PATIENT-LVL III: ICD-10-PCS | Mod: PBBFAC,,, | Performed by: NURSE PRACTITIONER

## 2021-10-15 PROCEDURE — 99999 PR PBB SHADOW E&M-EST. PATIENT-LVL III: CPT | Mod: PBBFAC,,, | Performed by: NURSE PRACTITIONER

## 2021-10-27 ENCOUNTER — TELEPHONE (OUTPATIENT)
Dept: HEMATOLOGY/ONCOLOGY | Facility: CLINIC | Age: 56
End: 2021-10-27
Payer: MEDICAID

## 2021-10-29 DIAGNOSIS — R91.8 RIGHT LOWER LOBE LUNG MASS: ICD-10-CM

## 2021-10-29 DIAGNOSIS — C85.80 MARGINAL ZONE B-CELL LYMPHOMA: ICD-10-CM

## 2021-10-29 RX ORDER — ALLOPURINOL 300 MG/1
300 TABLET ORAL DAILY
Qty: 30 TABLET | Refills: 5 | Status: SHIPPED | OUTPATIENT
Start: 2021-10-29 | End: 2022-02-01 | Stop reason: SDUPTHER

## 2021-11-11 ENCOUNTER — PATIENT MESSAGE (OUTPATIENT)
Dept: HEMATOLOGY/ONCOLOGY | Facility: CLINIC | Age: 56
End: 2021-11-11
Payer: MEDICAID

## 2021-11-11 ENCOUNTER — TELEPHONE (OUTPATIENT)
Dept: DERMATOLOGY | Facility: CLINIC | Age: 56
End: 2021-11-11
Payer: MEDICAID

## 2021-11-11 DIAGNOSIS — C44.92 SQUAMOUS CELL SKIN CANCER: Primary | ICD-10-CM

## 2021-11-12 ENCOUNTER — TELEPHONE (OUTPATIENT)
Dept: SURGERY | Facility: CLINIC | Age: 56
End: 2021-11-12
Payer: MEDICAID

## 2021-11-23 ENCOUNTER — TELEPHONE (OUTPATIENT)
Dept: DERMATOLOGY | Facility: CLINIC | Age: 56
End: 2021-11-23

## 2021-11-23 ENCOUNTER — OFFICE VISIT (OUTPATIENT)
Dept: DERMATOLOGY | Facility: CLINIC | Age: 56
End: 2021-11-23
Payer: MEDICAID

## 2021-11-23 DIAGNOSIS — L30.9 DERMATITIS, UNSPECIFIED: ICD-10-CM

## 2021-11-23 DIAGNOSIS — C44.02 SQUAMOUS CELL CANCER OF LIP: Primary | ICD-10-CM

## 2021-11-23 DIAGNOSIS — L57.0 ACTINIC KERATOSIS: ICD-10-CM

## 2021-11-23 DIAGNOSIS — C44.02 SQUAMOUS CELL CARCINOMA, LIP: Primary | ICD-10-CM

## 2021-11-23 DIAGNOSIS — Z12.83 SCREENING, MALIGNANT NEOPLASM, SKIN: ICD-10-CM

## 2021-11-23 PROCEDURE — 99204 OFFICE O/P NEW MOD 45 MIN: CPT | Mod: 25,S$PBB,, | Performed by: DERMATOLOGY

## 2021-11-23 PROCEDURE — 99204 PR OFFICE/OUTPT VISIT, NEW, LEVL IV, 45-59 MIN: ICD-10-PCS | Mod: 25,S$PBB,, | Performed by: DERMATOLOGY

## 2021-11-23 PROCEDURE — 17000 DESTRUCT PREMALG LESION: CPT | Mod: PBBFAC | Performed by: STUDENT IN AN ORGANIZED HEALTH CARE EDUCATION/TRAINING PROGRAM

## 2021-11-23 PROCEDURE — 99999 PR PBB SHADOW E&M-EST. PATIENT-LVL III: CPT | Mod: PBBFAC,,, | Performed by: DERMATOLOGY

## 2021-11-23 PROCEDURE — 99999 PR PBB SHADOW E&M-EST. PATIENT-LVL III: ICD-10-PCS | Mod: PBBFAC,,, | Performed by: DERMATOLOGY

## 2021-11-23 PROCEDURE — 17000 PR DESTRUCTION(LASER SURGERY,CRYOSURGERY,CHEMOSURGERY),PREMALIGNANT LESIONS,FIRST LESION: ICD-10-PCS | Mod: S$PBB,,, | Performed by: STUDENT IN AN ORGANIZED HEALTH CARE EDUCATION/TRAINING PROGRAM

## 2021-11-23 PROCEDURE — 17000 DESTRUCT PREMALG LESION: CPT | Mod: S$PBB,,, | Performed by: STUDENT IN AN ORGANIZED HEALTH CARE EDUCATION/TRAINING PROGRAM

## 2021-11-23 PROCEDURE — 99213 OFFICE O/P EST LOW 20 MIN: CPT | Mod: PBBFAC | Performed by: DERMATOLOGY

## 2021-11-23 RX ORDER — TRIAMCINOLONE ACETONIDE 1 MG/G
OINTMENT TOPICAL 2 TIMES DAILY
Qty: 80 G | Refills: 0 | Status: SHIPPED | OUTPATIENT
Start: 2021-11-23 | End: 2022-06-29

## 2021-11-24 ENCOUNTER — PATIENT MESSAGE (OUTPATIENT)
Dept: DERMATOLOGY | Facility: CLINIC | Age: 56
End: 2021-11-24
Payer: MEDICAID

## 2021-12-07 RX ORDER — HEPARIN 100 UNIT/ML
500 SYRINGE INTRAVENOUS
Status: CANCELLED | OUTPATIENT
Start: 2021-12-14

## 2021-12-07 RX ORDER — SODIUM CHLORIDE 0.9 % (FLUSH) 0.9 %
10 SYRINGE (ML) INJECTION
Status: CANCELLED | OUTPATIENT
Start: 2021-12-14

## 2021-12-07 RX ORDER — FAMOTIDINE 20 MG/1
20 TABLET, FILM COATED ORAL
Status: CANCELLED
Start: 2021-12-14

## 2021-12-07 RX ORDER — ACETAMINOPHEN 325 MG/1
650 TABLET ORAL
Status: CANCELLED | OUTPATIENT
Start: 2021-12-14

## 2021-12-07 RX ORDER — PREDNISONE 20 MG/1
20 TABLET ORAL
Status: CANCELLED
Start: 2021-12-14 | End: 2021-12-14

## 2021-12-09 ENCOUNTER — OFFICE VISIT (OUTPATIENT)
Dept: OTOLARYNGOLOGY | Facility: CLINIC | Age: 56
End: 2021-12-09
Payer: MEDICAID

## 2021-12-09 VITALS
DIASTOLIC BLOOD PRESSURE: 87 MMHG | HEART RATE: 101 BPM | WEIGHT: 218.5 LBS | BODY MASS INDEX: 42.67 KG/M2 | SYSTOLIC BLOOD PRESSURE: 147 MMHG

## 2021-12-09 DIAGNOSIS — Z01.818 PRE-OP TESTING: ICD-10-CM

## 2021-12-09 DIAGNOSIS — C44.02 SQUAMOUS CELL CARCINOMA, LIP: ICD-10-CM

## 2021-12-09 PROCEDURE — 4010F ACE/ARB THERAPY RXD/TAKEN: CPT | Mod: CPTII,,, | Performed by: OTOLARYNGOLOGY

## 2021-12-09 PROCEDURE — 99999 PR PBB SHADOW E&M-EST. PATIENT-LVL V: CPT | Mod: PBBFAC,,, | Performed by: OTOLARYNGOLOGY

## 2021-12-09 PROCEDURE — 99999 PR PBB SHADOW E&M-EST. PATIENT-LVL V: ICD-10-PCS | Mod: PBBFAC,,, | Performed by: OTOLARYNGOLOGY

## 2021-12-09 PROCEDURE — 99204 OFFICE O/P NEW MOD 45 MIN: CPT | Mod: S$PBB,,, | Performed by: OTOLARYNGOLOGY

## 2021-12-09 PROCEDURE — 99215 OFFICE O/P EST HI 40 MIN: CPT | Mod: PBBFAC | Performed by: OTOLARYNGOLOGY

## 2021-12-09 PROCEDURE — 4010F PR ACE/ARB THEARPY RXD/TAKEN: ICD-10-PCS | Mod: CPTII,,, | Performed by: OTOLARYNGOLOGY

## 2021-12-09 PROCEDURE — 99204 PR OFFICE/OUTPT VISIT, NEW, LEVL IV, 45-59 MIN: ICD-10-PCS | Mod: S$PBB,,, | Performed by: OTOLARYNGOLOGY

## 2021-12-09 RX ORDER — LIDOCAINE HYDROCHLORIDE 10 MG/ML
1 INJECTION, SOLUTION EPIDURAL; INFILTRATION; INTRACAUDAL; PERINEURAL ONCE
Status: CANCELLED | OUTPATIENT
Start: 2021-12-09 | End: 2021-12-09

## 2021-12-09 RX ORDER — SODIUM CHLORIDE 0.9 % (FLUSH) 0.9 %
10 SYRINGE (ML) INJECTION
Status: CANCELLED | OUTPATIENT
Start: 2021-12-09

## 2021-12-09 RX ORDER — CITALOPRAM 40 MG/1
TABLET, FILM COATED ORAL
COMMUNITY
End: 2022-05-31 | Stop reason: CLARIF

## 2021-12-09 RX ORDER — AMOXICILLIN 500 MG/1
500 CAPSULE ORAL 2 TIMES DAILY
COMMUNITY
Start: 2021-12-08 | End: 2022-01-05 | Stop reason: CLARIF

## 2021-12-09 RX ORDER — GEMFIBROZIL 600 MG/1
600 TABLET, FILM COATED ORAL 2 TIMES DAILY
COMMUNITY
Start: 2021-11-14 | End: 2022-06-01

## 2021-12-09 RX ORDER — LISINOPRIL 20 MG/1
TABLET ORAL
COMMUNITY
End: 2022-03-21 | Stop reason: DRUGHIGH

## 2021-12-10 PROBLEM — C44.02 SQUAMOUS CELL CARCINOMA, LIP: Status: ACTIVE | Noted: 2021-12-10

## 2021-12-19 ENCOUNTER — PATIENT MESSAGE (OUTPATIENT)
Dept: HEMATOLOGY/ONCOLOGY | Facility: CLINIC | Age: 56
End: 2021-12-19
Payer: MEDICAID

## 2021-12-19 ENCOUNTER — PATIENT MESSAGE (OUTPATIENT)
Dept: SURGERY | Facility: HOSPITAL | Age: 56
End: 2021-12-19
Payer: MEDICAID

## 2021-12-19 ENCOUNTER — PATIENT MESSAGE (OUTPATIENT)
Dept: GASTROENTEROLOGY | Facility: CLINIC | Age: 56
End: 2021-12-19
Payer: MEDICAID

## 2021-12-19 DIAGNOSIS — Z86.2 HISTORY OF ANEMIA: Primary | ICD-10-CM

## 2021-12-19 DIAGNOSIS — C85.99 GASTRIC LYMPHOMA: ICD-10-CM

## 2021-12-21 ENCOUNTER — OFFICE VISIT (OUTPATIENT)
Dept: HEMATOLOGY/ONCOLOGY | Facility: CLINIC | Age: 56
End: 2021-12-21
Payer: MEDICAID

## 2021-12-21 VITALS
TEMPERATURE: 99 F | HEIGHT: 60 IN | WEIGHT: 229.38 LBS | OXYGEN SATURATION: 95 % | RESPIRATION RATE: 12 BRPM | HEART RATE: 88 BPM | DIASTOLIC BLOOD PRESSURE: 69 MMHG | SYSTOLIC BLOOD PRESSURE: 147 MMHG | BODY MASS INDEX: 45.03 KG/M2

## 2021-12-21 DIAGNOSIS — C44.92 SQUAMOUS CELL SKIN CANCER: ICD-10-CM

## 2021-12-21 DIAGNOSIS — C85.89 MARGINAL ZONE LYMPHOMA OF EXTRANODAL AND SOLID ORGAN SITES: Primary | ICD-10-CM

## 2021-12-21 DIAGNOSIS — R60.9 EDEMA, UNSPECIFIED TYPE: ICD-10-CM

## 2021-12-21 PROCEDURE — 99215 OFFICE O/P EST HI 40 MIN: CPT | Mod: S$PBB,,, | Performed by: INTERNAL MEDICINE

## 2021-12-21 PROCEDURE — 3008F BODY MASS INDEX DOCD: CPT | Mod: CPTII,,, | Performed by: INTERNAL MEDICINE

## 2021-12-21 PROCEDURE — 4010F ACE/ARB THERAPY RXD/TAKEN: CPT | Mod: CPTII,,, | Performed by: INTERNAL MEDICINE

## 2021-12-21 PROCEDURE — 99215 PR OFFICE/OUTPT VISIT, EST, LEVL V, 40-54 MIN: ICD-10-PCS | Mod: S$PBB,,, | Performed by: INTERNAL MEDICINE

## 2021-12-21 PROCEDURE — 99999 PR PBB SHADOW E&M-EST. PATIENT-LVL III: CPT | Mod: PBBFAC,,, | Performed by: INTERNAL MEDICINE

## 2021-12-21 PROCEDURE — 4010F PR ACE/ARB THEARPY RXD/TAKEN: ICD-10-PCS | Mod: CPTII,,, | Performed by: INTERNAL MEDICINE

## 2021-12-21 PROCEDURE — 99999 PR PBB SHADOW E&M-EST. PATIENT-LVL III: ICD-10-PCS | Mod: PBBFAC,,, | Performed by: INTERNAL MEDICINE

## 2021-12-21 PROCEDURE — 99213 OFFICE O/P EST LOW 20 MIN: CPT | Mod: PBBFAC | Performed by: INTERNAL MEDICINE

## 2021-12-21 PROCEDURE — 3008F PR BODY MASS INDEX (BMI) DOCUMENTED: ICD-10-PCS | Mod: CPTII,,, | Performed by: INTERNAL MEDICINE

## 2021-12-21 RX ORDER — LIDOCAINE HYDROCHLORIDE 20 MG/ML
SOLUTION OROPHARYNGEAL EVERY 6 HOURS
Qty: 100 ML | Refills: 1 | Status: SHIPPED | OUTPATIENT
Start: 2021-12-21 | End: 2022-01-20

## 2021-12-21 RX ORDER — FUROSEMIDE 40 MG/1
40 TABLET ORAL DAILY
Qty: 90 TABLET | Refills: 0 | Status: SHIPPED | OUTPATIENT
Start: 2021-12-21 | End: 2022-06-01

## 2021-12-27 ENCOUNTER — TELEPHONE (OUTPATIENT)
Dept: GASTROENTEROLOGY | Facility: CLINIC | Age: 56
End: 2021-12-27
Payer: MEDICAID

## 2021-12-30 DIAGNOSIS — Z01.818 PRE-OP TESTING: ICD-10-CM

## 2022-01-05 NOTE — ANESTHESIA PAT ROS NOTE
01/05/2022  Gabi Newton is a 56 y.o., female.      Pre-op Assessment          Review of Systems         Anesthesia Assessment: Preoperative EQUATION    Planned Procedure: Procedure(s) (LRB):  EXCISION, LESION, LIP (Left)  MAPPING, LYMPH NODE, SENTINEL (N/A)  Requested Anesthesia Type:General  Surgeon: Glen Giang MD  Service: ENT  Known or anticipated Date of Surgery:1/12/2022    Surgeon notes: reviewed    Electronic QUestionnaire Assessment completed via nurse interview with patient.        Triage considerations:     The patient has no apparent active cardiac condition (No unstable coronary Syndrome such as severe unstable angina or recent [<1 month] myocardial infarction, decompensated CHF, severe valvular   disease or significant arrhythmia)    Previous anesthesia records:GETA and No problems     Airway/Jaw/Neck:  Airway Findings: Mouth Opening: Normal Tongue: Normal  General Airway Assessment: Adult  Mallampati: II  Improves to II with phonation.  TM Distance: Normal, at least 6 cm      Dental:  Poor dentition with several very loose, broken teeth.    Intubation  Performed by: Sivan Solorio CRNA  Authorized by: Mikael Levy MD      Intubation:     Induction:  Intravenous    Intubated:  Postinduction    Mask Ventilation:  Easy mask    Attempts:  1    Attempted By:  Staff anesthesiologist    Blade:  Gee 2    Laryngeal View Grade: Grade I - full view of chords      Difficult Airway Encountered?: No      Complications:  None    Airway Device:  Oral endotracheal tube    Airway Device Size:  7.5    Style/Cuff Inflation:  Cuffed    Secured at:  The lips    Placement Verified By:  Capnometry    Complicating Factors:  None    Findings Post-Intubation:  BS equal bilateral    Last PCP note: outside [facility]  - DR. 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      12/21/21:  LACTOSE DEHYDROGENASE  HEP B ANTIBODY/ANTIGEN  CMP  IRON&TIBC  FERRITIN    9/23/21:  D DIMER  T&S  PT/INR  TROPONIN  BNP  CMP  CBC    106/21:  CT CHEST    9/23/21:  CHEST XRAY    9/17/21:  PERITONEAL ULTRASOUND    9/17/18:  ECHO-EF 60%            Instructions given. (See in Nurse's note)    Optimization:  Anesthesia Preop Clinic Assessment  Indicated - WILL SCHEDULE POC    Medical Opinion Indicated [facility]       Sub-specialist consult indicated: [facility] ([phone])      Plan:    Testing:  Hematology Profile, PT/INR and PTT   Pre-anesthesia  visit       Visit focus: concerns in complex and/or prolonged anesthesia     Consultation:IM Perioperative Hospitalist     Patient  has previously scheduled Medical Appointment: 1/10, 1/12    Navigation: Tests Scheduled.              Consults scheduled.             Results will be tracked by Preop Clinic.     Patient is optimized  Krish Vidal NP  Perioperative Medicine  [facility]   Pager [phone]

## 2022-01-06 ENCOUNTER — TELEPHONE (OUTPATIENT)
Dept: PREADMISSION TESTING | Facility: HOSPITAL | Age: 57
End: 2022-01-06
Payer: MEDICAID

## 2022-01-06 NOTE — TELEPHONE ENCOUNTER
----- Message from Sivan Dominguez RN sent at 1/5/2022  1:58 PM CST -----  1/12: Abisai/POC/LAB    PREFER CODY MERA.

## 2022-01-07 ENCOUNTER — LAB VISIT (OUTPATIENT)
Dept: LAB | Facility: HOSPITAL | Age: 57
End: 2022-01-07
Attending: ANESTHESIOLOGY
Payer: MEDICAID

## 2022-01-07 ENCOUNTER — OFFICE VISIT (OUTPATIENT)
Dept: INTERNAL MEDICINE | Facility: CLINIC | Age: 57
End: 2022-01-07
Payer: MEDICAID

## 2022-01-07 VITALS
HEIGHT: 60 IN | OXYGEN SATURATION: 95 % | SYSTOLIC BLOOD PRESSURE: 176 MMHG | DIASTOLIC BLOOD PRESSURE: 82 MMHG | TEMPERATURE: 98 F | WEIGHT: 227.38 LBS | HEART RATE: 85 BPM | BODY MASS INDEX: 44.64 KG/M2

## 2022-01-07 DIAGNOSIS — D50.9 IRON DEFICIENCY ANEMIA, UNSPECIFIED IRON DEFICIENCY ANEMIA TYPE: ICD-10-CM

## 2022-01-07 DIAGNOSIS — L28.0 LICHENIFICATION: ICD-10-CM

## 2022-01-07 DIAGNOSIS — R60.0 PERIPHERAL EDEMA: ICD-10-CM

## 2022-01-07 DIAGNOSIS — R91.1 LUNG NODULE: ICD-10-CM

## 2022-01-07 DIAGNOSIS — I87.2 VENOUS INSUFFICIENCY (CHRONIC) (PERIPHERAL): ICD-10-CM

## 2022-01-07 DIAGNOSIS — I10 PRIMARY HYPERTENSION: ICD-10-CM

## 2022-01-07 DIAGNOSIS — C88.4 EXTRANODAL MARGINAL ZONE B-CELL LYMPHOMA OF MUCOSA-ASSOCIATED LYMPHOID TISSUE (MALT): ICD-10-CM

## 2022-01-07 DIAGNOSIS — K76.0 FATTY LIVER: ICD-10-CM

## 2022-01-07 DIAGNOSIS — L28.0 LICHENIFICATION AND LICHEN SIMPLEX CHRONICUS: Primary | ICD-10-CM

## 2022-01-07 DIAGNOSIS — K21.9 GASTROESOPHAGEAL REFLUX DISEASE, UNSPECIFIED WHETHER ESOPHAGITIS PRESENT: ICD-10-CM

## 2022-01-07 DIAGNOSIS — Z01.818 PRE-OP TESTING: ICD-10-CM

## 2022-01-07 DIAGNOSIS — R91.8 RIGHT LOWER LOBE LUNG MASS: ICD-10-CM

## 2022-01-07 DIAGNOSIS — C44.02 SQUAMOUS CELL CARCINOMA, LIP: ICD-10-CM

## 2022-01-07 DIAGNOSIS — N28.89 RIGHT RENAL MASS: ICD-10-CM

## 2022-01-07 DIAGNOSIS — R06.02 SHORTNESS OF BREATH: ICD-10-CM

## 2022-01-07 DIAGNOSIS — K59.00 CONSTIPATION, UNSPECIFIED CONSTIPATION TYPE: ICD-10-CM

## 2022-01-07 DIAGNOSIS — R92.8 ABNORMAL FINDING ON MAMMOGRAPHY: ICD-10-CM

## 2022-01-07 DIAGNOSIS — E78.00 PURE HYPERCHOLESTEROLEMIA: ICD-10-CM

## 2022-01-07 DIAGNOSIS — F17.200 SMOKER: ICD-10-CM

## 2022-01-07 LAB
APTT BLDCRRT: 24.4 SEC (ref 21–32)
ERYTHROCYTE [DISTWIDTH] IN BLOOD BY AUTOMATED COUNT: 13.8 % (ref 11.5–14.5)
HCT VFR BLD AUTO: 48.5 % (ref 37–48.5)
HGB BLD-MCNC: 15.2 G/DL (ref 12–16)
INR PPP: 0.9 (ref 0.8–1.2)
MCH RBC QN AUTO: 30.9 PG (ref 27–31)
MCHC RBC AUTO-ENTMCNC: 31.3 G/DL (ref 32–36)
MCV RBC AUTO: 99 FL (ref 82–98)
PLATELET # BLD AUTO: 177 K/UL (ref 150–450)
PMV BLD AUTO: 10.5 FL (ref 9.2–12.9)
PROTHROMBIN TIME: 10.3 SEC (ref 9–12.5)
RBC # BLD AUTO: 4.92 M/UL (ref 4–5.4)
WBC # BLD AUTO: 5.23 K/UL (ref 3.9–12.7)

## 2022-01-07 PROCEDURE — 1160F RVW MEDS BY RX/DR IN RCRD: CPT | Mod: CPTII,,, | Performed by: HOSPITALIST

## 2022-01-07 PROCEDURE — 36415 COLL VENOUS BLD VENIPUNCTURE: CPT | Performed by: ANESTHESIOLOGY

## 2022-01-07 PROCEDURE — 3077F PR MOST RECENT SYSTOLIC BLOOD PRESSURE >= 140 MM HG: ICD-10-PCS | Mod: CPTII,,, | Performed by: HOSPITALIST

## 2022-01-07 PROCEDURE — 99214 PR OFFICE/OUTPT VISIT, EST, LEVL IV, 30-39 MIN: ICD-10-PCS | Mod: S$PBB,,, | Performed by: HOSPITALIST

## 2022-01-07 PROCEDURE — 1159F MED LIST DOCD IN RCRD: CPT | Mod: CPTII,,, | Performed by: HOSPITALIST

## 2022-01-07 PROCEDURE — 1159F PR MEDICATION LIST DOCUMENTED IN MEDICAL RECORD: ICD-10-PCS | Mod: CPTII,,, | Performed by: HOSPITALIST

## 2022-01-07 PROCEDURE — 99999 PR PBB SHADOW E&M-EST. PATIENT-LVL V: CPT | Mod: PBBFAC,,,

## 2022-01-07 PROCEDURE — 4010F ACE/ARB THERAPY RXD/TAKEN: CPT | Mod: CPTII,,, | Performed by: HOSPITALIST

## 2022-01-07 PROCEDURE — 99214 OFFICE O/P EST MOD 30 MIN: CPT | Mod: S$PBB,,, | Performed by: HOSPITALIST

## 2022-01-07 PROCEDURE — 3008F PR BODY MASS INDEX (BMI) DOCUMENTED: ICD-10-PCS | Mod: CPTII,,, | Performed by: HOSPITALIST

## 2022-01-07 PROCEDURE — 3079F PR MOST RECENT DIASTOLIC BLOOD PRESSURE 80-89 MM HG: ICD-10-PCS | Mod: CPTII,,, | Performed by: HOSPITALIST

## 2022-01-07 PROCEDURE — 1160F PR REVIEW ALL MEDS BY PRESCRIBER/CLIN PHARMACIST DOCUMENTED: ICD-10-PCS | Mod: CPTII,,, | Performed by: HOSPITALIST

## 2022-01-07 PROCEDURE — 3008F BODY MASS INDEX DOCD: CPT | Mod: CPTII,,, | Performed by: HOSPITALIST

## 2022-01-07 PROCEDURE — 3077F SYST BP >= 140 MM HG: CPT | Mod: CPTII,,, | Performed by: HOSPITALIST

## 2022-01-07 PROCEDURE — 99999 PR PBB SHADOW E&M-EST. PATIENT-LVL V: ICD-10-PCS | Mod: PBBFAC,,,

## 2022-01-07 PROCEDURE — 4010F PR ACE/ARB THEARPY RXD/TAKEN: ICD-10-PCS | Mod: CPTII,,, | Performed by: HOSPITALIST

## 2022-01-07 PROCEDURE — 3079F DIAST BP 80-89 MM HG: CPT | Mod: CPTII,,, | Performed by: HOSPITALIST

## 2022-01-07 PROCEDURE — 85610 PROTHROMBIN TIME: CPT | Performed by: ANESTHESIOLOGY

## 2022-01-07 PROCEDURE — 85027 COMPLETE CBC AUTOMATED: CPT | Performed by: ANESTHESIOLOGY

## 2022-01-07 PROCEDURE — 99215 OFFICE O/P EST HI 40 MIN: CPT | Mod: PBBFAC

## 2022-01-07 PROCEDURE — 85730 THROMBOPLASTIN TIME PARTIAL: CPT | Performed by: ANESTHESIOLOGY

## 2022-01-07 RX ORDER — RIVAROXABAN 20 MG/1
20 TABLET, FILM COATED ORAL NIGHTLY
COMMUNITY
Start: 2021-12-16 | End: 2022-01-07

## 2022-01-07 NOTE — PROGRESS NOTES
"[facility]  Progress Note    Patient Name: Gabi Newton  MRN: [id number]  Date of Evaluation- 01/07/2022  PCP- Yanet Anderson MD    Future cases for Gabi Newton [[id number]]     Case ID Status Date Time Familia Procedure Provider Location    [id number] [facility] 1/12/2022  1:00  EXCISION, LESION, LIP Glen Giang MD [[id number]] NOM OR 2ND FLR          HPI:  This is a 56 y.o. female  who presents today for a preoperative evaluation in preparation for a ENT  procedure.  Scheduled for  1/12/22  Surgery is indicated for excision lesion lip mapping lymph node sentinelent is new to me.  Details of current problem: The duration of problem has been about 6 months.  Reports it started as a "scratch" that got progressively got worse over time   Reports symptoms of pain, mass, dry skin, difficulty to eat, and chew  Aggravating factors include: chewing, eating, & talking    Relieving factors are  lidocaine applied Q tip    Treated with  Lidocaine, & upcoming surgery  Reports pain: 2/10  The history has been obtained by speaking with the patient and reviewing the electronic medical record and/or outside health information. Significant health conditions for the perioperative period are discussed below in assessment and plan.     Patient reports current health status to be Poor.  Denies any new symptoms before surgery.         Subjective/ Objective:     Chief Complaint: Preoperative evaulation, perioperative medical management, and complication reduction plan.     Functional Capacity: Able to climb a flight of stairs without CP Syncope.  Would have mild SOB- not new stable over time. Does housework and ADLs      Anesthesia issues: None    Difficulty mouth opening: pain with opening mouth due to lesion on lips    Steroid use in the last 12 months:  Unsure- may have had steroid shot when ill    Dental Issues: none- missing many    Family anesthesia difficulty: None     Family Hx of Thrombosis - Mother had blood " clots in her lungs in the past    Past Medical History:   Diagnosis Date    Abdominal pain 2018    Anemia     Anxiety     Back pain     Deep vein thrombosis     Disorder of kidney and ureter     Fatty liver 2018    Gastric lymphoma 2019    Gastric tumor     GERD (gastroesophageal reflux disease)     Hyperlipidemia     Hypertension     Splenomegaly 2018     Past Surgical History:   Procedure Laterality Date    CARDIAC CATHETERIZATION      CARDIAC CATHETERIZATION  2018    had chest pains . states vessels at the bottom of heart collapsed     SECTION      x3    CHOLECYSTECTOMY      COLONOSCOPY      ENDOSCOPIC ULTRASOUND OF UPPER GASTROINTESTINAL TRACT Left 11/15/2019    Procedure: ULTRASOUND, UPPER GI TRACT, ENDOSCOPIC;  Surgeon: Mike Seay MD;  Location: [facility] (2ND [facility]);  Service: Endoscopy;  Laterality: Left;  Ok to hold xarelto per protocol per Dr. Lloyd see media file 10/25/19-tb    ESOPHAGOGASTRODUODENOSCOPY      ESOPHAGOGASTRODUODENOSCOPY N/A 2018    Procedure: EGD (ESOPHAGOGASTRODUODENOSCOPY);  Surgeon: Ant Camejo MD;  Location: [facility];  Service: Endoscopy;  Laterality: N/A;    ESOPHAGOGASTRODUODENOSCOPY N/A 10/15/2019    Procedure: EGD (ESOPHAGOGASTRODUODENOSCOPY);  Surgeon: Melanie Cedeno MD;  Location: [facility];  Service: Endoscopy;  Laterality: N/A;    ESOPHAGOGASTRODUODENOSCOPY N/A 11/15/2019    Procedure: EGD (ESOPHAGOGASTRODUODENOSCOPY);  Surgeon: Mike Seay MD;  Location: [address]);  Service: Endoscopy;  Laterality: N/A;    HYSTERECTOMY      PHLEBOGRAPHY Bilateral 10/16/2018    Procedure: VENOGRAM;  Surgeon: Colin Mathis MD;  Location: [facility] CATH LAB;  Service: Cardiology;  Laterality: Bilateral;    CT RT/LT HEART CATHETERS Left     Coronary Arteriogram-2 Cath    RHINOPLASTY      TUBAL LIGATION      venogram Bilateral 10/16/2018       Review of Systems   Constitutional: Positive for fatigue. Negative for chills  and fever.   HENT: Positive for mouth sores. Negative for trouble swallowing and voice change.    Eyes: Negative for photophobia and visual disturbance.        No acute visual changes   Respiratory: Negative for apnea, cough, chest tightness, shortness of breath and wheezing.         STOP bang  Score  4    High risk CHLOÉ   Cardiovascular: Positive for leg swelling. Negative for chest pain and palpitations.   Gastrointestinal: Positive for nausea and vomiting. Negative for abdominal distention, abdominal pain, blood in stool, constipation and diarrhea.        NO , hepatitis, cirrhosis  No BRB or black tarry stool  Has h/o FLD   Endocrine: Positive for cold intolerance. Negative for heat intolerance, polydipsia, polyphagia and polyuria.   Genitourinary: Positive for frequency. Negative for difficulty urinating, dysuria, flank pain, hematuria and urgency.   Musculoskeletal: Positive for arthralgias and back pain. Negative for gait problem, joint swelling, myalgias, neck pain and neck stiffness.   Neurological: Positive for numbness. Negative for dizziness, tremors, seizures, syncope, weakness, light-headedness and headaches.   Psychiatric/Behavioral: Negative for suicidal ideas. The patient is nervous/anxious.       VITALS  Visit Vitals  BP (!) 176/82   Pulse 85   Temp 97.9 °F (36.6 °C)   Ht 5' (1.524 m)   Wt 103.1 kg (227 lb 6.4 oz)   LMP 02/11/2015 (Approximate)   SpO2 95%   BMI 44.41 kg/m²      Physical Exam  Constitutional:       General: She is not in acute distress.     Appearance: She is well-developed and well-nourished. She is morbidly obese. She is not diaphoretic.   HENT:      Head: Normocephalic.      Right Ear: Hearing normal.      Left Ear: Hearing normal.      Nose: Nose normal.      Mouth/Throat:      Lips: Lesions present.      Mouth: Mucous membranes are dry.      Pharynx: No oropharyngeal exudate.     Eyes:      General: Lids are normal.         Right eye: No discharge.         Left eye: No discharge.       Extraocular Movements: EOM normal.      Conjunctiva/sclera: Conjunctivae normal.   Neck:      Thyroid: No thyromegaly.      Vascular: No carotid bruit or JVD.      Trachea: Trachea and phonation normal. No tracheal deviation.   Cardiovascular:      Rate and Rhythm: Normal rate and regular rhythm.      Pulses: Intact distal pulses.           Carotid pulses are 2+ on the right side and 2+ on the left side.       Radial pulses are 2+ on the right side and 2+ on the left side.      Heart sounds: Normal heart sounds. No murmur heard.  No friction rub. No gallop.    Pulmonary:      Effort: Pulmonary effort is normal. No respiratory distress.      Breath sounds: Normal breath sounds. No stridor. No wheezing or rales.   Abdominal:      General: Abdomen is protuberant. Bowel sounds are normal. There is no distension.      Palpations: Abdomen is soft.      Tenderness: There is no abdominal tenderness. There is no guarding.       Musculoskeletal:         General: No tenderness or deformity. Normal range of motion.      Cervical back: Normal range of motion and neck supple.      Right lower le+ Pitting Edema present.      Left lower le+ Pitting Edema present.   Lymphadenopathy:      Head:      Right side of head: No submental, submandibular, tonsillar, preauricular, posterior auricular or occipital adenopathy.      Left side of head: No submental, submandibular, tonsillar, preauricular, posterior auricular or occipital adenopathy.      Cervical: No cervical adenopathy.      Right cervical: No superficial cervical adenopathy.     Left cervical: No superficial cervical adenopathy.   Skin:     General: Skin is warm and dry.      Capillary Refill: Capillary refill takes 2 to 3 seconds.      Coloration: Skin is not pale.      Findings: No erythema or rash.          Neurological:      Mental Status: She is alert and oriented to person, place, and time.      GCS: GCS eye subscore is 4. GCS verbal subscore is 5. GCS motor  subscore is 6.      Motor: No abnormal muscle tone.      Coordination: Coordination normal.   Psychiatric:         Attention and Perception: Attention and perception normal.         Mood and Affect: Mood and affect and mood normal.         Speech: Speech normal.         Behavior: Behavior normal. Behavior is cooperative.         Thought Content: Thought content normal.         Cognition and Memory: Cognition and memory normal.         Judgment: Judgment normal.          Significant Labs:  Lab Results   Component Value Date    WBC 5.23 01/07/2022    HGB 15.2 01/07/2022    HCT 48.5 01/07/2022     01/07/2022    ALT 40 12/21/2021    AST 31 12/21/2021     12/21/2021    K 4.9 12/21/2021    CL 98 12/21/2021    CREATININE 0.8 12/21/2021    BUN 6 12/21/2021    CO2 29 12/21/2021    INR 0.9 01/07/2022       Diagnostic Studies: No relevant studies.    EKG:   Results for orders placed or performed during the hospital encounter of 09/23/21   EKG 12-lead    Collection Time: 09/23/21  1:47 PM    Narrative    Test Reason : R07.9,    Vent. Rate : 072 BPM     Atrial Rate : 072 BPM     P-R Int : 202 ms          QRS Dur : 172 ms      QT Int : 464 ms       P-R-T Axes : 041 -46 095 degrees     QTc Int : 508 ms    Normal sinus rhythm  Left axis deviation  Left bundle branch block  Abnormal ECG  When compared with ECG of 21-OCT-2020 18:30,  No significant change was found  Confirmed by Guy Pfeiffer MD ([id number]) on 9/24/2021 10:20:07 AM    Referred By:             Confirmed By:Guy Pfeiffer MD       2D ECHO:  TTE:  Results for orders placed or performed during the hospital encounter of 09/17/18   Transthoracic echo (TTE) complete   Result Value Ref Range    AORTIC VALVE CUSP SEPERATION 1.84 cm    LVIDd 4.58 3.5 - 6.0 cm    IVS 1.07 0.6 - 1.1 cm    Posterior Wall 1.20 (A) 0.6 - 1.1 cm    Ao root annulus 2.67 cm    LVIDs 3.50 2.1 - 4.0 cm    FS 24 28 - 44 %    LV mass 188.09 g    LA size 3.30 cm    Left Ventricle Relative Wall  Thickness 0.50 cm    MV valve area p 1/2 method 3.18 cm2    E/A ratio 0.76     E wave deceleration time 213.79 msec    LVOT diameter 1.28 cm    LVOT area 1.29 cm2    MV Peak E Víctor 0.78 m/s    MV stenosis pressure 1/2 time 69.09 ms    MV Peak A Víctor 1.03 m/s    RVDD 2.11 cm    Right Atrial Pressure (from IVC) 3.0 mmHg    Narrative    · The left ventricle cavity is normal.  · Left ventricle shows mild asymmetric hypertrophy.  · Left ventricle ejection fraction is normal at 60%  · Grade I (mild) left ventricular diastolic dysfunction consistent with   impaired relaxation.  · RV systolic function is normal.  · Normal cardiac valves  · Normal central venous pressure (3 mm Hg).          LARISSA:  No results found for this or any previous visit.     Imaging     Active Cardiac Conditions: None      Revised Cardiac Risk Index   High -Risk Surgery  Intraperitoneal; Intrathoracic; suprainguinal vascular Yes- + 1 No- 0   History of Ischemic Heart Disease   (Hx of MI/positive exercise test/current chest pain due to ischemia/use of nitrate therapy/EKG with pathological Q waves) Yes- + 1 No- 0   History of CHF  (Pulmonary edema/bilateral rales or S3 gallop/PND/CXR showing pulmonary vascular redistribution) Yes- + 1 No- 0   History of CVA   (Prior stroke or TIA) Yes- + 1 No- 0   Pre-operative treatment with insulin Yes- + 1 No- 0   Pre-operative creatinine > 2mg/dl Yes- + 1 No- 0   Total:      Risk Status:  Estimated risk of cardiac complications after non-cardiac surgery using the Revised Cardiac Risk Index for Preoperative risk is 3.9 %      ARISCAT (Canet) risk index: Low: 1.6% risk of post-op pulmonary complications.    American Society of Anesthesiologists Physical Status classification (ASA): 3           No further cardiac workup needed prior to surgery.        Preoperative cardiac risk assessment-  The patient does not have any active cardiac conditions . Revised cardiac risk index predictors- ---.Functional capacity is less than  4 Mets. She will be undergoing a ENT procedure that carries a Moderate Risk risk     The estimated risk of the rate of adverse cardiac outcomes  3.9%    No further cardiac work up is indicated prior to proceeding with the surgery     Orders Placed This Encounter    Ambulatory referral/consult to Dermatology    Ambulatory referral/consult to Nutrition Services       American Society of Anesthesiologists Physical status classification ( ASA ) class: 1.6     Postoperative pulmonary complication risk assessment: 3     ARISCAT ( Canet) risk index- risk class -  Low, if duration of surgery is under 3 hours, intermediate, if duration of surgery is over 3 hours          Assessment/Plan:     GERD (gastroesophageal reflux disease)  Currently being treated with no medication at this time  Encouraged to elevate HOB and avoid laying down for 2-3 hours after meals.   Weight loss encouraged as well as dietary changes such as reduction or avoidance of fatty foods, caffeine, spicy foods, and chocolate.    Smoking cessation was recommended as well as reduction of alcohol consumption.    Smoker  Instructed: Do not smoke the day of surgery  Currently smoking 1/2 pack of cigarettes per day.  States she is attempting to quit at this time    Hypertension  Current /82- did not take her medications for blood pressure today.   Taking: Lisinopril Lasix,   Chart BP readings of:120-140/70-80s as trend when taking her medication appropriately  Encouraged keeping a healthy weight and BMI  Lifestyle changes to reduce systolic BP:  Smoking cessation; exercise 30 minutes per day,  5 days per week or 150 minutes weekly; sodium reduction and avoidance of high salt foods such as processed meats, frozen meals and  fast foods.     BP acceptable for surgery. I recommend monitoring BP during perioperative period as well as uncontrolled pain which can elevated blood pressure.           Hyperlipidemia  Lopid, Zocor    Squamous cell carcinoma,  "lip  See HPI    Lung nodule    Underwent CT lung cancer screening and it was negative 9/21.       Right lower lobe lung mass  9/21/21 CT chest lung cancer screening  Impression:     Lung-RADS Category:  2 - Benign Appearance or Behavior - continue annual screening with LDCT in 12 months.     Clinically or potentially clinically significant non lung cancer finding:  None.     Prior Lung Cancer Modifier:  No history of prior lung cancer.      Venous insufficiency (chronic) (peripheral)  Recommendations for Chronic peripheral venous insufficiency:  Elevate legs while at rest  Wear compression socks when active  Limit salt intake    Right renal mass  Followed per Dr. 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 Holding rituxan for two cycles until a definitive diagnosis and treatment plan are made for her squamous cell carcinoma of lower lip   2. Continue following Urology, with Dr. Calero for right kidney lesion  3. On aspirin 81 mg daily  4. Continue to monitor labs [id number]. Discussed need for EGD/colonoscopy, will need to be rescheduled   5. Educated patient regarding smoking cessations, she's cutting down   6. Planned for CT Neck with contrast and resection with ENT 1/22. Viscous lidocaine for topical pain management.   7. Lasix 40 mg daily with potassium 20 meq daily and ECHO ordered , PT referral for lymphedema management "      Fatty liver  Liver enzymes  Component      Latest Ref Rng & Units 12/21/2021   Albumin      3.5 - 5.2 g/dL 3.9   BILIRUBIN TOTAL      0.1 - 1.0 mg/dL 0.7   Alkaline Phosphatase      55 - 135 U/L 151 (H)   AST      10 - 40 U/L 31   ALT      10 - 44 U/L 40     INR 0.9  Platelet 177  No evidence of hepatic decompensation at this time        Peripheral edema  Current plan :  Lasix  PTx to " "evaluate and treat lymphedema    Abnormal finding on mammography  States she has a spot on her left breast that is monitored yearly for changes-     Constipation  Improved.  Uses stool softener daily and made dietary changes    Shortness of breath  Denies SOB at this time    Lichenification  Patient has lichenification of anterior shins from below the knee to ankle- Skin is noted to be dry thickened and discolored.    She has a preexisting appointment with PTx for "lymphedema" to her LE.  Additional consultation placed to dermatology for treatment of skin disorder    CAD (coronary artery disease), native coronary artery  She denies any symptoms of chest pain, shortness of breath at rest, peripheral edema, orthopnea, palpitations, lightheadedness, presyncope, or syncope.       cardiac CTA 5/6/2016 was negative for significant stenosis  Impression   1. Focal mild to moderate stenosis in the proximal LAD due to   eccentric calcified plaque.   2. Superficial myocardial bridging measuring 15 millimeters in the   mid LAD.   3. Focal moderate stenosis in the mid RCA due to atherosclerotic   calcified plaque.   4. Right dominant system. LV ejection fraction is 69 percent.           -       BMI 40.0-44.9, adult  Current BMI 44.41  Reports she has gained weight since pandemic; roughly 29 pounds  Patient would benefit from weight loss and has not tried to set goals to achieve success. Lifestyle changes should be made by eating healthy, exercising at least 150 minutes weekly, and avoiding sedentary behavior.   Referral to dietician for nutrition education    Iron deficiency anemia  Hgb 15.2  HCT 48.5        Preventive perioperative care    Thromboembolic prophylaxis:  Her risk factors for thrombosis include tobacco use, morbid obesity, surgical procedure, age and reduced mobility.I suggest  thromboembolic prophylaxis ( mechanical/pharmacological, weighing the risk benefits of pharmacological agent use considering rui " procedural bleeding )  during the perioperative period.I suggested being active in the post operative period.      Postoperative pulmonary complication prophylaxis-Risk factors for post operative pulmonary complications include ASA class >2 and tobacco use- I suggest tobacco smoking cessation, incentive spirometry use, early ambulation and pain control so as to avoid diaphragmatic splinting  Brush teeth twice per day, oral rinses, sleep with the head of the bed up 30 degrees     Renal complication prophylaxis-Risk factors for renal complications include age and hypertension . I suggest keeping her well hydrated and avoidance/ minimizing the use of  NSAID's,RUBIO 2 Inhibitors ,IV contrast if possible in the perioperative period.I suggested drinking 2 litre's of water a day      Surgical site Infection Prophylaxis-I  suggest appropriate antibiotic for Prophylaxis against Surgical site infections Shower with Hibiclens  in the night before surgery and the morning of surgery     This visit was focused on Preoperative evaluation, Perioperative Medical management, complication reduction plans. I suggest that the patient follows up with primary care or relevant sub specialists for ongoing health care.    I appreciate the opportunity to be involved in this patients care. Please feel free to contact me if there were any questions about this consultation.    Patient is optimized    Krish Vidal NP  Perioperative Medicine  [facility]   Pager [phone]

## 2022-01-07 NOTE — ASSESSMENT & PLAN NOTE
"Patient has lichenification of anterior shins from below the knee to ankle- Skin is noted to be dry thickened and discolored.    She has a preexisting appointment with PTx for "lymphedema" to her LE.  Additional consultation placed to dermatology for treatment of skin disorder  "

## 2022-01-07 NOTE — ASSESSMENT & PLAN NOTE
Currently being treated with no medication at this time  Encouraged to elevate HOB and avoid laying down for 2-3 hours after meals.   Weight loss encouraged as well as dietary changes such as reduction or avoidance of fatty foods, caffeine, spicy foods, and chocolate.    Smoking cessation was recommended as well as reduction of alcohol consumption.

## 2022-01-07 NOTE — ASSESSMENT & PLAN NOTE
Instructed: Do not smoke the day of surgery  Currently smoking 1/2 pack of cigarettes per day.  States she is attempting to quit at this time

## 2022-01-07 NOTE — ASSESSMENT & PLAN NOTE
Current BMI 44.41  Reports she has gained weight since pandemic; roughly 29 pounds  Patient would benefit from weight loss and has not tried to set goals to achieve success. Lifestyle changes should be made by eating healthy, exercising at least 150 minutes weekly, and avoiding sedentary behavior.   Referral to dietician for nutrition education

## 2022-01-07 NOTE — Clinical Note
Ms. Newton is optimized for surgery.  If someone would notify her of arrival time for surgery it would be helpful as she is concerned about transportation issues.  Thank you, mallory

## 2022-01-07 NOTE — OUTPATIENT SUBJECTIVE & OBJECTIVE
Outpatient Subjective & Objective      Chief Complaint: Preoperative evaulation, perioperative medical management, and complication reduction plan.     Functional Capacity: Able to climb a flight of stairs without CP Syncope.  Would have mild SOB- not new stable over time. Does housework and ADLs      Anesthesia issues: None    Difficulty mouth opening: pain with opening mouth due to lesion on lips    Steroid use in the last 12 months:  Unsure- may have had steroid shot when ill    Dental Issues: none- missing many    Family anesthesia difficulty: None     Family Hx of Thrombosis - Mother had blood clots in her lungs in the past    Past Medical History:   Diagnosis Date    Abdominal pain 2018    Anemia     Anxiety     Back pain     Deep vein thrombosis     Disorder of kidney and ureter     Fatty liver 2018    Gastric lymphoma 2019    Gastric tumor     GERD (gastroesophageal reflux disease)     Hyperlipidemia     Hypertension     Splenomegaly 2018     Past Surgical History:   Procedure Laterality Date    CARDIAC CATHETERIZATION      CARDIAC CATHETERIZATION  2018    had chest pains . states vessels at the bottom of heart collapsed     SECTION      x3    CHOLECYSTECTOMY      COLONOSCOPY      ENDOSCOPIC ULTRASOUND OF UPPER GASTROINTESTINAL TRACT Left 11/15/2019    Procedure: ULTRASOUND, UPPER GI TRACT, ENDOSCOPIC;  Surgeon: Mike Seay MD;  Location: [facility] ([address] [place] [address]);  Service: Endoscopy;  Laterality: Left;  Ok to hold xarelto per protocol per Dr. Lloyd see media file 10/25/19-tb    ESOPHAGOGASTRODUODENOSCOPY      ESOPHAGOGASTRODUODENOSCOPY N/A 2018    Procedure: EGD (ESOPHAGOGASTRODUODENOSCOPY);  Surgeon: Ant Camejo MD;  Location: [facility];  Service: Endoscopy;  Laterality: N/A;    ESOPHAGOGASTRODUODENOSCOPY N/A 10/15/2019    Procedure: EGD (ESOPHAGOGASTRODUODENOSCOPY);  Surgeon: Melanie Cedeno MD;  Location: [facility];  Service: Endoscopy;   Laterality: N/A;    ESOPHAGOGASTRODUODENOSCOPY N/A 11/15/2019    Procedure: EGD (ESOPHAGOGASTRODUODENOSCOPY);  Surgeon: Mike Seay MD;  Location: [facility] ENDO ([address] [place] [address]);  Service: Endoscopy;  Laterality: N/A;    HYSTERECTOMY      PHLEBOGRAPHY Bilateral 10/16/2018    Procedure: VENOGRAM;  Surgeon: Colin Mathis MD;  Location: [facility] CATH LAB;  Service: Cardiology;  Laterality: Bilateral;    ID RT/LT HEART CATHETERS Left     Coronary Arteriogram-2 Cath    RHINOPLASTY      TUBAL LIGATION      venogram Bilateral 10/16/2018       Review of Systems   Constitutional: Positive for fatigue. Negative for chills and fever.   HENT: Positive for mouth sores. Negative for trouble swallowing and voice change.    Eyes: Negative for photophobia and visual disturbance.        No acute visual changes   Respiratory: Negative for apnea, cough, chest tightness, shortness of breath and wheezing.         STOP bang  Score  4    High risk CHLOÉ   Cardiovascular: Positive for leg swelling. Negative for chest pain and palpitations.   Gastrointestinal: Positive for nausea and vomiting. Negative for abdominal distention, abdominal pain, blood in stool, constipation and diarrhea.        NO , hepatitis, cirrhosis  No BRB or black tarry stool  Has h/o FLD   Endocrine: Positive for cold intolerance. Negative for heat intolerance, polydipsia, polyphagia and polyuria.   Genitourinary: Positive for frequency. Negative for difficulty urinating, dysuria, flank pain, hematuria and urgency.   Musculoskeletal: Positive for arthralgias and back pain. Negative for gait problem, joint swelling, myalgias, neck pain and neck stiffness.   Neurological: Positive for numbness. Negative for dizziness, tremors, seizures, syncope, weakness, light-headedness and headaches.   Psychiatric/Behavioral: Negative for suicidal ideas. The patient is nervous/anxious.       VITALS  Visit Vitals  BP (!) 176/82   Pulse 85   Temp 97.9 °F (36.6 °C)   Ht 5' (1.524 m)   Wt 103.1  kg (227 lb 6.4 oz)   LMP 2015 (Approximate)   SpO2 95%   BMI 44.41 kg/m²      Physical Exam  Constitutional:       General: She is not in acute distress.     Appearance: She is well-developed and well-nourished. She is morbidly obese. She is not diaphoretic.   HENT:      Head: Normocephalic.      Right Ear: Hearing normal.      Left Ear: Hearing normal.      Nose: Nose normal.      Mouth/Throat:      Lips: Lesions present.      Mouth: Mucous membranes are dry.      Pharynx: No oropharyngeal exudate.     Eyes:      General: Lids are normal.         Right eye: No discharge.         Left eye: No discharge.      Extraocular Movements: EOM normal.      Conjunctiva/sclera: Conjunctivae normal.   Neck:      Thyroid: No thyromegaly.      Vascular: No carotid bruit or JVD.      Trachea: Trachea and phonation normal. No tracheal deviation.   Cardiovascular:      Rate and Rhythm: Normal rate and regular rhythm.      Pulses: Intact distal pulses.           Carotid pulses are 2+ on the right side and 2+ on the left side.       Radial pulses are 2+ on the right side and 2+ on the left side.      Heart sounds: Normal heart sounds. No murmur heard.  No friction rub. No gallop.    Pulmonary:      Effort: Pulmonary effort is normal. No respiratory distress.      Breath sounds: Normal breath sounds. No stridor. No wheezing or rales.   Abdominal:      General: Abdomen is protuberant. Bowel sounds are normal. There is no distension.      Palpations: Abdomen is soft.      Tenderness: There is no abdominal tenderness. There is no guarding.       Musculoskeletal:         General: No tenderness or deformity. Normal range of motion.      Cervical back: Normal range of motion and neck supple.      Right lower le+ Pitting Edema present.      Left lower le+ Pitting Edema present.   Lymphadenopathy:      Head:      Right side of head: No submental, submandibular, tonsillar, preauricular, posterior auricular or occipital adenopathy.       Left side of head: No submental, submandibular, tonsillar, preauricular, posterior auricular or occipital adenopathy.      Cervical: No cervical adenopathy.      Right cervical: No superficial cervical adenopathy.     Left cervical: No superficial cervical adenopathy.   Skin:     General: Skin is warm and dry.      Capillary Refill: Capillary refill takes 2 to 3 seconds.      Coloration: Skin is not pale.      Findings: No erythema or rash.          Neurological:      Mental Status: She is alert and oriented to person, place, and time.      GCS: GCS eye subscore is 4. GCS verbal subscore is 5. GCS motor subscore is 6.      Motor: No abnormal muscle tone.      Coordination: Coordination normal.   Psychiatric:         Attention and Perception: Attention and perception normal.         Mood and Affect: Mood and affect and mood normal.         Speech: Speech normal.         Behavior: Behavior normal. Behavior is cooperative.         Thought Content: Thought content normal.         Cognition and Memory: Cognition and memory normal.         Judgment: Judgment normal.          Significant Labs:  Lab Results   Component Value Date    WBC 5.23 01/07/2022    HGB 15.2 01/07/2022    HCT 48.5 01/07/2022     01/07/2022    ALT 40 12/21/2021    AST 31 12/21/2021     12/21/2021    K 4.9 12/21/2021    CL 98 12/21/2021    CREATININE 0.8 12/21/2021    BUN 6 12/21/2021    CO2 29 12/21/2021    INR 0.9 01/07/2022       Diagnostic Studies: No relevant studies.    EKG:   Results for orders placed or performed during the hospital encounter of 09/23/21   EKG 12-lead    Collection Time: 09/23/21  1:47 PM    Narrative    Test Reason : R07.9,    Vent. Rate : 072 BPM     Atrial Rate : 072 BPM     P-R Int : 202 ms          QRS Dur : 172 ms      QT Int : 464 ms       P-R-T Axes : 041 -46 095 degrees     QTc Int : 508 ms    Normal sinus rhythm  Left axis deviation  Left bundle branch block  Abnormal ECG  When compared with ECG of  21-OCT-2020 18:30,  No significant change was found  Confirmed by Kentrell BARTHOLOMEW, Guy ([id number]) on 9/24/2021 10:20:07 AM    Referred By:             Confirmed By:Guy Pfeiffer MD       2D ECHO:  TTE:  Results for orders placed or performed during the hospital encounter of 09/17/18   Transthoracic echo (TTE) complete   Result Value Ref Range    AORTIC VALVE CUSP SEPERATION 1.84 cm    LVIDd 4.58 3.5 - 6.0 cm    IVS 1.07 0.6 - 1.1 cm    Posterior Wall 1.20 (A) 0.6 - 1.1 cm    Ao root annulus 2.67 cm    LVIDs 3.50 2.1 - 4.0 cm    FS 24 28 - 44 %    LV mass 188.09 g    LA size 3.30 cm    Left Ventricle Relative Wall Thickness 0.50 cm    MV valve area p 1/2 method 3.18 cm2    E/A ratio 0.76     E wave deceleration time 213.79 msec    LVOT diameter 1.28 cm    LVOT area 1.29 cm2    MV Peak E Víctor 0.78 m/s    MV stenosis pressure 1/2 time 69.09 ms    MV Peak A Víctor 1.03 m/s    RVDD 2.11 cm    Right Atrial Pressure (from IVC) 3.0 mmHg    Narrative    · The left ventricle cavity is normal.  · Left ventricle shows mild asymmetric hypertrophy.  · Left ventricle ejection fraction is normal at 60%  · Grade I (mild) left ventricular diastolic dysfunction consistent with   impaired relaxation.  · RV systolic function is normal.  · Normal cardiac valves  · Normal central venous pressure (3 mm Hg).          LARISSA:  No results found for this or any previous visit.     Imaging     Active Cardiac Conditions: None      Revised Cardiac Risk Index   High -Risk Surgery  Intraperitoneal; Intrathoracic; suprainguinal vascular Yes- + 1 No- 0   History of Ischemic Heart Disease   (Hx of MI/positive exercise test/current chest pain due to ischemia/use of nitrate therapy/EKG with pathological Q waves) Yes- + 1 No- 0   History of CHF  (Pulmonary edema/bilateral rales or S3 gallop/PND/CXR showing pulmonary vascular redistribution) Yes- + 1 No- 0   History of CVA   (Prior stroke or TIA) Yes- + 1 No- 0   Pre-operative treatment with insulin Yes- + 1 No- 0    Pre-operative creatinine > 2mg/dl Yes- + 1 No- 0   Total:      Risk Status:  Estimated risk of cardiac complications after non-cardiac surgery using the Revised Cardiac Risk Index for Preoperative risk is 3.9 %      ARISCAT (Jian) risk index: Low: 1.6% risk of post-op pulmonary complications.    American Society of Anesthesiologists Physical Status classification (ASA): 3           No further cardiac workup needed prior to surgery.    Outpatient Subjective & Objective

## 2022-01-07 NOTE — ASSESSMENT & PLAN NOTE
Liver enzymes  Component      Latest Ref Rng & Units 12/21/2021   Albumin      3.5 - 5.2 g/dL 3.9   BILIRUBIN TOTAL      0.1 - 1.0 mg/dL 0.7   Alkaline Phosphatase      55 - 135 U/L 151 (H)   AST      10 - 40 U/L 31   ALT      10 - 44 U/L 40     INR 0.9  Platelet 177  No evidence of hepatic decompensation at this time

## 2022-01-07 NOTE — ASSESSMENT & PLAN NOTE
Current /82- did not take her medications for blood pressure today.   Taking: Lisinopril Lasix,   Chart BP readings of:120-140/70-80s as trend when taking her medication appropriately  Encouraged keeping a healthy weight and BMI  Lifestyle changes to reduce systolic BP:  Smoking cessation; exercise 30 minutes per day,  5 days per week or 150 minutes weekly; sodium reduction and avoidance of high salt foods such as processed meats, frozen meals and  fast foods.     BP acceptable for surgery. I recommend monitoring BP during perioperative period as well as uncontrolled pain which can elevated blood pressure.

## 2022-01-07 NOTE — ASSESSMENT & PLAN NOTE
She denies any symptoms of chest pain, shortness of breath at rest, peripheral edema, orthopnea, palpitations, lightheadedness, presyncope, or syncope.       cardiac CTA 5/6/2016 was negative for significant stenosis  Impression   1. Focal mild to moderate stenosis in the proximal LAD due to   eccentric calcified plaque.   2. Superficial myocardial bridging measuring 15 millimeters in the   mid LAD.   3. Focal moderate stenosis in the mid RCA due to atherosclerotic   calcified plaque.   4. Right dominant system. LV ejection fraction is 69 percent.           -

## 2022-01-07 NOTE — ASSESSMENT & PLAN NOTE
9/21/21 CT chest lung cancer screening  Impression:     Lung-RADS Category:  2 - Benign Appearance or Behavior - continue annual screening with LDCT in 12 months.     Clinically or potentially clinically significant non lung cancer finding:  None.     Prior Lung Cancer Modifier:  No history of prior lung cancer.

## 2022-01-07 NOTE — ASSESSMENT & PLAN NOTE
"Patient is currently followed monthly in the Heme-Onc clinic; Last visit 12/22/21 Ale Velez  Current plan as outlined in noted:    "1. Holding rituxan for two cycles until a definitive diagnosis and treatment plan are made for her squamous cell carcinoma of lower lip   2. Continue following Urology, with Dr. Calero for right kidney lesion  3. On aspirin 81 mg daily  4. Continue to monitor labs [id number]. Discussed need for EGD/colonoscopy, will need to be rescheduled   5. Educated patient regarding smoking cessations, she's cutting down   6. Planned for CT Neck with contrast and resection with ENT 1/22. Viscous lidocaine for topical pain management.   7. Lasix 40 mg daily with potassium 20 meq daily and ECHO ordered , PT referral for lymphedema management "    "

## 2022-01-07 NOTE — ASSESSMENT & PLAN NOTE
Recommendations for Chronic peripheral venous insufficiency:  Elevate legs while at rest  Wear compression socks when active  Limit salt intake

## 2022-01-07 NOTE — HPI
"This is a 56 y.o. female  who presents today for a preoperative evaluation in preparation for a ENT  procedure.  Scheduled for  1/12/22  Surgery is indicated for excision lesion lip mapping lymph node sentinelent is new to me.  Details of current problem: The duration of problem has been about 6 months.  Reports it started as a "scratch" that got progressively got worse over time   Reports symptoms of pain, mass, dry skin, difficulty to eat, and chew  Aggravating factors include: chewing, eating, & talking    Relieving factors are  lidocaine applied Q tip    Treated with  Lidocaine, & upcoming surgery  Reports pain: 2/10  The history has been obtained by speaking with the patient and reviewing the electronic medical record and/or outside health information. Significant health conditions for the perioperative period are discussed below in assessment and plan.     Patient reports current health status to be Poor.  Denies any new symptoms before surgery.   "

## 2022-01-07 NOTE — PATIENT INSTRUCTIONS
Preventive perioperative care    Thromboembolic prophylaxis:  Her risk factors for thrombosis include tobacco use, morbid obesity, surgical procedure, age and reduced mobility.I suggest  thromboembolic prophylaxis ( mechanical/pharmacological, weighing the risk benefits of pharmacological agent use considering rui procedural bleeding )  during the perioperative period.I suggested being active in the post operative period.      Postoperative pulmonary complication prophylaxis-Risk factors for post operative pulmonary complications include ASA class >2 and tobacco use- I suggest tobacco smoking cessation, incentive spirometry use, early ambulation and pain control so as to avoid diaphragmatic splinting  Brush teeth twice per day, oral rinses, sleep with the head of the bed up 30 degrees     Renal complication prophylaxis-Risk factors for renal complications include age and hypertension . I suggest keeping her well hydrated and avoidance/ minimizing the use of  NSAID's,RUBIO 2 Inhibitors ,IV contrast if possible in the perioperative period.I suggested drinking 2 litre's of water a day      Surgical site Infection Prophylaxis-I  suggest appropriate antibiotic for Prophylaxis against Surgical site infections Shower with Hibiclens  in the night before surgery and the morning of surgery     This visit was focused on Preoperative evaluation, Perioperative Medical management, complication reduction plans. I suggest that the patient follows up with primary care or relevant sub specialists for ongoing health care.    I appreciate the opportunity to be involved in this patients care. Please feel free to contact me if there were any questions about this consultation.    Patient is pending optimization

## 2022-01-11 ENCOUNTER — TELEPHONE (OUTPATIENT)
Dept: OTOLARYNGOLOGY | Facility: CLINIC | Age: 57
End: 2022-01-11
Payer: MEDICAID

## 2022-01-11 ENCOUNTER — ANESTHESIA EVENT (OUTPATIENT)
Dept: SURGERY | Facility: HOSPITAL | Age: 57
End: 2022-01-11
Payer: MEDICAID

## 2022-01-11 NOTE — ANESTHESIA PREPROCEDURE EVALUATION
01/12/2022  Gabi Newton is a 56 y.o., female.      Pre-op Assessment          Review of Systems         Anesthesia Assessment: Preoperative EQUATION    Planned Procedure: Procedure(s) (LRB):  EXCISION, LESION, LIP (Left)  MAPPING, LYMPH NODE, SENTINEL (N/A)  Requested Anesthesia Type:General  Surgeon: Glen Giang MD  Service: ENT  Known or anticipated Date of Surgery:1/12/2022    Surgeon notes: reviewed    Electronic QUestionnaire Assessment completed via nurse interview with patient.        Triage considerations:     The patient has no apparent active cardiac condition (No unstable coronary Syndrome such as severe unstable angina or recent [<1 month] myocardial infarction, decompensated CHF, severe valvular   disease or significant arrhythmia)    Previous anesthesia records:GETA and No problems     Airway/Jaw/Neck:  Airway Findings: Mouth Opening: Normal Tongue: Normal  General Airway Assessment: Adult  Mallampati: II  Improves to II with phonation.  TM Distance: Normal, at least 6 cm      Dental:  Poor dentition with several very loose, broken teeth.    Intubation  Performed by: Sivna Solorio CRNA  Authorized by: Mikael Levy MD      Intubation:     Induction:  Intravenous    Intubated:  Postinduction    Mask Ventilation:  Easy mask    Attempts:  1    Attempted By:  Staff anesthesiologist    Blade:  Gee 2    Laryngeal View Grade: Grade I - full view of chords      Difficult Airway Encountered?: No      Complications:  None    Airway Device:  Oral endotracheal tube    Airway Device Size:  7.5    Style/Cuff Inflation:  Cuffed    Secured at:  The lips    Placement Verified By:  Capnometry    Complicating Factors:  None    Findings Post-Intubation:  BS equal bilateral    Last PCP note: outside [facility]  - DR. 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      12/21/21:  LACTOSE DEHYDROGENASE  HEP B ANTIBODY/ANTIGEN  CMP  IRON&TIBC  FERRITIN    9/23/21:  D DIMER  T&S  PT/INR  TROPONIN  BNP  CMP  CBC    106/21:  CT CHEST    9/23/21:  CHEST XRAY    9/17/21:  PERITONEAL ULTRASOUND    9/17/18:  ECHO-EF 60%            Instructions given. (See in Nurse's note)    Optimization:  Anesthesia Preop Clinic Assessment  Indicated - WILL SCHEDULE POC    Medical Opinion Indicated [facility]       Sub-specialist consult indicated: [facility] ([phone])      Plan:    Testing:  Hematology Profile, PT/INR and PTT   Pre-anesthesia  visit       Visit focus: concerns in complex and/or prolonged anesthesia     Consultation:IM Perioperative Hospitalist     Patient  has previously scheduled Medical Appointment: 1/10, 1/12    Navigation: Tests Scheduled.              Consults scheduled.             Results will be tracked by Preop Clinic.     Patient is optimized  Krish Vidal NP  Perioperative Medicine  [facility]   Pager [phone]                   [facility]  Anesthesia Pre-Operative Evaluation         Patient Name: Gabi Newton  YOB: 1965  MRN: [id number]    SUBJECTIVE:     Pre-operative evaluation for Procedure(s) (LRB):  EXCISION, LESION, LIP (Left)  MAPPING, LYMPH NODE, SENTINEL (N/A)     01/11/2022    Gabi Newton is a 56 y.o. female w/ a significant PMHx of non-hodgkins lymphoma (on rituxan) HTN, DVT, GERD, HLD, HFpEF, smoker. 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    Patient now presents  for the above procedure(s).    TTE 2018  · The left ventricle cavity is normal.  · Left ventricle shows mild asymmetric hypertrophy.  · Left ventricle ejection fraction is normal at 60%  · Grade I (mild) left ventricular diastolic dysfunction consistent with impaired relaxation.  · RV systolic function is normal.  · Normal cardiac valves  · Normal central venous pressure (3 mm Hg).     Prev airway: 2020  Mask Ventilation:  Easy mask    Attempts:  1    Attempted By:  Staff anesthesiologist    Blade:  Gee 2    Laryngeal View Grade: Grade I - full view of chords      Difficult Airway Encountered?: No      Complications:  None    Airway Device:  Oral endotracheal tube    Airway Device Size:  7.5    Style/Cuff Inflation:  Cuffed    Secured at:  The lips    Placement Verified By:  Capnometry    Complicating Factors:  None    Patient Active Problem List   Diagnosis    Abdominal pain    Fatty liver    Hypertension    Hyperlipidemia    Constipation    Splenomegaly    Venous insufficiency (chronic) (peripheral)    Peripheral edema    Lung nodule    Smoker    Shortness of breath    GERD (gastroesophageal reflux disease)    GIST (gastrointestinal stromal tumor), non-malignant    Gastric lymphoma    Right lower lobe lung mass    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)    Marginal zone lymphoma of extranodal and solid organ sites    Iron deficiency anemia    Right renal mass    Cancer of kidney    Squamous cell carcinoma, lip    CAD (coronary artery disease), native coronary artery    Abnormal finding on mammography    Lichenification    BMI 40.0-44.9, adult     Review of patient's allergies indicates:   Allergen Reactions    Azithromycin Anaphylaxis     Other reaction(s): swelling to entire body  Swelling (tongue / lips)^      Ciprofloxacin Swelling     Throat swells    Codeine      Swelling (throat)^    Tolerates Morphine      Codeine sulfate       Swelling (throat)^    Tolerates Morphine       Current Inpatient Medications:      No current facility-administered medications on file prior to encounter.     Current Outpatient Medications on File Prior to Encounter   Medication Sig Dispense Refill    allopurinoL (ZYLOPRIM) 300 MG tablet Take 1 tablet (300 mg total) by mouth once daily. 30 tablet 5    aspirin (ECOTRIN) 81 MG EC tablet Take 81 mg by mouth once daily.      citalopram (CELEXA) 40 MG tablet citalopram 40 mg tablet      gemfibroziL (LOPID) 600 MG tablet Take 600 mg by mouth 2 (two) times daily.      hydroxyzine HCL (ATARAX) 25 MG tablet       lisinopriL (PRINIVIL,ZESTRIL) 20 MG tablet 1 tablet      methadone (METHADOSE) 40 mg disintegrating tablet Take 40 mg by mouth once daily.       ondansetron (ZOFRAN) 8 MG tablet Take 1 tablet (8 mg total) by mouth every 8 (eight) hours as needed for Nausea. 30 tablet 3    simvastatin (ZOCOR) 40 MG tablet Take 40 mg by mouth every evening.       nitroGLYCERIN (NITROSTAT) 0.4 MG SL tablet Place 0.4 mg under the tongue every 5 (five) minutes as needed for Chest pain.      triamcinolone acetonide 0.1% (KENALOG) 0.1 % ointment Apply topically 2 (two) times daily. To be applied to itch rash on abdomen once daily 80 g 0    [DISCONTINUED] amLODIPine (NORVASC) 5 MG tablet Take 5 mg by mouth once daily.      [DISCONTINUED] isosorbide mononitrate (IMDUR) 60 MG 24 hr tablet Take 60 mg by mouth once daily.         Past Surgical History:   Procedure Laterality Date    CARDIAC CATHETERIZATION      CARDIAC CATHETERIZATION  2018    had chest pains . states vessels at the bottom of heart collapsed     SECTION      x3    CHOLECYSTECTOMY      COLONOSCOPY      ENDOSCOPIC ULTRASOUND OF UPPER GASTROINTESTINAL TRACT Left 11/15/2019    Procedure: ULTRASOUND, UPPER GI TRACT, ENDOSCOPIC;  Surgeon: Mike Seay MD;  Location: [facility] ([address] [place] [address]);  Service: Endoscopy;  Laterality: Left;  Ok to hold xarelto per  protocol per Dr. Lloyd see media file 10/25/19-tb    ESOPHAGOGASTRODUODENOSCOPY      ESOPHAGOGASTRODUODENOSCOPY N/A 7/2/2018    Procedure: EGD (ESOPHAGOGASTRODUODENOSCOPY);  Surgeon: Ant Camejo MD;  Location: [facility];  Service: Endoscopy;  Laterality: N/A;    ESOPHAGOGASTRODUODENOSCOPY N/A 10/15/2019    Procedure: EGD (ESOPHAGOGASTRODUODENOSCOPY);  Surgeon: Melanie Cedeno MD;  Location: [facility] ENDO;  Service: Endoscopy;  Laterality: N/A;    ESOPHAGOGASTRODUODENOSCOPY N/A 11/15/2019    Procedure: EGD (ESOPHAGOGASTRODUODENOSCOPY);  Surgeon: Mike Seay MD;  Location: [facility] (2ND FLR);  Service: Endoscopy;  Laterality: N/A;    HYSTERECTOMY      PHLEBOGRAPHY Bilateral 10/16/2018    Procedure: VENOGRAM;  Surgeon: Colin Mathis MD;  Location: [facility] CATH LAB;  Service: Cardiology;  Laterality: Bilateral;    NY RT/LT HEART CATHETERS Left     Coronary Arteriogram-2 Cath    RHINOPLASTY      TUBAL LIGATION      venogram Bilateral 10/16/2018       Social History     Socioeconomic History    Marital status:     Number of children: 3   Occupational History     Comment: cleans   Tobacco Use    Smoking status: Current Every Day Smoker     Packs/day: 0.50     Years: 40.00     Pack years: 20.00     Types: Cigarettes    Smokeless tobacco: Never Used    Tobacco comment: trying to quit; in a program; at 1/2 pack per day   Substance and Sexual Activity    Alcohol use: No    Drug use: No     Comment: former opioid addiction  quit 8 yrs ago- pain management   Social History Narrative    Stairs- 5 to enter house       OBJECTIVE:     Vital Signs Range (Last 24H):         Significant Labs:  Lab Results   Component Value Date    WBC 5.23 01/07/2022    HGB 15.2 01/07/2022    HCT 48.5 01/07/2022     01/07/2022    ALT 40 12/21/2021    AST 31 12/21/2021     12/21/2021    K 4.9 12/21/2021    CL 98 12/21/2021    CREATININE 0.8 12/21/2021    BUN 6 12/21/2021    CO2 29 12/21/2021    INR 0.9  01/07/2022       Diagnostic Studies: No relevant studies.    EKG:   Results for orders placed or performed during the hospital encounter of 09/23/21   EKG 12-lead    Collection Time: 09/23/21  1:47 PM    Narrative    Test Reason : R07.9,    Vent. Rate : 072 BPM     Atrial Rate : 072 BPM     P-R Int : 202 ms          QRS Dur : 172 ms      QT Int : 464 ms       P-R-T Axes : 041 -46 095 degrees     QTc Int : 508 ms    Normal sinus rhythm  Left axis deviation  Left bundle branch block  Abnormal ECG  When compared with ECG of 21-OCT-2020 18:30,  No significant change was found  Confirmed by Guy Pfeiffer MD ([id number]) on 9/24/2021 10:20:07 AM    Referred By:             Confirmed By:Guy Pfeiffer MD       2D ECHO:  TTE:  Results for orders placed or performed during the hospital encounter of 09/17/18   Transthoracic echo (TTE) complete   Result Value Ref Range    AORTIC VALVE CUSP SEPERATION 1.84 cm    LVIDd 4.58 3.5 - 6.0 cm    IVS 1.07 0.6 - 1.1 cm    Posterior Wall 1.20 (A) 0.6 - 1.1 cm    Ao root annulus 2.67 cm    LVIDs 3.50 2.1 - 4.0 cm    FS 24 28 - 44 %    LV mass 188.09 g    LA size 3.30 cm    Left Ventricle Relative Wall Thickness 0.50 cm    MV valve area p 1/2 method 3.18 cm2    E/A ratio 0.76     E wave deceleration time 213.79 msec    LVOT diameter 1.28 cm    LVOT area 1.29 cm2    MV Peak E Víctor 0.78 m/s    MV stenosis pressure 1/2 time 69.09 ms    MV Peak A Víctor 1.03 m/s    RVDD 2.11 cm    Right Atrial Pressure (from IVC) 3.0 mmHg    Narrative    · The left ventricle cavity is normal.  · Left ventricle shows mild asymmetric hypertrophy.  · Left ventricle ejection fraction is normal at 60%  · Grade I (mild) left ventricular diastolic dysfunction consistent with   impaired relaxation.  · RV systolic function is normal.  · Normal cardiac valves  · Normal central venous pressure (3 mm Hg).          LARISSA:  No results found for this or any previous visit.    ASSESSMENT/PLAN:         Anesthesia Evaluation    I have  reviewed the Patient Summary Reports.    I have reviewed the Nursing Notes. I have reviewed the NPO Status.   I have reviewed the Medications.     Review of Systems  Anesthesia Hx:  No problems with previous Anesthesia  History of prior surgery of interest to airway management or planning: Denies Family Hx of Anesthesia complications.   Denies Personal Hx of Anesthesia complications.   Social:  No Alcohol Use, Smoker    Hematology/Oncology:  Hematology Normal   Oncology Normal     EENT/Dental:EENT/Dental Normal   Cardiovascular:   Exercise tolerance: good Hypertension Denies CAD.    Denies Dysrhythmias.  CHF PVD hyperlipidemia    Pulmonary:   Denies COPD.  Denies Sleep Apnea.    Hepatic/GI:   Denies GERD.    Neurological:   Denies Neuromuscular Disease.    Endocrine:   Denies Diabetes.    Psych:   Denies Psychiatric History.          Physical Exam  General:  Well nourished    Airway/Jaw/Neck:  Airway Findings: Mouth Opening: Normal Tongue: Normal  General Airway Assessment: Adult  Oropharynx Findings: Normal Mallampati: II  TM Distance: Normal, at least 6 cm  Jaw/Neck Findings:  Neck ROM: Normal ROM     Eyes/Ears/Nose:  EYES/EARS/NOSE FINDINGS: Normal   Dental:  Dental Findings: In tact   Chest/Lungs:  Chest/Lungs Findings: Clear to auscultation, Normal Respiratory Rate     Heart/Vascular:  Heart Findings: Rate: Normal  Rhythm: Regular Rhythm  Sounds: Normal     Abdomen:  Abdomen Findings:       Mental Status:  Mental Status Findings: Normal        Anesthesia Plan  Type of Anesthesia, risks & benefits discussed:  Anesthesia Type:  general    Patient's Preference:   Plan Factors:          Intra-op Monitoring Plan: standard ASA monitors  Intra-op Monitoring Plan Comments:   Post Op Pain Control Plan: multimodal analgesia, IV/PO Opioids PRN and per primary service following discharge from PACU  Post Op Pain Control Plan Comments:     Induction:   IV  Beta Blocker:  Patient is not currently on a Beta-Blocker (No further  documentation required).       Informed Consent: Patient understands risks and agrees with Anesthesia plan.  Questions answered. Anesthesia consent signed with patient.  ASA Score: 3     Day of Surgery Review of History & Physical: I have interviewed and examined the patient. I have reviewed the patient's H&P dated:  There are no significant changes.  H&P update referred to the surgeon.         Ready For Surgery From Anesthesia Perspective.

## 2022-01-12 ENCOUNTER — ANESTHESIA (OUTPATIENT)
Dept: SURGERY | Facility: HOSPITAL | Age: 57
End: 2022-01-12
Payer: MEDICAID

## 2022-01-12 ENCOUNTER — HOSPITAL ENCOUNTER (OUTPATIENT)
Facility: HOSPITAL | Age: 57
Discharge: HOME | End: 2022-01-12
Attending: OTOLARYNGOLOGY | Admitting: OTOLARYNGOLOGY
Payer: MEDICAID

## 2022-01-12 ENCOUNTER — HOSPITAL ENCOUNTER (OUTPATIENT)
Dept: RADIOLOGY | Facility: HOSPITAL | Age: 57
Discharge: HOME | End: 2022-01-12
Attending: OTOLARYNGOLOGY | Admitting: OTOLARYNGOLOGY
Payer: MEDICAID

## 2022-01-12 VITALS
HEART RATE: 83 BPM | RESPIRATION RATE: 20 BRPM | OXYGEN SATURATION: 92 % | TEMPERATURE: 99 F | SYSTOLIC BLOOD PRESSURE: 144 MMHG | DIASTOLIC BLOOD PRESSURE: 70 MMHG

## 2022-01-12 DIAGNOSIS — C44.02 SQUAMOUS CELL CARCINOMA, LIP: Primary | ICD-10-CM

## 2022-01-12 DIAGNOSIS — C44.02 SQUAMOUS CELL CARCINOMA, LIP: ICD-10-CM

## 2022-01-12 PROCEDURE — 88331 PATH CONSLTJ SURG 1 BLK 1SPC: CPT | Mod: 26,,, | Performed by: PATHOLOGY

## 2022-01-12 PROCEDURE — 88341 IMHCHEM/IMCYTCHM EA ADD ANTB: CPT | Mod: 26,,, | Performed by: PATHOLOGY

## 2022-01-12 PROCEDURE — 71000015 HC POSTOP RECOV 1ST HR: Performed by: OTOLARYNGOLOGY

## 2022-01-12 PROCEDURE — 88342 IMHCHEM/IMCYTCHM 1ST ANTB: CPT | Performed by: PATHOLOGY

## 2022-01-12 PROCEDURE — 63600175 PHARM REV CODE 636 W HCPCS: Performed by: ANESTHESIOLOGY

## 2022-01-12 PROCEDURE — D9220A PRA ANESTHESIA: Mod: CRNA,,, | Performed by: NURSE ANESTHETIST, CERTIFIED REGISTERED

## 2022-01-12 PROCEDURE — 38510 BIOPSY/REMOVAL LYMPH NODES: CPT | Mod: RT,,, | Performed by: OTOLARYNGOLOGY

## 2022-01-12 PROCEDURE — 88342 IMHCHEM/IMCYTCHM 1ST ANTB: CPT | Mod: 26,,, | Performed by: PATHOLOGY

## 2022-01-12 PROCEDURE — 78195 NM LYMPHATICS AND LYMPH NODE IMAGING: ICD-10-PCS | Mod: 26,,, | Performed by: RADIOLOGY

## 2022-01-12 PROCEDURE — 25000003 PHARM REV CODE 250: Performed by: NURSE ANESTHETIST, CERTIFIED REGISTERED

## 2022-01-12 PROCEDURE — 88341 PR IHC OR ICC EACH ADD'L SINGLE ANTIBODY  STAINPR: ICD-10-PCS | Mod: 26,,, | Performed by: PATHOLOGY

## 2022-01-12 PROCEDURE — 00320 ANES ALL PX NECK NOS 1YR/>: CPT | Performed by: OTOLARYNGOLOGY

## 2022-01-12 PROCEDURE — 71000044 HC DOSC ROUTINE RECOVERY FIRST HOUR: Performed by: OTOLARYNGOLOGY

## 2022-01-12 PROCEDURE — A9520 TC99 TILMANOCEPT DIAG 0.5MCI: HCPCS

## 2022-01-12 PROCEDURE — 88305 TISSUE EXAM BY PATHOLOGIST: CPT | Mod: 26,,, | Performed by: PATHOLOGY

## 2022-01-12 PROCEDURE — D9220A PRA ANESTHESIA: Mod: ANES,,, | Performed by: ANESTHESIOLOGY

## 2022-01-12 PROCEDURE — 36000706: Performed by: OTOLARYNGOLOGY

## 2022-01-12 PROCEDURE — 25000003 PHARM REV CODE 250: Performed by: STUDENT IN AN ORGANIZED HEALTH CARE EDUCATION/TRAINING PROGRAM

## 2022-01-12 PROCEDURE — 88305 TISSUE EXAM BY PATHOLOGIST: CPT | Performed by: PATHOLOGY

## 2022-01-12 PROCEDURE — D9220A PRA ANESTHESIA: ICD-10-PCS | Mod: ANES,,, | Performed by: ANESTHESIOLOGY

## 2022-01-12 PROCEDURE — 38900 PR INTRAOPERATIVE SENTINEL LYMPH NODE ID W DYE INJECTION: ICD-10-PCS | Mod: RT,,, | Performed by: OTOLARYNGOLOGY

## 2022-01-12 PROCEDURE — 88331 PR  PATH CONSULT IN SURG,W FRZ SEC: ICD-10-PCS | Mod: 26,,, | Performed by: PATHOLOGY

## 2022-01-12 PROCEDURE — 63600175 PHARM REV CODE 636 W HCPCS: Performed by: OTOLARYNGOLOGY

## 2022-01-12 PROCEDURE — 40530 PARTIAL REMOVAL OF LIP: CPT | Mod: 51,,, | Performed by: OTOLARYNGOLOGY

## 2022-01-12 PROCEDURE — 88305 TISSUE EXAM BY PATHOLOGIST: ICD-10-PCS | Mod: 26,,, | Performed by: PATHOLOGY

## 2022-01-12 PROCEDURE — 25000003 PHARM REV CODE 250: Performed by: OTOLARYNGOLOGY

## 2022-01-12 PROCEDURE — 40530 PR PARTIAL REMOVAL OF LIP,>1/4: ICD-10-PCS | Mod: 51,,, | Performed by: OTOLARYNGOLOGY

## 2022-01-12 PROCEDURE — 63600175 PHARM REV CODE 636 W HCPCS: Mod: JW,JG | Performed by: OTOLARYNGOLOGY

## 2022-01-12 PROCEDURE — 63600175 PHARM REV CODE 636 W HCPCS: Performed by: NURSE ANESTHETIST, CERTIFIED REGISTERED

## 2022-01-12 PROCEDURE — 36000707: Performed by: OTOLARYNGOLOGY

## 2022-01-12 PROCEDURE — 78195 LYMPH SYSTEM IMAGING: CPT | Mod: 26,,, | Performed by: RADIOLOGY

## 2022-01-12 PROCEDURE — 37000008 HC ANESTHESIA 1ST 15 MINUTES: Performed by: OTOLARYNGOLOGY

## 2022-01-12 PROCEDURE — 38510 PR BIOPSY/REM LYMPH NODES, CERVICAL: ICD-10-PCS | Mod: RT,,, | Performed by: OTOLARYNGOLOGY

## 2022-01-12 PROCEDURE — 88342 CHG IMMUNOCYTOCHEMISTRY: ICD-10-PCS | Mod: 26,,, | Performed by: PATHOLOGY

## 2022-01-12 PROCEDURE — 37000009 HC ANESTHESIA EA ADD 15 MINS: Performed by: OTOLARYNGOLOGY

## 2022-01-12 PROCEDURE — D9220A PRA ANESTHESIA: ICD-10-PCS | Mod: CRNA,,, | Performed by: NURSE ANESTHETIST, CERTIFIED REGISTERED

## 2022-01-12 PROCEDURE — 27201423 OPTIME MED/SURG SUP & DEVICES STERILE SUPPLY: Performed by: OTOLARYNGOLOGY

## 2022-01-12 PROCEDURE — 88341 IMHCHEM/IMCYTCHM EA ADD ANTB: CPT | Mod: 59 | Performed by: PATHOLOGY

## 2022-01-12 PROCEDURE — 38900 IO MAP OF SENT LYMPH NODE: CPT | Mod: RT,,, | Performed by: OTOLARYNGOLOGY

## 2022-01-12 PROCEDURE — 88331 PATH CONSLTJ SURG 1 BLK 1SPC: CPT | Mod: 59 | Performed by: PATHOLOGY

## 2022-01-12 RX ORDER — HALOPERIDOL 5 MG/ML
0.5 INJECTION INTRAMUSCULAR EVERY 10 MIN PRN
Status: DISCONTINUED | OUTPATIENT
Start: 2022-01-12 | End: 2022-01-12 | Stop reason: HOSPADM

## 2022-01-12 RX ORDER — ONDANSETRON 8 MG/1
8 TABLET, ORALLY DISINTEGRATING ORAL EVERY 8 HOURS PRN
Qty: 15 TABLET | Refills: 0 | Status: SHIPPED | OUTPATIENT
Start: 2022-01-12 | End: 2022-01-12 | Stop reason: SDUPTHER

## 2022-01-12 RX ORDER — LIDOCAINE HCL/PF 100 MG/5ML
SYRINGE (ML) INTRAVENOUS
Status: DISCONTINUED | OUTPATIENT
Start: 2022-01-12 | End: 2022-01-12

## 2022-01-12 RX ORDER — CHLORHEXIDINE GLUCONATE ORAL RINSE 1.2 MG/ML
15 SOLUTION DENTAL 2 TIMES DAILY
Qty: 473 ML | Refills: 0 | Status: SHIPPED | OUTPATIENT
Start: 2022-01-12 | End: 2022-01-12 | Stop reason: SDUPTHER

## 2022-01-12 RX ORDER — MIDAZOLAM HYDROCHLORIDE 1 MG/ML
INJECTION INTRAMUSCULAR; INTRAVENOUS
Status: DISCONTINUED | OUTPATIENT
Start: 2022-01-12 | End: 2022-01-12

## 2022-01-12 RX ORDER — SUCCINYLCHOLINE CHLORIDE 20 MG/ML
INJECTION INTRAMUSCULAR; INTRAVENOUS
Status: DISCONTINUED | OUTPATIENT
Start: 2022-01-12 | End: 2022-01-12

## 2022-01-12 RX ORDER — AMOXICILLIN AND CLAVULANATE POTASSIUM 875; 125 MG/1; MG/1
1 TABLET, FILM COATED ORAL EVERY 12 HOURS
Qty: 20 TABLET | Refills: 0 | Status: SHIPPED | OUTPATIENT
Start: 2022-01-12 | End: 2022-01-12 | Stop reason: SDUPTHER

## 2022-01-12 RX ORDER — LIDOCAINE HYDROCHLORIDE 10 MG/ML
1 INJECTION, SOLUTION EPIDURAL; INFILTRATION; INTRACAUDAL; PERINEURAL ONCE
Status: DISCONTINUED | OUTPATIENT
Start: 2022-01-12 | End: 2022-01-12 | Stop reason: HOSPADM

## 2022-01-12 RX ORDER — NEOSTIGMINE METHYLSULFATE 0.5 MG/ML
INJECTION, SOLUTION INTRAVENOUS
Status: DISCONTINUED | OUTPATIENT
Start: 2022-01-12 | End: 2022-01-12

## 2022-01-12 RX ORDER — SODIUM CHLORIDE 0.9 % (FLUSH) 0.9 %
10 SYRINGE (ML) INJECTION
Status: DISCONTINUED | OUTPATIENT
Start: 2022-01-12 | End: 2022-01-12 | Stop reason: HOSPADM

## 2022-01-12 RX ORDER — HYDROCODONE BITARTRATE AND ACETAMINOPHEN 7.5; 325 MG/15ML; MG/15ML
15 SOLUTION ORAL 4 TIMES DAILY PRN
Qty: 473 ML | Refills: 0 | Status: SHIPPED | OUTPATIENT
Start: 2022-01-12 | End: 2022-01-12 | Stop reason: SDUPTHER

## 2022-01-12 RX ORDER — ONDANSETRON 2 MG/ML
INJECTION INTRAMUSCULAR; INTRAVENOUS
Status: DISCONTINUED | OUTPATIENT
Start: 2022-01-12 | End: 2022-01-12

## 2022-01-12 RX ORDER — LIDOCAINE HYDROCHLORIDE AND EPINEPHRINE 10; 10 MG/ML; UG/ML
INJECTION, SOLUTION INFILTRATION; PERINEURAL
Status: DISCONTINUED | OUTPATIENT
Start: 2022-01-12 | End: 2022-01-12 | Stop reason: HOSPADM

## 2022-01-12 RX ORDER — AMOXICILLIN AND CLAVULANATE POTASSIUM 875; 125 MG/1; MG/1
1 TABLET, FILM COATED ORAL EVERY 12 HOURS
Qty: 20 TABLET | Refills: 0 | Status: SHIPPED | OUTPATIENT
Start: 2022-01-12 | End: 2022-01-22

## 2022-01-12 RX ORDER — ROCURONIUM BROMIDE 10 MG/ML
INJECTION, SOLUTION INTRAVENOUS
Status: DISCONTINUED | OUTPATIENT
Start: 2022-01-12 | End: 2022-01-12

## 2022-01-12 RX ORDER — PROPOFOL 10 MG/ML
VIAL (ML) INTRAVENOUS
Status: DISCONTINUED | OUTPATIENT
Start: 2022-01-12 | End: 2022-01-12

## 2022-01-12 RX ORDER — ONDANSETRON 8 MG/1
8 TABLET, ORALLY DISINTEGRATING ORAL EVERY 8 HOURS PRN
Qty: 15 TABLET | Refills: 0 | Status: SHIPPED | OUTPATIENT
Start: 2022-01-12 | End: 2022-08-09

## 2022-01-12 RX ORDER — DEXMEDETOMIDINE HYDROCHLORIDE 100 UG/ML
INJECTION, SOLUTION INTRAVENOUS
Status: DISCONTINUED | OUTPATIENT
Start: 2022-01-12 | End: 2022-01-12

## 2022-01-12 RX ORDER — HYDROCODONE BITARTRATE AND ACETAMINOPHEN 7.5; 325 MG/15ML; MG/15ML
15 SOLUTION ORAL ONCE AS NEEDED
Status: COMPLETED | OUTPATIENT
Start: 2022-01-12 | End: 2022-01-12

## 2022-01-12 RX ORDER — CHLORHEXIDINE GLUCONATE ORAL RINSE 1.2 MG/ML
15 SOLUTION DENTAL 2 TIMES DAILY
Qty: 473 ML | Refills: 0 | Status: SHIPPED | OUTPATIENT
Start: 2022-01-12 | End: 2022-01-28

## 2022-01-12 RX ORDER — FENTANYL CITRATE 50 UG/ML
25 INJECTION, SOLUTION INTRAMUSCULAR; INTRAVENOUS EVERY 5 MIN PRN
Status: DISCONTINUED | OUTPATIENT
Start: 2022-01-12 | End: 2022-01-12 | Stop reason: HOSPADM

## 2022-01-12 RX ORDER — KETAMINE HCL IN 0.9 % NACL 50 MG/5 ML
SYRINGE (ML) INTRAVENOUS
Status: DISCONTINUED | OUTPATIENT
Start: 2022-01-12 | End: 2022-01-12

## 2022-01-12 RX ORDER — FENTANYL CITRATE 50 UG/ML
INJECTION, SOLUTION INTRAMUSCULAR; INTRAVENOUS
Status: DISCONTINUED | OUTPATIENT
Start: 2022-01-12 | End: 2022-01-12

## 2022-01-12 RX ORDER — LABETALOL HYDROCHLORIDE 5 MG/ML
INJECTION, SOLUTION INTRAVENOUS
Status: DISCONTINUED | OUTPATIENT
Start: 2022-01-12 | End: 2022-01-12

## 2022-01-12 RX ORDER — HALOPERIDOL 5 MG/ML
INJECTION INTRAMUSCULAR
Status: DISCONTINUED
Start: 2022-01-12 | End: 2022-01-12 | Stop reason: HOSPADM

## 2022-01-12 RX ORDER — ISOSULFAN BLUE 50 MG/5ML
INJECTION, SOLUTION SUBCUTANEOUS
Status: DISCONTINUED | OUTPATIENT
Start: 2022-01-12 | End: 2022-01-12 | Stop reason: HOSPADM

## 2022-01-12 RX ORDER — HYDROCODONE BITARTRATE AND ACETAMINOPHEN 7.5; 325 MG/15ML; MG/15ML
15 SOLUTION ORAL 4 TIMES DAILY PRN
Qty: 473 ML | Refills: 0 | Status: SHIPPED | OUTPATIENT
Start: 2022-01-12 | End: 2022-01-20 | Stop reason: SDUPTHER

## 2022-01-12 RX ADMIN — SUCCINYLCHOLINE CHLORIDE 140 MG: 20 INJECTION, SOLUTION INTRAMUSCULAR; INTRAVENOUS at 04:01

## 2022-01-12 RX ADMIN — Medication 40 MG: at 04:01

## 2022-01-12 RX ADMIN — PROPOFOL 200 MG: 10 INJECTION, EMULSION INTRAVENOUS at 04:01

## 2022-01-12 RX ADMIN — HYDROCODONE BITARTRATE AND ACETAMINOPHEN 15 ML: 7.5; 325 SOLUTION ORAL at 06:01

## 2022-01-12 RX ADMIN — ROCURONIUM BROMIDE 20 MG: 10 INJECTION, SOLUTION INTRAVENOUS at 04:01

## 2022-01-12 RX ADMIN — GLYCOPYRROLATE 0.6 MG: 0.2 INJECTION, SOLUTION INTRAMUSCULAR; INTRAVITREAL at 05:01

## 2022-01-12 RX ADMIN — DEXMEDETOMIDINE HYDROCHLORIDE 8 MCG: 100 INJECTION, SOLUTION, CONCENTRATE INTRAVENOUS at 04:01

## 2022-01-12 RX ADMIN — HALOPERIDOL LACTATE 0.5 MG: 5 INJECTION, SOLUTION INTRAMUSCULAR at 06:01

## 2022-01-12 RX ADMIN — NEOSTIGMINE METHYLSULFATE 5 MG: 0.5 INJECTION INTRAVENOUS at 05:01

## 2022-01-12 RX ADMIN — FENTANYL CITRATE 50 MCG: 50 INJECTION, SOLUTION INTRAMUSCULAR; INTRAVENOUS at 04:01

## 2022-01-12 RX ADMIN — SODIUM CHLORIDE: 0.9 INJECTION, SOLUTION INTRAVENOUS at 03:01

## 2022-01-12 RX ADMIN — ROCURONIUM BROMIDE 10 MG: 10 INJECTION, SOLUTION INTRAVENOUS at 04:01

## 2022-01-12 RX ADMIN — MIDAZOLAM HYDROCHLORIDE 2 MG: 1 INJECTION, SOLUTION INTRAMUSCULAR; INTRAVENOUS at 04:01

## 2022-01-12 RX ADMIN — ONDANSETRON 4 MG: 2 INJECTION INTRAMUSCULAR; INTRAVENOUS at 05:01

## 2022-01-12 RX ADMIN — LIDOCAINE HYDROCHLORIDE 100 MG: 20 INJECTION, SOLUTION INTRAVENOUS at 04:01

## 2022-01-12 RX ADMIN — AMPICILLIN SODIUM AND SULBACTAM SODIUM 3 G: 2; 1 INJECTION, POWDER, FOR SOLUTION INTRAMUSCULAR; INTRAVENOUS at 04:01

## 2022-01-12 RX ADMIN — SODIUM CHLORIDE, SODIUM GLUCONATE, SODIUM ACETATE, POTASSIUM CHLORIDE, MAGNESIUM CHLORIDE, SODIUM PHOSPHATE, DIBASIC, AND POTASSIUM PHOSPHATE: .53; .5; .37; .037; .03; .012; .00082 INJECTION, SOLUTION INTRAVENOUS at 05:01

## 2022-01-12 RX ADMIN — LABETALOL HYDROCHLORIDE 10 MG: 5 INJECTION, SOLUTION INTRAVENOUS at 04:01

## 2022-01-12 NOTE — BRIEF OP NOTE
[facility] - Surgery ([facility])  Brief Operative Note    Surgery Date: 1/12/2022     Surgeon(s) and Role:     * Glen Giang MD - Primary     * Flaco Rose MD - Resident - Assisting        Pre-op Diagnosis:  Squamous cell carcinoma, lip [C44.02]    Post-op Diagnosis:  Post-Op Diagnosis Codes:     * Squamous cell carcinoma, lip [C44.02]    Procedure(s) (LRB):  EXCISION, LESION, LIP (Left)  MAPPING, LYMPH NODE, SENTINEL (N/A)  BIOPSY, LYMPH NODE, SENTINEL    Anesthesia: General    Operative Findings: wedge excision of lower lip squamous cell carcinoma and sentinel lymph node biopsy    Estimated Blood Loss: * No values recorded between 1/12/2022  4:45 PM and 1/12/2022  5:56 PM *         Specimens:   Specimen (24h ago, onward)             Start     Ordered    01/12/22 1744  Specimen to Pathology, Surgery ENT  Once        Comments: Pre-op Diagnosis: Squamous cell carcinoma, lip [C44.02]      Procedure(s):  EXCISION, LESION, LIP  MAPPING, LYMPH NODE, SENTINEL  BIOPSY, LYMPH NODE, SENTINEL     Number of specimens: 4      Name of specimens:    1. Left mucosal margin - Frozen  2. Right mucosal margin - Frozen  3. Lower lip, single stitch inferior, double stitch right - Permanent  4. Right submandibular sentinel node - Permanent   References:    Click here for ordering Quick Tip   Question Answer Comment   Procedure Type: ENT    Specimen Class: Routine/Screening        01/12/22 5356                  Discharge Note    OUTCOME: Patient tolerated treatment/procedure well without complication and is now ready for discharge.    DISPOSITION: Home or Self Care    FINAL DIAGNOSIS:  Squamous cell carcinoma, lip    FOLLOWUP: In clinic    DISCHARGE INSTRUCTIONS:    Discharge Procedure Orders   Diet Adult Regular   Order Comments: Start with clears and advance as tolerated. No restrictions.     Other restrictions (specify):   Order Comments: No heavy lifting (nothing more than 10 lbs) for 2 weeks, no strenuous activity for 2  weeks     Notify your health care provider if you experience any of the following:  severe uncontrolled pain     Notify your health care provider if you experience any of the following:  redness, tenderness, or signs of infection (pain, swelling, redness, odor or green/yellow discharge around incision site)     Notify your health care provider if you experience any of the following:  difficulty breathing or increased cough     Change dressing (specify)   Order Comments: Bacitracin ointment over skin sutures twice a day. Keep clean and dry. Ok to let neck incision get wet but do not scrub, will slowly peel off over time.

## 2022-01-12 NOTE — H&P
H&P Notes  Glen Giang MD (Physician)   Otolaryngology  Expand All Collapse All      Chief Complaint   Patient presents with    scc lip         HPI   56 y.o. 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  No complaints.  She reports this lesion has been present for least 6 months.  She denies pain.  She denies palpable neck masses.  She does have a history of non-Hodgkin's lymphoma.     1/12/22: No major changes since last visit. To OR today for excision of lower lip SCCa as planned.    Review of Systems   Constitutional: Negative for fatigue and unexpected weight change.   HENT: Per HPI.  Eyes: Negative for visual disturbance.   Respiratory: Negative for shortness of breath, hemoptysis   Cardiovascular: Negative for chest pain and palpitations.   Musculoskeletal: Negative for decreased ROM, back pain.   Skin: Negative for rash, sunburn, itching.   Neurological: Negative for dizziness and seizures.   Hematological: Negative for adenopathy. Does not bruise/bleed easily.   Endocrine: Negative for rapid weight loss/weight gain, heat/cold intolerance.      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 states vessels at the bottom of heart collapsed     SECTION         x3    CHOLECYSTECTOMY        COLONOSCOPY        ENDOSCOPIC ULTRASOUND OF UPPER GASTROINTESTINAL TRACT Left 11/15/2019     Procedure: ULTRASOUND, UPPER GI TRACT, ENDOSCOPIC;  Surgeon: Mike Seay MD;  Location: [facility] (2ND FLR);  Service: Endoscopy;  Laterality: Left;  Ok to hold xarelto per protocol per Dr. Lloyd see media file 10/25/19-tb    ESOPHAGOGASTRODUODENOSCOPY        ESOPHAGOGASTRODUODENOSCOPY N/A 2018     Procedure: EGD (ESOPHAGOGASTRODUODENOSCOPY);  Surgeon: Ant Camejo MD;  Location: [facility];  Service: Endoscopy;  Laterality: N/A;    ESOPHAGOGASTRODUODENOSCOPY N/A 10/15/2019     Procedure: EGD (ESOPHAGOGASTRODUODENOSCOPY);  Surgeon: Melanie Ceedno MD;  Location: [facility];  Service: Endoscopy;  Laterality: N/A;    ESOPHAGOGASTRODUODENOSCOPY N/A 11/15/2019     Procedure: EGD (ESOPHAGOGASTRODUODENOSCOPY);  Surgeon: Mike Seay MD;  Location: [facility] ([address] [place] [address]);  Service: Endoscopy;  Laterality: N/A;    HYSTERECTOMY        PHLEBOGRAPHY Bilateral 10/16/2018     Procedure: VENOGRAM;  Surgeon: Colin Mathis MD;  Location: [facility] CATH LAB;  Service: Cardiology;  Laterality: Bilateral;    NY RT/LT HEART CATHETERS Left       Coronary Arteriogram-2 Cath    RHINOPLASTY        TUBAL LIGATION        venogram Bilateral 10/16/2018            Family History         Family History   Problem Relation Age of Onset    Breast cancer Maternal Grandfather      Dementia Mother      Aneurysm Father                 Social History   .  Social History               Socioeconomic History    Marital status:    Tobacco Use    Smoking status: Current Every Day Smoker       Packs/day: 1.00       Years: 40.00       Pack years: 40.00       Types: Cigarettes    Smokeless tobacco: Never Used    Tobacco comment: trying to quit; in a program   Substance and Sexual  Activity    Alcohol use: No    Drug use: No       Comment: former opioid addiction  quit 8 yrs ago               Allergies         Review of patient's allergies indicates:   Allergen Reactions    Azithromycin Anaphylaxis       Other reaction(s): swelling to entire body  Swelling (tongue / lips)^       Ciprofloxacin Swelling       Throat swells    Codeine         Swelling (throat)^     Tolerates Morphine       Codeine sulfate         Swelling (throat)^     Tolerates Morphine               Physical Exam          Vitals:     12/09/21 1604   BP: (!) 147/87   Pulse: 101            Body mass index is 42.67 kg/m².        General: AOx3, NAD   Respiratory:  Symmetric chest rise, normal effort  Nose: No gross nasal septal deviation. Inferior Turbinates WNL bilaterally. No septal perforation. No masses/lesions.   Oral Cavity:  Roughly 1 cm nodule of lower lip just left of midline with several mm of palpable induration.  Oral Tongue mobile, no lesions noted. Hard Palate WNL. No buccal or FOM lesions.  Oropharynx:  No masses/lesions of the posterior pharyngeal wall. Tonsillar fossa without lesions. Soft palate without masses. Midline uvula.   Neck: No scars.  No cervical lymphadenopathy, thyromegaly or thyroid nodules.  Normal range of motion.    Face: House Brackmann I bilaterally.      Outside records reviewed.     Assessment/Plan       Problem List Items Addressed This Visit                 Oncology      Squamous cell carcinoma, lip        Squamous cell carcinoma of the lower lip.  I recommended that we proceed to the operating room for resection, reconstruction either via primary or flap closure and sentinel lymph node biopsy.     To OR today for surgery as planned.

## 2022-01-12 NOTE — TRANSFER OF CARE
Anesthesia Transfer of Care Note    Patient: Gabi Newton    Procedure(s) Performed: Procedure(s) (LRB):  EXCISION, LESION, LIP (Left)  MAPPING, LYMPH NODE, SENTINEL (N/A)  BIOPSY, LYMPH NODE, SENTINEL    Patient location: PACU    Anesthesia Type: general    Transport from OR: Transported from OR on 6-10 L/min O2 by face mask with adequate spontaneous ventilation    Post pain: adequate analgesia    Post assessment: no apparent anesthetic complications and tolerated procedure well    Post vital signs: stable    Level of consciousness: sedated    Nausea/Vomiting: no nausea/vomiting    Complications: none    Transfer of care protocol was followed      Last vitals:   Visit Vitals  BP (!) 167/86 (BP Location: Right arm, Patient Position: Lying)   Pulse 72   Temp 36.8 °C (98.2 °F) (Oral)   Resp 18   LMP 02/11/2015 (Approximate)   SpO2 98%   Breastfeeding No

## 2022-01-12 NOTE — ANESTHESIA PROCEDURE NOTES
Intubation    Date/Time: 1/12/2022 4:26 PM  Performed by: Ava Esparza CRNA  Authorized by: Shana Brown MD     Intubation:     Induction:  Intravenous    Intubated:  Postinduction    Mask Ventilation:  Easy mask    Attempts:  1    Attempted By:  CRNA    Method of Intubation:  Direct    Blade:  Gee 2    Laryngeal View Grade: Grade I - full view of cords      Difficult Airway Encountered?: No      Complications:  None    Airway Device:  Oral endotracheal tube    Airway Device Size:  7.5    Style/Cuff Inflation:  Cuffed    Tube secured:  21    Secured at:  The lips    Placement Verified By:  Capnometry    Complicating Factors:  None    Findings Post-Intubation:  BS equal bilateral and atraumatic/condition of teeth unchanged

## 2022-01-13 ENCOUNTER — PATIENT MESSAGE (OUTPATIENT)
Dept: HEMATOLOGY/ONCOLOGY | Facility: CLINIC | Age: 57
End: 2022-01-13
Payer: MEDICAID

## 2022-01-13 NOTE — ANESTHESIA POSTPROCEDURE EVALUATION
Anesthesia Post Evaluation    Patient: Gabi Newton    Procedure(s) Performed: Procedure(s) (LRB):  EXCISION, LESION, LIP (Left)  MAPPING, LYMPH NODE, SENTINEL (N/A)  BIOPSY, LYMPH NODE, SENTINEL    Final Anesthesia Type: general      Patient location during evaluation: PACU  Patient participation: Yes- Able to Participate  Level of consciousness: awake and alert and oriented  Post-procedure vital signs: reviewed and stable  Pain management: adequate  Airway patency: patent    PONV status at discharge: No PONV  Anesthetic complications: no      Cardiovascular status: hemodynamically stable  Respiratory status: unassisted, spontaneous ventilation and room air  Hydration status: euvolemic  Follow-up not needed.          Vitals Value Taken Time   /70 01/12/22 1903   Temp 37.2 °C (99 °F) 01/12/22 1900   Pulse 77 01/12/22 1903   Resp 19 01/12/22 1903   SpO2 91 % 01/12/22 1903   Vitals shown include unvalidated device data.      No case tracking events are documented in the log.      Pain/Mao Score: Pain Rating Prior to Med Admin: 8 (1/12/2022  6:37 PM)  Pain Rating Post Med Admin: 0 (1/12/2022  7:12 PM)  Mao Score: 10 (1/12/2022  6:45 PM)

## 2022-01-14 ENCOUNTER — TELEPHONE (OUTPATIENT)
Dept: HEMATOLOGY/ONCOLOGY | Facility: CLINIC | Age: 57
End: 2022-01-14
Payer: MEDICAID

## 2022-01-14 NOTE — TELEPHONE ENCOUNTER
----- Message from Amber Jones sent at 1/14/2022  3:05 PM CST -----  Regarding: Pt Inquiry  Uyen with [facility] called requesting to speak with staff in regards to pt.      Uyen     [phone]

## 2022-01-15 ENCOUNTER — PATIENT MESSAGE (OUTPATIENT)
Dept: OTOLARYNGOLOGY | Facility: CLINIC | Age: 57
End: 2022-01-15
Payer: MEDICAID

## 2022-01-17 NOTE — OP NOTE
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 Wedge resection of lower lip squamous cell carcinoma primary closure  2. Right submandibular sentinel lymph node biopsy     ANESTHESIA: General      INDICATIONS FOR PROCEDURE:   Gabi Newton is a 56 y.o. woman who recently evaluated for squamous cell carcinoma of the lower lip.  Given her presentation, I recommended that we proceed with surgical resection and sentinel lymph node biopsy.  She was seen before surgery in the nuclear medicine suite where injection of Lymphoseek was carried out.    She was apprised of the risks, benefits and alternatives to surgery.  In spite of the risk inherent to surgery,she provided informed consent for the aforementioned procedures.     PROCEDURE IN DETAIL:  The patient was taken to the operating room and placed on the operating table in the supine position.  General endotracheal anesthesia was induced by the anesthesia team.     Lesion of the lower lip was addressed.  An approximately 2 cm margin was marked circumferentially.  A small amount of isosulfan blue was injected around the lesion in order to assist with identification of the sentinel lymph node which had localized to the right submandibular region on lymphoscintigraphy.  Also, a right-sided incision was marked to allow for access the submandibular region and sentinel lymph node biopsy.  The intended incisions were injected with several cc of 1% lidocaine with epinephrine and the face and neck were prepped and draped in standard sterile fashion.    To begin, the lip was addressed.  The incision was carried out through and through on either side of the lesion.  Dissection proceeded through the underlying  subcutaneous tissue orbicularis muscle and or mucosa.  Ultimately, the lesion was amputated, marked as below and frozen sections were sent as below and were found to be negative for carcinoma.  Hemostasis was achieved the site with electrocautery.  The wound was then closed in layers utilizing interrupted 3-0 Vicryl and 3-0 nylon.    Next, the right neck was addressed.  The incision was carried out and dissection proceeded through the underlying subcutaneous tissue and platysma.  Just deep to the platysma, the central lymph node was identified.  It ready to roughly 150 on the gamma probe.  The nodes were excised in the background activity dropped below 10.  Nodes were sent to pathology for permanent analysis.  Hemostasis was achieved electrocautery.  The wound was then closed with absorbable suture.  Dermabond was placed on the skin.  Once all wounds were closed, she was handed back to Anesthesia.  She was awakened, extubated and transferred recovery in satisfactory condition.    There were no intraoperative complications.  I was present for and participated in the entire procedure as dictated above.       ESTIMATED BLOOD LOSS: 20 mL    SPECIMENS:   Specimen (24h ago, onward)    1. Left mucosal margin - Frozen 2. Right mucosal margin - Frozen 3. Lower lip, single stitch inferior, double stitch right - Permanent 4. Right submandibular sentinel node - Permanent

## 2022-01-18 ENCOUNTER — TELEPHONE (OUTPATIENT)
Dept: HEMATOLOGY/ONCOLOGY | Facility: CLINIC | Age: 57
End: 2022-01-18
Payer: MEDICAID

## 2022-01-18 NOTE — TELEPHONE ENCOUNTER
----- Message from Amber Jones sent at 1/18/2022 10:54 AM CST -----  Regarding: Pt Inquiry  Patience with [facility] [employer] Appeals called requesting to speak with Valeri in regards to an appeal request.    Patience     [phone]

## 2022-01-20 ENCOUNTER — OFFICE VISIT (OUTPATIENT)
Dept: OTOLARYNGOLOGY | Facility: CLINIC | Age: 57
End: 2022-01-20
Payer: MEDICAID

## 2022-01-20 ENCOUNTER — TELEPHONE (OUTPATIENT)
Dept: SURGERY | Facility: CLINIC | Age: 57
End: 2022-01-20
Payer: MEDICAID

## 2022-01-20 VITALS
DIASTOLIC BLOOD PRESSURE: 87 MMHG | WEIGHT: 227 LBS | SYSTOLIC BLOOD PRESSURE: 142 MMHG | HEART RATE: 80 BPM | BODY MASS INDEX: 44.33 KG/M2

## 2022-01-20 DIAGNOSIS — R11.0 NAUSEA: ICD-10-CM

## 2022-01-20 DIAGNOSIS — C44.02 SQUAMOUS CELL CARCINOMA, LIP: Primary | ICD-10-CM

## 2022-01-20 PROCEDURE — 99213 OFFICE O/P EST LOW 20 MIN: CPT | Mod: PBBFAC | Performed by: NURSE PRACTITIONER

## 2022-01-20 PROCEDURE — 99024 PR POST-OP FOLLOW-UP VISIT: ICD-10-PCS | Mod: ,,, | Performed by: NURSE PRACTITIONER

## 2022-01-20 PROCEDURE — 99999 PR PBB SHADOW E&M-EST. PATIENT-LVL III: CPT | Mod: PBBFAC,,, | Performed by: NURSE PRACTITIONER

## 2022-01-20 PROCEDURE — 99024 POSTOP FOLLOW-UP VISIT: CPT | Mod: ,,, | Performed by: NURSE PRACTITIONER

## 2022-01-20 PROCEDURE — 4010F PR ACE/ARB THEARPY RXD/TAKEN: ICD-10-PCS | Mod: CPTII,,, | Performed by: NURSE PRACTITIONER

## 2022-01-20 PROCEDURE — 99999 PR PBB SHADOW E&M-EST. PATIENT-LVL III: ICD-10-PCS | Mod: PBBFAC,,, | Performed by: NURSE PRACTITIONER

## 2022-01-20 PROCEDURE — 1159F PR MEDICATION LIST DOCUMENTED IN MEDICAL RECORD: ICD-10-PCS | Mod: CPTII,,, | Performed by: NURSE PRACTITIONER

## 2022-01-20 PROCEDURE — 3079F PR MOST RECENT DIASTOLIC BLOOD PRESSURE 80-89 MM HG: ICD-10-PCS | Mod: CPTII,,, | Performed by: NURSE PRACTITIONER

## 2022-01-20 PROCEDURE — 3079F DIAST BP 80-89 MM HG: CPT | Mod: CPTII,,, | Performed by: NURSE PRACTITIONER

## 2022-01-20 PROCEDURE — 4010F ACE/ARB THERAPY RXD/TAKEN: CPT | Mod: CPTII,,, | Performed by: NURSE PRACTITIONER

## 2022-01-20 PROCEDURE — 3077F PR MOST RECENT SYSTOLIC BLOOD PRESSURE >= 140 MM HG: ICD-10-PCS | Mod: CPTII,,, | Performed by: NURSE PRACTITIONER

## 2022-01-20 PROCEDURE — 1159F MED LIST DOCD IN RCRD: CPT | Mod: CPTII,,, | Performed by: NURSE PRACTITIONER

## 2022-01-20 PROCEDURE — 3008F PR BODY MASS INDEX (BMI) DOCUMENTED: ICD-10-PCS | Mod: CPTII,,, | Performed by: NURSE PRACTITIONER

## 2022-01-20 PROCEDURE — 3008F BODY MASS INDEX DOCD: CPT | Mod: CPTII,,, | Performed by: NURSE PRACTITIONER

## 2022-01-20 PROCEDURE — 3077F SYST BP >= 140 MM HG: CPT | Mod: CPTII,,, | Performed by: NURSE PRACTITIONER

## 2022-01-20 RX ORDER — NALOXONE HYDROCHLORIDE 4 MG/.1ML
SPRAY NASAL
Qty: 1 EACH | Refills: 11 | Status: ON HOLD | OUTPATIENT
Start: 2022-01-20 | End: 2022-06-02

## 2022-01-20 RX ORDER — ONDANSETRON HYDROCHLORIDE 8 MG/1
8 TABLET, FILM COATED ORAL EVERY 8 HOURS PRN
Qty: 12 TABLET | Refills: 0 | Status: SHIPPED | OUTPATIENT
Start: 2022-01-20 | End: 2022-03-21 | Stop reason: DRUGHIGH

## 2022-01-20 RX ORDER — HYDROCODONE BITARTRATE AND ACETAMINOPHEN 7.5; 325 MG/15ML; MG/15ML
15 SOLUTION ORAL 4 TIMES DAILY PRN
Qty: 473 ML | Refills: 0 | Status: SHIPPED | OUTPATIENT
Start: 2022-01-20 | End: 2022-03-21 | Stop reason: ALTCHOICE

## 2022-01-20 NOTE — TELEPHONE ENCOUNTER
Called patient to schedule colonoscopy. State's she just had surgery on her lip and is at another doctor's appointment. She is not ready to schedule procedure and will get a new referral when she is ready.

## 2022-01-21 ENCOUNTER — TELEPHONE (OUTPATIENT)
Dept: HEMATOLOGY/ONCOLOGY | Facility: CLINIC | Age: 57
End: 2022-01-21
Payer: MEDICAID

## 2022-01-21 NOTE — PROGRESS NOTES
Subjective:       Patient ID: Gabi Newton is a 56 y.o. female.    Chief Complaint: Post-op Evaluation    HPI     Gabi Newton returns for a post op visit. She has been doing well since surgery. She does have lip and chin pain, controlled with Hycet. She does request a refill of pain medication. She denies any fever, chills, or drainage. No complaints.    Past Medical History:   Diagnosis Date    Abdominal pain     Anemia     Anxiety     Back pain     Deep vein thrombosis     Disorder of kidney and ureter     Fatty liver 2018    Gastric lymphoma 2019    Gastric tumor     GERD (gastroesophageal reflux disease)     Hyperlipidemia     Hypertension     Splenomegaly 2018       Past Surgical History:   Procedure Laterality Date    CARDIAC CATHETERIZATION      CARDIAC CATHETERIZATION  2018    had chest pains . states vessels at the bottom of heart collapsed     SECTION      x3    CHOLECYSTECTOMY      COLONOSCOPY      ENDOSCOPIC ULTRASOUND OF UPPER GASTROINTESTINAL TRACT Left 11/15/2019    Procedure: ULTRASOUND, UPPER GI TRACT, ENDOSCOPIC;  Surgeon: Mike Seay MD;  [facility]  Service: Endoscopy;  Laterality: Left;  Ok to hold xarelto per protocol per Dr. Lloyd see media file 10/25/19-tb    ESOPHAGOGASTRODUODENOSCOPY      ESOPHAGOGASTRODUODENOSCOPY N/A 2018    Procedure: EGD (ESOPHAGOGASTRODUODENOSCOPY);  Surgeon: Ant Camejo MD;  [facility];  Service: Endoscopy;  Laterality: N/A;    ESOPHAGOGASTRODUODENOSCOPY N/A 10/15/2019    Procedure: EGD (ESOPHAGOGASTRODUODENOSCOPY);  Surgeon: Melanie Cedeno MD;  [facility];  Service: Endoscopy;  Laterality: N/A;    ESOPHAGOGASTRODUODENOSCOPY N/A 11/15/2019    Procedure: EGD (ESOPHAGOGASTRODUODENOSCOPY);  Surgeon: Mike Seay MD;  [facility] ([facility]);  Service: Endoscopy;  Laterality: N/A;    EXCISION OF LESION OF LIP Left 2022    Procedure: EXCISION, LESION,  LIP;  Surgeon: Glen Giang MD;  Location: [facility] OR [address] [place] [address];  Service: ENT;  Laterality: Left;  Lip resection    HYSTERECTOMY      PHLEBOGRAPHY Bilateral 10/16/2018    Procedure: VENOGRAM;  Surgeon: Colin Mathis MD;  Location: [facility];  Service: Cardiology;  Laterality: Bilateral;    KS RT/LT HEART CATHETERS Left     Coronary Arteriogram-2 Cath    RHINOPLASTY      SENTINEL LYMPH NODE BIOPSY  1/12/2022    Procedure: BIOPSY, LYMPH NODE, SENTINEL;  Surgeon: Glen Giang MD;  Location: [facility] OR [address] [place] [address];  Service: ENT;;    TUBAL LIGATION      venogram Bilateral 10/16/2018         Current Outpatient Medications:     allopurinoL (ZYLOPRIM) 300 MG tablet, Take 1 tablet (300 mg total) by mouth once daily., Disp: 30 tablet, Rfl: 5    amoxicillin-clavulanate 875-125mg (AUGMENTIN) 875-125 mg per tablet, Take 1 tablet by mouth every 12 (twelve) hours. for 10 days, Disp: 20 tablet, Rfl: 0    aspirin (ECOTRIN) 81 MG EC tablet, Take 81 mg by mouth once daily., Disp: , Rfl:     chlorhexidine (PERIDEX) 0.12 % solution, Use as directed 15 mLs in the mouth or throat 2 (two) times daily. for 14 days, Disp: 473 mL, Rfl: 0    citalopram (CELEXA) 40 MG tablet, citalopram 40 mg tablet, Disp: , Rfl:     furosemide (LASIX) 40 MG tablet, Take 1 tablet (40 mg total) by mouth once daily., Disp: 90 tablet, Rfl: 0    gemfibroziL (LOPID) 600 MG tablet, Take 600 mg by mouth 2 (two) times daily., Disp: , Rfl:     hydrocodone-acetaminophen (HYCET) solution 7.5-325 mg/15mL, Take 15 mLs by mouth 4 (four) times daily as needed for Pain., Disp: 473 mL, Rfl: 0    hydroxyzine HCL (ATARAX) 25 MG tablet, , Disp: , Rfl:     lisinopriL (PRINIVIL,ZESTRIL) 20 MG tablet, 1 tablet, Disp: , Rfl:     methadone (METHADOSE) 40 mg disintegrating tablet, Take 40 mg by mouth once daily. , Disp: , Rfl:     naloxone (NARCAN) 4 mg/actuation Spry, 4mg by nasal route as needed for opioid overdose; may repeat every 2-3 minutes in  alternating nostrils until medical help arrives. Call 911, Disp: 1 each, Rfl: 11    nitroGLYCERIN (NITROSTAT) 0.4 MG SL tablet, Place 0.4 mg under the tongue every 5 (five) minutes as needed for Chest pain., Disp: , Rfl:     ondansetron (ZOFRAN) 8 MG tablet, Take 1 tablet (8 mg total) by mouth every 8 (eight) hours as needed for Nausea., Disp: 12 tablet, Rfl: 0    ondansetron (ZOFRAN-ODT) 8 MG TbDL, Dissolve 1 tablet (8 mg total) by mouth every 8 (eight) hours as needed (nausea)., Disp: 15 tablet, Rfl: 0    simvastatin (ZOCOR) 40 MG tablet, Take 40 mg by mouth every evening. , Disp: , Rfl:     triamcinolone acetonide 0.1% (KENALOG) 0.1 % ointment, Apply topically 2 (two) times daily. To be applied to itch rash on abdomen once daily, Disp: 80 g, Rfl: 0    Review of patient's allergies indicates:   Allergen Reactions    Azithromycin Anaphylaxis     Other reaction(s): swelling to entire body  Swelling (tongue / lips)^      Ciprofloxacin Swelling     Throat swells    Codeine      Swelling (throat)^    Tolerates Morphine      Codeine sulfate      Swelling (throat)^    Tolerates Morphine       Social History     Socioeconomic History    Marital status:     Number of children: 3   Occupational History     Comment: cleans   Tobacco Use    Smoking status: Current Every Day Smoker     Packs/day: 0.50     Years: 40.00     Pack years: 20.00     Types: Cigarettes    Smokeless tobacco: Never Used    Tobacco comment: trying to quit; in a program; at 1/2 pack per day   Substance and Sexual Activity    Alcohol use: No    Drug use: No     Comment: former opioid addiction  quit 8 yrs ago- pain management   Social History Narrative    Stairs- 5 to enter house       Family History   Problem Relation Age of Onset    Breast cancer Maternal Grandfather     Dementia Mother     Atrial fibrillation Mother     Deep vein thrombosis Mother     Pulmonary embolism Mother     Stroke Mother     Aneurysm Father         Review of Systems   Constitutional: Negative for appetite change, chills, diaphoresis, fatigue, fever and unexpected weight change.   HENT: Positive for mouth sores. Negative for nasal congestion, dental problem, drooling, ear discharge, ear pain, facial swelling, hearing loss, nosebleeds, postnasal drip, rhinorrhea, sinus pressure/congestion, sneezing, sore throat, tinnitus, trouble swallowing and voice change.    Eyes: Negative for pain, discharge, redness and itching.   Respiratory: Negative for cough and shortness of breath.    Cardiovascular: Negative for chest pain.   Gastrointestinal: Negative for abdominal distention, abdominal pain, diarrhea, nausea and vomiting.   Endocrine: Negative for cold intolerance and heat intolerance.   Genitourinary: Negative for difficulty urinating.   Musculoskeletal: Negative for neck pain and neck stiffness.   Integumentary:  Negative for rash.   Neurological: Negative for dizziness, weakness and headaches.   Hematological: Negative for adenopathy.         Objective:      Physical Exam  Constitutional:       Appearance: Normal appearance.   HENT:      Head: Normocephalic and atraumatic.      Right Ear: External ear normal.      Left Ear: External ear normal.      Mouth/Throat:      Comments: Lip Incision CDI.  No redness, drainage, or fluid collection noted.  Edges well approximated.  Sutures intact.  Pulmonary:      Effort: Pulmonary effort is normal. No respiratory distress.   Neurological:      General: No focal deficit present.      Mental Status: She is alert.   Psychiatric:         Mood and Affect: Mood normal.         Behavior: Behavior normal.         Thought Content: Thought content normal.         Assessment:       Problem List Items Addressed This Visit        Oncology    Squamous cell carcinoma, lip - Primary     She is healing well. Pain medication refilled. Will leave sutures in for 3 weeks total. Questions answered. RTC in 2 weeks, sooner if needed.          Relevant Medications    hydrocodone-acetaminophen (HYCET) solution 7.5-325 mg/15mL      Other Visit Diagnoses     Nausea        Relevant Medications    ondansetron (ZOFRAN) 8 MG tablet          Plan:       Problem List Items Addressed This Visit        Oncology    Squamous cell carcinoma, lip - Primary     She is healing well. Pain medication refilled. Will leave sutures in for 3 weeks total. Questions answered. RTC in 2 weeks, sooner if needed.         Relevant Medications    hydrocodone-acetaminophen (HYCET) solution 7.5-325 mg/15mL      Other Visit Diagnoses     Nausea        Relevant Medications    ondansetron (ZOFRAN) 8 MG tablet

## 2022-01-21 NOTE — TELEPHONE ENCOUNTER
Representative with appeals dept stating that appeal is still pending. Representative reached out to clinic asking if there was any additional information or notes that we would like to include. Additional notes can be faxed to [phone].    Call ref #[id number]

## 2022-01-21 NOTE — TELEPHONE ENCOUNTER
"----- Message from Marge Bryant sent at 1/21/2022  4:38 PM CST -----  Regarding: Consult/Advisory:  Name Of Caller: Ashleigh SRIVASTAVA    Contact Preference?: [phone]     Tracking # [id number]    What is the nature of the call?: authorization currently in appeal           Additional Notes:  "Thank you for all that you do for our patients'"     "

## 2022-01-21 NOTE — ASSESSMENT & PLAN NOTE
She is healing well. Pain medication refilled. Will leave sutures in for 3 weeks total. Questions answered. RTC in 2 weeks, sooner if needed.

## 2022-01-24 LAB
FINAL PATHOLOGIC DIAGNOSIS: NORMAL
FROZEN SECTION DIAGNOSIS: NORMAL
FROZEN SECTION FOOTNOTE: NORMAL
GROSS: NORMAL
Lab: NORMAL
MICROSCOPIC EXAM: NORMAL

## 2022-02-01 ENCOUNTER — OFFICE VISIT (OUTPATIENT)
Dept: OTOLARYNGOLOGY | Facility: CLINIC | Age: 57
End: 2022-02-01
Payer: MEDICAID

## 2022-02-01 ENCOUNTER — OFFICE VISIT (OUTPATIENT)
Dept: HEMATOLOGY/ONCOLOGY | Facility: CLINIC | Age: 57
End: 2022-02-01
Payer: MEDICAID

## 2022-02-01 ENCOUNTER — PATIENT MESSAGE (OUTPATIENT)
Dept: HEMATOLOGY/ONCOLOGY | Facility: CLINIC | Age: 57
End: 2022-02-01

## 2022-02-01 VITALS
RESPIRATION RATE: 12 BRPM | OXYGEN SATURATION: 95 % | HEART RATE: 87 BPM | SYSTOLIC BLOOD PRESSURE: 124 MMHG | DIASTOLIC BLOOD PRESSURE: 68 MMHG | WEIGHT: 227.5 LBS | BODY MASS INDEX: 44.66 KG/M2 | HEIGHT: 60 IN | TEMPERATURE: 99 F

## 2022-02-01 VITALS — DIASTOLIC BLOOD PRESSURE: 68 MMHG | SYSTOLIC BLOOD PRESSURE: 124 MMHG | HEART RATE: 87 BPM

## 2022-02-01 DIAGNOSIS — C44.02 SQUAMOUS CELL CARCINOMA, LIP: Primary | ICD-10-CM

## 2022-02-01 DIAGNOSIS — R60.0 LEG EDEMA: ICD-10-CM

## 2022-02-01 DIAGNOSIS — C85.89 MARGINAL ZONE LYMPHOMA OF EXTRANODAL AND SOLID ORGAN SITES: Primary | ICD-10-CM

## 2022-02-01 DIAGNOSIS — R91.8 RIGHT LOWER LOBE LUNG MASS: ICD-10-CM

## 2022-02-01 DIAGNOSIS — C85.80 MARGINAL ZONE B-CELL LYMPHOMA: ICD-10-CM

## 2022-02-01 PROCEDURE — 99215 PR OFFICE/OUTPT VISIT, EST, LEVL V, 40-54 MIN: ICD-10-PCS | Mod: S$PBB,,, | Performed by: INTERNAL MEDICINE

## 2022-02-01 PROCEDURE — 1159F MED LIST DOCD IN RCRD: CPT | Mod: CPTII,,, | Performed by: NURSE PRACTITIONER

## 2022-02-01 PROCEDURE — 99215 OFFICE O/P EST HI 40 MIN: CPT | Mod: PBBFAC | Performed by: INTERNAL MEDICINE

## 2022-02-01 PROCEDURE — 3078F DIAST BP <80 MM HG: CPT | Mod: CPTII,,, | Performed by: NURSE PRACTITIONER

## 2022-02-01 PROCEDURE — 1159F MED LIST DOCD IN RCRD: CPT | Mod: CPTII,,, | Performed by: INTERNAL MEDICINE

## 2022-02-01 PROCEDURE — 3078F DIAST BP <80 MM HG: CPT | Mod: CPTII,,, | Performed by: INTERNAL MEDICINE

## 2022-02-01 PROCEDURE — 3074F SYST BP LT 130 MM HG: CPT | Mod: CPTII,,, | Performed by: INTERNAL MEDICINE

## 2022-02-01 PROCEDURE — 99999 PR PBB SHADOW E&M-EST. PATIENT-LVL V: CPT | Mod: PBBFAC,,, | Performed by: INTERNAL MEDICINE

## 2022-02-01 PROCEDURE — 3074F PR MOST RECENT SYSTOLIC BLOOD PRESSURE < 130 MM HG: ICD-10-PCS | Mod: CPTII,,, | Performed by: NURSE PRACTITIONER

## 2022-02-01 PROCEDURE — 99999 PR PBB SHADOW E&M-EST. PATIENT-LVL V: ICD-10-PCS | Mod: PBBFAC,,, | Performed by: INTERNAL MEDICINE

## 2022-02-01 PROCEDURE — 1159F PR MEDICATION LIST DOCUMENTED IN MEDICAL RECORD: ICD-10-PCS | Mod: CPTII,,, | Performed by: NURSE PRACTITIONER

## 2022-02-01 PROCEDURE — 4010F PR ACE/ARB THEARPY RXD/TAKEN: ICD-10-PCS | Mod: CPTII,,, | Performed by: NURSE PRACTITIONER

## 2022-02-01 PROCEDURE — 4010F PR ACE/ARB THEARPY RXD/TAKEN: ICD-10-PCS | Mod: CPTII,,, | Performed by: INTERNAL MEDICINE

## 2022-02-01 PROCEDURE — 4010F ACE/ARB THERAPY RXD/TAKEN: CPT | Mod: CPTII,,, | Performed by: NURSE PRACTITIONER

## 2022-02-01 PROCEDURE — 99024 PR POST-OP FOLLOW-UP VISIT: ICD-10-PCS | Mod: ,,, | Performed by: NURSE PRACTITIONER

## 2022-02-01 PROCEDURE — 99215 OFFICE O/P EST HI 40 MIN: CPT | Mod: S$PBB,,, | Performed by: INTERNAL MEDICINE

## 2022-02-01 PROCEDURE — 99999 PR PBB SHADOW E&M-EST. PATIENT-LVL III: CPT | Mod: PBBFAC,,, | Performed by: NURSE PRACTITIONER

## 2022-02-01 PROCEDURE — 99213 OFFICE O/P EST LOW 20 MIN: CPT | Mod: PBBFAC | Performed by: NURSE PRACTITIONER

## 2022-02-01 PROCEDURE — 1159F PR MEDICATION LIST DOCUMENTED IN MEDICAL RECORD: ICD-10-PCS | Mod: CPTII,,, | Performed by: INTERNAL MEDICINE

## 2022-02-01 PROCEDURE — 3008F BODY MASS INDEX DOCD: CPT | Mod: CPTII,,, | Performed by: INTERNAL MEDICINE

## 2022-02-01 PROCEDURE — 1160F RVW MEDS BY RX/DR IN RCRD: CPT | Mod: CPTII,,, | Performed by: INTERNAL MEDICINE

## 2022-02-01 PROCEDURE — 4010F ACE/ARB THERAPY RXD/TAKEN: CPT | Mod: CPTII,,, | Performed by: INTERNAL MEDICINE

## 2022-02-01 PROCEDURE — 3078F PR MOST RECENT DIASTOLIC BLOOD PRESSURE < 80 MM HG: ICD-10-PCS | Mod: CPTII,,, | Performed by: NURSE PRACTITIONER

## 2022-02-01 PROCEDURE — 3008F PR BODY MASS INDEX (BMI) DOCUMENTED: ICD-10-PCS | Mod: CPTII,,, | Performed by: INTERNAL MEDICINE

## 2022-02-01 PROCEDURE — 1160F PR REVIEW ALL MEDS BY PRESCRIBER/CLIN PHARMACIST DOCUMENTED: ICD-10-PCS | Mod: CPTII,,, | Performed by: INTERNAL MEDICINE

## 2022-02-01 PROCEDURE — 99999 PR PBB SHADOW E&M-EST. PATIENT-LVL III: ICD-10-PCS | Mod: PBBFAC,,, | Performed by: NURSE PRACTITIONER

## 2022-02-01 PROCEDURE — 99024 POSTOP FOLLOW-UP VISIT: CPT | Mod: ,,, | Performed by: NURSE PRACTITIONER

## 2022-02-01 PROCEDURE — 3074F PR MOST RECENT SYSTOLIC BLOOD PRESSURE < 130 MM HG: ICD-10-PCS | Mod: CPTII,,, | Performed by: INTERNAL MEDICINE

## 2022-02-01 PROCEDURE — 3074F SYST BP LT 130 MM HG: CPT | Mod: CPTII,,, | Performed by: NURSE PRACTITIONER

## 2022-02-01 PROCEDURE — 3078F PR MOST RECENT DIASTOLIC BLOOD PRESSURE < 80 MM HG: ICD-10-PCS | Mod: CPTII,,, | Performed by: INTERNAL MEDICINE

## 2022-02-01 RX ORDER — LISINOPRIL 10 MG/1
10 TABLET ORAL DAILY
COMMUNITY
Start: 2022-01-03

## 2022-02-01 RX ORDER — ALLOPURINOL 300 MG/1
300 TABLET ORAL DAILY
Qty: 30 TABLET | Refills: 5 | Status: SHIPPED | OUTPATIENT
Start: 2022-02-01 | End: 2023-02-01

## 2022-02-01 NOTE — Clinical Note
follow up with [employer] auth please schedule md appt with labs cbc/cmp/ldh/hepatitis b surface antigen/ hepaitis b core anitbody in 3 months  schedule rituxan infusion after appointment \ please schedule cardiology appointment

## 2022-02-01 NOTE — PROGRESS NOTES
PATIENT: Gabi Newton  MRN: [id number]  DATE: 2/1/2022      Diagnosis:   1. 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 Leg edema        Chief Complaint: Please schedule follow up in one month, Oral Pain (Bottom lip pain, Post procedure), Knee Pain (RT), and Nasal Congestion (X 3 days, clear mucus)    Ms. Newton is a 54 year old female with Stage IV Marginal Zone Lymphoma (Gastric, Pulmonary, and Marrow) s/p 4 cycles of single agent Rituximab induction therapy completed on 4/14/20 followed by PET CT on 4/29/20 consistent with partial treatment response. She is here for a follow up visit. She has been on maintenance therapy since July 7, 2020    Oncologic History  Ms. Newton is a 54-year-old female with a past medical history of abdominal pain for the last 3-4 years with unknown etiology, significant smoking history for 40 years, back pain, hypertension presented to the Hematology Clinic for newly diagnosed marginal zone B-cell lymphoma     Patient complained of chronic epigastric/periumbilical abdominal pain for the last 3 years and has been receiving multiple abdominal imaging at [facility].  In 2016 her abdominal pain was thought to be from her gallbladder and she had underwent a cholecystectomy however she continued to have abdominal pain. A CT abdomen done in June 11, 2018 revealed wall thickening of the anterior gastric body suspicious for infectious or inflammatory but is concerning for soft tissue mass and a EGD was a recommended. MRI on June 19, 2018 which revealed diffuse hepatic steatosis without evidence of suspicious focal lesions, splenomegaly but no ascites.  She has been following  Dr. Cedeno since 2018 for her abdominal pain, however liver was not suspected to be the cause for her abdominal pain. She had negative workup for hepatitis-B, C, negative workup for autoimmune disease, negative for alpha 1 antitrypsin disease, negative for celiac sprue and her  hepatitis panel has been negative so far.  MRI of the abdomen was done on September 24, 2019 which revealed multifocal areas of asymmetric gastric wall thickening.  Recommend further evaluation with direct visualization to exclude neoplasm.  EGD was done on 10/15/2019 and Gastric tumor in the gastric fundus was found however no specimen was collected as she was on anticoagulation.  The EUS done on 11/15/2019 revealed gastric tumor in the gastric fundus with many abnormal lymph nodes visualized in the lower paraesophageal mediastinum (level 8L) and gastrohepatic ligament (level 18). Fine needle biopsy performed and pathology revealed marginal zone B cell lymphoma.     She had chest pain in 2016 and the imaging done at that time revealed pulmonary nodule and has been following pulmonary, Dr. Yeung at [facility].  She has a currently daily smoker, has been smoking for almost 40 years.  On the CT chest done in November 2017 revealed 1.7 x 1.3 cm lesion in the right lower lobe and 2 x 1 cm lesion in the anterior portion of the right lower lobe.  A CT scan done on September 25, 2019 revealed a complex poorly defined mass in the right lower lobe measuring 2.78 x 2.64 cm, suspicious for primary lung carcinoma     She was diagnosed with right lower extremity DVT and has been on Xarelto.  As per the patient she was hospitalized at that time and underwent procedure for her right lower extremity for venous insufficiency.  It appears that the right lower extremity DVT is a provoked clot.  She had history of bilateral iliac vein stenting 10/16/18 for venous outflow obsturction.     Subjective:    Initial History: Ms. Newton is a 56 y.o. female who returns for follow up. 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 Continues to have LE edema. Complains of exertional dyspnea and states that she can't lay flat. She has been taking lasix intermittently. Scheduled to see lymphedema clinic tomorrow.      Past Medical History:   Past Medical History:   Diagnosis Date    Abdominal pain 2018    Anemia     Anxiety     Back pain     Deep vein thrombosis     Disorder of kidney and ureter     Fatty liver 2018    Gastric lymphoma 2019    Gastric tumor     GERD (gastroesophageal reflux disease)     Hyperlipidemia     Hypertension     Splenomegaly 2018       Past Surgical HIstory:   Past Surgical History:   Procedure Laterality Date    CARDIAC CATHETERIZATION      CARDIAC CATHETERIZATION  2018    had chest pains . states vessels at the bottom of heart collapsed     SECTION      x3    CHOLECYSTECTOMY      COLONOSCOPY      ENDOSCOPIC ULTRASOUND OF UPPER GASTROINTESTINAL TRACT Left 11/15/2019    Procedure: ULTRASOUND, UPPER GI TRACT, ENDOSCOPIC;  Surgeon: Mike Seay MD;  Location: [facility] (2ND FLR);  Service: Endoscopy;  Laterality: Left;  Ok to hold xarelto per protocol per Dr. Lloyd see media file 10/25/19-tb    ESOPHAGOGASTRODUODENOSCOPY      ESOPHAGOGASTRODUODENOSCOPY N/A 2018    Procedure: EGD (ESOPHAGOGASTRODUODENOSCOPY);  Surgeon: Ant Camejo MD;  Location: [facility];  Service: Endoscopy;  Laterality: N/A;    ESOPHAGOGASTRODUODENOSCOPY N/A 10/15/2019    Procedure: EGD (ESOPHAGOGASTRODUODENOSCOPY);  Surgeon: Melanie Cedeno MD;  Location: [facility];  Service: Endoscopy;  Laterality: N/A;    ESOPHAGOGASTRODUODENOSCOPY N/A 11/15/2019    Procedure: EGD (ESOPHAGOGASTRODUODENOSCOPY);  Surgeon: Mike Seay MD;  Location: [facility] (2ND FLR);  Service: Endoscopy;  Laterality: N/A;    EXCISION OF LESION OF LIP Left 2022    Procedure: EXCISION, LESION, LIP;  Surgeon: Glen Giang MD;  Location: [facility] OR 2ND FLR;  Service: ENT;  Laterality: Left;  Lip resection    HYSTERECTOMY      PHLEBOGRAPHY Bilateral 10/16/2018    Procedure: VENOGRAM;  Surgeon: Colin Mathis MD;  [place] [facility] [place] [facility];  Service: Cardiology;  Laterality: Bilateral;    MN  RT/LT HEART CATHETERS Left     Coronary Arteriogram-2 Cath    RHINOPLASTY      SENTINEL LYMPH NODE BIOPSY  1/12/2022    Procedure: BIOPSY, LYMPH NODE, SENTINEL;  Surgeon: Glen Giang MD;  Location: [facility] OR [address] [place] [address];  Service: ENT;;    TUBAL LIGATION      venogram Bilateral 10/16/2018       Family History:   Family History   Problem Relation Age of Onset    Breast cancer Maternal Grandfather     Dementia Mother     Atrial fibrillation Mother     Deep vein thrombosis Mother     Pulmonary embolism Mother     Stroke Mother     Aneurysm Father        Social History:  reports that she has been smoking cigarettes. She has a 20.00 pack-year smoking history. She has never used smokeless tobacco. She reports that she does not drink alcohol and does not use drugs.    Allergies:  Review of patient's allergies indicates:   Allergen Reactions    Azithromycin Anaphylaxis     Other reaction(s): swelling to entire body  Swelling (tongue / lips)^      Ciprofloxacin Swelling     Throat swells    Codeine      Swelling (throat)^    Tolerates Morphine      Codeine sulfate      Swelling (throat)^    Tolerates Morphine       Medications:  Current Outpatient Medications   Medication Sig Dispense Refill    aspirin (ECOTRIN) 81 MG EC tablet Take 81 mg by mouth once daily.      citalopram (CELEXA) 40 MG tablet citalopram 40 mg tablet      gemfibroziL (LOPID) 600 MG tablet Take 600 mg by mouth 2 (two) times daily.      hydrocodone-acetaminophen (HYCET) solution 7.5-325 mg/15mL Take 15 mLs by mouth 4 (four) times daily as needed for Pain. 473 mL 0    hydroxyzine HCL (ATARAX) 25 MG tablet       methadone (METHADOSE) 40 mg disintegrating tablet Take 40 mg by mouth once daily.       ondansetron (ZOFRAN) 8 MG tablet Take 1 tablet (8 mg total) by mouth every 8 (eight) hours as needed for Nausea. 12 tablet 0    simvastatin (ZOCOR) 40 MG tablet Take 40 mg by mouth every evening.       allopurinoL (ZYLOPRIM) 300 MG tablet  Take 1 tablet (300 mg total) by mouth once daily. (Patient not taking: Reported on 2/1/2022) 30 tablet 5    furosemide (LASIX) 40 MG tablet Take 1 tablet (40 mg total) by mouth once daily. (Patient not taking: Reported on 2/1/2022) 90 tablet 0    lisinopriL (PRINIVIL,ZESTRIL) 20 MG tablet 1 tablet      lisinopriL 10 MG tablet Take 10 mg by mouth once daily.      naloxone (NARCAN) 4 mg/actuation Spry 4mg by nasal route as needed for opioid overdose; may repeat every 2-3 minutes in alternating nostrils until medical help arrives. Call 911 (Patient not taking: Reported on 2/1/2022) 1 each 11    nitroGLYCERIN (NITROSTAT) 0.4 MG SL tablet Place 0.4 mg under the tongue every 5 (five) minutes as needed for Chest pain.      ondansetron (ZOFRAN-ODT) 8 MG TbDL Dissolve 1 tablet (8 mg total) by mouth every 8 (eight) hours as needed (nausea). (Patient not taking: Reported on 2/1/2022) 15 tablet 0    triamcinolone acetonide 0.1% (KENALOG) 0.1 % ointment Apply topically 2 (two) times daily. To be applied to itch rash on abdomen once daily (Patient not taking: Reported on 2/1/2022) 80 g 0     No current facility-administered medications for this visit.       Review of Systems   Constitutional: Negative for appetite change, fatigue, fever and unexpected weight change.   HENT: Negative for nosebleeds and sore throat.    Respiratory: Negative for cough and shortness of breath.    Cardiovascular: Negative for chest pain and leg swelling.   Gastrointestinal: Negative for abdominal pain, blood in stool, diarrhea, nausea and vomiting.   Genitourinary: Negative for dysuria, flank pain, hematuria, urgency and vaginal bleeding.   Musculoskeletal: Positive for back pain.   Skin: Negative for rash.   Neurological: Negative for seizures and headaches.   Hematological: Negative for adenopathy.   Psychiatric/Behavioral: Negative for agitation.       ECOG Performance Status: 2   Objective:      Vitals:   Vitals:    02/01/22 1429   BP:  124/68   BP Location: Left arm   Patient Position: Sitting   BP Method: Large (Automatic)   Pulse: 87   Resp: 12   Temp: 98.8 °F (37.1 °C)   TempSrc: Oral   SpO2: 95%   Weight: 103.2 kg (227 lb 8.2 oz)   Height: 5' (1.524 m)     BMI: Body mass index is 44.43 kg/m².    Physical Exam  Constitutional:       Appearance: Normal appearance. She is obese.   HENT:      Head: Normocephalic and atraumatic.      Nose: Nose normal. No rhinorrhea.      Mouth/Throat:      Mouth: Mucous membranes are moist.   Eyes:      Pupils: Pupils are equal, round, and reactive to light.   Cardiovascular:      Rate and Rhythm: Normal rate and regular rhythm.      Heart sounds: No murmur heard.      Pulmonary:      Effort: Pulmonary effort is normal.      Breath sounds: Normal breath sounds.   Abdominal:      General: Abdomen is flat. There is no distension.      Palpations: Abdomen is soft.   Musculoskeletal:         General: No swelling or tenderness. Normal range of motion.      Cervical back: Normal range of motion and neck supple.      Right lower leg: Edema present.      Left lower leg: Edema present.   Skin:     General: Skin is warm.      Capillary Refill: Capillary refill takes less than 2 seconds.      Findings: Rash (b/l LE due to lymphedema ) present.   Neurological:      General: No focal deficit present.      Mental Status: She is alert and oriented to person, place, and time.   Psychiatric:         Mood and Affect: Mood normal.         Behavior: Behavior normal.       Laboratory Data:  No visits with results within 1 Week(s) from this visit.   Latest known visit with results is:   Lab Visit on 01/25/2022   Component Date Value Ref Range Status    Ferritin 01/25/2022 500* 20.0 - 300.0 ng/mL Final    Iron 01/25/2022 49  30 - 160 ug/dL Final    Transferrin 01/25/2022 239  200 - 375 mg/dL Final    TIBC 01/25/2022 354  250 - 450 ug/dL Final    Saturated Iron 01/25/2022 14* 20 - 50 % Final    WBC 01/25/2022 3.95  3.90 - 12.70  K/uL Final    RBC 01/25/2022 4.28  4.00 - 5.40 M/uL Final    Hemoglobin 01/25/2022 13.1  12.0 - 16.0 g/dL Final    Hematocrit 01/25/2022 42.1  37.0 - 48.5 % Final    MCV 01/25/2022 98  82 - 98 fL Final    MCH 01/25/2022 30.6  27.0 - 31.0 pg Final    MCHC 01/25/2022 31.1* 32.0 - 36.0 g/dL Final    RDW 01/25/2022 13.9  11.5 - 14.5 % Final    Platelets 01/25/2022 158  150 - 450 K/uL Final    MPV 01/25/2022 10.3  9.2 - 12.9 fL Final    Immature Granulocytes 01/25/2022 0.5  0.0 - 0.5 % Final    Gran # (ANC) 01/25/2022 3.2  1.8 - 7.7 K/uL Final    Immature Grans (Abs) 01/25/2022 0.02  0.00 - 0.04 K/uL Final    Comment: Mild elevation in immature granulocytes is non specific and   can be seen in a variety of conditions including stress response,   acute inflammation, trauma and pregnancy. Correlation with other   laboratory and clinical findings is essential.      Lymph # 01/25/2022 0.3* 1.0 - 4.8 K/uL Final    Mono # 01/25/2022 0.2* 0.3 - 1.0 K/uL Final    Eos # 01/25/2022 0.1  0.0 - 0.5 K/uL Final    Baso # 01/25/2022 0.02  0.00 - 0.20 K/uL Final    nRBC 01/25/2022 0  0 /100 WBC Final    Gran % 01/25/2022 81.3* 38.0 - 73.0 % Final    Lymph % 01/25/2022 8.6* 18.0 - 48.0 % Final    Mono % 01/25/2022 5.6  4.0 - 15.0 % Final    Eosinophil % 01/25/2022 3.5  0.0 - 8.0 % Final    Basophil % 01/25/2022 0.5  0.0 - 1.9 % Final    Differential Method 01/25/2022 Automated   Final    Sodium 01/25/2022 134* 136 - 145 mmol/L Final    Potassium 01/25/2022 4.3  3.5 - 5.1 mmol/L Final    Chloride 01/25/2022 96  95 - 110 mmol/L Final    CO2 01/25/2022 26  23 - 29 mmol/L Final    Glucose 01/25/2022 209* 70 - 110 mg/dL Final    BUN 01/25/2022 5* 6 - 20 mg/dL Final    Creatinine 01/25/2022 0.8  0.5 - 1.4 mg/dL Final    Calcium 01/25/2022 9.0  8.7 - 10.5 mg/dL Final    Total Protein 01/25/2022 7.1  6.0 - 8.4 g/dL Final    Albumin 01/25/2022 3.5  3.5 - 5.2 g/dL Final    Total Bilirubin 01/25/2022 0.4  0.1 - 1.0  mg/dL Final    Comment: For infants and newborns, interpretation of results should be based  on gestational age, weight and in agreement with clinical  observations.    Premature Infant recommended reference ranges:  Up to 24 hours.............<8.0 mg/dL  Up to 48 hours............<12.0 mg/dL  3-5 days..................<15.0 mg/dL  6-29 days.................<15.0 mg/dL      Alkaline Phosphatase 01/25/2022 149* 55 - 135 U/L Final    AST 01/25/2022 44* 10 - 40 U/L Final    ALT 01/25/2022 47* 10 - 44 U/L Final    Anion Gap 01/25/2022 12  8 - 16 mmol/L Final    eGFR if African American 01/25/2022 >60.0  >60 mL/min/1.73 m^2 Final    eGFR if non African American 01/25/2022 >60.0  >60 mL/min/1.73 m^2 Final    Comment: Calculation used to obtain the estimated glomerular filtration  rate (eGFR) is the CKD-EPI equation.       Hepatitis B Surface Ag 01/25/2022 Negative  Negative Final    Hep B Core Total Ab 01/25/2022 Negative   Final    LD 01/25/2022 304* 110 - 260 U/L Final    Results are increased in hemolyzed samples.         Imaging:       PET CT- 4/29/2020  1.  Findings consistent with partial treatment response with decreased size of the stomach wall and decreased SUV throughout the stomach.  Metabolic activity on the baseline scan was atypically prominent for marginal zone lymphoma.  If a Deauville score were to be assigned, the score would be 4.    2.  Decreased size and SUV of the patient's right lower lobe pulmonary biopsy-proven lymphoma.    3.  Questionable slight increase in size of the patient's right lower pole lesion again concerning for RCC or oncocytoma.  Extranodal lymphoma could have a similar appearance.    Assessment:       1. 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 Leg edema      1.  Sha MZL with coexisting Gastric/pulmonary/diffuse marrow Marginal zone B-cell lymphoma:    - MALT IPI score- 2, LDH elevated at 301, age less than 70,  [place] stage IV  - Patient  complained of chronic epigastric/periumbilical abdominal pain for the last 3 years  - September 24, 2019-  MRI of the abdomen revealed multifocal areas of asymmetric gastric wall thickening.  Recommend further evaluation with direct visualization to exclude neoplasm.    - October 15, 2019- EGD revealed Gastric tumor in the gastric fundus was found however no specimen was collected as she was on anticoagulation.    - November 15, 2019- EUS revealed gastric tumor in the gastric fundus with many abnormal lymph nodes visualized in the lower paraesophageal mediastinum (level 8L) and gastrohepatic ligament (level 18). Fine needle biopsy performed and pathology revealed marginal zone B cell lymphoma.  PET CT-12/10/2019- revealed gastric marginal zone lymphoma, with pulmonary manifestations of marginal zone lymphoma in right lower lobe along with diffuse marrow uptake throughout the axial skeleton with more focal uptake identified at the right femoral greater trochanter and left ischial tuberosity  - She has a currently daily smoker, has been smoking for almost 40 years.    4/14/20- s/p 4 cycles of single agent Rituximab induction therapy completed    4/29/20- PET/CT consistent with partial treatment response   7/7/2020- S/p maintenance cycle 1 Rituximab 375 mg/m2. Will schedule every 12 weeks for 2 years   9/29/2020- cycle 2 of maintenance Rituximab  12/22/20- cycle 3 of maintenance Rituximab  3/15/21- cycle 4 of maintenance Rituximab  6/8/21- Cycle 5 of  maintenance Rituximab  9/21/21 - cycle 6 of maintenance Rituximab   11/21 - cycle 7 was held due to squamous cell carcinoma diagnosis. Plan to hold for two cycles. Resume in May 2022     2. Solid right renal mass: PET CT-Questionable slight increase in size of the patient's right lower pole lesion again concerning for RCC or oncocytoma. Following Urology. Stable.   3. Right lower extremity DVT, likely provoked: was on Xarelto for about 2 years. Stopped taking  8/21  4. Iron deficiency anemia - resolved   5. Chronic active smoker  6. squamous cell carcinoma of lower lip diagnosed 10/21 s/p Wedge resection of lower lip squamous cell carcinoma primary closure and Right submandibular sentinel lymph node biopsy 1/12/22. Margins neg, LN negative.   7. LE edema        Plan:     1. Holding rituxan for two cycles until a definitive diagnosis and treatment plan are made for her squamous cell carcinoma of lower lip   2. Continue following Urology, with Dr. Calero for right kidney lesion  3. On aspirin 81 mg daily  4. Continue to monitor labs [id number]. Discussed need for EGD/colonoscopy, will need to be rescheduled   5. Educated patient regarding smoking cessations, she's cutting down   6. Follows with ENT and dermatology   7. Lasix 40 mg daily with potassium 20 meq daily and ECHO ordered pending insurance auth , PT referral for lymphedema management. Will refer to cardiology as well.     Follow up with us in 3 months to resume rituxan      Plan of care discussed with Dr. Rich Shea MD  Hematology and Medical Oncology fellow

## 2022-02-01 NOTE — PROGRESS NOTES
Subjective:       Patient ID: Gabi Newton is a 56 y.o. female.    Chief Complaint: Post-op Evaluation    HPI     Gabi Newton returns for a post op visit. She has less pain. She denies any fever, chills, or drainage. She reports she inadvertently removed several sutures. No complaints.    Past Medical History:   Diagnosis Date    Abdominal pain     Anemia     Anxiety     Back pain     Deep vein thrombosis     Disorder of kidney and ureter     Fatty liver 2018    Gastric lymphoma 2019    Gastric tumor     GERD (gastroesophageal reflux disease)     Hyperlipidemia     Hypertension     Splenomegaly 2018       Past Surgical History:   Procedure Laterality Date    CARDIAC CATHETERIZATION      CARDIAC CATHETERIZATION  2018    had chest pains . states vessels at the bottom of heart collapsed     SECTION      x3    CHOLECYSTECTOMY      COLONOSCOPY      ENDOSCOPIC ULTRASOUND OF UPPER GASTROINTESTINAL TRACT Left 11/15/2019    Procedure: ULTRASOUND, UPPER GI TRACT, ENDOSCOPIC;  Surgeon: Mike Seay MD;  Location: [facility] ([address] [place] [address]);  Service: Endoscopy;  Laterality: Left;  Ok to hold xarelto per protocol per Dr. Lloyd see media file 10/25/19-tb    ESOPHAGOGASTRODUODENOSCOPY      ESOPHAGOGASTRODUODENOSCOPY N/A 2018    Procedure: EGD (ESOPHAGOGASTRODUODENOSCOPY);  Surgeon: Ant Camejo MD;  Location: [facility];  Service: Endoscopy;  Laterality: N/A;    ESOPHAGOGASTRODUODENOSCOPY N/A 10/15/2019    Procedure: EGD (ESOPHAGOGASTRODUODENOSCOPY);  Surgeon: Melanie Cedeno MD;  Location: [facility];  Service: Endoscopy;  Laterality: N/A;    ESOPHAGOGASTRODUODENOSCOPY N/A 11/15/2019    Procedure: EGD (ESOPHAGOGASTRODUODENOSCOPY);  Surgeon: Mike Seay MD;  Location: [facility] ([address] [place] [address]);  Service: Endoscopy;  Laterality: N/A;    EXCISION OF LESION OF LIP Left 2022    Procedure: EXCISION, LESION, LIP;  Surgeon: Glen Giang MD;  Location: [facility] OR [address] [place] [address];   Service: ENT;  Laterality: Left;  Lip resection    HYSTERECTOMY      PHLEBOGRAPHY Bilateral 10/16/2018    Procedure: VENOGRAM;  Surgeon: Colin Mathis MD;  Location: [facility] CATH LAB;  Service: Cardiology;  Laterality: Bilateral;    ND RT/LT HEART CATHETERS Left     Coronary Arteriogram-2 Cath    RHINOPLASTY      SENTINEL LYMPH NODE BIOPSY  1/12/2022    Procedure: BIOPSY, LYMPH NODE, SENTINEL;  Surgeon: Glen Giang MD;  Location: [facility] OR [address] [place] [address];  Service: ENT;;    TUBAL LIGATION      venogram Bilateral 10/16/2018         Current Outpatient Medications:     allopurinoL (ZYLOPRIM) 300 MG tablet, Take 1 tablet (300 mg total) by mouth once daily. (Patient not taking: Reported on 2/1/2022), Disp: 30 tablet, Rfl: 5    aspirin (ECOTRIN) 81 MG EC tablet, Take 81 mg by mouth once daily., Disp: , Rfl:     citalopram (CELEXA) 40 MG tablet, citalopram 40 mg tablet, Disp: , Rfl:     furosemide (LASIX) 40 MG tablet, Take 1 tablet (40 mg total) by mouth once daily. (Patient not taking: Reported on 2/1/2022), Disp: 90 tablet, Rfl: 0    gemfibroziL (LOPID) 600 MG tablet, Take 600 mg by mouth 2 (two) times daily., Disp: , Rfl:     hydrocodone-acetaminophen (HYCET) solution 7.5-325 mg/15mL, Take 15 mLs by mouth 4 (four) times daily as needed for Pain., Disp: 473 mL, Rfl: 0    hydroxyzine HCL (ATARAX) 25 MG tablet, , Disp: , Rfl:     lisinopriL (PRINIVIL,ZESTRIL) 20 MG tablet, 1 tablet, Disp: , Rfl:     lisinopriL 10 MG tablet, Take 10 mg by mouth once daily., Disp: , Rfl:     methadone (METHADOSE) 40 mg disintegrating tablet, Take 40 mg by mouth once daily. , Disp: , Rfl:     naloxone (NARCAN) 4 mg/actuation Spry, 4mg by nasal route as needed for opioid overdose; may repeat every 2-3 minutes in alternating nostrils until medical help arrives. Call 911 (Patient not taking: Reported on 2/1/2022), Disp: 1 each, Rfl: 11    nitroGLYCERIN (NITROSTAT) 0.4 MG SL tablet, Place 0.4 mg under the tongue every 5 (five)  minutes as needed for Chest pain., Disp: , Rfl:     ondansetron (ZOFRAN) 8 MG tablet, Take 1 tablet (8 mg total) by mouth every 8 (eight) hours as needed for Nausea., Disp: 12 tablet, Rfl: 0    ondansetron (ZOFRAN-ODT) 8 MG TbDL, Dissolve 1 tablet (8 mg total) by mouth every 8 (eight) hours as needed (nausea). (Patient not taking: Reported on 2/1/2022), Disp: 15 tablet, Rfl: 0    simvastatin (ZOCOR) 40 MG tablet, Take 40 mg by mouth every evening. , Disp: , Rfl:     triamcinolone acetonide 0.1% (KENALOG) 0.1 % ointment, Apply topically 2 (two) times daily. To be applied to itch rash on abdomen once daily (Patient not taking: Reported on 2/1/2022), Disp: 80 g, Rfl: 0    Review of patient's allergies indicates:   Allergen Reactions    Azithromycin Anaphylaxis     Other reaction(s): swelling to entire body  Swelling (tongue / lips)^      Ciprofloxacin Swelling     Throat swells    Codeine      Swelling (throat)^    Tolerates Morphine      Codeine sulfate      Swelling (throat)^    Tolerates Morphine       Social History     Socioeconomic History    Marital status:     Number of children: 3   Occupational History     Comment: cleans   Tobacco Use    Smoking status: Current Every Day Smoker     Packs/day: 0.50     Years: 40.00     Pack years: 20.00     Types: Cigarettes    Smokeless tobacco: Never Used    Tobacco comment: trying to quit; in a program; at 1/2 pack per day   Substance and Sexual Activity    Alcohol use: No    Drug use: No     Comment: former opioid addiction  quit 8 yrs ago- pain management   Social History Narrative    Stairs- 5 to enter house       Family History   Problem Relation Age of Onset    Breast cancer Maternal Grandfather     Dementia Mother     Atrial fibrillation Mother     Deep vein thrombosis Mother     Pulmonary embolism Mother     Stroke Mother     Aneurysm Father        Review of Systems   Constitutional: Negative for appetite change, chills, diaphoresis,  fatigue, fever and unexpected weight change.   HENT: Positive for mouth sores. Negative for nasal congestion, dental problem, drooling, ear discharge, ear pain, facial swelling, hearing loss, nosebleeds, postnasal drip, rhinorrhea, sinus pressure/congestion, sneezing, sore throat, tinnitus, trouble swallowing and voice change.    Eyes: Negative for pain, discharge, redness and itching.   Respiratory: Negative for cough and shortness of breath.    Cardiovascular: Negative for chest pain.   Gastrointestinal: Negative for abdominal distention, abdominal pain, diarrhea, nausea and vomiting.   Endocrine: Negative for cold intolerance and heat intolerance.   Genitourinary: Negative for difficulty urinating.   Musculoskeletal: Negative for neck pain and neck stiffness.   Integumentary:  Negative for rash.   Neurological: Negative for dizziness, weakness and headaches.   Hematological: Negative for adenopathy.         Objective:      Physical Exam  Constitutional:       Appearance: Normal appearance.   HENT:      Head: Normocephalic and atraumatic.      Right Ear: External ear normal.      Left Ear: External ear normal.      Mouth/Throat:      Comments: Small area of dehiscence at lip, but otherwise edges well approximated.  No redness, drainage, or fluid collection noted.    Sutures removed without difficulty.  Neck:      Comments: Neck Incision CDI.  No redness, drainage, or fluid collection noted.  Small area of dehiscence at distal end of suture line, edges otherwise well approximated.   Pulmonary:      Effort: Pulmonary effort is normal. No respiratory distress.   Neurological:      General: No focal deficit present.      Mental Status: She is alert.   Psychiatric:         Mood and Affect: Mood normal.         Behavior: Behavior normal.         Thought Content: Thought content normal.         Assessment:       Problem List Items Addressed This Visit        Oncology    Squamous cell carcinoma, lip - Primary     Doing  well. Sutures removed. Questions answered. Scar care discussed. Follow up with derm, RTC here as needed.               Plan:       Problem List Items Addressed This Visit        Oncology    Squamous cell carcinoma, lip - Primary     Doing well. Sutures removed. Questions answered. Scar care discussed. Follow up with derm, RTC here as needed.

## 2022-02-02 ENCOUNTER — CLINICAL SUPPORT (OUTPATIENT)
Dept: REHABILITATION | Facility: HOSPITAL | Age: 57
End: 2022-02-02
Payer: MEDICAID

## 2022-02-02 DIAGNOSIS — R60.9 EDEMA, UNSPECIFIED TYPE: ICD-10-CM

## 2022-02-02 DIAGNOSIS — I87.2 VENOUS INSUFFICIENCY (CHRONIC) (PERIPHERAL): Primary | ICD-10-CM

## 2022-02-02 DIAGNOSIS — R60.0 PERIPHERAL EDEMA: ICD-10-CM

## 2022-02-02 PROCEDURE — 97163 PT EVAL HIGH COMPLEX 45 MIN: CPT

## 2022-02-02 PROCEDURE — 97110 THERAPEUTIC EXERCISES: CPT

## 2022-02-02 NOTE — ASSESSMENT & PLAN NOTE
Doing well. Sutures removed. Questions answered. Scar care discussed. Follow up with derm, RTC here as needed.

## 2022-02-02 NOTE — PROGRESS NOTES
See evaluation in POC for goals and assessment     Eval Date: 02/04/2022    Angie Hdez, PT, DPT, CLT

## 2022-02-04 NOTE — PLAN OF CARE
[facility]  Physical Therapy Initial Evaluation    Name: Gabi Newton  Clinic Number: [id number]    Therapy Diagnosis:   Encounter Diagnoses   Name Primary?    Edema, unspecified type     Venous insufficiency (chronic) (peripheral) Yes    Peripheral edema      Physician: Shukri Shea, *    Physician Orders: PT Eval and Treat - lymphedema  Medical Diagnosis from Referral: R60.0 (ICD-10-CM) - Leg edema  Evaluation Date: 2022  Authorization Period Expiration:2022  Plan of Care Expiration: 2022  Visit # / Visits authorized:     Time In: 11:15pm   Time Out: 12:06pm   Total Billable Time: 51 minutes    Precautions: Standard and cancer    Subjective   Date of onset: Its been a long time   History of current condition - Gabi reports: She has had stents in the groin area and 2 ablations in the legs. She has worn compression before but they were itchy. Pt reports blood clots in the legs     Pt denies CHF, KF, DM   Pt reports non Hodkins lymphoma, recently had a carcinoma on the face   Fluid pill- take its sometimes   Blood thinner- aspirin      Medical History:   Past Medical History:   Diagnosis Date    Abdominal pain 2018    Anemia     Anxiety     Back pain     Deep vein thrombosis     Disorder of kidney and ureter     Fatty liver 2018    Gastric lymphoma 2019    Gastric tumor     GERD (gastroesophageal reflux disease)     Hyperlipidemia     Hypertension     Splenomegaly 2018       Surgical History:   Gabi Newton  has a past surgical history that includes Hysterectomy; Cholecystectomy;  section; Esophagogastroduodenoscopy; Colonoscopy; Tubal ligation; Esophagogastroduodenoscopy (N/A, 2018); Cardiac catheterization; Cardiac catheterization (2018); Rhinoplasty; venogram (Bilateral, 10/16/2018); Phlebography (Bilateral, 10/16/2018); *pr rt/lt heart catheters (Left); Esophagogastroduodenoscopy (N/A, 10/15/2019); Endoscopic  ultrasound of upper gastrointestinal tract (Left, 11/15/2019); Esophagogastroduodenoscopy (N/A, 11/15/2019); Excision of lesion of lip (Left, 1/12/2022); and [place] lymph node biopsy (1/12/2022).    Medications:   Gabi has a current medication list which includes the following prescription(s): allopurinol, aspirin, citalopram, furosemide, gemfibrozil, hydrocodone-apap 7.5-325 mg/15 ml, hydroxyzine hcl, lisinopril, lisinopril, methadone, naloxone, nitroglycerin, ondansetron, ondansetron, simvastatin, triamcinolone acetonide 0.1%, [DISCONTINUED] amlodipine, and [DISCONTINUED] isosorbide mononitrate.    Allergies:   Review of patient's allergies indicates:   Allergen Reactions    Azithromycin Anaphylaxis     Other reaction(s): swelling to entire body  Swelling (tongue / lips)^      Ciprofloxacin Swelling     Throat swells    Codeine      Swelling (throat)^    Tolerates Morphine      Codeine sulfate      Swelling (throat)^    Tolerates Morphine        Imaging,   3/3/2020:   1. A stent is identified in the left CFV.  Patient had a history of left GSV ablation with occlusion of most of the visualized left greater saphenous vein.  2. No evidence of deep venous thrombosis in either lower extremity.    Surgery: stents and ablations   Radiation:  None   Chemotherapy: Yes, may start back up in May   Previous Lymphedema Treatment: No   Prior Therapy: Yes   Social History: Lives with two kids   Abuse/Neglect: pt shows no signs of abuse/ neglect   Nutritional status: Pt reports no nutritional status   Educational needs: Pt reports no educational needs    Spiritual/Cultural: Pt reports no spiritual/ cultural needs   Fall risk: Had a fall recently at home, caught herself   Occupation: Not working   Prior Level of Function: Independent   Current Level of Function: limited in dressing   Gait: independent, decreased step length, increase fatigue    Transfers:independent    Bed Mobility: independent      Pain and  Swelling:  Current 2/10, worst 6/10, best 2/10   Location: bilateral knee   Description: Aching  Aggravating Factors: everything   Easing Factors: rest    Pts goals: To be able to get around better     Objective                           Moderate swelling of BLEs especially around ankles   Severe fibrosis/ dry skin buildup of BLEs   Overgrown nails     Amount of Swelling/Location of Swelling: Moderate swelling of BLEs especially around ankles   Skin Integrity: Severe fibrosis/ dry skin buildup of BLEs   Palpation/Texture: thick fibrosis   Positive B Stemmer Sign  Negative B Álvaro's Sign  Circulation: intact      Posture: Forward head posture, rounded shoulders     Strength: functional screen of AROM against gravity and sit to stand transfers      Sensation:  intact to lt touch B LE    Girth Measurements (in centimeters)  Girth measurements not taken due to concerns of skin and possible infectious process      CMS Impairment/Limitation/Restriction for FOTO Edema Survey  Limitation: not performed     Therapist reviewed FOTO scores for Gabi Newton on 2/2/2022.   FOTO documents entered into [employer] - see Media section.       TREATMENT     Total Treatment time separate from Evaluation: 20 minutes    Gabi received therapeutic exercises to develop strength, endurance, ROM, flexibility and posture for 2 minutes including:  Aps, knee ROM, avoid dependency, avoid immobility, elevation, walking with compression, deep breathing, use of muscle pump to assist venous return    Gabi received the following manual therapy techniques: Manual Lymphatic Drainage were applied to the: RLE for 3 minutes, including:  Education and training in compression needs.  Complete Decongestive Therapy components and management with goals and plan of care review.    Demo of Manual Lymphatic Drainage and short stretch compression bandaging.       Self Care/Home Management training for 10 minutes including:    Pt was educated in potential compression  needs.  Demo of products including socks, garments, and Inelastic Velcro wraps.   Discussed cost/coverage and authorization per insurance with Durable Medical Equipment(DME) provider.  Compression require orders from referring provider and coverage or purchase of products from DME or self order.      Discussed wear schedule, don/doff, wash and management of products.  Size and compression class and AM/PM needs.     Consideration for shorts/tights or capris style compression to compliment lower leg compression to support size/shape of LE.       Informed insurance coverage of compression is per DME provider and typically Medicare and Medicare group plans may not cover cost beyond pair of standard sized knee high garments.   Commitment to attendance as well as commitment to securing compression needs is critical to edema management.      Home Exercises and Patient Education Provided  Education provided:   - lymphedema booklet   - Pt was educated in lymphedema etiology and management plans.  Pt was provided with written risk reductions and precautions for managing lymphedema.      This patient is in agreement to participate in Lymphedema treatment.    Written Home Exercises Provided: yes.  Exercises were reviewed and Gabi was able to demonstrate them prior to the end of the session.  Gabi demonstrated good  understanding of the education provided.     See EMR under Patient Instructions for exercises provided 2/2/2022.    Assessment   Gabi is a 56 y.o. female referred to outpatient Physical Therapy with a medical diagnosis of R60.0 (ICD-10-CM) - Leg edema   . This patient presents s/p cancer, DVT   resulting in: lymphedema of the BLEs, increased pain, as well as difficulty performing walking and dressing , compression needs, and placing the pt at higher risk of infection.     Pt presents with moderate swelling of BLEs with significant buildup of dry skin on both legs. Because of concerns of possible infectious  process, pt's measurements were not taken. Pt's dermatologist was message and cleared pt for therapy at the end of session. Pt was educated on lymphedema causes and physiology, infection signs and symptoms, complete decongestive therapy and its components, and expectations for therapy. Pt was also educated on the need for some type of compression. Because of pt's cancer, progress may be limited. Pt would benefit from therapy to decrease swelling, assist with compression, and prepare for home management     Pt prognosis is Fair.   Pt will benefit from skilled outpatient Physical Therapy to address the deficits stated above and in the chart below, provide pt/family education, and to maximize pt's level of independence.     Plan of care discussed with patient: Yes  Pt's spiritual, cultural and educational needs considered and patient is agreeable to the plan of care and goals as stated below:     Medical Necessity is demonstrated by the following  History  Co-morbidities and personal factors that may impact the plan of care Co-morbidities:   anxiety, high BMI, history of cancer, HTN and hx of DVT    Personal Factors:   no deficits     high   Examination  Body Structures and Functions, activity limitations and participation restrictions that may impact the plan of care Body Regions:   lower extremities    Body Systems:    gross symmetry  ROM  strength  edema  skin integrity    Participation Restrictions:   None     Activity limitations:   Learning and applying knowledge  no deficits    General Tasks and Commands  no deficits    Communication  no deficits    Mobility  lifting and carrying objects  walking    Self care  caring for body parts (brushing teeth, shaving, grooming)  dressing    Domestic Life  doing house work (cleaning house, washing dishes, laundry)    Interactions/Relationships  no deficits    Life Areas  no deficits    Community and Social Life  no deficits         high   Clinical Presentation unstable  clinical presentation with unpredictable characteristics high   Decision Making/ Complexity Score: high     Anticipated Barriers for therapy: cancer treatment     The following goals were discussed with the patient and patient is in agreement with them as to be addressed in the treatment plan.     Short Term Goals: (6 weeks)  1. Patient will show decreased girth in B LE by up to 1 cm to allow for LE symmetry, shoe and clothing choice, and ability to apply needed compression.  (progressing, not met)   2. Patient will demonstrate 100% knowledge of lymphedema precautions and signs of infection to allow for reduced lymphedema risk, infection risk, and/or exacerbation of condition.  (progressing, not met)  3. Patient or caregiver will perform self-bandaging techniques and/or wearing of compression garments to allow for lymphatic drainage support, skin elasticity, and reduction in shape and size of limb. (progressing, not met)  4. Patient will perform self lymph drainage techniques to areas within reach to enhance lymphatic drainage and skin condition.  (progressing, not met)  5. Patient will tolerate daily activities with multilayered bandaging to allow for lymphatic and venous support.  (progressing, not met)    Long Term Goals: (12  weeks)  1. Patient will show decreased girth in B LE by up to 2 cm  to allow for LE symmetry, shoe and clothing choice, and ability to apply needed compression daily.  (progressing, not met)  2. Patient will show reduction in density to mild or less with improved contour of limb to allow for cosmesis, LE symmetry, infection risk reduction, and clothing and compression choice.   (progressing, not met)  3. Patient to julian/doff compression garment with daily compliance to assist in lymphedema management, skin elasticity, and tissue density.  (progressing, not met)  4. Pt to show improved postural awareness and alignment.  (progressing, not met)  5. Pt to be I and compliant with HEP to allow  for increased function in affected limb.   (progressing, not met)  Plan   Plan of care Certification: 2/2/2022 to 4/14/2022.    Outpatient Physical Therapy 2 times weekly for 10 weeks to include the following interventions: Gait Training, Manual Therapy, Neuromuscular Re-ed, Patient Education, Self Care, Therapeutic Activities and Therapeutic Exercise. Complete Decongestive Therapy- compression and home equipment needs to be addressed and assisted.    Pt may be seen by a PTA as part of the Rehab treatment team.  Plan of Care was discussed with LUCILLE Fagan, PT

## 2022-02-09 ENCOUNTER — PATIENT MESSAGE (OUTPATIENT)
Dept: UROLOGY | Facility: CLINIC | Age: 57
End: 2022-02-09
Payer: MEDICAID

## 2022-02-09 ENCOUNTER — TELEPHONE (OUTPATIENT)
Dept: HEMATOLOGY/ONCOLOGY | Facility: CLINIC | Age: 57
End: 2022-02-09
Payer: MEDICAID

## 2022-02-09 ENCOUNTER — PATIENT MESSAGE (OUTPATIENT)
Dept: REHABILITATION | Facility: HOSPITAL | Age: 57
End: 2022-02-09
Payer: MEDICAID

## 2022-02-09 NOTE — TELEPHONE ENCOUNTER
"----- Message from Marge Bryant sent at 2/9/2022  2:59 PM CST -----  Regarding: Consult/Advisory:  Name Of Caller: Yvonne SRIVASTAVA    Contact Preference?: [phone]    What is the nature of the call?: appeal has been approved. Valid until 5/10/2022. Auth [id number]           Additional Notes:  "Thank you for all that you do for our patients'"     "

## 2022-02-09 NOTE — TELEPHONE ENCOUNTER
"----- Message from Ivone Wu sent at 2/9/2022 11:47 AM CST -----         Consult/Advisory:      Name Of Caller:Jefferson with [facility]   Contact Preference?:[phone] tracking#[id number]      Provider Name: Agustin      What is the nature of the call?:She's calling regarding an appeal for echo cardiogram. She states that they need a call today       Additional Notes:  "Thank you for all that you do for our patients'"                           "

## 2022-02-09 NOTE — TELEPHONE ENCOUNTER
Spoke with [facility] team member and confirmed that we have sent in all documents. States they will notified Mirene and get the appeal processed for echo.

## 2022-02-11 ENCOUNTER — PATIENT MESSAGE (OUTPATIENT)
Dept: REHABILITATION | Facility: HOSPITAL | Age: 57
End: 2022-02-11
Payer: MEDICAID

## 2022-02-23 ENCOUNTER — PATIENT MESSAGE (OUTPATIENT)
Dept: REHABILITATION | Facility: HOSPITAL | Age: 57
End: 2022-02-23
Payer: MEDICAID

## 2022-02-25 ENCOUNTER — PATIENT MESSAGE (OUTPATIENT)
Dept: DERMATOLOGY | Facility: CLINIC | Age: 57
End: 2022-02-25
Payer: MEDICAID

## 2022-03-07 ENCOUNTER — PATIENT MESSAGE (OUTPATIENT)
Dept: REHABILITATION | Facility: HOSPITAL | Age: 57
End: 2022-03-07
Payer: MEDICAID

## 2022-03-07 ENCOUNTER — PATIENT MESSAGE (OUTPATIENT)
Dept: UROLOGY | Facility: CLINIC | Age: 57
End: 2022-03-07
Payer: MEDICAID

## 2022-03-08 ENCOUNTER — TELEPHONE (OUTPATIENT)
Dept: UROLOGY | Facility: CLINIC | Age: 57
End: 2022-03-08
Payer: MEDICAID

## 2022-03-08 DIAGNOSIS — N28.89 RIGHT RENAL MASS: Primary | ICD-10-CM

## 2022-03-08 NOTE — TELEPHONE ENCOUNTER
See Catalina with a renal US asap    Catalina, please schedule an IR biopsy of renal mass then RTC to see me ASAP at [facility] to use a new spot    Thanks    Sc

## 2022-03-08 NOTE — TELEPHONE ENCOUNTER
----- Message from Priyanka Tenorio sent at 3/7/2022  9:56 AM CST -----  Pt sent me a portal message in regards to a bone scan. What exactly does pt need and would you like her to follow up after?

## 2022-03-21 ENCOUNTER — TELEPHONE (OUTPATIENT)
Dept: CARDIOLOGY | Facility: CLINIC | Age: 57
End: 2022-03-21

## 2022-03-21 ENCOUNTER — OFFICE VISIT (OUTPATIENT)
Dept: CARDIOLOGY | Facility: CLINIC | Age: 57
End: 2022-03-21
Payer: MEDICAID

## 2022-03-21 VITALS
DIASTOLIC BLOOD PRESSURE: 72 MMHG | BODY MASS INDEX: 43.61 KG/M2 | SYSTOLIC BLOOD PRESSURE: 135 MMHG | WEIGHT: 222.13 LBS | HEIGHT: 60 IN | HEART RATE: 92 BPM | OXYGEN SATURATION: 95 %

## 2022-03-21 DIAGNOSIS — R60.0 LEG EDEMA: ICD-10-CM

## 2022-03-21 DIAGNOSIS — R16.1 SPLENOMEGALY: ICD-10-CM

## 2022-03-21 DIAGNOSIS — C85.89 MARGINAL ZONE LYMPHOMA OF EXTRANODAL AND SOLID ORGAN SITES: ICD-10-CM

## 2022-03-21 DIAGNOSIS — C85.99 GASTRIC LYMPHOMA: ICD-10-CM

## 2022-03-21 DIAGNOSIS — F17.200 SMOKER: ICD-10-CM

## 2022-03-21 DIAGNOSIS — C44.02 SQUAMOUS CELL CARCINOMA, LIP: ICD-10-CM

## 2022-03-21 DIAGNOSIS — I25.10 CORONARY ARTERY DISEASE INVOLVING NATIVE CORONARY ARTERY OF NATIVE HEART WITHOUT ANGINA PECTORIS: Primary | ICD-10-CM

## 2022-03-21 DIAGNOSIS — D21.4 GIST (GASTROINTESTINAL STROMAL TUMOR), NON-MALIGNANT: ICD-10-CM

## 2022-03-21 DIAGNOSIS — I87.2 VENOUS INSUFFICIENCY (CHRONIC) (PERIPHERAL): ICD-10-CM

## 2022-03-21 DIAGNOSIS — D50.0 IRON DEFICIENCY ANEMIA DUE TO CHRONIC BLOOD LOSS: ICD-10-CM

## 2022-03-21 DIAGNOSIS — I44.7 LBBB (LEFT BUNDLE BRANCH BLOCK): ICD-10-CM

## 2022-03-21 DIAGNOSIS — I10 PRIMARY HYPERTENSION: ICD-10-CM

## 2022-03-21 DIAGNOSIS — C88.4 EXTRANODAL MARGINAL ZONE B-CELL LYMPHOMA OF MUCOSA-ASSOCIATED LYMPHOID TISSUE (MALT): ICD-10-CM

## 2022-03-21 DIAGNOSIS — E78.5 HYPERLIPIDEMIA, UNSPECIFIED HYPERLIPIDEMIA TYPE: ICD-10-CM

## 2022-03-21 DIAGNOSIS — R06.02 SHORTNESS OF BREATH: ICD-10-CM

## 2022-03-21 DIAGNOSIS — R60.0 PERIPHERAL EDEMA: ICD-10-CM

## 2022-03-21 PROCEDURE — 3078F DIAST BP <80 MM HG: CPT | Mod: CPTII,,, | Performed by: INTERNAL MEDICINE

## 2022-03-21 PROCEDURE — 99214 OFFICE O/P EST MOD 30 MIN: CPT | Mod: PBBFAC,PN | Performed by: INTERNAL MEDICINE

## 2022-03-21 PROCEDURE — 93005 ELECTROCARDIOGRAM TRACING: CPT | Mod: PBBFAC,PN | Performed by: INTERNAL MEDICINE

## 2022-03-21 PROCEDURE — 93010 EKG 12-LEAD: ICD-10-PCS | Mod: S$PBB,,, | Performed by: INTERNAL MEDICINE

## 2022-03-21 PROCEDURE — 4010F ACE/ARB THERAPY RXD/TAKEN: CPT | Mod: CPTII,,, | Performed by: INTERNAL MEDICINE

## 2022-03-21 PROCEDURE — 1160F PR REVIEW ALL MEDS BY PRESCRIBER/CLIN PHARMACIST DOCUMENTED: ICD-10-PCS | Mod: CPTII,,, | Performed by: INTERNAL MEDICINE

## 2022-03-21 PROCEDURE — 93010 ELECTROCARDIOGRAM REPORT: CPT | Mod: S$PBB,,, | Performed by: INTERNAL MEDICINE

## 2022-03-21 PROCEDURE — 99205 PR OFFICE/OUTPT VISIT, NEW, LEVL V, 60-74 MIN: ICD-10-PCS | Mod: S$PBB,25,, | Performed by: INTERNAL MEDICINE

## 2022-03-21 PROCEDURE — 3008F PR BODY MASS INDEX (BMI) DOCUMENTED: ICD-10-PCS | Mod: CPTII,,, | Performed by: INTERNAL MEDICINE

## 2022-03-21 PROCEDURE — 99999 PR PBB SHADOW E&M-EST. PATIENT-LVL IV: CPT | Mod: PBBFAC,,, | Performed by: INTERNAL MEDICINE

## 2022-03-21 PROCEDURE — 3075F SYST BP GE 130 - 139MM HG: CPT | Mod: CPTII,,, | Performed by: INTERNAL MEDICINE

## 2022-03-21 PROCEDURE — 99205 OFFICE O/P NEW HI 60 MIN: CPT | Mod: S$PBB,25,, | Performed by: INTERNAL MEDICINE

## 2022-03-21 PROCEDURE — 3075F PR MOST RECENT SYSTOLIC BLOOD PRESS GE 130-139MM HG: ICD-10-PCS | Mod: CPTII,,, | Performed by: INTERNAL MEDICINE

## 2022-03-21 PROCEDURE — 1159F PR MEDICATION LIST DOCUMENTED IN MEDICAL RECORD: ICD-10-PCS | Mod: CPTII,,, | Performed by: INTERNAL MEDICINE

## 2022-03-21 PROCEDURE — 3078F PR MOST RECENT DIASTOLIC BLOOD PRESSURE < 80 MM HG: ICD-10-PCS | Mod: CPTII,,, | Performed by: INTERNAL MEDICINE

## 2022-03-21 PROCEDURE — 1159F MED LIST DOCD IN RCRD: CPT | Mod: CPTII,,, | Performed by: INTERNAL MEDICINE

## 2022-03-21 PROCEDURE — 99999 PR PBB SHADOW E&M-EST. PATIENT-LVL IV: ICD-10-PCS | Mod: PBBFAC,,, | Performed by: INTERNAL MEDICINE

## 2022-03-21 PROCEDURE — 4010F PR ACE/ARB THEARPY RXD/TAKEN: ICD-10-PCS | Mod: CPTII,,, | Performed by: INTERNAL MEDICINE

## 2022-03-21 PROCEDURE — 1160F RVW MEDS BY RX/DR IN RCRD: CPT | Mod: CPTII,,, | Performed by: INTERNAL MEDICINE

## 2022-03-21 PROCEDURE — 3008F BODY MASS INDEX DOCD: CPT | Mod: CPTII,,, | Performed by: INTERNAL MEDICINE

## 2022-03-21 RX ORDER — IBUPROFEN 200 MG
1 TABLET ORAL DAILY
Status: ON HOLD | COMMUNITY
Start: 2022-03-11 | End: 2022-06-02

## 2022-03-21 NOTE — PROGRESS NOTES
"  Subjective:      Patient ID: Gabi Newton is a 56 y.o. female.    Chief Complaint: Edema (Ref by Dr Green -  Hx of stents in legs)    HPI:  Pt referred by oncologist.    Pt c/o swelling of legs for a year.    Pt sleeps in a recliner for about 2 years.    "I get short of breath when I lay flat; I can't get comfortable."    Pt sleeps upright in the recliner with her feet on the floor.    Not very active especially since Covid.    Pt sweeps and washes clothes and dishes.    Pt feels short of breath when walking.    Right knee always hurts.    Review of Systems   Cardiovascular: Positive for chest pain, dyspnea on exertion and leg swelling. Negative for claudication, irregular heartbeat, near-syncope, orthopnea, palpitations and syncope.      Pt has retrosternal chest pains which are not exertional    Pt occasionally feels dizzy and lightheaded.    Pt was scheduled for lymphedema procedure but had to cancel due to illness of son.    Pt was diagnosed with non-Hodgekin's lymphoma about 2 years ago.    Pt was getting lymphedema treatments for her LE until a DVT was identified in RLE.   Pt took blood thinners for 2 years but pt stopped due to low iron.    Pt dx with MALT and received rituxin.    Pt required surgery for CA of lip.    Past Medical History:   Diagnosis Date    Abdominal pain 2018    Anemia     Anxiety     Back pain     Deep vein thrombosis     Disorder of kidney and ureter     Fatty liver 2018    Gastric lymphoma 2019    Gastric tumor     GERD (gastroesophageal reflux disease)     Hyperlipidemia     Hypertension     Splenomegaly 2018        Past Surgical History:   Procedure Laterality Date    bilateral iliac vein stenosis with stenting      CARDIAC CATHETERIZATION      CARDIAC CATHETERIZATION  2018    had chest pains . states vessels at the bottom of heart collapsed     SECTION      x3    CHOLECYSTECTOMY      COLONOSCOPY      ENDOSCOPIC ULTRASOUND OF UPPER " GASTROINTESTINAL TRACT Left 11/15/2019    Procedure: ULTRASOUND, UPPER GI TRACT, ENDOSCOPIC;  Surgeon: Mike Seay MD;  Location: [facility] ENDO (2ND FLR);  Service: Endoscopy;  Laterality: Left;  Ok to hold xarelto per protocol per Dr. Lloyd see media file 10/25/19-tb    ESOPHAGOGASTRODUODENOSCOPY      ESOPHAGOGASTRODUODENOSCOPY N/A 7/2/2018    Procedure: EGD (ESOPHAGOGASTRODUODENOSCOPY);  Surgeon: Ant Camejo MD;  Location: [facility] ENDO;  Service: Endoscopy;  Laterality: N/A;    ESOPHAGOGASTRODUODENOSCOPY N/A 10/15/2019    Procedure: EGD (ESOPHAGOGASTRODUODENOSCOPY);  Surgeon: Melanie Cedeno MD;  Location: [facility];  Service: Endoscopy;  Laterality: N/A;    ESOPHAGOGASTRODUODENOSCOPY N/A 11/15/2019    Procedure: EGD (ESOPHAGOGASTRODUODENOSCOPY);  Surgeon: Mike Seay MD;  [facility]  Service: Endoscopy;  Laterality: N/A;    EXCISION OF LESION OF LIP Left 1/12/2022    Procedure: EXCISION, LESION, LIP;  Surgeon: Glen Giang MD;  [facility];  Service: ENT;  Laterality: Left;  Lip resection    HYSTERECTOMY      PHLEBOGRAPHY Bilateral 10/16/2018    Procedure: VENOGRAM;  Surgeon: Colin Mathis MD;  Location: [facility] CATH LAB;  Service: Cardiology;  Laterality: Bilateral;    ND RT/LT HEART CATHETERS Left     Coronary Arteriogram-2 Cath    RHINOPLASTY      SENTINEL LYMPH NODE BIOPSY  1/12/2022    Procedure: BIOPSY, LYMPH NODE, SENTINEL;  Surgeon: Glen Giang MD;  [facility];  Service: ENT;;    TUBAL LIGATION      venogram Bilateral 10/16/2018       Family History   Problem Relation Age of Onset    Breast cancer Maternal Grandfather     Dementia Mother     Atrial fibrillation Mother     Deep vein thrombosis Mother     Pulmonary embolism Mother     Stroke Mother     Aneurysm Father        Social History     Socioeconomic History    Marital status:     Number of children: 3   Occupational History     Comment: cleans    Tobacco Use    Smoking status: Current Every Day Smoker     Packs/day: 0.50     Years: 40.00     Pack years: 20.00     Types: Cigarettes    Smokeless tobacco: Never Used    Tobacco comment: trying to quit; in a program; at 1/2 pack per day   Substance and Sexual Activity    Alcohol use: No    Drug use: No     Comment: former opioid addiction  quit 8 yrs ago- pain management   Social History Narrative    Stairs- 5 to enter house       Current Outpatient Medications on File Prior to Visit   Medication Sig Dispense Refill    allopurinoL (ZYLOPRIM) 300 MG tablet Take 1 tablet (300 mg total) by mouth once daily. 30 tablet 5    aspirin (ECOTRIN) 81 MG EC tablet Take 81 mg by mouth once daily.      citalopram (CELEXA) 40 MG tablet citalopram 40 mg tablet      furosemide (LASIX) 40 MG tablet Take 1 tablet (40 mg total) by mouth once daily. 90 tablet 0    gemfibroziL (LOPID) 600 MG tablet Take 600 mg by mouth 2 (two) times daily.      hydroxyzine HCL (ATARAX) 25 MG tablet       lisinopriL 10 MG tablet Take 10 mg by mouth once daily.      methadone (METHADOSE) 40 mg disintegrating tablet Take 40 mg by mouth once daily.       naloxone (NARCAN) 4 mg/actuation Spry 4mg by nasal route as needed for opioid overdose; may repeat every 2-3 minutes in alternating nostrils until medical help arrives. Call 911 1 each 11    nitroGLYCERIN (NITROSTAT) 0.4 MG SL tablet Place 0.4 mg under the tongue every 5 (five) minutes as needed for Chest pain.      ondansetron (ZOFRAN-ODT) 8 MG TbDL Dissolve 1 tablet (8 mg total) by mouth every 8 (eight) hours as needed (nausea). 15 tablet 0    simvastatin (ZOCOR) 40 MG tablet Take 40 mg by mouth every evening.       triamcinolone acetonide 0.1% (KENALOG) 0.1 % ointment Apply topically 2 (two) times daily. To be applied to itch rash on abdomen once daily 80 g 0    nicotine (NICODERM CQ) 21 mg/24 hr 1 patch once daily.      [DISCONTINUED] amLODIPine (NORVASC) 5 MG tablet Take 5 mg by  mouth once daily.      [DISCONTINUED] hydrocodone-acetaminophen (HYCET) solution 7.5-325 mg/15mL Take 15 mLs by mouth 4 (four) times daily as needed for Pain. (Patient not taking: Reported on 3/21/2022) 473 mL 0    [DISCONTINUED] isosorbide mononitrate (IMDUR) 60 MG 24 hr tablet Take 60 mg by mouth once daily.      [DISCONTINUED] lisinopriL (PRINIVIL,ZESTRIL) 20 MG tablet 1 tablet      [DISCONTINUED] ondansetron (ZOFRAN) 8 MG tablet Take 1 tablet (8 mg total) by mouth every 8 (eight) hours as needed for Nausea. (Patient not taking: Reported on 3/21/2022) 12 tablet 0     No current facility-administered medications on file prior to visit.       Review of patient's allergies indicates:   Allergen Reactions    Azithromycin Anaphylaxis     Other reaction(s): swelling to entire body  Swelling (tongue / lips)^      Ciprofloxacin Swelling     Throat swells    Codeine      Swelling (throat)^    Tolerates Morphine      Codeine sulfate      Swelling (throat)^    Tolerates Morphine     Objective:     Vitals:    03/21/22 1322   BP: 135/72   BP Location: Left arm   Patient Position: Sitting   BP Method: Large (Automatic)   Pulse: 92   SpO2: 95%   Weight: 100.8 kg (222 lb 1.8 oz)   Height: 5' (1.524 m)        Physical Exam  Vitals reviewed.   Constitutional:       Appearance: She is well-developed. She is obese.   Eyes:      General: No scleral icterus.  Neck:      Vascular: No carotid bruit or JVD.   Cardiovascular:      Rate and Rhythm: Normal rate and regular rhythm.      Pulses:           Dorsalis pedis pulses are 2+ on the right side and 2+ on the left side.      Heart sounds: Murmur (III/VI systolic ) heard.     No gallop.   Pulmonary:      Breath sounds: Normal breath sounds.   Abdominal:       Musculoskeletal:      Right lower leg: Edema (pt has mild bilateral edema with extensive stasis dermatitis bilaterally) present.      Left lower leg: Edema present.   Skin:     General: Skin is warm and dry.   Neurological:       Mental Status: She is alert and oriented to person, place, and time.   Psychiatric:         Behavior: Behavior normal.         Thought Content: Thought content normal.         Judgment: Judgment normal.              MyChart Results Release    MyChart Status: Active  Results Release       PACS Images for [facility] Viewer     Show images for NM PET CT Routine FDG    All Reviewers List    Ale Velez MD on 5/6/2020 09:16     NM PET CT Routine FDG  Order: [id number]  · Status: Final result     · Visible to patient: Yes (not seen)      · Next appt: 04/26/2022 at 10:15 AM in Cardiology ([facility])      · Dx: Marginal zone lymphoma of extranodal ...     · 0 Result Notes      Details    Reading Physician Reading Date Result Priority   George Michelle MD  [phone]  [phone] 4/30/2020 Routine   Hemanth Rosen MD  [phone] 4/30/2020      Narrative & Impression  EXAMINATION:  NM PET CT ROUTINE     CLINICAL HISTORY:  lymphoma; Other specified types of non-hodgkin lymphoma, extranodal and solid organ sites     TECHNIQUE:  13.78 mCi of F18-FDG was administered intravenously in the right hand.  After an approximately 60 min distribution time, PET/CT images were acquired from the skull base to the mid thigh. Transmission images were acquired to correct for attenuation using a whole body low-dose CT scan without contrast with the arms positioned above the head. Glycemia at the time of injection was 78 mg/dL.     COMPARISON:  FDG PET-CT 12/10/2019 MRI abdomen without contrast 09/24/2019     FINDINGS:  Quality of the study: Adequate.     In the head and neck, there are no hypermetabolic lesions worrisome for malignancy. There are no hypermetabolic mucosal lesions, and there are no pathologically enlarged or hypermetabolic lymph nodes.     In the chest, there is significant decrease in size and radiotracer avidity of the patient's right lower lobe lung lesion representing biopsy-proven low-grade B-cell  lymphoma.  On today's exam this measures 2.4 x 1.5 cm with SUV max of 1.7, previously measuring 3.3 x 2.6 cm with SUV max of 8.1. there are no new concerning pulmonary nodules or masses, and there are no pathologically enlarged or hypermetabolic lymph nodes.     In the abdomen and pelvis, there is marked decrease in size and hypermetabolism of the gastric wall consistent with treatment response.  There is some residual hypermetabolism in wall thickening most focal in the gastric fundus such as seen on image 84.  SUV max for the stomach on today's exam measures 5.9, previously 14.     Decreased SUV max with stable size of the patient's right lower pole renal mass measuring 5.0 x 3.7 cm on today's exam, previously 4.5 cm when directly remeasured.     The spleen has normal uptake and is normal at 11 cm in craniocaudal dimenson.     In the bones, there are no hypermetabolic lesions worrisome for malignancy.  Generalized hypermetabolism seen throughout the osseous structures is decreased.     Impression:     1.  Findings consistent with partial treatment response with decreased size of the stomach wall and decreased SUV throughout the stomach.  Metabolic activity on the baseline scan was atypically prominent for marginal zone lymphoma.  If a Deauville score were to be assigned, the score would be 4.     2.  Decreased size and SUV of the patient's right lower lobe pulmonary biopsy-proven lymphoma.     3.  Questionable slight increase in size of the patient's right lower pole lesion again concerning for RCC or oncocytoma.  Extranodal lymphoma could have a similar appearance.     I, George Michelle MD, attest that I reviewed and interpreted the images.     Electronically signed by resident: Hemanth Rosen  Date:                                            04/29/2020  Time:                                           15:22     Electronically signed by: George Michelle  Date:                                            04/30/2020  Time:                                            09:27               Exam Ended: 04/29/20 15:17 Last Resulted: 04/30/20 09:27              Office Visit    2/1/2022  [facility] - Bone Marrow Transplant  · Shukri Shea MD    Hematology and Oncology · Marginal zone lymphoma of extranodal and solid organ sites +1 more    Dx · Please schedule follow up in one month  Oral Pain   Knee Pain   Nasal Congestion     Reason for Visit       Progress Notes  Shukri Shea MD (Resident)   Hematology and Oncology  Cosigned by: Woody Villanueva MD at 2/5/2022  5:23 PM       Attestation signed by Woody Villanueva MD at 2/5/2022  5:23 PM     I have personally seen, interviewed, and examined the patient at the bedside. I have reviewed and agree with the assessment/plan. 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 Referral to cardiology for LE edema. Lasix 40mg daily. Will schedule echo for further evaluation.             Gaudencio Villanueva MD   Hematology, Oncology, and Stem Cell Transplantation   [facility]             Expand AllCollapse All    PATIENT: Gabi Newton  MRN: [id number]  DATE: 2/1/2022        Diagnosis:   1. 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 Leg edema          Chief Complaint: Please schedule follow up in one month, Oral Pain (Bottom lip pain, Post procedure), Knee Pain (RT), and Nasal Congestion (X 3 days, clear mucus)     Ms. Newton is a 54 year old female with Stage IV Marginal Zone Lymphoma (Gastric, Pulmonary, and Marrow) s/p 4 cycles of single agent Rituximab induction therapy completed on 4/14/20 followed by PET CT on 4/29/20 consistent with partial treatment response. She is here for a follow up visit. She has been on maintenance therapy since July 7, 2020     Oncologic History  Ms. Newton is a 54-year-old female with a past medical history of abdominal pain for the last 3-4  years with unknown etiology, significant smoking history for 40 years, back pain, hypertension presented to the Hematology Clinic for newly diagnosed marginal zone B-cell lymphoma     Patient complained of chronic epigastric/periumbilical abdominal pain for the last 3 years and has been receiving multiple abdominal imaging at [facility].  In 2016 her abdominal pain was thought to be from her gallbladder and she had underwent a cholecystectomy however she continued to have abdominal pain. A CT abdomen done in June 11, 2018 revealed wall thickening of the anterior gastric body suspicious for infectious or inflammatory but is concerning for soft tissue mass and a EGD was a recommended. MRI on June 19, 2018 which revealed diffuse hepatic steatosis without evidence of suspicious focal lesions, splenomegaly but no ascites.  She has been following  Dr. Cedeno since 2018 for her abdominal pain, however liver was not suspected to be the cause for her abdominal pain. She had negative workup for hepatitis-B, C, negative workup for autoimmune disease, negative for alpha 1 antitrypsin disease, negative for celiac sprue and her hepatitis panel has been negative so far.  MRI of the abdomen was done on September 24, 2019 which revealed multifocal areas of asymmetric gastric wall thickening.  Recommend further evaluation with direct visualization to exclude neoplasm.  EGD was done on 10/15/2019 and Gastric tumor in the gastric fundus was found however no specimen was collected as she was on anticoagulation.  The EUS done on 11/15/2019 revealed gastric tumor in the gastric fundus with many abnormal lymph nodes visualized in the lower paraesophageal mediastinum (level 8L) and gastrohepatic ligament (level 18). Fine needle biopsy performed and pathology revealed marginal zone B cell lymphoma.     She had chest pain in 2016 and the imaging done at that time revealed pulmonary nodule and has been following  pulmonary, Dr. Yeung at [facility].  She has a currently daily smoker, has been smoking for almost 40 years.  On the CT chest done in 2017 revealed 1.7 x 1.3 cm lesion in the right lower lobe and 2 x 1 cm lesion in the anterior portion of the right lower lobe.  A CT scan done on 2019 revealed a complex poorly defined mass in the right lower lobe measuring 2.78 x 2.64 cm, suspicious for primary lung carcinoma     She was diagnosed with right lower extremity DVT and has been on Xarelto.  As per the patient she was hospitalized at that time and underwent procedure for her right lower extremity for venous insufficiency.  It appears that the right lower extremity DVT is a provoked clot.  She had history of bilateral iliac vein stenting 10/16/18 for venous outflow obsturction.     Subjective:    Initial History: Ms. Netwon is a 56 y.o. female who returns for follow up. 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 Continues to have LE edema. Complains of exertional dyspnea and states that she can't lay flat. She has been taking lasix intermittently. Scheduled to see lymphedema clinic tomorrow.      Past Medical History:        Past Medical History:   Diagnosis Date    Abdominal pain 2018    Anemia      Anxiety      Back pain      Deep vein thrombosis      Disorder of kidney and ureter      Fatty liver 2018    Gastric lymphoma 2019    Gastric tumor      GERD (gastroesophageal reflux disease)      Hyperlipidemia      Hypertension      Splenomegaly 2018         Past Surgical HIstory:         Past Surgical History:   Procedure Laterality Date    CARDIAC CATHETERIZATION        CARDIAC CATHETERIZATION   2018     had chest pains . states vessels at the bottom of heart collapsed     SECTION         x3    CHOLECYSTECTOMY        COLONOSCOPY        ENDOSCOPIC ULTRASOUND OF UPPER GASTROINTESTINAL TRACT Left 11/15/2019     Procedure:  ULTRASOUND, UPPER GI TRACT, ENDOSCOPIC;  Surgeon: Mike Seay MD;  Location: [facility] ENDO (2ND FLR);  Service: Endoscopy;  Laterality: Left;  Ok to hold xarelto per protocol per Dr. Lloyd see media file 10/25/19-tb    ESOPHAGOGASTRODUODENOSCOPY        ESOPHAGOGASTRODUODENOSCOPY N/A 7/2/2018     Procedure: EGD (ESOPHAGOGASTRODUODENOSCOPY);  Surgeon: Ant Camejo MD;  Location: [facility] ENDO;  Service: Endoscopy;  Laterality: N/A;    ESOPHAGOGASTRODUODENOSCOPY N/A 10/15/2019     Procedure: EGD (ESOPHAGOGASTRODUODENOSCOPY);  Surgeon: Melanie Cedeno MD;  Location: [facility];  Service: Endoscopy;  Laterality: N/A;    ESOPHAGOGASTRODUODENOSCOPY N/A 11/15/2019     Procedure: EGD (ESOPHAGOGASTRODUODENOSCOPY);  Surgeon: Mike Seay MD;  [facility]  Service: Endoscopy;  Laterality: N/A;    EXCISION OF LESION OF LIP Left 1/12/2022     Procedure: EXCISION, LESION, LIP;  Surgeon: Glen Giang MD;  [facility];  Service: ENT;  Laterality: Left;  Lip resection    HYSTERECTOMY        PHLEBOGRAPHY Bilateral 10/16/2018     Procedure: VENOGRAM;  Surgeon: Colin Mathis MD;  Location: [facility] CATH LAB;  Service: Cardiology;  Laterality: Bilateral;    MS RT/LT HEART CATHETERS Left       Coronary Arteriogram-2 Cath    RHINOPLASTY        SENTINEL LYMPH NODE BIOPSY   1/12/2022     Procedure: BIOPSY, LYMPH NODE, SENTINEL;  Surgeon: Glen Giang MD;  [facility];  Service: ENT;;    TUBAL LIGATION        venogram Bilateral 10/16/2018         Family History:         Family History   Problem Relation Age of Onset    Breast cancer Maternal Grandfather      Dementia Mother      Atrial fibrillation Mother      Deep vein thrombosis Mother      Pulmonary embolism Mother      Stroke Mother      Aneurysm Father           Social History:  reports that she has been smoking cigarettes. She has a 20.00 pack-year smoking history. She has never used smokeless tobacco.  She reports that she does not drink alcohol and does not use drugs.     Allergies:        Review of patient's allergies indicates:   Allergen Reactions    Azithromycin Anaphylaxis       Other reaction(s): swelling to entire body  Swelling (tongue / lips)^       Ciprofloxacin Swelling       Throat swells    Codeine         Swelling (throat)^     Tolerates Morphine       Codeine sulfate         Swelling (throat)^     Tolerates Morphine         Medications:  Current Medications          Current Outpatient Medications   Medication Sig Dispense Refill    aspirin (ECOTRIN) 81 MG EC tablet Take 81 mg by mouth once daily.        citalopram (CELEXA) 40 MG tablet citalopram 40 mg tablet        gemfibroziL (LOPID) 600 MG tablet Take 600 mg by mouth 2 (two) times daily.        hydrocodone-acetaminophen (HYCET) solution 7.5-325 mg/15mL Take 15 mLs by mouth 4 (four) times daily as needed for Pain. 473 mL 0    hydroxyzine HCL (ATARAX) 25 MG tablet          methadone (METHADOSE) 40 mg disintegrating tablet Take 40 mg by mouth once daily.         ondansetron (ZOFRAN) 8 MG tablet Take 1 tablet (8 mg total) by mouth every 8 (eight) hours as needed for Nausea. 12 tablet 0    simvastatin (ZOCOR) 40 MG tablet Take 40 mg by mouth every evening.         allopurinoL (ZYLOPRIM) 300 MG tablet Take 1 tablet (300 mg total) by mouth once daily. (Patient not taking: Reported on 2/1/2022) 30 tablet 5    furosemide (LASIX) 40 MG tablet Take 1 tablet (40 mg total) by mouth once daily. (Patient not taking: Reported on 2/1/2022) 90 tablet 0    lisinopriL (PRINIVIL,ZESTRIL) 20 MG tablet 1 tablet        lisinopriL 10 MG tablet Take 10 mg by mouth once daily.        naloxone (NARCAN) 4 mg/actuation Spry 4mg by nasal route as needed for opioid overdose; may repeat every 2-3 minutes in alternating nostrils until medical help arrives. Call 911 (Patient not taking: Reported on 2/1/2022) 1 each 11    nitroGLYCERIN (NITROSTAT) 0.4 MG SL  tablet Place 0.4 mg under the tongue every 5 (five) minutes as needed for Chest pain.        ondansetron (ZOFRAN-ODT) 8 MG TbDL Dissolve 1 tablet (8 mg total) by mouth every 8 (eight) hours as needed (nausea). (Patient not taking: Reported on 2/1/2022) 15 tablet 0    triamcinolone acetonide 0.1% (KENALOG) 0.1 % ointment Apply topically 2 (two) times daily. To be applied to itch rash on abdomen once daily (Patient not taking: Reported on 2/1/2022) 80 g 0      No current facility-administered medications for this visit.            Review of Systems   Constitutional: Negative for appetite change, fatigue, fever and unexpected weight change.   HENT: Negative for nosebleeds and sore throat.    Respiratory: Negative for cough and shortness of breath.    Cardiovascular: Negative for chest pain and leg swelling.   Gastrointestinal: Negative for abdominal pain, blood in stool, diarrhea, nausea and vomiting.   Genitourinary: Negative for dysuria, flank pain, hematuria, urgency and vaginal bleeding.   Musculoskeletal: Positive for back pain.   Skin: Negative for rash.   Neurological: Negative for seizures and headaches.   Hematological: Negative for adenopathy.   Psychiatric/Behavioral: Negative for agitation.         ECOG Performance Status: 2   Objective:   Vitals:   Vitals       Vitals:     02/01/22 1429   BP: 124/68   BP Location: Left arm   Patient Position: Sitting   BP Method: Large (Automatic)   Pulse: 87   Resp: 12   Temp: 98.8 °F (37.1 °C)   TempSrc: Oral   SpO2: 95%   Weight: 103.2 kg (227 lb 8.2 oz)   Height: 5' (1.524 m)         BMI: Body mass index is 44.43 kg/m².     Physical Exam  Constitutional:       Appearance: Normal appearance. She is obese.   HENT:      Head: Normocephalic and atraumatic.      Nose: Nose normal. No rhinorrhea.      Mouth/Throat:      Mouth: Mucous membranes are moist.   Eyes:      Pupils: Pupils are equal, round, and reactive to light.   Cardiovascular:      Rate and Rhythm: Normal rate  and regular rhythm.      Heart sounds: No murmur heard.       Pulmonary:      Effort: Pulmonary effort is normal.      Breath sounds: Normal breath sounds.   Abdominal:      General: Abdomen is flat. There is no distension.      Palpations: Abdomen is soft.   Musculoskeletal:         General: No swelling or tenderness. Normal range of motion.      Cervical back: Normal range of motion and neck supple.      Right lower leg: Edema present.      Left lower leg: Edema present.   Skin:     General: Skin is warm.      Capillary Refill: Capillary refill takes less than 2 seconds.      Findings: Rash (b/l LE due to lymphedema ) present.   Neurological:      General: No focal deficit present.      Mental Status: She is alert and oriented to person, place, and time.   Psychiatric:         Mood and Affect: Mood normal.         Behavior: Behavior normal.         Laboratory Data:          No visits with results within 1 Week(s) from this visit.   Latest known visit with results is:   Lab Visit on 01/25/2022   Component Date Value Ref Range Status    Ferritin 01/25/2022 500* 20.0 - 300.0 ng/mL Final    Iron 01/25/2022 49  30 - 160 ug/dL Final    Transferrin 01/25/2022 239  200 - 375 mg/dL Final    TIBC 01/25/2022 354  250 - 450 ug/dL Final    Saturated Iron 01/25/2022 14* 20 - 50 % Final    WBC 01/25/2022 3.95  3.90 - 12.70 K/uL Final    RBC 01/25/2022 4.28  4.00 - 5.40 M/uL Final    Hemoglobin 01/25/2022 13.1  12.0 - 16.0 g/dL Final    Hematocrit 01/25/2022 42.1  37.0 - 48.5 % Final    MCV 01/25/2022 98  82 - 98 fL Final    MCH 01/25/2022 30.6  27.0 - 31.0 pg Final    MCHC 01/25/2022 31.1* 32.0 - 36.0 g/dL Final    RDW 01/25/2022 13.9  11.5 - 14.5 % Final    Platelets 01/25/2022 158  150 - 450 K/uL Final    MPV 01/25/2022 10.3  9.2 - 12.9 fL Final    Immature Granulocytes 01/25/2022 0.5  0.0 - 0.5 % Final    Gran # (ANC) 01/25/2022 3.2  1.8 - 7.7 K/uL Final    Immature Grans (Abs) 01/25/2022 0.02  0.00 - 0.04  K/uL Final     Comment: Mild elevation in immature granulocytes is non specific and   can be seen in a variety of conditions including stress response,   acute inflammation, trauma and pregnancy. Correlation with other   laboratory and clinical findings is essential.       Lymph # 01/25/2022 0.3* 1.0 - 4.8 K/uL Final    Mono # 01/25/2022 0.2* 0.3 - 1.0 K/uL Final    Eos # 01/25/2022 0.1  0.0 - 0.5 K/uL Final    Baso # 01/25/2022 0.02  0.00 - 0.20 K/uL Final    nRBC 01/25/2022 0  0 /100 WBC Final    Gran % 01/25/2022 81.3* 38.0 - 73.0 % Final    Lymph % 01/25/2022 8.6* 18.0 - 48.0 % Final    Mono % 01/25/2022 5.6  4.0 - 15.0 % Final    Eosinophil % 01/25/2022 3.5  0.0 - 8.0 % Final    Basophil % 01/25/2022 0.5  0.0 - 1.9 % Final    Differential Method 01/25/2022 Automated    Final    Sodium 01/25/2022 134* 136 - 145 mmol/L Final    Potassium 01/25/2022 4.3  3.5 - 5.1 mmol/L Final    Chloride 01/25/2022 96  95 - 110 mmol/L Final    CO2 01/25/2022 26  23 - 29 mmol/L Final    Glucose 01/25/2022 209* 70 - 110 mg/dL Final    BUN 01/25/2022 5* 6 - 20 mg/dL Final    Creatinine 01/25/2022 0.8  0.5 - 1.4 mg/dL Final    Calcium 01/25/2022 9.0  8.7 - 10.5 mg/dL Final    Total Protein 01/25/2022 7.1  6.0 - 8.4 g/dL Final    Albumin 01/25/2022 3.5  3.5 - 5.2 g/dL Final    Total Bilirubin 01/25/2022 0.4  0.1 - 1.0 mg/dL Final     Comment: For infants and newborns, interpretation of results should be based  on gestational age, weight and in agreement with clinical  observations.     Premature Infant recommended reference ranges:  Up to 24 hours.............<8.0 mg/dL  Up to 48 hours............<12.0 mg/dL  3-5 days..................<15.0 mg/dL  6-29 days.................<15.0 mg/dL       Alkaline Phosphatase 01/25/2022 149* 55 - 135 U/L Final    AST 01/25/2022 44* 10 - 40 U/L Final    ALT 01/25/2022 47* 10 - 44 U/L Final    Anion Gap 01/25/2022 12  8 - 16 mmol/L Final    eGFR if   01/25/2022 >60.0  >60 mL/min/1.73 m^2 Final    eGFR if non African American 01/25/2022 >60.0  >60 mL/min/1.73 m^2 Final     Comment: Calculation used to obtain the estimated glomerular filtration  rate (eGFR) is the CKD-EPI equation.        Hepatitis B Surface Ag 01/25/2022 Negative  Negative Final    Hep B Core Total Ab 01/25/2022 Negative    Final    LD 01/25/2022 304* 110 - 260 U/L Final     Results are increased in hemolyzed samples.            Imaging:        PET CT- 4/29/2020  1.  Findings consistent with partial treatment response with decreased size of the stomach wall and decreased SUV throughout the stomach.  Metabolic activity on the baseline scan was atypically prominent for marginal zone lymphoma.  If a Deauville score were to be assigned, the score would be 4.    2.  Decreased size and SUV of the patient's right lower lobe pulmonary biopsy-proven lymphoma.    3.  Questionable slight increase in size of the patient's right lower pole lesion again concerning for RCC or oncocytoma.  Extranodal lymphoma could have a similar appearance.     Assessment:       1. 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 Leg edema       1.  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  Recommend further evaluation with direct visualization to exclude neoplasm.    - October 15, 2019- EGD revealed Gastric tumor in the gastric fundus was found however no specimen was collected as she was on anticoagulation.    - November 15, 2019- EUS revealed gastric tumor in the gastric fundus with many abnormal lymph nodes visualized in the lower paraesophageal mediastinum (level 8L) and  gastrohepatic ligament (level 18). Fine needle biopsy performed and pathology revealed marginal zone B cell lymphoma.  PET CT-12/10/2019- revealed gastric marginal zone lymphoma, with pulmonary manifestations of marginal zone lymphoma in right lower lobe along with diffuse marrow uptake throughout the axial skeleton with more focal uptake identified at the right femoral greater trochanter and left ischial tuberosity  - She has a currently daily smoker, has been smoking for almost 40 years.    4/14/20- s/p 4 cycles of single agent Rituximab induction therapy completed    4/29/20- PET/CT consistent with partial treatment response   7/7/2020- S/p maintenance cycle 1 Rituximab 375 mg/m2. Will schedule every 12 weeks for 2 years   9/29/2020- cycle 2 of maintenance Rituximab  12/22/20- cycle 3 of maintenance Rituximab  3/15/21- cycle 4 of maintenance Rituximab  6/8/21- Cycle 5 of  maintenance Rituximab  9/21/21 - cycle 6 of maintenance Rituximab   11/21 - cycle 7 was held due to squamous cell carcinoma diagnosis. Plan to hold for two cycles. Resume in May 2022     2. Solid right renal mass: PET CT-Questionable slight increase in size of the patient's right lower pole lesion again concerning for RCC or oncocytoma. Following Urology. Stable.   3. Right lower extremity DVT, likely provoked: was on Xarelto for about 2 years. Stopped taking 8/21  4. Iron deficiency anemia - resolved   5. Chronic active smoker  6. squamous cell carcinoma of lower lip diagnosed 10/21 s/p Wedge resection of lower lip squamous cell carcinoma primary closure and Right submandibular sentinel lymph node biopsy 1/12/22. Margins neg, LN negative.   7. LE edema      Plan:      1. Holding rituxan for two cycles until a definitive diagnosis and treatment plan are made for her squamous cell carcinoma of lower lip   2. Continue following Urology, with Dr. Calero for right kidney lesion  3. On aspirin 81 mg daily  4. Continue to monitor labs [id number].  Discussed need for EGD/colonoscopy, will need to be rescheduled   5. Educated patient regarding smoking cessations, she's cutting down   6. Follows with ENT and dermatology   7. Lasix 40 mg daily with potassium 20 meq daily and ECHO ordered pending insurance auth , PT referral for lymphedema management. Will refer to cardiology as well.      Follow up with us in 3 months to resume rituxan      Plan of care discussed with Dr. Rich Shea MD  Hematology and Medical Oncology fellow                           Clinical History   evaluate for CAD       Examination   Cardiac computed tomography: Limited thorax, heart, coronary arteries   and bypass grafts (when present) with contrast material, including 3D   image post processing ( including evaluation of cardiac structures   and morphology, assessment of cardiac function and evaluation of non   cardiac structures).     Comparison   There is no prior similar study for comparison.       Technique    localization of the chest was performed for adequate cardiac   border definition. Gated non contrast axial scanning coronary artery   calcium scoring was performed. Sublingual nitroglycerin (0.4 mg) was   administered prior to the scanning. Gated contrast enhances 256 slice   CT angiography of the heart was performed at 0.625 mm collimation.   Postcontrast scan was then obtained following injection of 70 cc of   nonionic contrast material. The contrast was given at 5 mL/second   intravenously. The patient received 40mg oral metoprolol. The heart   rate was 65 beats per minute during scanning. Images were obtained   with retrospective gated during suspended respiration. Bolus tracking   with a region of interest placed in the ascending aorta was used to   trigger image acquisition when threshold exceeded 120 Hounsfield   units. Multiple phases of the cardiac cycle were reconstructed for   analysis. Interpretation included analysis of the axial  source   images, post processed 3D reconstructions, oblique maximum intensity   projections and multiplanar reformats. Post processing was performed   with volume rendering and segmentation is of the coronary arteries   utilizing [employer] software on [employer].   Thorax CT was reviewed with full FOV in the region of heart.     Quality of images: Good   Gating: Optimal     Findings     CORONARY ARTERIES:   Visualization of the segments of the coronary arteries is good. The   coronary system is right dominant.   RCA : Normal origin from the right coronary cusp. There is normal   epicardial course. The lumen is well opacified by contrast and the   vessel is of normal caliber. The 1st branch is the conus artery   demonstrating normal disease free course. The 2nd branch is sinonodal   artery demonstrating normal disease free course. 2 focal calcified   atherosclerotic plaques with mild-to-moderate  stenosis identified in   the mid RCA. RCA terminates into PLLV and PDA which are patent and   disease free.   LM:Normal origin from the left coronary cusp. Normal configuration of   the ostium and angle of takeoff. Normal epicardial course. Length is   21 mm and diameter is 4 mm. Divides into Left circumflex artery and   Left anterior descending artery. Lumen is well opacified by contrast.   No significant atherosclerotic plaques or stenosis identified.   LAD : Type C. Normal origin and epicardial course. The lumen is well   opacified by contrast and the vessel is of normal caliber. There is a   moderate stenosis due to eccentric calcified  atherosclerotic plaques   in the LAD. D1 and D2 branches identified and 3 septal perforators   noticed. There is moderate stenosis at the ostium of the 1st diagonal   and due to atherosclerotic plaque. A 15 millimeter superficial   bridging is noted in the mid LAD.   LCX : Normal origin and epicardial course. The lumen is well   opacified by contrast and the vessel is of normal  caliber. No   significant atherosclerotic plaques or stenosis identified. OM 1 and   OM 2 are disease free.     MEASUREMENTS:   GREAT VESSEL :   Truncation of the images resulted in incomplete visualization of the   aortic arch.   Ascending aortic: 31 x 29 mm   Descending aorta : 23 x 22 mm. There is no dissection or intramural   thrombus.     Main pulmonary artery:43 mm.   Left pulmonary artery:21 mm.   Right pulmonary artery:19 mm. The opacification of pulmonary arteries   is suboptimal for adequate assessment of pulmonary embolism. The   linear central poor filling of the distal branches in both lower   lobes is probably related to flow artifacts.   DAINEL: Bilateral single, patent, anatomical course.       LEFT VENTRICLE:   LVEF: 69 %, EDV: 153.3 ml, ESV: 46.9 ml, SV: 106.3 ml. No thrombus,   no masses     ASWT: 0.9cm   PLWT: 0.6cm   FARZANA: 6.7cm   ESD: 3.5cm   IVS:0.9cm     MORPHOLOGICAL ASSESSMENT:   The heart is 4 chamber with orthotopic  configuration. The   atrioventricular and ventriculoarterial relationship is anatomical.   The cardiac chambers are without a mass or thrombus. There is no   dilatation of the left atrium, right atrium and right ventricle.   Physiological apical thinning is identified. The intraventricular   septum demonstrates normal thickness and morphology without   paradoxical motion.     RIGHT VENTRICLE:   RV Major:8.4cm,RV Minor:3.4cm,     LEFT ATRIUM: AP:3.9cm.   Pulmonary vein: 4, no accessory. No thrombus.     RIGHT ATRIUM: No masses.     VALVES: Aortic Valve is trileaflet without calcification. The mitral   valve does not show calcification.     PERICARDIUM: No pericardial effusion.  No calcified plaques.       NONCORONARY AND NONCARDIAC FINDINGS:   Mediastinum: No mediastinal lymphadenopathy or masses.  There small   physiological mediastinal and perihilar lymph nodes noted.   Airways: Patent airways with normal branching pattern   Lungs: There is a 2 millimeter subpleural nodule in  the lateral   segment of the middle lobe, series 513 image 29 .   Pleura: No pleural effusion or calcified pleural plaques   Bone: Moderate thoracic spondylosis   Chest wall: Bilateral symmetrical distal soft tissues.   Upper abdomen: Calcified mass is present in the right lobe of the   liver near the dome of the diaphragm measuring 1.5 x 0.9 centimeters   of indeterminate etiology.     Impression   1. Focal mild to moderate stenosis in the proximal LAD due to   eccentric calcified plaque.   2. Superficial myocardial bridging measuring 15 millimeters in the   mid LAD.   3. Focal moderate stenosis in the mid RCA due to atherosclerotic   calcified plaque.   4. Right dominant system. LV ejection fraction is 69 percent.     Recommendations:   1. Follow up with cardiology clinic   2. CT scan of the chest to screen for potential other pulmonary   nodules  outside the field of view on the current study.   3. Ultrasound of the liver with attention to calcified mass in the   right upper lobe near the dome of the diaphragm.    Other Result Text    Gregory, External Ris In - 05/16/2016  1:29 PM CDT   Clinical History   evaluate for CAD       Examination   Cardiac computed tomography: Limited thorax, heart, coronary arteries   and bypass grafts (when present) with contrast material, including 3D   image post processing ( including evaluation of cardiac structures   and morphology, assessment of cardiac function and evaluation of non   cardiac structures).     Comparison   Cardiac catheterization  Order# [id number]  Reading physician: Colin Mathis MD Ordering physician: Colin Mathis MD Study date: 10/16/18       Patient Information    Name MRN Description   Gabi Newton [id number] 52 y.o. female       Physicians    Panel Physicians Referring Physician Case Authorizing Physician   Colin Mathis MD (Primary)         Indications    Chronic venous insufficiency [I87.2 (ICD-10-CM)]   Peripheral vascular disease, unspecified [I73.9  (ICD-10-CM)]   Essential hypertension, malignant [I10 (ICD-10-CM)]   Hyperlipidemia, unspecified hyperlipidemia type [E78.5 (ICD-10-CM)]       Summary       · Successful venogram and intravascular ultrasound of the inferior vena cava, right and left common iliac and external iliac veins and right and left common femoral veins  · Successful PTA and stenting of high-grade stenosis in the right external iliac vein to no remaining stenosis by intravascular ultrasound  · Successful PTA and stenting of the left common iliac vein in the remaining stenosis by intravascular ultrasound  · Successful PTA and stenting of the left external iliac vein to no remaining stenosis by intravascular ultrasound  · Successful renal PTAs.     Venogram and intravascular ultrasound of the inferior vena cava iliac and common femoral veins  The inferior vena cava appears normal by angiography and intravascular ultrasound  The right common iliac vein appeared without significant disease by angiography and intravascular ultrasound   The left common iliac vein as significant 80-90% stenosis by intravascular ultrasound  The right external iliac vein has 70% stenosis by intravascular ultrasound  The left external iliac vein has a 70% stenosis by intravascular ultrasound  The right and left common femoral veins appear without significant disease by angiography and intravascular ultrasound     Intervention  1.  Successful PTA and stenting of high-grade stenosis of the right external iliac artery using 18x90 mm Wallstent post dilated with a 16x60 mm balloon catheter to no remaining stenosis  2.  Successful PTA and stenting of high-grade stenosis of the left common iliac artery using 18x90 mm Wallstent post dilated with a 18x60 mm balloon catheter to no remaining stenosis  3.  Successful PTA and stenting of high-grade stenosis of the left external iliac artery using 16x90 mm Wallstent post dilated with a 14x60 mm balloon catheter to no remaining  stenosis          Procedures    VENOGRAM   ULTRASOUND, INTRAVASCULAR          ECG today: NSR, LBBB, unchanged dating back several years        Lab Visit on 01/25/2022   Component Date Value Ref Range Status    Ferritin 01/25/2022 500 (A) 20.0 - 300.0 ng/mL Final    Iron 01/25/2022 49  30 - 160 ug/dL Final    Transferrin 01/25/2022 239  200 - 375 mg/dL Final    TIBC 01/25/2022 354  250 - 450 ug/dL Final    Saturated Iron 01/25/2022 14 (A) 20 - 50 % Final    WBC 01/25/2022 3.95  3.90 - 12.70 K/uL Final    RBC 01/25/2022 4.28  4.00 - 5.40 M/uL Final    Hemoglobin 01/25/2022 13.1  12.0 - 16.0 g/dL Final    Hematocrit 01/25/2022 42.1  37.0 - 48.5 % Final    MCV 01/25/2022 98  82 - 98 fL Final    MCH 01/25/2022 30.6  27.0 - 31.0 pg Final    MCHC 01/25/2022 31.1 (A) 32.0 - 36.0 g/dL Final    RDW 01/25/2022 13.9  11.5 - 14.5 % Final    Platelets 01/25/2022 158  150 - 450 K/uL Final    MPV 01/25/2022 10.3  9.2 - 12.9 fL Final    Immature Granulocytes 01/25/2022 0.5  0.0 - 0.5 % Final    Gran # (ANC) 01/25/2022 3.2  1.8 - 7.7 K/uL Final    Immature Grans (Abs) 01/25/2022 0.02  0.00 - 0.04 K/uL Final    Lymph # 01/25/2022 0.3 (A) 1.0 - 4.8 K/uL Final    Mono # 01/25/2022 0.2 (A) 0.3 - 1.0 K/uL Final    Eos # 01/25/2022 0.1  0.0 - 0.5 K/uL Final    Baso # 01/25/2022 0.02  0.00 - 0.20 K/uL Final    nRBC 01/25/2022 0  0 /100 WBC Final    Gran % 01/25/2022 81.3 (A) 38.0 - 73.0 % Final    Lymph % 01/25/2022 8.6 (A) 18.0 - 48.0 % Final    Mono % 01/25/2022 5.6  4.0 - 15.0 % Final    Eosinophil % 01/25/2022 3.5  0.0 - 8.0 % Final    Basophil % 01/25/2022 0.5  0.0 - 1.9 % Final    Differential Method 01/25/2022 Automated   Final    Sodium 01/25/2022 134 (A) 136 - 145 mmol/L Final    Potassium 01/25/2022 4.3  3.5 - 5.1 mmol/L Final    Chloride 01/25/2022 96  95 - 110 mmol/L Final    CO2 01/25/2022 26  23 - 29 mmol/L Final    Glucose 01/25/2022 209 (A) 70 - 110 mg/dL Final    BUN 01/25/2022 5 (A) 6 -  20 mg/dL Final    Creatinine 01/25/2022 0.8  0.5 - 1.4 mg/dL Final    Calcium 01/25/2022 9.0  8.7 - 10.5 mg/dL Final    Total Protein 01/25/2022 7.1  6.0 - 8.4 g/dL Final    Albumin 01/25/2022 3.5  3.5 - 5.2 g/dL Final    Total Bilirubin 01/25/2022 0.4  0.1 - 1.0 mg/dL Final    Alkaline Phosphatase 01/25/2022 149 (A) 55 - 135 U/L Final    AST 01/25/2022 44 (A) 10 - 40 U/L Final    ALT 01/25/2022 47 (A) 10 - 44 U/L Final    Anion Gap 01/25/2022 12  8 - 16 mmol/L Final    eGFR if African American 01/25/2022 >60.0  >60 mL/min/1.73 m^2 Final    eGFR if non African American 01/25/2022 >60.0  >60 mL/min/1.73 m^2 Final    Hepatitis B Surface Ag 01/25/2022 Negative  Negative Final    Hep B Core Total Ab 01/25/2022 Negative   Final    LD 01/25/2022 304 (A) 110 - 260 U/L Final   Admission on 01/12/2022, Discharged on 01/12/2022   Component Date Value Ref Range Status    Final Pathologic Diagnosis 01/12/2022    Final                    Value:MARGINS FROM 1. LEFT MUCOSA AND 2. RIGHT MUCOSA:  NO NEOPLASIA IDENTIFIED  ADVANCED SOLAR DEGENERATION OF DERMAL COLLAGEN  3. EXCISION FROM LOWER LIP:  0.7 CM FOCUS OF RESIDUAL POORLY DIFFERENTIATED SQUAMOUS CARCINOMA WIDELY   FROM RESECTION MARGINS  ADVANCED SOLAR DEGENERATION OF DERMAL COLLAGEN  4. TISSUE FROM RIGHT SUBMANDIBULAR AREA:  POSSIBLY AS MANY AS 5 LYMPH NODE PORTIONS WITH NO METASTATIC CARCINOMA  IDENTIFIED      Frozen Section Diagnosis 01/12/2022    Final                    Value:Spec 1:  Negative for carcinoma  ------------------------------------------------  Spec 2:  Negative for carcinoma  Verbally reported to: Padmaja      Microscopic Exam 01/12/2022    Final                    Value:The squamous nature substantiated by positivity for keratin 5/6 and p63. The  tumor is cytokeratin AE1/AE3 positive.  The positive and negative controls  stained appropriately.  Specimen :  Lip excision specimen  Procedure :  Excision from lip  Tumor Focality :   "Unifocal  Tumor Site :  Lower lip at the mucosal interface with the skin  Tumor Laterality :  Just to left of midline  Tumor Size :  0.7 cm  Histologic Type :  Poorly differentiated squamous cell carcinoma with a  number of spindle cells  Histologic Grade :  Poorly differentiated  Tumor Depth of Invasion :  4 mm  Lymphovascular Invasion :  Not identified  Perineural Invasion :  Not identified  Margins :  No margin involvement identified; at least 7 mm to closest lateral  margin  Regional Lymph Node Status :  No lymph nodes examined  PATHOLOGIC STAGE CLASSIFICATION (pTNM, AJCC 8th Edition) : pT1 pNX pMX      Gross 01/12/2022    Final                    Value:Patient ID/Pathology ID [id number]  Received in 4 parts  Part 1  Pathology ID:  [id number]  Received fresh and subsequently fixed in formalin, labeled "left mucosal  margin-frozen", is a 1.3 x 0.5 x 0.3 cm rubbery, tan-pink hemorrhagic tissue  fragment.  Entirely submitted in cassette HGS--1-A-FS after a frozen  section is done.  Part 2  Pathology ID:  [id number]  Received fresh and subsequently fixed in formalin, labeled "right mucosal  margin-frozen", is a 0.7 x 0.2 x 0.1 cm tan-pink tissue fragment.  Entirely  submitted in cassette SGX--2-A-FS after a frozen section is done.  Part 3  Pathology ID:  [id number]  Received fresh and subsequently fixed in formalin, labeled "lower lip, single  stitch inferior, double stitch right-permanent", is a triangular shaped piece  of tissue consistent with lower lip.  The specimen is oriented by a single  stitch indicating inferior and a double stitch indicating right.  The  specimen measures 2.4 by 2.4 by 1.2 cm.  The right margin is inked b                          lue, the  left gold.  The lip has a slightly eccentric 0.8 x 0.6 cm crusted brown  lesion located at 5 mm from the left gold inked margin and 9 mm from the blue  inked margin.  The specimen is sectioned and entirely submitted in " "cassettes  TLD--3:  A:  Shaved right margin  B:  Shaved left margin  C-D:  Cross sections of remainder specimen.  Part 4  Pathology ID:  [id number]  Received fresh and subsequently fixed in formalin, labeled "right  submandibular sentinel node", is a 2.7 x 1.2 x 0.6 cm irregular, tan-yellow  hemorrhagic tissue fragment.  The specimen is bisected and entirely submitted  in cassette  FQP--4-A  Pj ESCOBAR ([facility]) cm      Frozen Section Footnote 01/12/2022    Final                    Value:Frozen section performed at [facility], [address] [place] [address], [phone]      Disclaimer 01/12/2022    Final                    Value:Unless the case is a 'gross only' or additional testing only, the final  diagnosis for each specimen is based on a microscopic examination of  appropriate tissue sections.     Hospital Outpatient Visit on 01/10/2022   Component Date Value Ref Range Status    SARS-CoV2 (COVID-19) Qualitative P* 01/10/2022 Not Detected  Not Detected Final    SARS-COV-2- Cycle Number 01/10/2022 N/A   Final   Lab Visit on 01/07/2022   Component Date Value Ref Range Status    aPTT 01/07/2022 24.4  21.0 - 32.0 sec Final    Prothrombin Time 01/07/2022 10.3  9.0 - 12.5 sec Final    INR 01/07/2022 0.9  0.8 - 1.2 Final    WBC 01/07/2022 5.23  3.90 - 12.70 K/uL Final    RBC 01/07/2022 4.92  4.00 - 5.40 M/uL Final    Hemoglobin 01/07/2022 15.2  12.0 - 16.0 g/dL Final    Hematocrit 01/07/2022 48.5  37.0 - 48.5 % Final    MCV 01/07/2022 99 (A) 82 - 98 fL Final    MCH 01/07/2022 30.9  27.0 - 31.0 pg Final    MCHC 01/07/2022 31.3 (A) 32.0 - 36.0 g/dL Final    RDW 01/07/2022 13.8  11.5 - 14.5 % Final    Platelets 01/07/2022 177  150 - 450 K/uL Final    MPV 01/07/2022 10.5  9.2 - 12.9 fL Final   Lab Visit on 12/21/2021   Component Date Value Ref Range Status    Ferritin 12/21/2021 348 (A) 20.0 - 300.0 ng/mL Final    Iron 12/21/2021 72  30 - 160 ug/dL Final    Transferrin 12/21/2021 " 275  200 - 375 mg/dL Final    TIBC 12/21/2021 407  250 - 450 ug/dL Final    Saturated Iron 12/21/2021 18 (A) 20 - 50 % Final    Sodium 12/21/2021 139  136 - 145 mmol/L Final    Potassium 12/21/2021 4.9  3.5 - 5.1 mmol/L Final    Chloride 12/21/2021 98  95 - 110 mmol/L Final    CO2 12/21/2021 29  23 - 29 mmol/L Final    Glucose 12/21/2021 113 (A) 70 - 110 mg/dL Final    BUN 12/21/2021 6  6 - 20 mg/dL Final    Creatinine 12/21/2021 0.8  0.5 - 1.4 mg/dL Final    Calcium 12/21/2021 10.0  8.7 - 10.5 mg/dL Final    Total Protein 12/21/2021 7.2  6.0 - 8.4 g/dL Final    Albumin 12/21/2021 3.9  3.5 - 5.2 g/dL Final    Total Bilirubin 12/21/2021 0.7  0.1 - 1.0 mg/dL Final    Alkaline Phosphatase 12/21/2021 151 (A) 55 - 135 U/L Final    AST 12/21/2021 31  10 - 40 U/L Final    ALT 12/21/2021 40  10 - 44 U/L Final    Anion Gap 12/21/2021 12  8 - 16 mmol/L Final    eGFR if African American 12/21/2021 >60.0  >60 mL/min/1.73 m^2 Final    eGFR if non African American 12/21/2021 >60.0  >60 mL/min/1.73 m^2 Final    Hepatitis B Surface Ag 12/21/2021 Negative  Negative Final    Hep B Core Total Ab 12/21/2021 Negative   Final    LD 12/21/2021 296 (A) 110 - 260 U/L Final   Hospital Outpatient Visit on 11/29/2021   Component Date Value Ref Range Status    SARS-CoV2 (COVID-19) Qualitative P* 11/29/2021 Not Detected  Not Detected Final    SARS-COV-2- Cycle Number 11/29/2021 N/A   Final   Admission on 09/23/2021, Discharged on 09/23/2021   Component Date Value Ref Range Status    WBC 09/23/2021 6.70  3.90 - 12.70 K/uL Final    RBC 09/23/2021 4.73  4.00 - 5.40 M/uL Final    Hemoglobin 09/23/2021 14.0  12.0 - 16.0 g/dL Final    Hematocrit 09/23/2021 42.2  37.0 - 48.5 % Final    MCV 09/23/2021 89  82 - 98 fL Final    MCH 09/23/2021 29.5  27.0 - 31.0 pg Final    MCHC 09/23/2021 33.1  32.0 - 36.0 g/dL Final    RDW 09/23/2021 21.8 (A) 11.5 - 14.5 % Final    Platelets 09/23/2021 181  150 - 450 K/uL Final    MPV  09/23/2021 8.0 (A) 9.2 - 12.9 fL Final    Gran # (ANC) 09/23/2021 5.6  1.8 - 7.7 K/uL Final    Lymph # 09/23/2021 0.4 (A) 1.0 - 4.8 K/uL Final    Mono # 09/23/2021 0.4  0.3 - 1.0 K/uL Final    Eos # 09/23/2021 0.3  0.0 - 0.5 K/uL Final    Baso # 09/23/2021 0.10  0.00 - 0.20 K/uL Final    Gran % 09/23/2021 83.5 (A) 38.0 - 73.0 % Final    Lymph % 09/23/2021 5.9 (A) 18.0 - 48.0 % Final    Mono % 09/23/2021 5.9  4.0 - 15.0 % Final    Eosinophil % 09/23/2021 3.8  0.0 - 8.0 % Final    Basophil % 09/23/2021 0.9  0.0 - 1.9 % Final    Differential Method 09/23/2021 Automated   Final    Sodium 09/23/2021 137  136 - 145 mmol/L Final    Potassium 09/23/2021 3.9  3.5 - 5.1 mmol/L Final    Chloride 09/23/2021 101  95 - 110 mmol/L Final    CO2 09/23/2021 27  23 - 29 mmol/L Final    Glucose 09/23/2021 107  70 - 110 mg/dL Final    BUN 09/23/2021 12  6 - 20 mg/dL Final    Creatinine 09/23/2021 0.8  0.5 - 1.4 mg/dL Final    Calcium 09/23/2021 9.6  8.7 - 10.5 mg/dL Final    Total Protein 09/23/2021 7.4  6.0 - 8.4 g/dL Final    Albumin 09/23/2021 3.8  3.5 - 5.2 g/dL Final    Total Bilirubin 09/23/2021 0.3  0.1 - 1.0 mg/dL Final    Alkaline Phosphatase 09/23/2021 137 (A) 55 - 135 U/L Final    AST 09/23/2021 19  10 - 40 U/L Final    ALT 09/23/2021 30  10 - 44 U/L Final    Anion Gap 09/23/2021 9  8 - 16 mmol/L Final    eGFR if African American 09/23/2021 >60.0  >60 mL/min/1.73 m^2 Final    eGFR if non African American 09/23/2021 >60.0  >60 mL/min/1.73 m^2 Final    BNP 09/23/2021 <10  0 - 99 pg/mL Final    Troponin I 09/23/2021 <0.006  0.000 - 0.026 ng/mL Final    Prothrombin Time 09/23/2021 10.6  9.0 - 12.5 sec Final    INR 09/23/2021 1.0  0.8 - 1.2 Final    Group & Rh 09/23/2021 AB POS   Final    Indirect Sridhar 09/23/2021 NEG   Final    D-Dimer 09/23/2021 0.29  <0.50 mg/L FEU Final   (      Clinical History   evaluate for CAD       Examination   Cardiac computed tomography: Limited thorax, heart,  coronary arteries   and bypass grafts (when present) with contrast material, including 3D   image post processing ( including evaluation of cardiac structures   and morphology, assessment of cardiac function and evaluation of non   cardiac structures).     Comparison   There is no prior similar study for comparison.       Technique    localization of the chest was performed for adequate cardiac   border definition. Gated non contrast axial scanning coronary artery   calcium scoring was performed. Sublingual nitroglycerin (0.4 mg) was   administered prior to the scanning. Gated contrast enhances 256 slice   CT angiography of the heart was performed at 0.625 mm collimation.   Postcontrast scan was then obtained following injection of 70 cc of   nonionic contrast material. The contrast was given at 5 mL/second   intravenously. The patient received 40mg oral metoprolol. The heart   rate was 65 beats per minute during scanning. Images were obtained   with retrospective gated during suspended respiration. Bolus tracking   with a region of interest placed in the ascending aorta was used to   trigger image acquisition when threshold exceeded 120 Hounsfield   units. Multiple phases of the cardiac cycle were reconstructed for   analysis. Interpretation included analysis of the axial source   images, post processed 3D reconstructions, oblique maximum intensity   projections and multiplanar reformats. Post processing was performed   with volume rendering and segmentation is of the coronary arteries   utilizing Click[employer] software on [employer].   Thorax CT was reviewed with full FOV in the region of heart.     Quality of images: Good   Gating: Optimal     Findings     CORONARY ARTERIES:   Visualization of the segments of the coronary arteries is good. The   coronary system is right dominant.   RCA : Normal origin from the right coronary cusp. There is normal   epicardial course. The lumen is well opacified by  contrast and the   vessel is of normal caliber. The 1st branch is the conus artery   demonstrating normal disease free course. The 2nd branch is sinonodal   artery demonstrating normal disease free course. 2 focal calcified   atherosclerotic plaques with mild-to-moderate  stenosis identified in   the mid RCA. RCA terminates into PLLV and PDA which are patent and   disease free.   LM:Normal origin from the left coronary cusp. Normal configuration of   the ostium and angle of takeoff. Normal epicardial course. Length is   21 mm and diameter is 4 mm. Divides into Left circumflex artery and   Left anterior descending artery. Lumen is well opacified by contrast.   No significant atherosclerotic plaques or stenosis identified.   LAD : Type C. Normal origin and epicardial course. The lumen is well   opacified by contrast and the vessel is of normal caliber. There is a   moderate stenosis due to eccentric calcified  atherosclerotic plaques   in the LAD. D1 and D2 branches identified and 3 septal perforators   noticed. There is moderate stenosis at the ostium of the 1st diagonal   and due to atherosclerotic plaque. A 15 millimeter superficial   bridging is noted in the mid LAD.   LCX : Normal origin and epicardial course. The lumen is well   opacified by contrast and the vessel is of normal caliber. No   significant atherosclerotic plaques or stenosis identified. OM 1 and   OM 2 are disease free.     MEASUREMENTS:   GREAT VESSEL :   Truncation of the images resulted in incomplete visualization of the   aortic arch.   Ascending aortic: 31 x 29 mm   Descending aorta : 23 x 22 mm. There is no dissection or intramural   thrombus.     Main pulmonary artery:43 mm.   Left pulmonary artery:21 mm.   Right pulmonary artery:19 mm. The opacification of pulmonary arteries   is suboptimal for adequate assessment of pulmonary embolism. The   linear central poor filling of the distal branches in both lower   lobes is probably related to  flow artifacts.   DANIEL: Bilateral single, patent, anatomical course.       LEFT VENTRICLE:   LVEF: 69 %, EDV: 153.3 ml, ESV: 46.9 ml, SV: 106.3 ml. No thrombus,   no masses     ASWT: 0.9cm   PLWT: 0.6cm   FARZANA: 6.7cm   ESD: 3.5cm   IVS:0.9cm     MORPHOLOGICAL ASSESSMENT:   The heart is 4 chamber with orthotopic  configuration. The   atrioventricular and ventriculoarterial relationship is anatomical.   The cardiac chambers are without a mass or thrombus. There is no   dilatation of the left atrium, right atrium and right ventricle.   Physiological apical thinning is identified. The intraventricular   septum demonstrates normal thickness and morphology without   paradoxical motion.     RIGHT VENTRICLE:   RV Major:8.4cm,RV Minor:3.4cm,     LEFT ATRIUM: AP:3.9cm.   Pulmonary vein: 4, no accessory. No thrombus.     RIGHT ATRIUM: No masses.     VALVES: Aortic Valve is trileaflet without calcification. The mitral   valve does not show calcification.     PERICARDIUM: No pericardial effusion.  No calcified plaques.       NONCORONARY AND NONCARDIAC FINDINGS:   Mediastinum: No mediastinal lymphadenopathy or masses.  There small   physiological mediastinal and perihilar lymph nodes noted.   Airways: Patent airways with normal branching pattern   Lungs: There is a 2 millimeter subpleural nodule in the lateral   segment of the middle lobe, series 513 image 29 .   Pleura: No pleural effusion or calcified pleural plaques   Bone: Moderate thoracic spondylosis   Chest wall: Bilateral symmetrical distal soft tissues.   Upper abdomen: Calcified mass is present in the right lobe of the   liver near the dome of the diaphragm measuring 1.5 x 0.9 centimeters   of indeterminate etiology.     Impression   1. Focal mild to moderate stenosis in the proximal LAD due to   eccentric calcified plaque.   2. Superficial myocardial bridging measuring 15 millimeters in the   mid LAD.   3. Focal moderate stenosis in the mid RCA due to atherosclerotic    calcified plaque.   4. Right dominant system. LV ejection fraction is 69 percent.     Recommendations:   1. Follow up with cardiology clinic   2. CT scan of the chest to screen for potential other pulmonary   nodules  outside the field of view on the current study.   3. Ultrasound of the liver with attention to calcified mass in the   right upper lobe near the dome of the diaphragm.    Other Result Text    Gregory, External Ris In - 05/16/2016  1:29 PM CDT   Clinical History   evaluate for CAD       Examination   Cardiac computed tomography: Limited thorax, heart, coronary arteries   and bypass grafts (when present) with contrast material, including 3D   image post processing ( including evaluation of cardiac structures   and morphology, assessment of cardiac function and evaluation of non   cardiac structures).     Comparison   Cardiac catheterization  Order# [id number]  Reading physician: Colin Mathis MD Ordering physician: Colin Mathis MD Study date: 10/16/18       Patient Information    Name MRN Description   Gabi Newton [id number] 52 y.o. female       Physicians    Panel Physicians Referring Physician Case Authorizing Physician   Colin Mathis MD (Primary)         Indications    Chronic venous insufficiency [I87.2 (ICD-10-CM)]   Peripheral vascular disease, unspecified [I73.9 (ICD-10-CM)]   Essential hypertension, malignant [I10 (ICD-10-CM)]   Hyperlipidemia, unspecified hyperlipidemia type [E78.5 (ICD-10-CM)]       Summary       · Successful venogram and intravascular ultrasound of the inferior vena cava, right and left common iliac and external iliac veins and right and left common femoral veins  · Successful PTA and stenting of high-grade stenosis in the right external iliac vein to no remaining stenosis by intravascular ultrasound  · Successful PTA and stenting of the left common iliac vein in the remaining stenosis by intravascular ultrasound  · Successful PTA and stenting of the left external iliac vein  to no remaining stenosis by intravascular ultrasound  · Successful renal PTAs.     Venogram and intravascular ultrasound of the inferior vena cava iliac and common femoral veins  The inferior vena cava appears normal by angiography and intravascular ultrasound  The right common iliac vein appeared without significant disease by angiography and intravascular ultrasound   The left common iliac vein as significant 80-90% stenosis by intravascular ultrasound  The right external iliac vein has 70% stenosis by intravascular ultrasound  The left external iliac vein has a 70% stenosis by intravascular ultrasound  The right and left common femoral veins appear without significant disease by angiography and intravascular ultrasound     Intervention  1.  Successful PTA and stenting of high-grade stenosis of the right external iliac artery using 18x90 mm Wallstent post dilated with a 16x60 mm balloon catheter to no remaining stenosis  2.  Successful PTA and stenting of high-grade stenosis of the left common iliac artery using 18x90 mm Wallstent post dilated with a 18x60 mm balloon catheter to no remaining stenosis  3.  Successful PTA and stenting of high-grade stenosis of the left external iliac artery using 16x90 mm Wallstent post dilated with a 14x60 mm balloon catheter to no remaining stenosis          Procedures    VENOGRAM   ULTRASOUND, INTRAVASCULAR             Details    Reading Physician Reading Date Result Priority   Sina Candelario II, MD  [phone] 2/18/2019      Narrative & Impression  EXAMINATION:  US LOWER EXTREMITY VEINS RIGHT     CLINICAL HISTORY:  Edema, unspecified     TECHNIQUE:  Duplex and color flow Doppler evaluation and graded compression of the right lower extremity veins was performed.     COMPARISON:  None     FINDINGS:  Right thigh veins: The common femoral, femoral, popliteal, upper greater saphenous, and deep femoral veins are patent and free of thrombus. The veins are normally compressible and  have normal phasic flow and augmentation response.     Right calf veins: The visualized calf veins are patent.     Miscellaneous: No venous reflux.  Right inguinal lymph nodes are not pathologically enlarged.     There are multiple small round to oval mildly hyperechoic subcutaneous lesions in the upper lateral thigh without Doppler vascularity.     A stent in the left common femoral vein was visualized as that was indicated in history.     IMPRESSION:      1. No deep venous thrombosis or venous reflux of the right lower extremity identified.  2. Multiple small subcutaneous nodular areas right upper thigh laterally appear most likely to be lipomas.  Largest is 2.3 x 1.5 x 3.1 cm.  3. Left common femoral vein stent noted with good flow in the vein.        Electronically signed by: Sina Candelario II, MD  Date:                                            02/18/2019  Time:                                           14:14             Exam Ended: 02/18/19 14:05 Last Resulted: 02/18/19 14:14                      MyChart Results Release    MyChart Status: Active  Results Release       PACS Images for [employer] Viewer     Show images for NM PET CT Routine FDG    All Reviewers List    Ale Velez MD on 5/6/2020 09:16     NM PET CT Routine FDG  Order: [id number]  · Status: Final result     · Visible to patient: Yes (not seen)      · Next appt: 04/26/2022 at 10:15 AM in Cardiology ([facility])      · Dx: Marginal zone lymphoma of extranodal ...     · 0 Result Notes      Details    Reading Physician Reading Date Result Priority   George Michelle MD  [phone]  [phone] 4/30/2020 Routine   Hemanth Rosen MD  [phone] 4/30/2020      Narrative & Impression  EXAMINATION:  NM PET CT ROUTINE     CLINICAL HISTORY:  lymphoma; Other specified types of non-hodgkin lymphoma, extranodal and solid organ sites     TECHNIQUE:  13.78 mCi of F18-FDG was administered intravenously in the right hand.  After an approximately  60 min distribution time, PET/CT images were acquired from the skull base to the mid thigh. Transmission images were acquired to correct for attenuation using a whole body low-dose CT scan without contrast with the arms positioned above the head. Glycemia at the time of injection was 78 mg/dL.     COMPARISON:  FDG PET-CT 12/10/2019 MRI abdomen without contrast 09/24/2019     FINDINGS:  Quality of the study: Adequate.     In the head and neck, there are no hypermetabolic lesions worrisome for malignancy. There are no hypermetabolic mucosal lesions, and there are no pathologically enlarged or hypermetabolic lymph nodes.     In the chest, there is significant decrease in size and radiotracer avidity of the patient's right lower lobe lung lesion representing biopsy-proven low-grade B-cell lymphoma.  On today's exam this measures 2.4 x 1.5 cm with SUV max of 1.7, previously measuring 3.3 x 2.6 cm with SUV max of 8.1. there are no new concerning pulmonary nodules or masses, and there are no pathologically enlarged or hypermetabolic lymph nodes.     In the abdomen and pelvis, there is marked decrease in size and hypermetabolism of the gastric wall consistent with treatment response.  There is some residual hypermetabolism in wall thickening most focal in the gastric fundus such as seen on image 84.  SUV max for the stomach on today's exam measures 5.9, previously 14.     Decreased SUV max with stable size of the patient's right lower pole renal mass measuring 5.0 x 3.7 cm on today's exam, previously 4.5 cm when directly remeasured.     The spleen has normal uptake and is normal at 11 cm in craniocaudal dimenson.     In the bones, there are no hypermetabolic lesions worrisome for malignancy.  Generalized hypermetabolism seen throughout the osseous structures is decreased.     Impression:     1.  Findings consistent with partial treatment response with decreased size of the stomach wall and decreased SUV throughout the  stomach.  Metabolic activity on the baseline scan was atypically prominent for marginal zone lymphoma.  If a Deauville score were to be assigned, the score would be 4.     2.  Decreased size and SUV of the patient's right lower lobe pulmonary biopsy-proven lymphoma.     3.  Questionable slight increase in size of the patient's right lower pole lesion again concerning for RCC or oncocytoma.  Extranodal lymphoma could have a similar appearance.     I, George Michelle MD, attest that I reviewed and interpreted the images.     Electronically signed by resident: Hemanth Rosen  Date:                                            04/29/2020  Time:                                           15:22     Electronically signed by: George Michelle  Date:                                            04/30/2020  Time:                                           09:27               Exam Ended: 04/29/20 15:17 Last Resulted: 04/30/20 09:27              Office Visit    2/1/2022  [facility] - Bone Marrow Transplant  · Shukri Shea MD    Hematology and Oncology · Marginal zone lymphoma of extranodal and solid organ sites +1 more    Dx · Please schedule follow up in one month  Oral Pain   Knee Pain   Nasal Congestion     Reason for Visit       Progress Notes  Shukri Shea MD (Resident)   Hematology and Oncology  Cosigned by: Woody Villanueva MD at 2/5/2022  5:23 PM       Attestation signed by Woody Villanueva MD at 2/5/2022  5:23 PM     I have personally seen, interviewed, and examined the patient at the bedside. I have reviewed and agree with the assessment/plan. 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 Referral to cardiology for LE edema. Lasix 40mg daily. Will schedule echo for further evaluation.             Gaudencio Villanueva MD   Hematology, Oncology, and Stem Cell Transplantation   [facility]             Expand AllCollapse  All    PATIENT: Gabi Newton  MRN: [id number]  DATE: 2/1/2022        Diagnosis:   1. 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 Leg edema          Chief Complaint: Please schedule follow up in one month, Oral Pain (Bottom lip pain, Post procedure), Knee Pain (RT), and Nasal Congestion (X 3 days, clear mucus)     Ms. Newton is a 54 year old female with Stage IV Marginal Zone Lymphoma (Gastric, Pulmonary, and Marrow) s/p 4 cycles of single agent Rituximab induction therapy completed on 4/14/20 followed by PET CT on 4/29/20 consistent with partial treatment response. She is here for a follow up visit. She has been on maintenance therapy since July 7, 2020     Oncologic History  Ms. Newton is a 54-year-old female with a past medical history of abdominal pain for the last 3-4 years with unknown etiology, significant smoking history for 40 years, back pain, hypertension presented to the Hematology Clinic for newly diagnosed marginal zone B-cell lymphoma     Patient complained of chronic epigastric/periumbilical abdominal pain for the last 3 years and has been receiving multiple abdominal imaging at [facility].  In 2016 her abdominal pain was thought to be from her gallbladder and she had underwent a cholecystectomy however she continued to have abdominal pain. A CT abdomen done in June 11, 2018 revealed wall thickening of the anterior gastric body suspicious for infectious or inflammatory but is concerning for soft tissue mass and a EGD was a recommended. MRI on June 19, 2018 which revealed diffuse hepatic steatosis without evidence of suspicious focal lesions, splenomegaly but no ascites.  She has been following  Dr. Cedeno since 2018 for her abdominal pain, however liver was not suspected to be the cause for her abdominal pain. She had negative workup for hepatitis-B, C, negative workup for autoimmune disease, negative for alpha 1 antitrypsin disease, negative for celiac sprue  and her hepatitis panel has been negative so far.  MRI of the abdomen was done on September 24, 2019 which revealed multifocal areas of asymmetric gastric wall thickening.  Recommend further evaluation with direct visualization to exclude neoplasm.  EGD was done on 10/15/2019 and Gastric tumor in the gastric fundus was found however no specimen was collected as she was on anticoagulation.  The EUS done on 11/15/2019 revealed gastric tumor in the gastric fundus with many abnormal lymph nodes visualized in the lower paraesophageal mediastinum (level 8L) and gastrohepatic ligament (level 18). Fine needle biopsy performed and pathology revealed marginal zone B cell lymphoma.     She had chest pain in 2016 and the imaging done at that time revealed pulmonary nodule and has been following pulmonary, Dr. Yeung at [facility].  She has a currently daily smoker, has been smoking for almost 40 years.  On the CT chest done in November 2017 revealed 1.7 x 1.3 cm lesion in the right lower lobe and 2 x 1 cm lesion in the anterior portion of the right lower lobe.  A CT scan done on September 25, 2019 revealed a complex poorly defined mass in the right lower lobe measuring 2.78 x 2.64 cm, suspicious for primary lung carcinoma     She was diagnosed with right lower extremity DVT and has been on Xarelto.  As per the patient she was hospitalized at that time and underwent procedure for her right lower extremity for venous insufficiency.  It appears that the right lower extremity DVT is a provoked clot.  She had history of bilateral iliac vein stenting 10/16/18 for venous outflow obsturction.     Subjective:    Initial History: Ms. Newton is a 56 y.o. female who returns for follow up. 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 Continues to have LE edema. Complains of exertional dyspnea and states that she can't lay flat. She has been taking lasix intermittently. Scheduled to see lymphedema clinic  tomorrow.      Past Medical History:        Past Medical History:   Diagnosis Date    Abdominal pain 2018    Anemia      Anxiety      Back pain      Deep vein thrombosis      Disorder of kidney and ureter      Fatty liver 2018    Gastric lymphoma 2019    Gastric tumor      GERD (gastroesophageal reflux disease)      Hyperlipidemia      Hypertension      Splenomegaly 2018         Past Surgical HIstory:         Past Surgical History:   Procedure Laterality Date    CARDIAC CATHETERIZATION        CARDIAC CATHETERIZATION   2018     had chest pains . states vessels at the bottom of heart collapsed     SECTION         x3    CHOLECYSTECTOMY        COLONOSCOPY        ENDOSCOPIC ULTRASOUND OF UPPER GASTROINTESTINAL TRACT Left 11/15/2019     Procedure: ULTRASOUND, UPPER GI TRACT, ENDOSCOPIC;  Surgeon: Mike Seay MD;  Location: [facility] (2ND FLR);  Service: Endoscopy;  Laterality: Left;  Ok to hold xarelto per protocol per Dr. Lloyd see media file 10/25/19-tb    ESOPHAGOGASTRODUODENOSCOPY        ESOPHAGOGASTRODUODENOSCOPY N/A 2018     Procedure: EGD (ESOPHAGOGASTRODUODENOSCOPY);  Surgeon: Ant Camejo MD;  Location: [facility];  Service: Endoscopy;  Laterality: N/A;    ESOPHAGOGASTRODUODENOSCOPY N/A 10/15/2019     Procedure: EGD (ESOPHAGOGASTRODUODENOSCOPY);  Surgeon: Melanie Cedeno MD;  Location: [facility];  Service: Endoscopy;  Laterality: N/A;    ESOPHAGOGASTRODUODENOSCOPY N/A 11/15/2019     Procedure: EGD (ESOPHAGOGASTRODUODENOSCOPY);  Surgeon: Mike Seay MD;  [facility]  Service: Endoscopy;  Laterality: N/A;    EXCISION OF LESION OF LIP Left 2022     Procedure: EXCISION, LESION, LIP;  Surgeon: Glen Giang MD;  Location: [facility] OR 2ND FLR;  Service: ENT;  Laterality: Left;  Lip resection    HYSTERECTOMY        PHLEBOGRAPHY Bilateral 10/16/2018     Procedure: VENOGRAM;  Surgeon: Colin Mathis MD;  Location: [facility] CATH LAB;   Service: Cardiology;  Laterality: Bilateral;    AR RT/LT HEART CATHETERS Left       Coronary Arteriogram-2 Cath    RHINOPLASTY        SENTINEL LYMPH NODE BIOPSY   1/12/2022     Procedure: BIOPSY, LYMPH NODE, SENTINEL;  Surgeon: Glen Giang MD;  Location: [facility] OR [address] [place] [address];  Service: ENT;;    TUBAL LIGATION        venogram Bilateral 10/16/2018         Family History:         Family History   Problem Relation Age of Onset    Breast cancer Maternal Grandfather      Dementia Mother      Atrial fibrillation Mother      Deep vein thrombosis Mother      Pulmonary embolism Mother      Stroke Mother      Aneurysm Father           Social History:  reports that she has been smoking cigarettes. She has a 20.00 pack-year smoking history. She has never used smokeless tobacco. She reports that she does not drink alcohol and does not use drugs.     Allergies:        Review of patient's allergies indicates:   Allergen Reactions    Azithromycin Anaphylaxis       Other reaction(s): swelling to entire body  Swelling (tongue / lips)^       Ciprofloxacin Swelling       Throat swells    Codeine         Swelling (throat)^     Tolerates Morphine       Codeine sulfate         Swelling (throat)^     Tolerates Morphine         Medications:  Current Medications          Current Outpatient Medications   Medication Sig Dispense Refill    aspirin (ECOTRIN) 81 MG EC tablet Take 81 mg by mouth once daily.        citalopram (CELEXA) 40 MG tablet citalopram 40 mg tablet        gemfibroziL (LOPID) 600 MG tablet Take 600 mg by mouth 2 (two) times daily.        hydrocodone-acetaminophen (HYCET) solution 7.5-325 mg/15mL Take 15 mLs by mouth 4 (four) times daily as needed for Pain. 473 mL 0    hydroxyzine HCL (ATARAX) 25 MG tablet          methadone (METHADOSE) 40 mg disintegrating tablet Take 40 mg by mouth once daily.         ondansetron (ZOFRAN) 8 MG tablet Take 1 tablet (8 mg total) by mouth every 8 (eight) hours as needed  for Nausea. 12 tablet 0    simvastatin (ZOCOR) 40 MG tablet Take 40 mg by mouth every evening.         allopurinoL (ZYLOPRIM) 300 MG tablet Take 1 tablet (300 mg total) by mouth once daily. (Patient not taking: Reported on 2/1/2022) 30 tablet 5    furosemide (LASIX) 40 MG tablet Take 1 tablet (40 mg total) by mouth once daily. (Patient not taking: Reported on 2/1/2022) 90 tablet 0    lisinopriL (PRINIVIL,ZESTRIL) 20 MG tablet 1 tablet        lisinopriL 10 MG tablet Take 10 mg by mouth once daily.        naloxone (NARCAN) 4 mg/actuation Spry 4mg by nasal route as needed for opioid overdose; may repeat every 2-3 minutes in alternating nostrils until medical help arrives. Call 911 (Patient not taking: Reported on 2/1/2022) 1 each 11    nitroGLYCERIN (NITROSTAT) 0.4 MG SL tablet Place 0.4 mg under the tongue every 5 (five) minutes as needed for Chest pain.        ondansetron (ZOFRAN-ODT) 8 MG TbDL Dissolve 1 tablet (8 mg total) by mouth every 8 (eight) hours as needed (nausea). (Patient not taking: Reported on 2/1/2022) 15 tablet 0    triamcinolone acetonide 0.1% (KENALOG) 0.1 % ointment Apply topically 2 (two) times daily. To be applied to itch rash on abdomen once daily (Patient not taking: Reported on 2/1/2022) 80 g 0      No current facility-administered medications for this visit.            Review of Systems   Constitutional: Negative for appetite change, fatigue, fever and unexpected weight change.   HENT: Negative for nosebleeds and sore throat.    Respiratory: Negative for cough and shortness of breath.    Cardiovascular: Negative for chest pain and leg swelling.   Gastrointestinal: Negative for abdominal pain, blood in stool, diarrhea, nausea and vomiting.   Genitourinary: Negative for dysuria, flank pain, hematuria, urgency and vaginal bleeding.   Musculoskeletal: Positive for back pain.   Skin: Negative for rash.   Neurological: Negative for seizures and headaches.   Hematological: Negative for  adenopathy.   Psychiatric/Behavioral: Negative for agitation.         ECOG Performance Status: 2   Objective:   Vitals:   Vitals       Vitals:     02/01/22 1429   BP: 124/68   BP Location: Left arm   Patient Position: Sitting   BP Method: Large (Automatic)   Pulse: 87   Resp: 12   Temp: 98.8 °F (37.1 °C)   TempSrc: Oral   SpO2: 95%   Weight: 103.2 kg (227 lb 8.2 oz)   Height: 5' (1.524 m)         BMI: Body mass index is 44.43 kg/m².     Physical Exam  Constitutional:       Appearance: Normal appearance. She is obese.   HENT:      Head: Normocephalic and atraumatic.      Nose: Nose normal. No rhinorrhea.      Mouth/Throat:      Mouth: Mucous membranes are moist.   Eyes:      Pupils: Pupils are equal, round, and reactive to light.   Cardiovascular:      Rate and Rhythm: Normal rate and regular rhythm.      Heart sounds: No murmur heard.       Pulmonary:      Effort: Pulmonary effort is normal.      Breath sounds: Normal breath sounds.   Abdominal:      General: Abdomen is flat. There is no distension.      Palpations: Abdomen is soft.   Musculoskeletal:         General: No swelling or tenderness. Normal range of motion.      Cervical back: Normal range of motion and neck supple.      Right lower leg: Edema present.      Left lower leg: Edema present.   Skin:     General: Skin is warm.      Capillary Refill: Capillary refill takes less than 2 seconds.      Findings: Rash (b/l LE due to lymphedema ) present.   Neurological:      General: No focal deficit present.      Mental Status: She is alert and oriented to person, place, and time.   Psychiatric:         Mood and Affect: Mood normal.         Behavior: Behavior normal.         Laboratory Data:          No visits with results within 1 Week(s) from this visit.   Latest known visit with results is:   Lab Visit on 01/25/2022   Component Date Value Ref Range Status    Ferritin 01/25/2022 500* 20.0 - 300.0 ng/mL Final    Iron 01/25/2022 49  30 - 160 ug/dL Final     Transferrin 01/25/2022 239  200 - 375 mg/dL Final    TIBC 01/25/2022 354  250 - 450 ug/dL Final    Saturated Iron 01/25/2022 14* 20 - 50 % Final    WBC 01/25/2022 3.95  3.90 - 12.70 K/uL Final    RBC 01/25/2022 4.28  4.00 - 5.40 M/uL Final    Hemoglobin 01/25/2022 13.1  12.0 - 16.0 g/dL Final    Hematocrit 01/25/2022 42.1  37.0 - 48.5 % Final    MCV 01/25/2022 98  82 - 98 fL Final    MCH 01/25/2022 30.6  27.0 - 31.0 pg Final    MCHC 01/25/2022 31.1* 32.0 - 36.0 g/dL Final    RDW 01/25/2022 13.9  11.5 - 14.5 % Final    Platelets 01/25/2022 158  150 - 450 K/uL Final    MPV 01/25/2022 10.3  9.2 - 12.9 fL Final    Immature Granulocytes 01/25/2022 0.5  0.0 - 0.5 % Final    Gran # (ANC) 01/25/2022 3.2  1.8 - 7.7 K/uL Final    Immature Grans (Abs) 01/25/2022 0.02  0.00 - 0.04 K/uL Final     Comment: Mild elevation in immature granulocytes is non specific and   can be seen in a variety of conditions including stress response,   acute inflammation, trauma and pregnancy. Correlation with other   laboratory and clinical findings is essential.       Lymph # 01/25/2022 0.3* 1.0 - 4.8 K/uL Final    Mono # 01/25/2022 0.2* 0.3 - 1.0 K/uL Final    Eos # 01/25/2022 0.1  0.0 - 0.5 K/uL Final    Baso # 01/25/2022 0.02  0.00 - 0.20 K/uL Final    nRBC 01/25/2022 0  0 /100 WBC Final    Gran % 01/25/2022 81.3* 38.0 - 73.0 % Final    Lymph % 01/25/2022 8.6* 18.0 - 48.0 % Final    Mono % 01/25/2022 5.6  4.0 - 15.0 % Final    Eosinophil % 01/25/2022 3.5  0.0 - 8.0 % Final    Basophil % 01/25/2022 0.5  0.0 - 1.9 % Final    Differential Method 01/25/2022 Automated    Final    Sodium 01/25/2022 134* 136 - 145 mmol/L Final    Potassium 01/25/2022 4.3  3.5 - 5.1 mmol/L Final    Chloride 01/25/2022 96  95 - 110 mmol/L Final    CO2 01/25/2022 26  23 - 29 mmol/L Final    Glucose 01/25/2022 209* 70 - 110 mg/dL Final    BUN 01/25/2022 5* 6 - 20 mg/dL Final    Creatinine 01/25/2022 0.8  0.5 - 1.4 mg/dL Final    Calcium  01/25/2022 9.0  8.7 - 10.5 mg/dL Final    Total Protein 01/25/2022 7.1  6.0 - 8.4 g/dL Final    Albumin 01/25/2022 3.5  3.5 - 5.2 g/dL Final    Total Bilirubin 01/25/2022 0.4  0.1 - 1.0 mg/dL Final     Comment: For infants and newborns, interpretation of results should be based  on gestational age, weight and in agreement with clinical  observations.     Premature Infant recommended reference ranges:  Up to 24 hours.............<8.0 mg/dL  Up to 48 hours............<12.0 mg/dL  3-5 days..................<15.0 mg/dL  6-29 days.................<15.0 mg/dL       Alkaline Phosphatase 01/25/2022 149* 55 - 135 U/L Final    AST 01/25/2022 44* 10 - 40 U/L Final    ALT 01/25/2022 47* 10 - 44 U/L Final    Anion Gap 01/25/2022 12  8 - 16 mmol/L Final    eGFR if African American 01/25/2022 >60.0  >60 mL/min/1.73 m^2 Final    eGFR if non African American 01/25/2022 >60.0  >60 mL/min/1.73 m^2 Final     Comment: Calculation used to obtain the estimated glomerular filtration  rate (eGFR) is the CKD-EPI equation.        Hepatitis B Surface Ag 01/25/2022 Negative  Negative Final    Hep B Core Total Ab 01/25/2022 Negative    Final    LD 01/25/2022 304* 110 - 260 U/L Final     Results are increased in hemolyzed samples.            Imaging:        PET CT- 4/29/2020  1.  Findings consistent with partial treatment response with decreased size of the stomach wall and decreased SUV throughout the stomach.  Metabolic activity on the baseline scan was atypically prominent for marginal zone lymphoma.  If a Deauville score were to be assigned, the score would be 4.    2.  Decreased size and SUV of the patient's right lower lobe pulmonary biopsy-proven lymphoma.    3.  Questionable slight increase in size of the patient's right lower pole lesion again concerning for RCC or oncocytoma.  Extranodal lymphoma could have a similar appearance.     Assessment:       1. 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 Leg  edema       1.  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  Recommend further evaluation with direct visualization to exclude neoplasm.    - October 15, 2019- EGD revealed Gastric tumor in the gastric fundus was found however no specimen was collected as she was on anticoagulation.    - November 15, 2019- EUS revealed gastric tumor in the gastric fundus with many abnormal lymph nodes visualized in the lower paraesophageal mediastinum (level 8L) and gastrohepatic ligament (level 18). Fine needle biopsy performed and pathology revealed marginal zone B cell lymphoma.  PET CT-12/10/2019- revealed gastric marginal zone lymphoma, with pulmonary manifestations of marginal zone lymphoma in right lower lobe along with diffuse marrow uptake throughout the axial skeleton with more focal uptake identified at the right femoral greater trochanter and left ischial tuberosity  - She has a currently daily smoker, has been smoking for almost 40 years.    4/14/20- s/p 4 cycles of single agent Rituximab induction therapy completed    4/29/20- PET/CT consistent with partial treatment response   7/7/2020- S/p maintenance cycle 1 Rituximab 375 mg/m2. Will schedule every 12 weeks for 2 years   9/29/2020- cycle 2 of maintenance Rituximab  12/22/20- cycle 3 of maintenance Rituximab  3/15/21- cycle 4 of maintenance Rituximab  6/8/21- Cycle 5 of  maintenance Rituximab  9/21/21 - cycle 6 of maintenance Rituximab   11/21 - cycle 7 was held due to squamous cell carcinoma diagnosis. Plan to hold for two cycles. Resume in May 2022     2. Solid right renal mass: PET CT-Questionable slight increase in size of the patient's  right lower pole lesion again concerning for RCC or oncocytoma. Following Urology. Stable.   3. Right lower extremity DVT, likely provoked: was on Xarelto for about 2 years. Stopped taking 8/21  4. Iron deficiency anemia - resolved   5. Chronic active smoker  6. squamous cell carcinoma of lower lip diagnosed 10/21 s/p Wedge resection of lower lip squamous cell carcinoma primary closure and Right submandibular sentinel lymph node biopsy 1/12/22. Margins neg, LN negative.   7. LE edema      Plan:      1. Holding rituxan for two cycles until a definitive diagnosis and treatment plan are made for her squamous cell carcinoma of lower lip   2. Continue following Urology, with Dr. Calero for right kidney lesion  3. On aspirin 81 mg daily  4. Continue to monitor labs [id number]. Discussed need for EGD/colonoscopy, will need to be rescheduled   5. Educated patient regarding smoking cessations, she's cutting down   6. Follows with ENT and dermatology   7. Lasix 40 mg daily with potassium 20 meq daily and ECHO ordered pending insurance auth , PT referral for lymphedema management. Will refer to cardiology as well.      Follow up with us in 3 months to resume rituxan      Plan of care discussed with Dr. Rich Shea MD  Hematology and Medical Oncology fellow                           Clinical History   evaluate for CAD       Examination   Cardiac computed tomography: Limited thorax, heart, coronary arteries   and bypass grafts (when present) with contrast material, including 3D   image post processing ( including evaluation of cardiac structures   and morphology, assessment of cardiac function and evaluation of non   cardiac structures).     Comparison   There is no prior similar study for comparison.       Technique    localization of the chest was performed for adequate cardiac   border definition. Gated non contrast axial scanning coronary artery   calcium scoring was performed. Sublingual  nitroglycerin (0.4 mg) was   administered prior to the scanning. Gated contrast enhances 256 slice   CT angiography of the heart was performed at 0.625 mm collimation.   Postcontrast scan was then obtained following injection of 70 cc of   nonionic contrast material. The contrast was given at 5 mL/second   intravenously. The patient received 40mg oral metoprolol. The heart   rate was 65 beats per minute during scanning. Images were obtained   with retrospective gated during suspended respiration. Bolus tracking   with a region of interest placed in the ascending aorta was used to   trigger image acquisition when threshold exceeded 120 Hounsfield   units. Multiple phases of the cardiac cycle were reconstructed for   analysis. Interpretation included analysis of the axial source   images, post processed 3D reconstructions, oblique maximum intensity   projections and multiplanar reformats. Post processing was performed   with volume rendering and segmentation is of the coronary arteries   utilizing icomasoft software on [employer].   Thorax CT was reviewed with full FOV in the region of heart.     Quality of images: Good   Gating: Optimal     Findings     CORONARY ARTERIES:   Visualization of the segments of the coronary arteries is good. The   coronary system is right dominant.   RCA : Normal origin from the right coronary cusp. There is normal   epicardial course. The lumen is well opacified by contrast and the   vessel is of normal caliber. The 1st branch is the conus artery   demonstrating normal disease free course. The 2nd branch is sinonodal   artery demonstrating normal disease free course. 2 focal calcified   atherosclerotic plaques with mild-to-moderate  stenosis identified in   the mid RCA. RCA terminates into PLLV and PDA which are patent and   disease free.   LM:Normal origin from the left coronary cusp. Normal configuration of   the ostium and angle of takeoff. Normal epicardial course. Length is    21 mm and diameter is 4 mm. Divides into Left circumflex artery and   Left anterior descending artery. Lumen is well opacified by contrast.   No significant atherosclerotic plaques or stenosis identified.   LAD : Type C. Normal origin and epicardial course. The lumen is well   opacified by contrast and the vessel is of normal caliber. There is a   moderate stenosis due to eccentric calcified  atherosclerotic plaques   in the LAD. D1 and D2 branches identified and 3 septal perforators   noticed. There is moderate stenosis at the ostium of the 1st diagonal   and due to atherosclerotic plaque. A 15 millimeter superficial   bridging is noted in the mid LAD.   LCX : Normal origin and epicardial course. The lumen is well   opacified by contrast and the vessel is of normal caliber. No   significant atherosclerotic plaques or stenosis identified. OM 1 and   OM 2 are disease free.     MEASUREMENTS:   GREAT VESSEL :   Truncation of the images resulted in incomplete visualization of the   aortic arch.   Ascending aortic: 31 x 29 mm   Descending aorta : 23 x 22 mm. There is no dissection or intramural   thrombus.     Main pulmonary artery:43 mm.   Left pulmonary artery:21 mm.   Right pulmonary artery:19 mm. The opacification of pulmonary arteries   is suboptimal for adequate assessment of pulmonary embolism. The   linear central poor filling of the distal branches in both lower   lobes is probably related to flow artifacts.   DANIEL: Bilateral single, patent, anatomical course.       LEFT VENTRICLE:   LVEF: 69 %, EDV: 153.3 ml, ESV: 46.9 ml, SV: 106.3 ml. No thrombus,   no masses     ASWT: 0.9cm   PLWT: 0.6cm   FARZANA: 6.7cm   ESD: 3.5cm   IVS:0.9cm     MORPHOLOGICAL ASSESSMENT:   The heart is 4 chamber with orthotopic  configuration. The   atrioventricular and ventriculoarterial relationship is anatomical.   The cardiac chambers are without a mass or thrombus. There is no   dilatation of the left atrium, right atrium and right  ventricle.   Physiological apical thinning is identified. The intraventricular   septum demonstrates normal thickness and morphology without   paradoxical motion.     RIGHT VENTRICLE:   RV Major:8.4cm,RV Minor:3.4cm,     LEFT ATRIUM: AP:3.9cm.   Pulmonary vein: 4, no accessory. No thrombus.     RIGHT ATRIUM: No masses.     VALVES: Aortic Valve is trileaflet without calcification. The mitral   valve does not show calcification.     PERICARDIUM: No pericardial effusion.  No calcified plaques.       NONCORONARY AND NONCARDIAC FINDINGS:   Mediastinum: No mediastinal lymphadenopathy or masses.  There small   physiological mediastinal and perihilar lymph nodes noted.   Airways: Patent airways with normal branching pattern   Lungs: There is a 2 millimeter subpleural nodule in the lateral   segment of the middle lobe, series 513 image 29 .   Pleura: No pleural effusion or calcified pleural plaques   Bone: Moderate thoracic spondylosis   Chest wall: Bilateral symmetrical distal soft tissues.   Upper abdomen: Calcified mass is present in the right lobe of the   liver near the dome of the diaphragm measuring 1.5 x 0.9 centimeters   of indeterminate etiology.     Impression   1. Focal mild to moderate stenosis in the proximal LAD due to   eccentric calcified plaque.   2. Superficial myocardial bridging measuring 15 millimeters in the   mid LAD.   3. Focal moderate stenosis in the mid RCA due to atherosclerotic   calcified plaque.   4. Right dominant system. LV ejection fraction is 69 percent.     Recommendations:   1. Follow up with cardiology clinic   2. CT scan of the chest to screen for potential other pulmonary   nodules  outside the field of view on the current study.   3. Ultrasound of the liver with attention to calcified mass in the   right upper lobe near the dome of the diaphragm.    Other Result Text    Gregory, External Ris In - 05/16/2016  1:29 PM CDT   Clinical History   evaluate for CAD       Examination   Cardiac  computed tomography: Limited thorax, heart, coronary arteries   and bypass grafts (when present) with contrast material, including 3D   image post processing ( including evaluation of cardiac structures   and morphology, assessment of cardiac function and evaluation of non   cardiac structures).     Comparison   Cardiac catheterization  Order# [id number]  Reading physician: Colin Mathis MD Ordering physician: Colin Mathis MD Study date: 10/16/18       Patient Information    Name MRN Description   Gabi Newton [id number] 52 y.o. female       Physicians    Panel Physicians Referring Physician Case Authorizing Physician   Colin Mathis MD (Primary)         Indications    Chronic venous insufficiency [I87.2 (ICD-10-CM)]   Peripheral vascular disease, unspecified [I73.9 (ICD-10-CM)]   Essential hypertension, malignant [I10 (ICD-10-CM)]   Hyperlipidemia, unspecified hyperlipidemia type [E78.5 (ICD-10-CM)]       Summary       · Successful venogram and intravascular ultrasound of the inferior vena cava, right and left common iliac and external iliac veins and right and left common femoral veins  · Successful PTA and stenting of high-grade stenosis in the right external iliac vein to no remaining stenosis by intravascular ultrasound  · Successful PTA and stenting of the left common iliac vein in the remaining stenosis by intravascular ultrasound  · Successful PTA and stenting of the left external iliac vein to no remaining stenosis by intravascular ultrasound  · Successful renal PTAs.     Venogram and intravascular ultrasound of the inferior vena cava iliac and common femoral veins  The inferior vena cava appears normal by angiography and intravascular ultrasound  The right common iliac vein appeared without significant disease by angiography and intravascular ultrasound   The left common iliac vein as significant 80-90% stenosis by intravascular ultrasound  The right external iliac vein has 70% stenosis by intravascular  ultrasound  The left external iliac vein has a 70% stenosis by intravascular ultrasound  The right and left common femoral veins appear without significant disease by angiography and intravascular ultrasound     Intervention  1.  Successful PTA and stenting of high-grade stenosis of the right external iliac artery using 18x90 mm Wallstent post dilated with a 16x60 mm balloon catheter to no remaining stenosis  2.  Successful PTA and stenting of high-grade stenosis of the left common iliac artery using 18x90 mm Wallstent post dilated with a 18x60 mm balloon catheter to no remaining stenosis  3.  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     COMPARISON:  Ultrasound of the right lower extremity veins: 02/18/2019.  Ultrasound of bilateral lower extremity veins: 08/31/2018.     FINDINGS:  Right thigh veins: The common femoral, femoral, popliteal, upper greater saphenous, and deep femoral veins are patent and free of thrombus. The veins are normally compressible and have normal phasic flow and augmentation response.     Right calf veins: The visualized calf veins are patent.     Left thigh veins: A stent is identified in the left CFV.  The femoral, popliteal, upper greater saphenous, and deep femoral veins are patent and free of thrombus. The veins are normally compressible and have normal phasic flow and augmentation response.  Patient had a  history of left GSV ablation with occlusion of most of the visualized left greater saphenous vein.     Left calf veins: The visualized calf veins are patent.     Miscellaneous: None     Impression:     1. A stent is identified in the left CFV.  Patient had a history of left GSV ablation with occlusion of most of the visualized left greater saphenous vein.  2. No evidence of deep venous thrombosis in either lower extremity.     Electronically signed by resident: Farhat Hurst  Date:                                            03/11/2020  Time:                                           10:43     Electronically signed by: Vincent Schultz MD  Date:                                            03/11/2020  Time:                                           11:37             Exam Ended: 03/11/20 10:33 Last Resulted: 03/11/20 11:37       Order Details     View Encounter     Lab and Collection Details     Routing     Result History             Result Care Coordination            Details    Reading Physician Reading Date Result Priority   Sina Candelario II, MD  [phone] 2/18/2019      Narrative & Impression  EXAMINATION:  US LOWER EXTREMITY VEINS RIGHT     CLINICAL HISTORY:  Edema, unspecified     TECHNIQUE:  Duplex and color flow Doppler evaluation and graded compression of the right lower extremity veins was performed.     COMPARISON:  None     FINDINGS:  Right thigh veins: The common femoral, femoral, popliteal, upper greater saphenous, and deep femoral veins are patent and free of thrombus. The veins are normally compressible and have normal phasic flow and augmentation response.     Right calf veins: The visualized calf veins are patent.     Miscellaneous: No venous reflux.  Right inguinal lymph nodes are not pathologically enlarged.     There are multiple small round to oval mildly hyperechoic subcutaneous lesions in the upper lateral thigh without Doppler vascularity.     A stent in the left common femoral vein was  visualized as that was indicated in history.     IMPRESSION:      1. No deep venous thrombosis or venous reflux of the right lower extremity identified.  2. Multiple small subcutaneous nodular areas right upper thigh laterally appear most likely to be lipomas.  Largest is 2.3 x 1.5 x 3.1 cm.  3. Left common femoral vein stent noted with good flow in the vein.        Electronically signed by: Sina Candelario II, MD  Date:                                            02/18/2019  Time:                                           14:14             Exam Ended: 02/18/19 14:05 Last Resulted: 02/18/19 14:14                   Assessment:     1. Coronary artery disease involving native coronary artery of native heart without angina pectoris    2. Leg edema    3. Hyperlipidemia, unspecified hyperlipidemia type    4. Primary hypertension    5. Venous insufficiency (chronic) (peripheral)    6. 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 Gastric lymphoma    8. Marginal zone lymphoma of extranodal and solid organ sites    9. Squamous cell carcinoma, lip    10. BMI 40.0-44.9, adult    11. Peripheral edema    12. Splenomegaly    13. LBBB (left bundle branch block)    14. GIST (gastrointestinal stromal tumor), non-malignant    15. Iron deficiency anemia due to chronic blood loss    16. Shortness of breath    17. Smoker      Plan:   Gabi was seen today for edema.    Diagnoses and all orders for this visit:    Coronary artery disease involving native coronary artery of native heart without angina pectoris  -     IN OFFICE EKG 12-LEAD (to Muse)    Leg edema  -     Ambulatory referral/consult to Cardiology  -     IN OFFICE EKG 12-LEAD (to Muse)    Hyperlipidemia, unspecified hyperlipidemia type  -     IN OFFICE EKG 12-LEAD (to Muse)    Primary hypertension  -     IN OFFICE EKG 12-LEAD (to Muse)    Venous insufficiency (chronic) (peripheral)  -     IN OFFICE EKG 12-LEAD (to Muse)    Extranodal  marginal zone B-cell lymphoma of mucosa-associated lymphoid tissue (MALT)  -     IN OFFICE EKG 12-LEAD (to Muse)    Gastric lymphoma  -     IN OFFICE EKG 12-LEAD (to Muse)    Marginal zone lymphoma of extranodal and solid organ sites  -     IN OFFICE EKG 12-LEAD (to Muse)    Squamous cell carcinoma, lip  -     IN OFFICE EKG 12-LEAD (to Muse)    BMI 40.0-44.9, adult  -     IN OFFICE EKG 12-LEAD (to Muse)    Peripheral edema  -     IN OFFICE EKG 12-LEAD (to Muse)    Splenomegaly  -     IN OFFICE EKG 12-LEAD (to Muse)    LBBB (left bundle branch block)  -     IN OFFICE EKG 12-LEAD (to Muse)    GIST (gastrointestinal stromal tumor), non-malignant  -     IN OFFICE EKG 12-LEAD (to Muse)    Iron deficiency anemia due to chronic blood loss  -     IN OFFICE EKG 12-LEAD (to Muse)    Shortness of breath  -     IN OFFICE EKG 12-LEAD (to Muse)    Smoker  -     IN OFFICE EKG 12-LEAD (to [place])     Need records from Dr Mathis    The edema is likely venous stasis edema and lymphedema rather than CHF,  Will repeat the echocardiogram -- already scheduled.  And repeat the venous U/S of LE    Consider repeat CT angiogram of the heart although th recent chest pain unrelated to activity is more likely of GI origin rather than angina due to CAD    Agree with current regimen    Agree with echocardiogram which has been scheduled    RTC 6 weeks.    The shortness of breath is most likely to be due to obesity and deconditioning    Smoking cessation counseled    Low carb diet discussed    Follow up in about 6 weeks (around 5/2/2022).

## 2022-04-18 ENCOUNTER — TELEPHONE (OUTPATIENT)
Dept: UROLOGY | Facility: CLINIC | Age: 57
End: 2022-04-18
Payer: MEDICAID

## 2022-04-18 ENCOUNTER — OFFICE VISIT (OUTPATIENT)
Dept: UROLOGY | Facility: CLINIC | Age: 57
End: 2022-04-18
Payer: MEDICAID

## 2022-04-18 VITALS
HEIGHT: 60 IN | BODY MASS INDEX: 43.63 KG/M2 | WEIGHT: 222.25 LBS | SYSTOLIC BLOOD PRESSURE: 138 MMHG | DIASTOLIC BLOOD PRESSURE: 83 MMHG | HEART RATE: 94 BPM

## 2022-04-18 DIAGNOSIS — Z79.01 CHRONIC ANTICOAGULATION: ICD-10-CM

## 2022-04-18 DIAGNOSIS — N28.89 RIGHT RENAL MASS: Primary | ICD-10-CM

## 2022-04-18 DIAGNOSIS — C88.4 EXTRANODAL MARGINAL ZONE B-CELL LYMPHOMA OF MUCOSA-ASSOCIATED LYMPHOID TISSUE (MALT): ICD-10-CM

## 2022-04-18 PROCEDURE — 4010F PR ACE/ARB THEARPY RXD/TAKEN: ICD-10-PCS | Mod: CPTII,,, | Performed by: UROLOGY

## 2022-04-18 PROCEDURE — 99214 OFFICE O/P EST MOD 30 MIN: CPT | Mod: PBBFAC,PN | Performed by: UROLOGY

## 2022-04-18 PROCEDURE — 3079F DIAST BP 80-89 MM HG: CPT | Mod: CPTII,,, | Performed by: UROLOGY

## 2022-04-18 PROCEDURE — 1159F PR MEDICATION LIST DOCUMENTED IN MEDICAL RECORD: ICD-10-PCS | Mod: CPTII,,, | Performed by: UROLOGY

## 2022-04-18 PROCEDURE — 3075F PR MOST RECENT SYSTOLIC BLOOD PRESS GE 130-139MM HG: ICD-10-PCS | Mod: CPTII,,, | Performed by: UROLOGY

## 2022-04-18 PROCEDURE — 99215 OFFICE O/P EST HI 40 MIN: CPT | Mod: S$PBB,,, | Performed by: UROLOGY

## 2022-04-18 PROCEDURE — 99999 PR PBB SHADOW E&M-EST. PATIENT-LVL IV: ICD-10-PCS | Mod: PBBFAC,,, | Performed by: UROLOGY

## 2022-04-18 PROCEDURE — 3008F PR BODY MASS INDEX (BMI) DOCUMENTED: ICD-10-PCS | Mod: CPTII,,, | Performed by: UROLOGY

## 2022-04-18 PROCEDURE — 99999 PR PBB SHADOW E&M-EST. PATIENT-LVL IV: CPT | Mod: PBBFAC,,, | Performed by: UROLOGY

## 2022-04-18 PROCEDURE — 3079F PR MOST RECENT DIASTOLIC BLOOD PRESSURE 80-89 MM HG: ICD-10-PCS | Mod: CPTII,,, | Performed by: UROLOGY

## 2022-04-18 PROCEDURE — 3008F BODY MASS INDEX DOCD: CPT | Mod: CPTII,,, | Performed by: UROLOGY

## 2022-04-18 PROCEDURE — 3075F SYST BP GE 130 - 139MM HG: CPT | Mod: CPTII,,, | Performed by: UROLOGY

## 2022-04-18 PROCEDURE — 1159F MED LIST DOCD IN RCRD: CPT | Mod: CPTII,,, | Performed by: UROLOGY

## 2022-04-18 PROCEDURE — 99215 PR OFFICE/OUTPT VISIT, EST, LEVL V, 40-54 MIN: ICD-10-PCS | Mod: S$PBB,,, | Performed by: UROLOGY

## 2022-04-18 PROCEDURE — 4010F ACE/ARB THERAPY RXD/TAKEN: CPT | Mod: CPTII,,, | Performed by: UROLOGY
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
## 2022-05-09 NOTE — PROGRESS NOTES
PATIENT: Gabi Newton  MRN: [id number]  DATE: 5/10/2022      Diagnosis:   1. Marginal zone lymphoma of extranodal and solid organ sites        Chief Complaint: No chief complaint on file.    Ms. Newton is a 54 year old female with Stage IV Marginal Zone Lymphoma (Gastric, Pulmonary, and Marrow) s/p 4 cycles of single agent Rituximab induction therapy completed on 4/14/20 followed by PET CT on 4/29/20 consistent with partial treatment response. She is here for a follow up visit. She has been on maintenance therapy since July 7, 2020    Oncologic History  Ms. Newton is a 54-year-old female with a past medical history of abdominal pain for the last 3-4 years with unknown etiology, significant smoking history for 40 years, back pain, hypertension presented to the Hematology Clinic for newly diagnosed marginal zone B-cell lymphoma     Patient complained of chronic epigastric/periumbilical abdominal pain for the last 3 years and has been receiving multiple abdominal imaging at [facility].  In 2016 her abdominal pain was thought to be from her gallbladder and she had underwent a cholecystectomy however she continued to have abdominal pain. A CT abdomen done in June 11, 2018 revealed wall thickening of the anterior gastric body suspicious for infectious or inflammatory but is concerning for soft tissue mass and a EGD was a recommended. MRI on June 19, 2018 which revealed diffuse hepatic steatosis without evidence of suspicious focal lesions, splenomegaly but no ascites.  She has been following  Dr. Cedeno since 2018 for her abdominal pain, however liver was not suspected to be the cause for her abdominal pain. She had negative workup for hepatitis-B, C, negative workup for autoimmune disease, negative for alpha 1 antitrypsin disease, negative for celiac sprue and her hepatitis panel has been negative so far.  MRI of the abdomen was done on September 24, 2019 which revealed multifocal areas of asymmetric  gastric wall thickening.  Recommend further evaluation with direct visualization to exclude neoplasm.  EGD was done on 10/15/2019 and Gastric tumor in the gastric fundus was found however no specimen was collected as she was on anticoagulation.  The EUS done on 11/15/2019 revealed gastric tumor in the gastric fundus with many abnormal lymph nodes visualized in the lower paraesophageal mediastinum (level 8L) and gastrohepatic ligament (level 18). Fine needle biopsy performed and pathology revealed marginal zone B cell lymphoma.     She had chest pain in 2016 and the imaging done at that time revealed pulmonary nodule and has been following pulmonary, Dr. Yeung at [facility].  She has a currently daily smoker, has been smoking for almost 40 years.  On the CT chest done in November 2017 revealed 1.7 x 1.3 cm lesion in the right lower lobe and 2 x 1 cm lesion in the anterior portion of the right lower lobe.  A CT scan done on September 25, 2019 revealed a complex poorly defined mass in the right lower lobe measuring 2.78 x 2.64 cm, suspicious for primary lung carcinoma     She was diagnosed with right lower extremity DVT and has been on Xarelto.  As per the patient she was hospitalized at that time and underwent procedure for her right lower extremity for venous insufficiency.  It appears that the right lower extremity DVT is a provoked clot.  She had history of bilateral iliac vein stenting 10/16/18 for venous outflow obsturction.     Subjective:    Initial History: Ms. Newton is a 56 y.o. female who returns for follow up.   Doing well overall. Has a lot of stress going to because her son was shot and had to undergo splenectomy. Will refer to psych onc for support.     Past Medical History:   Past Medical History:   Diagnosis Date    Abdominal pain 2018    Anemia     Anxiety     Back pain     Deep vein thrombosis     Disorder of kidney and ureter     Fatty liver 7/24/2018    Gastric lymphoma 12/8/2019    Gastric  tumor     GERD (gastroesophageal reflux disease)     Hyperlipidemia     Hypertension     Splenomegaly 2018       Past Surgical HIstory:   Past Surgical History:   Procedure Laterality Date    bilateral iliac vein stenosis with stenting      CARDIAC CATHETERIZATION      CARDIAC CATHETERIZATION  2018    had chest pains . states vessels at the bottom of heart collapsed     SECTION      x3    CHOLECYSTECTOMY      COLONOSCOPY      ENDOSCOPIC ULTRASOUND OF UPPER GASTROINTESTINAL TRACT Left 11/15/2019    Procedure: ULTRASOUND, UPPER GI TRACT, ENDOSCOPIC;  Surgeon: Mike Seay MD;  Location: [facility] (2ND FLR);  Service: Endoscopy;  Laterality: Left;  Ok to hold xarelto per protocol per Dr. Lloyd see media file 10/25/19-tb    ESOPHAGOGASTRODUODENOSCOPY      ESOPHAGOGASTRODUODENOSCOPY N/A 2018    Procedure: EGD (ESOPHAGOGASTRODUODENOSCOPY);  Surgeon: Ant Camejo MD;  Location: [facility];  Service: Endoscopy;  Laterality: N/A;    ESOPHAGOGASTRODUODENOSCOPY N/A 10/15/2019    Procedure: EGD (ESOPHAGOGASTRODUODENOSCOPY);  Surgeon: Melanie Cedeno MD;  Location: [facility];  Service: Endoscopy;  Laterality: N/A;    ESOPHAGOGASTRODUODENOSCOPY N/A 11/15/2019    Procedure: EGD (ESOPHAGOGASTRODUODENOSCOPY);  Surgeon: Mike Seay MD;  Location: [facility] ([address] [place] [address]);  Service: Endoscopy;  Laterality: N/A;    EXCISION OF LESION OF LIP Left 2022    Procedure: EXCISION, LESION, LIP;  Surgeon: Glen Giang MD;  Location: [facility] OR [address] [place] [address];  Service: ENT;  Laterality: Left;  Lip resection    HYSTERECTOMY      PHLEBOGRAPHY Bilateral 10/16/2018    Procedure: VENOGRAM;  Surgeon: Colin Mathis MD;  Location: [facility] [place] [facility];  Service: Cardiology;  Laterality: Bilateral;    CA RT/LT HEART CATHETERS Left     Coronary Arteriogram-2 Cath    RHINOPLASTY      SENTINEL LYMPH NODE BIOPSY  2022    Procedure: BIOPSY, LYMPH NODE, SENTINEL;  Surgeon: Glen Giang MD;  Location:  [facility] OR 2ND FLR;  Service: ENT;;    TUBAL LIGATION      venogram Bilateral 10/16/2018       Family History:   Family History   Problem Relation Age of Onset    Breast cancer Maternal Grandfather     Dementia Mother     Atrial fibrillation Mother     Deep vein thrombosis Mother     Pulmonary embolism Mother     Stroke Mother     Aneurysm Father        Social History:  reports that she has been smoking cigarettes. She has a 20.00 pack-year smoking history. She has never used smokeless tobacco. She reports that she does not drink alcohol and does not use drugs.    Allergies:  Review of patient's allergies indicates:   Allergen Reactions    Azithromycin Anaphylaxis     Other reaction(s): swelling to entire body  Swelling (tongue / lips)^      Ciprofloxacin Swelling     Throat swells    Codeine      Swelling (throat)^    Tolerates Morphine      Codeine sulfate      Swelling (throat)^    Tolerates Morphine       Medications:  Current Outpatient Medications   Medication Sig Dispense Refill    allopurinoL (ZYLOPRIM) 300 MG tablet Take 1 tablet (300 mg total) by mouth once daily. 30 tablet 5    aspirin (ECOTRIN) 81 MG EC tablet Take 81 mg by mouth once daily.      citalopram (CELEXA) 40 MG tablet citalopram 40 mg tablet      furosemide (LASIX) 40 MG tablet Take 1 tablet (40 mg total) by mouth once daily. 90 tablet 0    gemfibroziL (LOPID) 600 MG tablet Take 600 mg by mouth 2 (two) times daily.      hydroxyzine HCL (ATARAX) 25 MG tablet       lisinopriL 10 MG tablet Take 10 mg by mouth once daily.      methadone (METHADOSE) 40 mg disintegrating tablet Take 40 mg by mouth once daily.       naloxone (NARCAN) 4 mg/actuation Spry 4mg by nasal route as needed for opioid overdose; may repeat every 2-3 minutes in alternating nostrils until medical help arrives. Call 911 1 each 11    nicotine (NICODERM CQ) 21 mg/24 hr 1 patch once daily.      nitroGLYCERIN (NITROSTAT) 0.4 MG SL tablet Place 0.4 mg under the  tongue every 5 (five) minutes as needed for Chest pain.      ondansetron (ZOFRAN-ODT) 8 MG TbDL Dissolve 1 tablet (8 mg total) by mouth every 8 (eight) hours as needed (nausea). 15 tablet 0    simvastatin (ZOCOR) 40 MG tablet Take 40 mg by mouth every evening.       triamcinolone acetonide 0.1% (KENALOG) 0.1 % ointment Apply topically 2 (two) times daily. To be applied to itch rash on abdomen once daily 80 g 0     No current facility-administered medications for this visit.       Review of Systems   Constitutional: Negative for appetite change, fatigue, fever and unexpected weight change.   HENT: Negative for nosebleeds and sore throat.    Respiratory: Negative for cough and shortness of breath.    Cardiovascular: Negative for chest pain and leg swelling.   Gastrointestinal: Negative for abdominal pain, blood in stool, diarrhea, nausea and vomiting.   Genitourinary: Negative for dysuria, flank pain, hematuria, urgency and vaginal bleeding.   Musculoskeletal: Positive for back pain.   Skin: Negative for rash.   Neurological: Negative for seizures and headaches.   Hematological: Negative for adenopathy.   Psychiatric/Behavioral: Negative for agitation.     ECOG Performance Status: 2   Objective:      Vitals:   Vitals:    05/10/22 1306   BP: 134/72   Pulse: 81   Resp: 17   Temp: 97.9 °F (36.6 °C)   SpO2: 95%   Weight: 100.1 kg (220 lb 10.9 oz)   Height: 5' (1.524 m)     BMI: Body mass index is 43.1 kg/m².    Physical Exam  Constitutional:       Appearance: Normal appearance. She is obese.   HENT:      Head: Normocephalic and atraumatic.      Nose: Nose normal. No rhinorrhea.      Mouth/Throat:      Mouth: Mucous membranes are moist.   Eyes:      Pupils: Pupils are equal, round, and reactive to light.   Cardiovascular:      Rate and Rhythm: Normal rate and regular rhythm.      Heart sounds: No murmur heard.  Pulmonary:      Effort: Pulmonary effort is normal.      Breath sounds: Normal breath sounds.   Abdominal:       General: Abdomen is flat. There is no distension.      Palpations: Abdomen is soft.   Musculoskeletal:         General: No swelling or tenderness. Normal range of motion.      Cervical back: Normal range of motion and neck supple.      Right lower leg: Edema present.      Left lower leg: Edema present.   Skin:     General: Skin is warm.      Capillary Refill: Capillary refill takes less than 2 seconds.      Findings: Rash (b/l LE due to lymphedema ) present.   Neurological:      General: No focal deficit present.      Mental Status: She is alert and oriented to person, place, and time.   Psychiatric:         Mood and Affect: Mood normal.         Behavior: Behavior normal.       Laboratory Data:  No visits with results within 1 Week(s) from this visit.   Latest known visit with results is:   Admission on 04/12/2022, Discharged on 04/12/2022   Component Date Value Ref Range Status    WBC 04/12/2022 5.36  3.90 - 12.70 K/uL Final    RBC 04/12/2022 4.85  4.00 - 5.40 M/uL Final    Hemoglobin 04/12/2022 14.4  12.0 - 16.0 g/dL Final    Hematocrit 04/12/2022 45.7  37.0 - 48.5 % Final    MCV 04/12/2022 94  82 - 98 fL Final    MCH 04/12/2022 29.7  27.0 - 31.0 pg Final    MCHC 04/12/2022 31.5 (A) 32.0 - 36.0 g/dL Final    RDW 04/12/2022 13.2  11.5 - 14.5 % Final    Platelets 04/12/2022 175  150 - 450 K/uL Final    MPV 04/12/2022 10.5  9.2 - 12.9 fL Final    Immature Granulocytes 04/12/2022 0.2  0.0 - 0.5 % Final    Gran # (ANC) 04/12/2022 4.4  1.8 - 7.7 K/uL Final    Immature Grans (Abs) 04/12/2022 0.01  0.00 - 0.04 K/uL Final    Comment: Mild elevation in immature granulocytes is non specific and   can be seen in a variety of conditions including stress response,   acute inflammation, trauma and pregnancy. Correlation with other   laboratory and clinical findings is essential.      Lymph # 04/12/2022 0.5 (A) 1.0 - 4.8 K/uL Final    Mono # 04/12/2022 0.2 (A) 0.3 - 1.0 K/uL Final    Eos # 04/12/2022 0.2  0.0 -  0.5 K/uL Final    Baso # 04/12/2022 0.02  0.00 - 0.20 K/uL Final    nRBC 04/12/2022 0  0 /100 WBC Final    Gran % 04/12/2022 82.0 (A) 38.0 - 73.0 % Final    Lymph % 04/12/2022 8.8 (A) 18.0 - 48.0 % Final    Mono % 04/12/2022 4.5  4.0 - 15.0 % Final    Eosinophil % 04/12/2022 4.1  0.0 - 8.0 % Final    Basophil % 04/12/2022 0.4  0.0 - 1.9 % Final    Differential Method 04/12/2022 Automated   Final    Sodium 04/12/2022 137  136 - 145 mmol/L Final    Potassium 04/12/2022 4.1  3.5 - 5.1 mmol/L Final    Chloride 04/12/2022 99  95 - 110 mmol/L Final    CO2 04/12/2022 25  23 - 29 mmol/L Final    Glucose 04/12/2022 115 (A) 70 - 110 mg/dL Final    BUN 04/12/2022 7  6 - 20 mg/dL Final    Creatinine 04/12/2022 0.8  0.5 - 1.4 mg/dL Final    Calcium 04/12/2022 9.2  8.7 - 10.5 mg/dL Final    Total Protein 04/12/2022 6.8  6.0 - 8.4 g/dL Final    Albumin 04/12/2022 3.7  3.5 - 5.2 g/dL Final    Total Bilirubin 04/12/2022 0.6  0.1 - 1.0 mg/dL Final    Comment: For infants and newborns, interpretation of results should be based  on gestational age, weight and in agreement with clinical  observations.    Premature Infant recommended reference ranges:  Up to 24 hours.............<8.0 mg/dL  Up to 48 hours............<12.0 mg/dL  3-5 days..................<15.0 mg/dL  6-29 days.................<15.0 mg/dL      Alkaline Phosphatase 04/12/2022 133  55 - 135 U/L Final    AST 04/12/2022 38  10 - 40 U/L Final    ALT 04/12/2022 39  10 - 44 U/L Final    Anion Gap 04/12/2022 13  8 - 16 mmol/L Final    eGFR if African American 04/12/2022 >60.0  >60 mL/min/1.73 m^2 Final    eGFR if non African American 04/12/2022 >60.0  >60 mL/min/1.73 m^2 Final    Comment: Calculation used to obtain the estimated glomerular filtration  rate (eGFR) is the CKD-EPI equation.       Troponin I 04/12/2022 <0.006  0.000 - 0.026 ng/mL Final    Comment: The reference interval for Troponin I represents the 99th percentile   cutoff   for our  facility and is consistent with 3rd generation assay   performance.      BNP 04/12/2022 <10  0 - 99 pg/mL Final    Values of less than 100 pg/ml are consistent with non-CHF populations.    D-Dimer 04/12/2022 0.54 (A) <0.50 mg/L FEU Final    Comment: The quantitative D-dimer assay should be used as an aid in   the diagnosis of deep vein thrombosis and pulmonary embolism  in patients with the appropriate presentation and clinical  history. The upper limit of the reference interval and the clinical   cut off   point are identical. Causes of a positive (>0.50 mg/L FEU) D-Dimer   test  include, but are not limited to: DVT, PE, DIC, thrombolytic   therapy, anticoagulant therapy, recent surgery, trauma, or   pregnancy, disseminated malignancy, aortic aneurysm, cirrhosis,  and severe infection. False negative results may occur in   patients with distal DVT.  JATINDER^612^^2^  LOT^[id number]^DDiSup^[id number]      Troponin I 04/12/2022 <0.006  0.000 - 0.026 ng/mL Final    Comment: The reference interval for Troponin I represents the 99th percentile   cutoff   for our facility and is consistent with 3rd generation assay   performance.           Imaging:       PET CT- 4/29/2020  1.  Findings consistent with partial treatment response with decreased size of the stomach wall and decreased SUV throughout the stomach.  Metabolic activity on the baseline scan was atypically prominent for marginal zone lymphoma.  If a Deauville score were to be assigned, the score would be 4.    2.  Decreased size and SUV of the patient's right lower lobe pulmonary biopsy-proven lymphoma.    3.  Questionable slight increase in size of the patient's right lower pole lesion again concerning for RCC or oncocytoma.  Extranodal lymphoma could have a similar appearance.    Assessment:       1. Marginal zone lymphoma of extranodal and solid organ sites      1.  Sha MZL with coexisting Gastric/pulmonary/diffuse marrow Marginal zone B-cell  lymphoma:    - MALT IPI score- 2, LDH elevated at 301, age less than 70,  Sherwood stage IV  - Patient complained of chronic epigastric/periumbilical abdominal pain for the last 3 years  - September 24, 2019-  MRI of the abdomen revealed multifocal areas of asymmetric gastric wall thickening.  Recommend further evaluation with direct visualization to exclude neoplasm.    - October 15, 2019- EGD revealed Gastric tumor in the gastric fundus was found however no specimen was collected as she was on anticoagulation.    - November 15, 2019- EUS revealed gastric tumor in the gastric fundus with many abnormal lymph nodes visualized in the lower paraesophageal mediastinum (level 8L) and gastrohepatic ligament (level 18). Fine needle biopsy performed and pathology revealed marginal zone B cell lymphoma.  PET CT-12/10/2019- revealed gastric marginal zone lymphoma, with pulmonary manifestations of marginal zone lymphoma in right lower lobe along with diffuse marrow uptake throughout the axial skeleton with more focal uptake identified at the right femoral greater trochanter and left ischial tuberosity  - She has a currently daily smoker, has been smoking for almost 40 years.    4/14/20- s/p 4 cycles of single agent Rituximab induction therapy completed    4/29/20- PET/CT consistent with partial treatment response   7/7/2020- S/p maintenance cycle 1 Rituximab 375 mg/m2. Will schedule every 12 weeks for 2 years   9/29/2020- cycle 2 of maintenance Rituximab  12/22/20- cycle 3 of maintenance Rituximab  3/15/21- cycle 4 of maintenance Rituximab  6/8/21- Cycle 5 of  maintenance Rituximab  9/21/21 - cycle 6 of maintenance Rituximab   12/2021 - cycle 7 was held due to squamous cell carcinoma diagnosis. Plan to hold for two cycles. Resume in May 2022  5/2022 . Will proceed with cycle 7. CT CAP to monitor disease response to treatment ordered      2. Solid right renal mass: PET CT-Questionable slight increase in size of the patient's  right lower pole lesion again concerning for RCC or oncocytoma. Following Urology. Stable.   3. Right lower extremity DVT, likely provoked: was on Xarelto for about 2 years. Stopped taking 8/21  4. Iron deficiency anemia - resolved   5. Chronic active smoker  6. squamous cell carcinoma of lower lip diagnosed 10/21 s/p Wedge resection of lower lip squamous cell carcinoma primary closure and Right submandibular sentinel lymph node biopsy 1/12/22. Margins neg, LN negative.   7. LE edema      Plan:     1. rituxan was held for two cycles for her squamous cell carcinoma of lower lip. Plan to resume today.   2. Continue following Urology, with Dr. Calero for right kidney lesion. Planning for biopsy 6/1/2022  3. On aspirin 81 mg daily  4. Continue to monitor labs [id number]. Discussed need for EGD/colonoscopy, will need to be rescheduled   5. Educated patient regarding smoking cessations, she's cutting down   6. Follows with ENT and dermatology   7. Follow with cardiology    please schedule md appt with labs cbc/cmp/ldh/hepatitis b surface antigen/ hepaitis b core anitbody in 3 months   schedule rituxan infusion after appointment      Plan of care discussed with Dr. Rich Shea MD  Hematology and Medical Oncology fellow

## 2022-05-10 ENCOUNTER — OFFICE VISIT (OUTPATIENT)
Dept: INTERVENTIONAL RADIOLOGY/VASCULAR | Facility: CLINIC | Age: 57
End: 2022-05-10
Payer: MEDICAID

## 2022-05-10 ENCOUNTER — OFFICE VISIT (OUTPATIENT)
Dept: HEMATOLOGY/ONCOLOGY | Facility: CLINIC | Age: 57
End: 2022-05-10
Payer: MEDICAID

## 2022-05-10 ENCOUNTER — LAB VISIT (OUTPATIENT)
Dept: LAB | Facility: HOSPITAL | Age: 57
End: 2022-05-10
Payer: MEDICAID

## 2022-05-10 VITALS
SYSTOLIC BLOOD PRESSURE: 142 MMHG | WEIGHT: 220.69 LBS | DIASTOLIC BLOOD PRESSURE: 89 MMHG | HEART RATE: 96 BPM | BODY MASS INDEX: 43.33 KG/M2 | HEIGHT: 60 IN

## 2022-05-10 VITALS
RESPIRATION RATE: 17 BRPM | WEIGHT: 220.69 LBS | DIASTOLIC BLOOD PRESSURE: 72 MMHG | TEMPERATURE: 98 F | OXYGEN SATURATION: 95 % | BODY MASS INDEX: 43.33 KG/M2 | HEART RATE: 81 BPM | SYSTOLIC BLOOD PRESSURE: 134 MMHG | HEIGHT: 60 IN

## 2022-05-10 DIAGNOSIS — C85.89 MARGINAL ZONE LYMPHOMA OF EXTRANODAL AND SOLID ORGAN SITES: ICD-10-CM

## 2022-05-10 DIAGNOSIS — N28.89 RIGHT RENAL MASS: Primary | ICD-10-CM

## 2022-05-10 DIAGNOSIS — N28.89 RIGHT RENAL MASS: ICD-10-CM

## 2022-05-10 DIAGNOSIS — C85.89 MARGINAL ZONE LYMPHOMA OF EXTRANODAL AND SOLID ORGAN SITES: Primary | ICD-10-CM

## 2022-05-10 LAB
ALBUMIN SERPL BCP-MCNC: 3.8 G/DL (ref 3.5–5.2)
ALP SERPL-CCNC: 144 U/L (ref 55–135)
ALT SERPL W/O P-5'-P-CCNC: 33 U/L (ref 10–44)
ANION GAP SERPL CALC-SCNC: 9 MMOL/L (ref 8–16)
AST SERPL-CCNC: 31 U/L (ref 10–40)
BASOPHILS # BLD AUTO: 0.01 K/UL (ref 0–0.2)
BASOPHILS NFR BLD: 0.2 % (ref 0–1.9)
BILIRUB SERPL-MCNC: 0.5 MG/DL (ref 0.1–1)
BUN SERPL-MCNC: 6 MG/DL (ref 6–20)
CALCIUM SERPL-MCNC: 9.9 MG/DL (ref 8.7–10.5)
CHLORIDE SERPL-SCNC: 99 MMOL/L (ref 95–110)
CO2 SERPL-SCNC: 32 MMOL/L (ref 23–29)
CREAT SERPL-MCNC: 0.8 MG/DL (ref 0.5–1.4)
DIFFERENTIAL METHOD: ABNORMAL
EOSINOPHIL # BLD AUTO: 0.2 K/UL (ref 0–0.5)
EOSINOPHIL NFR BLD: 5.4 % (ref 0–8)
ERYTHROCYTE [DISTWIDTH] IN BLOOD BY AUTOMATED COUNT: 13.5 % (ref 11.5–14.5)
EST. GFR  (AFRICAN AMERICAN): >60 ML/MIN/1.73 M^2
EST. GFR  (NON AFRICAN AMERICAN): >60 ML/MIN/1.73 M^2
GLUCOSE SERPL-MCNC: 104 MG/DL (ref 70–110)
HCT VFR BLD AUTO: 45.2 % (ref 37–48.5)
HGB BLD-MCNC: 14.1 G/DL (ref 12–16)
IMM GRANULOCYTES # BLD AUTO: 0.02 K/UL (ref 0–0.04)
IMM GRANULOCYTES NFR BLD AUTO: 0.5 % (ref 0–0.5)
INR PPP: 1 (ref 0.8–1.2)
LDH SERPL L TO P-CCNC: 317 U/L (ref 110–260)
LYMPHOCYTES # BLD AUTO: 0.4 K/UL (ref 1–4.8)
LYMPHOCYTES NFR BLD: 9.8 % (ref 18–48)
MCH RBC QN AUTO: 29.1 PG (ref 27–31)
MCHC RBC AUTO-ENTMCNC: 31.2 G/DL (ref 32–36)
MCV RBC AUTO: 93 FL (ref 82–98)
MONOCYTES # BLD AUTO: 0.2 K/UL (ref 0.3–1)
MONOCYTES NFR BLD: 4.8 % (ref 4–15)
NEUTROPHILS # BLD AUTO: 3.5 K/UL (ref 1.8–7.7)
NEUTROPHILS NFR BLD: 79.3 % (ref 38–73)
NRBC BLD-RTO: 0 /100 WBC
PLATELET # BLD AUTO: 179 K/UL (ref 150–450)
PMV BLD AUTO: 10.8 FL (ref 9.2–12.9)
POTASSIUM SERPL-SCNC: 4.7 MMOL/L (ref 3.5–5.1)
PROT SERPL-MCNC: 7.3 G/DL (ref 6–8.4)
PROTHROMBIN TIME: 10.3 SEC (ref 9–12.5)
RBC # BLD AUTO: 4.84 M/UL (ref 4–5.4)
SODIUM SERPL-SCNC: 140 MMOL/L (ref 136–145)
WBC # BLD AUTO: 4.41 K/UL (ref 3.9–12.7)

## 2022-05-10 PROCEDURE — 99999 PR PBB SHADOW E&M-EST. PATIENT-LVL IV: ICD-10-PCS | Mod: PBBFAC,,, | Performed by: INTERNAL MEDICINE

## 2022-05-10 PROCEDURE — 99999 PR PBB SHADOW E&M-EST. PATIENT-LVL III: ICD-10-PCS | Mod: PBBFAC,,, | Performed by: FAMILY MEDICINE

## 2022-05-10 PROCEDURE — 1160F RVW MEDS BY RX/DR IN RCRD: CPT | Mod: CPTII,,, | Performed by: FAMILY MEDICINE

## 2022-05-10 PROCEDURE — 99213 OFFICE O/P EST LOW 20 MIN: CPT | Mod: PBBFAC | Performed by: FAMILY MEDICINE

## 2022-05-10 PROCEDURE — 3008F PR BODY MASS INDEX (BMI) DOCUMENTED: ICD-10-PCS | Mod: CPTII,,, | Performed by: INTERNAL MEDICINE

## 2022-05-10 PROCEDURE — 99214 PR OFFICE/OUTPT VISIT, EST, LEVL IV, 30-39 MIN: ICD-10-PCS | Mod: S$PBB,,, | Performed by: INTERNAL MEDICINE

## 2022-05-10 PROCEDURE — 3078F PR MOST RECENT DIASTOLIC BLOOD PRESSURE < 80 MM HG: ICD-10-PCS | Mod: CPTII,,, | Performed by: INTERNAL MEDICINE

## 2022-05-10 PROCEDURE — 85025 COMPLETE CBC W/AUTO DIFF WBC: CPT | Performed by: INTERNAL MEDICINE

## 2022-05-10 PROCEDURE — 99999 PR PBB SHADOW E&M-EST. PATIENT-LVL IV: CPT | Mod: PBBFAC,,, | Performed by: INTERNAL MEDICINE

## 2022-05-10 PROCEDURE — 1159F MED LIST DOCD IN RCRD: CPT | Mod: CPTII,,, | Performed by: INTERNAL MEDICINE

## 2022-05-10 PROCEDURE — 3008F PR BODY MASS INDEX (BMI) DOCUMENTED: ICD-10-PCS | Mod: CPTII,,, | Performed by: FAMILY MEDICINE

## 2022-05-10 PROCEDURE — 3078F DIAST BP <80 MM HG: CPT | Mod: CPTII,,, | Performed by: INTERNAL MEDICINE

## 2022-05-10 PROCEDURE — 87340 HEPATITIS B SURFACE AG IA: CPT | Performed by: INTERNAL MEDICINE

## 2022-05-10 PROCEDURE — 83615 LACTATE (LD) (LDH) ENZYME: CPT | Performed by: INTERNAL MEDICINE

## 2022-05-10 PROCEDURE — 3075F SYST BP GE 130 - 139MM HG: CPT | Mod: CPTII,,, | Performed by: INTERNAL MEDICINE

## 2022-05-10 PROCEDURE — 1159F PR MEDICATION LIST DOCUMENTED IN MEDICAL RECORD: ICD-10-PCS | Mod: CPTII,,, | Performed by: INTERNAL MEDICINE

## 2022-05-10 PROCEDURE — 1160F PR REVIEW ALL MEDS BY PRESCRIBER/CLIN PHARMACIST DOCUMENTED: ICD-10-PCS | Mod: CPTII,,, | Performed by: INTERNAL MEDICINE

## 2022-05-10 PROCEDURE — 4010F ACE/ARB THERAPY RXD/TAKEN: CPT | Mod: CPTII,,, | Performed by: FAMILY MEDICINE

## 2022-05-10 PROCEDURE — 85610 PROTHROMBIN TIME: CPT | Performed by: FAMILY MEDICINE

## 2022-05-10 PROCEDURE — 99214 OFFICE O/P EST MOD 30 MIN: CPT | Mod: S$PBB,,, | Performed by: INTERNAL MEDICINE

## 2022-05-10 PROCEDURE — 3077F SYST BP >= 140 MM HG: CPT | Mod: CPTII,,, | Performed by: FAMILY MEDICINE

## 2022-05-10 PROCEDURE — 99214 PR OFFICE/OUTPT VISIT, EST, LEVL IV, 30-39 MIN: ICD-10-PCS | Mod: S$PBB,,, | Performed by: FAMILY MEDICINE

## 2022-05-10 PROCEDURE — 99214 OFFICE O/P EST MOD 30 MIN: CPT | Mod: S$PBB,,, | Performed by: FAMILY MEDICINE

## 2022-05-10 PROCEDURE — 1160F PR REVIEW ALL MEDS BY PRESCRIBER/CLIN PHARMACIST DOCUMENTED: ICD-10-PCS | Mod: CPTII,,, | Performed by: FAMILY MEDICINE

## 2022-05-10 PROCEDURE — 3008F BODY MASS INDEX DOCD: CPT | Mod: CPTII,,, | Performed by: FAMILY MEDICINE

## 2022-05-10 PROCEDURE — 99214 OFFICE O/P EST MOD 30 MIN: CPT | Mod: PBBFAC,27 | Performed by: INTERNAL MEDICINE

## 2022-05-10 PROCEDURE — 86704 HEP B CORE ANTIBODY TOTAL: CPT | Performed by: INTERNAL MEDICINE

## 2022-05-10 PROCEDURE — 3079F PR MOST RECENT DIASTOLIC BLOOD PRESSURE 80-89 MM HG: ICD-10-PCS | Mod: CPTII,,, | Performed by: FAMILY MEDICINE

## 2022-05-10 PROCEDURE — 99999 PR PBB SHADOW E&M-EST. PATIENT-LVL III: CPT | Mod: PBBFAC,,, | Performed by: FAMILY MEDICINE

## 2022-05-10 PROCEDURE — 3075F PR MOST RECENT SYSTOLIC BLOOD PRESS GE 130-139MM HG: ICD-10-PCS | Mod: CPTII,,, | Performed by: INTERNAL MEDICINE

## 2022-05-10 PROCEDURE — 4010F PR ACE/ARB THEARPY RXD/TAKEN: ICD-10-PCS | Mod: CPTII,,, | Performed by: INTERNAL MEDICINE

## 2022-05-10 PROCEDURE — 4010F PR ACE/ARB THEARPY RXD/TAKEN: ICD-10-PCS | Mod: CPTII,,, | Performed by: FAMILY MEDICINE

## 2022-05-10 PROCEDURE — 3008F BODY MASS INDEX DOCD: CPT | Mod: CPTII,,, | Performed by: INTERNAL MEDICINE

## 2022-05-10 PROCEDURE — 1159F MED LIST DOCD IN RCRD: CPT | Mod: CPTII,,, | Performed by: FAMILY MEDICINE

## 2022-05-10 PROCEDURE — 1160F RVW MEDS BY RX/DR IN RCRD: CPT | Mod: CPTII,,, | Performed by: INTERNAL MEDICINE

## 2022-05-10 PROCEDURE — 36415 COLL VENOUS BLD VENIPUNCTURE: CPT | Performed by: FAMILY MEDICINE

## 2022-05-10 PROCEDURE — 4010F ACE/ARB THERAPY RXD/TAKEN: CPT | Mod: CPTII,,, | Performed by: INTERNAL MEDICINE

## 2022-05-10 PROCEDURE — 1159F PR MEDICATION LIST DOCUMENTED IN MEDICAL RECORD: ICD-10-PCS | Mod: CPTII,,, | Performed by: FAMILY MEDICINE

## 2022-05-10 PROCEDURE — 3077F PR MOST RECENT SYSTOLIC BLOOD PRESSURE >= 140 MM HG: ICD-10-PCS | Mod: CPTII,,, | Performed by: FAMILY MEDICINE

## 2022-05-10 PROCEDURE — 80053 COMPREHEN METABOLIC PANEL: CPT | Performed by: INTERNAL MEDICINE

## 2022-05-10 PROCEDURE — 3079F DIAST BP 80-89 MM HG: CPT | Mod: CPTII,,, | Performed by: FAMILY MEDICINE

## 2022-05-10 RX ORDER — SODIUM CHLORIDE 0.9 % (FLUSH) 0.9 %
10 SYRINGE (ML) INJECTION
Status: CANCELLED | OUTPATIENT
Start: 2022-06-19

## 2022-05-10 RX ORDER — HEPARIN 100 UNIT/ML
500 SYRINGE INTRAVENOUS
Status: CANCELLED | OUTPATIENT
Start: 2022-06-19

## 2022-05-10 RX ORDER — PREDNISONE 20 MG/1
20 TABLET ORAL
Status: CANCELLED
Start: 2022-06-19 | End: 2022-05-10

## 2022-05-10 RX ORDER — ACETAMINOPHEN 325 MG/1
650 TABLET ORAL
Status: CANCELLED | OUTPATIENT
Start: 2022-06-19

## 2022-05-10 RX ORDER — FAMOTIDINE 20 MG/1
20 TABLET, FILM COATED ORAL
Status: CANCELLED
Start: 2022-06-19

## 2022-05-10 NOTE — Clinical Note
"Thank you for referring Ms. Newton to Interventional Radiology at the [facility]. Please don't hesitate to contact us if there are any questions regarding this evaluation at [phone]. If you have any other patients for whom you would like a consultation, please place an order for "[id number]", and we will be happy to review their case.  Sincerely, MICHELLE Barboza, FNP Interventional Radiology    "

## 2022-05-10 NOTE — PROGRESS NOTES
"Subjective:       Patient ID: Gabi Newton is a 56 y.o. female.    Chief Complaint: Lesion     Patient referred to Interventional Radiology by Chao Calero MD for evaluation of a right renal mass. She has a history of lymphoma and lip squamous cell carcinoma. Mass was initially identified in 2019. An ultrasound obtained on 4/11/2022 noted "Hypoechoic mass at the inferior pole of the right kidney 5.9 x 4.8 x 5.2 cm (previously measuring 4.2 x 4.2 x 4.2 cm.)."  She has complaints of fatigue, but tells me she has difficulty sleeping at night. She denies any dysuria, hematuria, or flank pain.    Review of Systems   Constitutional: Positive for fatigue (due to difficulty sleeping). Negative for activity change, appetite change, chills and fever.   HENT: Negative for nasal congestion, drooling, ear discharge, postnasal drip, sneezing and trouble swallowing.    Eyes: Negative for pain, discharge, redness and itching.   Respiratory: Positive for cough. Negative for shortness of breath, wheezing and stridor.    Cardiovascular: Positive for leg swelling. Negative for chest pain and palpitations.   Gastrointestinal: Positive for abdominal distention. Negative for abdominal pain, constipation, diarrhea, nausea and vomiting.   Genitourinary: Negative for difficulty urinating, dysuria, frequency and urgency.   Musculoskeletal: Positive for myalgias. Negative for arthralgias, back pain, gait problem, joint swelling and neck pain.   Integumentary:  Negative for color change, pallor and rash.   Neurological: Negative for dizziness, weakness and headaches.   Psychiatric/Behavioral: Positive for sleep disturbance.         Objective:      Physical Exam  Vitals reviewed.   Constitutional:       General: She is not in acute distress.     Appearance: She is well-developed. She is not diaphoretic.   HENT:      Head: Normocephalic and atraumatic.      Right Ear: External ear normal.      Left Ear: External ear normal.      Nose: Nose " normal.      Mouth/Throat:      Pharynx: No oropharyngeal exudate.   Eyes:      General: No scleral icterus.        Right eye: No discharge.         Left eye: No discharge.      Conjunctiva/sclera: Conjunctivae normal.      Pupils: Pupils are equal, round, and reactive to light.   Neck:      Thyroid: No thyromegaly.      Vascular: No JVD.      Trachea: No tracheal deviation.   Cardiovascular:      Rate and Rhythm: Normal rate and regular rhythm.      Heart sounds: Normal heart sounds. No murmur heard.    No friction rub. No gallop.   Pulmonary:      Effort: Pulmonary effort is normal. No respiratory distress.      Breath sounds: Normal breath sounds. No stridor. No wheezing or rales.   Abdominal:      General: Bowel sounds are normal. There is no distension.      Palpations: Abdomen is soft. There is no mass.      Tenderness: There is no abdominal tenderness. There is no right CVA tenderness, left CVA tenderness, guarding or rebound.   Musculoskeletal:      Cervical back: Neck supple.   Lymphadenopathy:      Cervical: No cervical adenopathy.   Skin:     General: Skin is warm and dry.      Nails: There is no clubbing.   Neurological:      Mental Status: She is alert and oriented to person, place, and time.      Gait: Gait normal.       US 4/11/2022    Assessment:       Problem List Items Addressed This Visit        Renal/    Right renal mass - Primary    Relevant Orders    Protime-INR          Plan:         Discussed case with Dr. Gupta.  Explained to patient can offer biopsy of right renal mass. Discussed how the procedure will be performed, risks (including, but not limited to, pain, bleeding, infection, damage to nearby structures, and the need for additional procedures), benefits, possible complications, pre-post procedure expectations, and alternatives. The patient voices understanding and all questions have been answered.  The patient agrees to proceed as planned. Patient scheduled for 6/1/2022.  Pre-procedure handout with clinic phone number provided.

## 2022-05-10 NOTE — LETTER
May 10, 2022    Gabi Newton  [address] [facility] [address]     [facility] 6th Fl  [address]  [facility] [address]  Phone: [phone] PRE-PROCEDURE INSTRUCTIONS    Your procedure with Interventional Radiology is scheduled for 6/1/2022. Please arrive by 8:30am. Please note your appointment time in [facility] will be different.    You must check-in and receive a wristband before going to your procedure. We will call you the day before to let you know your check-in location.     DO NOT take aspirin for 5 days before your procedure.    **Do not eat or drink anything between midnight and the time of your procedure. This includes gum, mints, and candy lemon drops.    **Do not smoke or drink alcoholic beverages 24 hours prior to your procedure.    **If you wear contact lenses, dentures, hearing aids, or glasses, bring a container to put them in during the procedure and give them to a family member for safekeeping.    **If you have been diagnosed with sleep apnea please bring your CPAP machine.    **If your doctor has scheduled you for an overnight stay, bring a small overnight bag with any personal items that you may need.    **Make arrangements in advance for transportation home by a responsible adult. It is not safe to drive a vehicle during the 24 hours following the procedure.    **All [facility] facilities and properties are tobacco free. Smoking is NOT allowed.    PLEASE NOTE: The procedure schedule has many variables which affect the time of your procedure. Family members should be available if your surgery time changes.    If you have any questions about these instructions call Interventional Radiology at [phone] Monday - Friday between 8:00am and 4:00pm or [phone] (ask for interventional radiology resident) for after hours.

## 2022-05-10 NOTE — Clinical Note
please schedule md appt with labs cbc/cmp/ldh/hepatitis b surface antigen/ hepaitis b core anitbody (labs day before in [place]) in 3 months  schedule rituxan infusion after appointment  Please schedule CT CAP w/wo contrast as soon as feasible

## 2022-05-13 ENCOUNTER — PATIENT MESSAGE (OUTPATIENT)
Dept: OTOLARYNGOLOGY | Facility: CLINIC | Age: 57
End: 2022-05-13
Payer: MEDICAID

## 2022-05-16 LAB
HBV CORE AB SERPL QL IA: NEGATIVE
HBV SURFACE AG SERPL QL IA: NEGATIVE

## 2022-05-18 ENCOUNTER — TELEPHONE (OUTPATIENT)
Dept: HEMATOLOGY/ONCOLOGY | Facility: CLINIC | Age: 57
End: 2022-05-18
Payer: MEDICAID

## 2022-05-18 DIAGNOSIS — C85.89 MARGINAL ZONE LYMPHOMA OF EXTRANODAL AND SOLID ORGAN SITES: Primary | ICD-10-CM

## 2022-05-18 NOTE — TELEPHONE ENCOUNTER
----- Message from Shukri Shea MD sent at 5/18/2022  2:02 PM CDT -----  I ordered PET CT as alternative    Thanks. Please schedule PET CT as soon as feasible    ----- Message -----  From: Shannon Toussaint  Sent: 5/18/2022   1:51 PM CDT  To: Shukri Shea MD    There is a contrast shortage the soonest patient would be scheduled for a CT CAP w/wo contrast is 06/30/2022.  Can scan be done without contrast?      Thanks,   Shan

## 2022-05-20 ENCOUNTER — TELEPHONE (OUTPATIENT)
Dept: HEMATOLOGY/ONCOLOGY | Facility: CLINIC | Age: 57
End: 2022-05-20
Payer: MEDICAID

## 2022-05-30 ENCOUNTER — INFUSION (OUTPATIENT)
Dept: INFUSION THERAPY | Facility: HOSPITAL | Age: 57
End: 2022-05-30
Payer: MEDICAID

## 2022-05-30 VITALS
WEIGHT: 223.56 LBS | HEIGHT: 61 IN | RESPIRATION RATE: 18 BRPM | TEMPERATURE: 98 F | DIASTOLIC BLOOD PRESSURE: 63 MMHG | SYSTOLIC BLOOD PRESSURE: 133 MMHG | HEART RATE: 72 BPM | BODY MASS INDEX: 42.21 KG/M2

## 2022-05-30 DIAGNOSIS — C88.4 EXTRANODAL MARGINAL ZONE B-CELL LYMPHOMA OF MUCOSA-ASSOCIATED LYMPHOID TISSUE (MALT): Primary | ICD-10-CM

## 2022-05-30 DIAGNOSIS — C85.89 MARGINAL ZONE LYMPHOMA OF EXTRANODAL AND SOLID ORGAN SITES: ICD-10-CM

## 2022-05-30 PROCEDURE — 96415 CHEMO IV INFUSION ADDL HR: CPT

## 2022-05-30 PROCEDURE — 63600175 PHARM REV CODE 636 W HCPCS: Mod: TB | Performed by: INTERNAL MEDICINE

## 2022-05-30 PROCEDURE — 63600175 PHARM REV CODE 636 W HCPCS: Performed by: INTERNAL MEDICINE

## 2022-05-30 PROCEDURE — 25000003 PHARM REV CODE 250: Performed by: INTERNAL MEDICINE

## 2022-05-30 PROCEDURE — 96413 CHEMO IV INFUSION 1 HR: CPT

## 2022-05-30 RX ORDER — MEPERIDINE HYDROCHLORIDE 50 MG/ML
25 INJECTION INTRAMUSCULAR; INTRAVENOUS; SUBCUTANEOUS
Status: DISCONTINUED | OUTPATIENT
Start: 2022-05-30 | End: 2022-05-30 | Stop reason: HOSPADM

## 2022-05-30 RX ORDER — SODIUM CHLORIDE 0.9 % (FLUSH) 0.9 %
10 SYRINGE (ML) INJECTION
Status: DISCONTINUED | OUTPATIENT
Start: 2022-05-30 | End: 2022-05-30 | Stop reason: HOSPADM

## 2022-05-30 RX ORDER — HEPARIN 100 UNIT/ML
500 SYRINGE INTRAVENOUS
Status: DISCONTINUED | OUTPATIENT
Start: 2022-05-30 | End: 2022-05-30 | Stop reason: HOSPADM

## 2022-05-30 RX ORDER — ACETAMINOPHEN 325 MG/1
650 TABLET ORAL
Status: COMPLETED | OUTPATIENT
Start: 2022-05-30 | End: 2022-05-30

## 2022-05-30 RX ORDER — PREDNISONE 20 MG/1
20 TABLET ORAL
Status: COMPLETED | OUTPATIENT
Start: 2022-05-30 | End: 2022-05-30

## 2022-05-30 RX ORDER — FAMOTIDINE 20 MG/1
20 TABLET, FILM COATED ORAL
Status: COMPLETED | OUTPATIENT
Start: 2022-05-30 | End: 2022-05-30

## 2022-05-30 RX ADMIN — FAMOTIDINE 20 MG: 20 TABLET ORAL at 10:05

## 2022-05-30 RX ADMIN — ACETAMINOPHEN 650 MG: 325 TABLET ORAL at 10:05

## 2022-05-30 RX ADMIN — PREDNISONE 20 MG: 20 TABLET ORAL at 10:05

## 2022-05-30 RX ADMIN — SODIUM CHLORIDE 784 MG: 0.9 INJECTION, SOLUTION INTRAVENOUS at 11:05

## 2022-05-30 NOTE — PLAN OF CARE
1515 pt tolerated ruxience infusion without issue, pt has no upcoming appts scheduled at this time, no distress noted upon d/c to home

## 2022-05-30 NOTE — PLAN OF CARE
1020 pt here for ruxience infusion (greater than 6 months), labs, hx, meds, allergies reviewed, pt with no new complaints at this time, reclined in chair, warm blanket provided, continue to monitor

## 2022-05-31 ENCOUNTER — DOCUMENTATION ONLY (OUTPATIENT)
Dept: PREADMISSION TESTING | Facility: HOSPITAL | Age: 57
End: 2022-05-31
Payer: MEDICAID

## 2022-05-31 ENCOUNTER — PATIENT MESSAGE (OUTPATIENT)
Dept: INTERVENTIONAL RADIOLOGY/VASCULAR | Facility: HOSPITAL | Age: 57
End: 2022-05-31
Payer: MEDICAID

## 2022-06-01 ENCOUNTER — ANESTHESIA EVENT (OUTPATIENT)
Dept: INTERVENTIONAL RADIOLOGY/VASCULAR | Facility: HOSPITAL | Age: 57
DRG: 907 | End: 2022-06-01
Payer: MEDICAID

## 2022-06-01 ENCOUNTER — HOSPITAL ENCOUNTER (OUTPATIENT)
Dept: RADIOLOGY | Facility: HOSPITAL | Age: 57
Discharge: HOME | DRG: 907 | End: 2022-06-01
Attending: INTERNAL MEDICINE
Payer: MEDICAID

## 2022-06-01 ENCOUNTER — HOSPITAL ENCOUNTER (INPATIENT)
Dept: INTERVENTIONAL RADIOLOGY/VASCULAR | Facility: HOSPITAL | Age: 57
LOS: 9 days | Discharge: HOME | DRG: 907 | End: 2022-06-10
Attending: STUDENT IN AN ORGANIZED HEALTH CARE EDUCATION/TRAINING PROGRAM | Admitting: HOSPITALIST
Payer: MEDICAID

## 2022-06-01 ENCOUNTER — HOSPITAL ENCOUNTER (OUTPATIENT)
Dept: INTERVENTIONAL RADIOLOGY/VASCULAR | Facility: HOSPITAL | Age: 57
Discharge: HOME | DRG: 907 | End: 2022-06-01
Attending: STUDENT IN AN ORGANIZED HEALTH CARE EDUCATION/TRAINING PROGRAM
Payer: MEDICAID

## 2022-06-01 DIAGNOSIS — R07.9 CHEST PAIN AT REST: ICD-10-CM

## 2022-06-01 DIAGNOSIS — R94.31 QT PROLONGATION: ICD-10-CM

## 2022-06-01 DIAGNOSIS — R58 RETROPERITONEAL BLEEDING: ICD-10-CM

## 2022-06-01 DIAGNOSIS — J44.9 CHRONIC OBSTRUCTIVE PULMONARY DISEASE, UNSPECIFIED COPD TYPE: Primary | ICD-10-CM

## 2022-06-01 DIAGNOSIS — R07.9 CHEST PAIN: ICD-10-CM

## 2022-06-01 DIAGNOSIS — R10.9 ABDOMINAL PAIN, UNSPECIFIED ABDOMINAL LOCATION: ICD-10-CM

## 2022-06-01 DIAGNOSIS — N28.89 RENAL MASS, RIGHT: Primary | ICD-10-CM

## 2022-06-01 DIAGNOSIS — N28.89 RIGHT RENAL MASS: ICD-10-CM

## 2022-06-01 DIAGNOSIS — G47.33 OSA (OBSTRUCTIVE SLEEP APNEA): Chronic | ICD-10-CM

## 2022-06-01 LAB
ABO + RH BLD: NORMAL
ANION GAP SERPL CALC-SCNC: 8 MMOL/L (ref 8–16)
BASOPHILS # BLD AUTO: 0.02 K/UL (ref 0–0.2)
BASOPHILS # BLD AUTO: 0.03 K/UL (ref 0–0.2)
BASOPHILS NFR BLD: 0.2 % (ref 0–1.9)
BASOPHILS NFR BLD: 0.3 % (ref 0–1.9)
BLD GP AB SCN CELLS X3 SERPL QL: NORMAL
BUN SERPL-MCNC: 6 MG/DL (ref 6–20)
CALCIUM SERPL-MCNC: 7.8 MG/DL (ref 8.7–10.5)
CHLORIDE SERPL-SCNC: 103 MMOL/L (ref 95–110)
CO2 SERPL-SCNC: 23 MMOL/L (ref 23–29)
CREAT SERPL-MCNC: 0.7 MG/DL (ref 0.5–1.4)
CTP QC/QA: YES
DIFFERENTIAL METHOD: ABNORMAL
DIFFERENTIAL METHOD: ABNORMAL
EOSINOPHIL # BLD AUTO: 0 K/UL (ref 0–0.5)
EOSINOPHIL # BLD AUTO: 0.1 K/UL (ref 0–0.5)
EOSINOPHIL NFR BLD: 0.3 % (ref 0–8)
EOSINOPHIL NFR BLD: 1 % (ref 0–8)
ERYTHROCYTE [DISTWIDTH] IN BLOOD BY AUTOMATED COUNT: 14.1 % (ref 11.5–14.5)
ERYTHROCYTE [DISTWIDTH] IN BLOOD BY AUTOMATED COUNT: 14.2 % (ref 11.5–14.5)
EST. GFR  (AFRICAN AMERICAN): >60 ML/MIN/1.73 M^2
EST. GFR  (NON AFRICAN AMERICAN): >60 ML/MIN/1.73 M^2
GLUCOSE SERPL-MCNC: 142 MG/DL (ref 70–110)
HCT VFR BLD AUTO: 36.2 % (ref 37–48.5)
HCT VFR BLD AUTO: 40.7 % (ref 37–48.5)
HGB BLD-MCNC: 11.4 G/DL (ref 12–16)
HGB BLD-MCNC: 13.1 G/DL (ref 12–16)
IMM GRANULOCYTES # BLD AUTO: 0.03 K/UL (ref 0–0.04)
IMM GRANULOCYTES # BLD AUTO: 0.04 K/UL (ref 0–0.04)
IMM GRANULOCYTES NFR BLD AUTO: 0.3 % (ref 0–0.5)
IMM GRANULOCYTES NFR BLD AUTO: 0.4 % (ref 0–0.5)
LYMPHOCYTES # BLD AUTO: 0.1 K/UL (ref 1–4.8)
LYMPHOCYTES # BLD AUTO: 0.3 K/UL (ref 1–4.8)
LYMPHOCYTES NFR BLD: 1.3 % (ref 18–48)
LYMPHOCYTES NFR BLD: 2.8 % (ref 18–48)
MCH RBC QN AUTO: 29.4 PG (ref 27–31)
MCH RBC QN AUTO: 29.5 PG (ref 27–31)
MCHC RBC AUTO-ENTMCNC: 31.5 G/DL (ref 32–36)
MCHC RBC AUTO-ENTMCNC: 32.2 G/DL (ref 32–36)
MCV RBC AUTO: 91 FL (ref 82–98)
MCV RBC AUTO: 94 FL (ref 82–98)
MONOCYTES # BLD AUTO: 0.3 K/UL (ref 0.3–1)
MONOCYTES # BLD AUTO: 0.3 K/UL (ref 0.3–1)
MONOCYTES NFR BLD: 2.8 % (ref 4–15)
MONOCYTES NFR BLD: 2.9 % (ref 4–15)
NEUTROPHILS # BLD AUTO: 8.5 K/UL (ref 1.8–7.7)
NEUTROPHILS # BLD AUTO: 8.5 K/UL (ref 1.8–7.7)
NEUTROPHILS NFR BLD: 92.6 % (ref 38–73)
NEUTROPHILS NFR BLD: 95.1 % (ref 38–73)
NRBC BLD-RTO: 0 /100 WBC
NRBC BLD-RTO: 0 /100 WBC
PLATELET # BLD AUTO: 143 K/UL (ref 150–450)
PLATELET # BLD AUTO: 159 K/UL (ref 150–450)
PMV BLD AUTO: 11.3 FL (ref 9.2–12.9)
PMV BLD AUTO: 11.3 FL (ref 9.2–12.9)
POTASSIUM SERPL-SCNC: 5.4 MMOL/L (ref 3.5–5.1)
RBC # BLD AUTO: 3.86 M/UL (ref 4–5.4)
RBC # BLD AUTO: 4.46 M/UL (ref 4–5.4)
SARS-COV-2 AG RESP QL IA.RAPID: NEGATIVE
SODIUM SERPL-SCNC: 134 MMOL/L (ref 136–145)
WBC # BLD AUTO: 8.96 K/UL (ref 3.9–12.7)
WBC # BLD AUTO: 9.18 K/UL (ref 3.9–12.7)

## 2022-06-01 PROCEDURE — 27201056 IR ANGIOGRAM VISCERAL

## 2022-06-01 PROCEDURE — 88342 IMHCHEM/IMCYTCHM 1ST ANTB: CPT | Performed by: PATHOLOGY

## 2022-06-01 PROCEDURE — 63600175 PHARM REV CODE 636 W HCPCS: Performed by: ANESTHESIOLOGY

## 2022-06-01 PROCEDURE — 88365 INSITU HYBRIDIZATION (FISH): CPT | Performed by: PATHOLOGY

## 2022-06-01 PROCEDURE — 25000003 PHARM REV CODE 250: Performed by: NURSE ANESTHETIST, CERTIFIED REGISTERED

## 2022-06-01 PROCEDURE — 37000008 HC ANESTHESIA 1ST 15 MINUTES

## 2022-06-01 PROCEDURE — 88184 FLOWCYTOMETRY/ TC 1 MARKER: CPT | Performed by: PATHOLOGY

## 2022-06-01 PROCEDURE — 25000003 PHARM REV CODE 250: Performed by: STUDENT IN AN ORGANIZED HEALTH CARE EDUCATION/TRAINING PROGRAM

## 2022-06-01 PROCEDURE — 85025 COMPLETE CBC W/AUTO DIFF WBC: CPT | Mod: 91 | Performed by: STUDENT IN AN ORGANIZED HEALTH CARE EDUCATION/TRAINING PROGRAM

## 2022-06-01 PROCEDURE — 88341 PR IHC OR ICC EACH ADD'L SINGLE ANTIBODY  STAINPR: ICD-10-PCS | Mod: 26,,, | Performed by: PATHOLOGY

## 2022-06-01 PROCEDURE — 63600175 PHARM REV CODE 636 W HCPCS: Performed by: NURSE ANESTHETIST, CERTIFIED REGISTERED

## 2022-06-01 PROCEDURE — 88305 TISSUE EXAM BY PATHOLOGIST: ICD-10-PCS | Mod: 26,,, | Performed by: PATHOLOGY

## 2022-06-01 PROCEDURE — 88189 PR  FLOWCYTOMETRY/READ, 16 & > MARKERS: ICD-10-PCS | Mod: ,,, | Performed by: PATHOLOGY

## 2022-06-01 PROCEDURE — 88364 INSITU HYBRIDIZATION (FISH): CPT | Mod: 26,,, | Performed by: PATHOLOGY

## 2022-06-01 PROCEDURE — D9220A PRA ANESTHESIA: Mod: 59,ANES,, | Performed by: ANESTHESIOLOGY

## 2022-06-01 PROCEDURE — 36253 IR ANGIOGRAM VISCERAL: ICD-10-PCS | Mod: RT,,, | Performed by: STUDENT IN AN ORGANIZED HEALTH CARE EDUCATION/TRAINING PROGRAM

## 2022-06-01 PROCEDURE — D9220A PRA ANESTHESIA: ICD-10-PCS | Mod: CRNA,,, | Performed by: NURSE ANESTHETIST, CERTIFIED REGISTERED

## 2022-06-01 PROCEDURE — 88333 PATH CONSLTJ SURG CYTO XM 1: CPT | Performed by: PATHOLOGY

## 2022-06-01 PROCEDURE — 88189 FLOWCYTOMETRY/READ 16 & >: CPT | Mod: ,,, | Performed by: PATHOLOGY

## 2022-06-01 PROCEDURE — 88365 INSITU HYBRIDIZATION (FISH): CPT | Mod: 26,,, | Performed by: PATHOLOGY

## 2022-06-01 PROCEDURE — 74176 CT ABD & PELVIS W/O CONTRAST: CPT | Mod: 26,,, | Performed by: RADIOLOGY

## 2022-06-01 PROCEDURE — 88341 IMHCHEM/IMCYTCHM EA ADD ANTB: CPT | Mod: 59 | Performed by: PATHOLOGY

## 2022-06-01 PROCEDURE — 63600175 PHARM REV CODE 636 W HCPCS: Performed by: STUDENT IN AN ORGANIZED HEALTH CARE EDUCATION/TRAINING PROGRAM

## 2022-06-01 PROCEDURE — 86901 BLOOD TYPING SEROLOGIC RH(D): CPT | Performed by: STUDENT IN AN ORGANIZED HEALTH CARE EDUCATION/TRAINING PROGRAM

## 2022-06-01 PROCEDURE — 27000221 HC OXYGEN, UP TO 24 HOURS

## 2022-06-01 PROCEDURE — D9220A PRA ANESTHESIA: Mod: 59,CRNA,, | Performed by: STUDENT IN AN ORGANIZED HEALTH CARE EDUCATION/TRAINING PROGRAM

## 2022-06-01 PROCEDURE — 88185 FLOWCYTOMETRY/TC ADD-ON: CPT | Performed by: PATHOLOGY

## 2022-06-01 PROCEDURE — 88333 PR  INTRAOPERATIVE CYTO PATH CONSULT, INITIAL SITE: ICD-10-PCS | Mod: 26,,, | Performed by: PATHOLOGY

## 2022-06-01 PROCEDURE — 88342 CHG IMMUNOCYTOCHEMISTRY: ICD-10-PCS | Mod: 26,59,, | Performed by: PATHOLOGY

## 2022-06-01 PROCEDURE — D9220A PRA ANESTHESIA: ICD-10-PCS | Mod: 59,CRNA,, | Performed by: STUDENT IN AN ORGANIZED HEALTH CARE EDUCATION/TRAINING PROGRAM

## 2022-06-01 PROCEDURE — 88364 INSITU HYBRIDIZATION (FISH): CPT | Performed by: PATHOLOGY

## 2022-06-01 PROCEDURE — D9220A PRA ANESTHESIA: ICD-10-PCS | Mod: ANES,,, | Performed by: ANESTHESIOLOGY

## 2022-06-01 PROCEDURE — D9220A PRA ANESTHESIA: ICD-10-PCS | Mod: 59,ANES,, | Performed by: ANESTHESIOLOGY

## 2022-06-01 PROCEDURE — 63600175 PHARM REV CODE 636 W HCPCS

## 2022-06-01 PROCEDURE — 74176 CT ABDOMEN PELVIS WITHOUT CONTRAST: ICD-10-PCS | Mod: 26,,, | Performed by: RADIOLOGY

## 2022-06-01 PROCEDURE — 88365 PR  TISSUE HYBRIDIZATION: ICD-10-PCS | Mod: 26,,, | Performed by: PATHOLOGY

## 2022-06-01 PROCEDURE — 37000009 HC ANESTHESIA EA ADD 15 MINS

## 2022-06-01 PROCEDURE — 25000003 PHARM REV CODE 250: Performed by: FAMILY MEDICINE

## 2022-06-01 PROCEDURE — 50200 IR BIOPSY KIDNEY: ICD-10-PCS | Mod: 51,RT,, | Performed by: STUDENT IN AN ORGANIZED HEALTH CARE EDUCATION/TRAINING PROGRAM

## 2022-06-01 PROCEDURE — 88342 IMHCHEM/IMCYTCHM 1ST ANTB: CPT | Mod: 26,59,, | Performed by: PATHOLOGY

## 2022-06-01 PROCEDURE — 36253 INS CATH REN ART 2ND+ UNILAT: CPT | Mod: RT | Performed by: STUDENT IN AN ORGANIZED HEALTH CARE EDUCATION/TRAINING PROGRAM

## 2022-06-01 PROCEDURE — D9220A PRA ANESTHESIA: Mod: ANES,,, | Performed by: ANESTHESIOLOGY

## 2022-06-01 PROCEDURE — D9220A PRA ANESTHESIA: Mod: CRNA,,, | Performed by: NURSE ANESTHETIST, CERTIFIED REGISTERED

## 2022-06-01 PROCEDURE — 77012 PR  CT GUIDANCE NEEDLE PLACEMENT: ICD-10-PCS | Mod: 26,,, | Performed by: STUDENT IN AN ORGANIZED HEALTH CARE EDUCATION/TRAINING PROGRAM

## 2022-06-01 PROCEDURE — 88305 TISSUE EXAM BY PATHOLOGIST: CPT | Performed by: PATHOLOGY

## 2022-06-01 PROCEDURE — 88341 IMHCHEM/IMCYTCHM EA ADD ANTB: CPT | Mod: 26,,, | Performed by: PATHOLOGY

## 2022-06-01 PROCEDURE — 88305 TISSUE EXAM BY PATHOLOGIST: CPT | Mod: 26,,, | Performed by: PATHOLOGY

## 2022-06-01 PROCEDURE — 50200 RENAL BIOPSY PERQ: CPT | Mod: RT | Performed by: STUDENT IN AN ORGANIZED HEALTH CARE EDUCATION/TRAINING PROGRAM

## 2022-06-01 PROCEDURE — 74176 CT ABD & PELVIS W/O CONTRAST: CPT | Mod: TC

## 2022-06-01 PROCEDURE — 80048 BASIC METABOLIC PNL TOTAL CA: CPT | Performed by: HOSPITALIST

## 2022-06-01 PROCEDURE — 77012 CT SCAN FOR NEEDLE BIOPSY: CPT | Mod: TC | Performed by: STUDENT IN AN ORGANIZED HEALTH CARE EDUCATION/TRAINING PROGRAM

## 2022-06-01 PROCEDURE — 77012 CT SCAN FOR NEEDLE BIOPSY: CPT | Mod: 26,,, | Performed by: STUDENT IN AN ORGANIZED HEALTH CARE EDUCATION/TRAINING PROGRAM

## 2022-06-01 PROCEDURE — 27201068 IR BIOPSY KIDNEY

## 2022-06-01 PROCEDURE — 88333 PATH CONSLTJ SURG CYTO XM 1: CPT | Mod: 26,,, | Performed by: PATHOLOGY

## 2022-06-01 PROCEDURE — 20600001 HC STEP DOWN PRIVATE ROOM

## 2022-06-01 PROCEDURE — 63600175 PHARM REV CODE 636 W HCPCS: Performed by: INTERNAL MEDICINE

## 2022-06-01 PROCEDURE — 27800607 IR ANGIOGRAM VISCERAL

## 2022-06-01 PROCEDURE — 85025 COMPLETE CBC W/AUTO DIFF WBC: CPT | Performed by: HOSPITALIST

## 2022-06-01 PROCEDURE — 94761 N-INVAS EAR/PLS OXIMETRY MLT: CPT

## 2022-06-01 PROCEDURE — 99900035 HC TECH TIME PER 15 MIN (STAT)

## 2022-06-01 PROCEDURE — 88364 CHG INSITU HYBRIDIZATION (FISH: ICD-10-PCS | Mod: 26,,, | Performed by: PATHOLOGY

## 2022-06-01 RX ORDER — PROPOFOL 10 MG/ML
VIAL (ML) INTRAVENOUS
Status: DISCONTINUED | OUTPATIENT
Start: 2022-06-01 | End: 2022-06-01

## 2022-06-01 RX ORDER — SUCCINYLCHOLINE CHLORIDE 20 MG/ML
INJECTION INTRAMUSCULAR; INTRAVENOUS
Status: DISCONTINUED | OUTPATIENT
Start: 2022-06-01 | End: 2022-06-01

## 2022-06-01 RX ORDER — HYDROMORPHONE HYDROCHLORIDE 1 MG/ML
INJECTION, SOLUTION INTRAMUSCULAR; INTRAVENOUS; SUBCUTANEOUS
Status: DISPENSED
Start: 2022-06-01 | End: 2022-06-02

## 2022-06-01 RX ORDER — SODIUM CHLORIDE 9 MG/ML
INJECTION, SOLUTION INTRAVENOUS CONTINUOUS
Status: DISCONTINUED | OUTPATIENT
Start: 2022-06-01 | End: 2022-06-02

## 2022-06-01 RX ORDER — FENTANYL CITRATE 50 UG/ML
25 INJECTION, SOLUTION INTRAMUSCULAR; INTRAVENOUS EVERY 5 MIN PRN
Status: COMPLETED | OUTPATIENT
Start: 2022-06-01 | End: 2022-06-01

## 2022-06-01 RX ORDER — HYDROMORPHONE HYDROCHLORIDE 1 MG/ML
1 INJECTION, SOLUTION INTRAMUSCULAR; INTRAVENOUS; SUBCUTANEOUS
Status: DISCONTINUED | OUTPATIENT
Start: 2022-06-01 | End: 2022-06-01 | Stop reason: SDUPTHER

## 2022-06-01 RX ORDER — LABETALOL HYDROCHLORIDE 5 MG/ML
INJECTION, SOLUTION INTRAVENOUS
Status: DISCONTINUED | OUTPATIENT
Start: 2022-06-01 | End: 2022-06-01

## 2022-06-01 RX ORDER — FENTANYL CITRATE 50 UG/ML
INJECTION, SOLUTION INTRAMUSCULAR; INTRAVENOUS
Status: DISCONTINUED | OUTPATIENT
Start: 2022-06-01 | End: 2022-06-01

## 2022-06-01 RX ORDER — DEXAMETHASONE SODIUM PHOSPHATE 4 MG/ML
INJECTION, SOLUTION INTRA-ARTICULAR; INTRALESIONAL; INTRAMUSCULAR; INTRAVENOUS; SOFT TISSUE
Status: DISCONTINUED | OUTPATIENT
Start: 2022-06-01 | End: 2022-06-01

## 2022-06-01 RX ORDER — LIDOCAINE HYDROCHLORIDE 20 MG/ML
INJECTION, SOLUTION EPIDURAL; INFILTRATION; INTRACAUDAL; PERINEURAL
Status: DISCONTINUED | OUTPATIENT
Start: 2022-06-01 | End: 2022-06-01

## 2022-06-01 RX ORDER — MIDAZOLAM HYDROCHLORIDE 1 MG/ML
INJECTION, SOLUTION INTRAMUSCULAR; INTRAVENOUS
Status: DISCONTINUED | OUTPATIENT
Start: 2022-06-01 | End: 2022-06-01

## 2022-06-01 RX ORDER — HYDROMORPHONE HYDROCHLORIDE 1 MG/ML
INJECTION, SOLUTION INTRAMUSCULAR; INTRAVENOUS; SUBCUTANEOUS
Status: COMPLETED
Start: 2022-06-01 | End: 2022-06-01

## 2022-06-01 RX ORDER — SODIUM CHLORIDE 0.9 % (FLUSH) 0.9 %
3 SYRINGE (ML) INJECTION
Status: DISCONTINUED | OUTPATIENT
Start: 2022-06-01 | End: 2022-06-02

## 2022-06-01 RX ORDER — LABETALOL HCL 20 MG/4 ML
10 SYRINGE (ML) INTRAVENOUS
Status: DISCONTINUED | OUTPATIENT
Start: 2022-06-01 | End: 2022-06-02 | Stop reason: HOSPADM

## 2022-06-01 RX ORDER — KETAMINE HCL IN 0.9 % NACL 50 MG/5 ML
SYRINGE (ML) INTRAVENOUS
Status: DISCONTINUED | OUTPATIENT
Start: 2022-06-01 | End: 2022-06-01

## 2022-06-01 RX ORDER — SODIUM CHLORIDE 0.9 % (FLUSH) 0.9 %
10 SYRINGE (ML) INJECTION
Status: DISCONTINUED | OUTPATIENT
Start: 2022-06-01 | End: 2022-06-02

## 2022-06-01 RX ORDER — ONDANSETRON 2 MG/ML
INJECTION INTRAMUSCULAR; INTRAVENOUS
Status: DISCONTINUED | OUTPATIENT
Start: 2022-06-01 | End: 2022-06-01

## 2022-06-01 RX ORDER — ROCURONIUM BROMIDE 10 MG/ML
INJECTION, SOLUTION INTRAVENOUS
Status: DISCONTINUED | OUTPATIENT
Start: 2022-06-01 | End: 2022-06-01

## 2022-06-01 RX ORDER — HALOPERIDOL 5 MG/ML
0.5 INJECTION INTRAMUSCULAR EVERY 10 MIN PRN
Status: COMPLETED | OUTPATIENT
Start: 2022-06-01 | End: 2022-06-01

## 2022-06-01 RX ORDER — HYDROMORPHONE HYDROCHLORIDE 1 MG/ML
0.2 INJECTION, SOLUTION INTRAMUSCULAR; INTRAVENOUS; SUBCUTANEOUS EVERY 5 MIN PRN
Status: CANCELLED | OUTPATIENT
Start: 2022-06-01

## 2022-06-01 RX ORDER — SODIUM CHLORIDE 0.9 % (FLUSH) 0.9 %
10 SYRINGE (ML) INJECTION
Status: CANCELLED | OUTPATIENT
Start: 2022-06-01

## 2022-06-01 RX ORDER — HYDROMORPHONE HYDROCHLORIDE 1 MG/ML
1 INJECTION, SOLUTION INTRAMUSCULAR; INTRAVENOUS; SUBCUTANEOUS
Status: DISCONTINUED | OUTPATIENT
Start: 2022-06-01 | End: 2022-06-02

## 2022-06-01 RX ORDER — LIDOCAINE HYDROCHLORIDE 10 MG/ML
1 INJECTION, SOLUTION EPIDURAL; INFILTRATION; INTRACAUDAL; PERINEURAL ONCE
Status: DISCONTINUED | OUTPATIENT
Start: 2022-06-01 | End: 2022-06-02

## 2022-06-01 RX ORDER — HYDROMORPHONE HYDROCHLORIDE 1 MG/ML
0.2 INJECTION, SOLUTION INTRAMUSCULAR; INTRAVENOUS; SUBCUTANEOUS EVERY 5 MIN PRN
Status: DISCONTINUED | OUTPATIENT
Start: 2022-06-01 | End: 2022-06-02

## 2022-06-01 RX ADMIN — LIDOCAINE HYDROCHLORIDE 80 MG: 20 INJECTION, SOLUTION EPIDURAL; INFILTRATION; INTRACAUDAL at 10:06

## 2022-06-01 RX ADMIN — ROCURONIUM BROMIDE 5 MG: 10 INJECTION INTRAVENOUS at 06:06

## 2022-06-01 RX ADMIN — ONDANSETRON 4 MG: 2 INJECTION, SOLUTION INTRAMUSCULAR; INTRAVENOUS at 07:06

## 2022-06-01 RX ADMIN — LABETALOL HYDROCHLORIDE 10 MG: 5 INJECTION INTRAVENOUS at 07:06

## 2022-06-01 RX ADMIN — FENTANYL CITRATE 25 MCG: 50 INJECTION INTRAMUSCULAR; INTRAVENOUS at 12:06

## 2022-06-01 RX ADMIN — PROPOFOL 170 MG: 10 INJECTION, EMULSION INTRAVENOUS at 10:06

## 2022-06-01 RX ADMIN — SUGAMMADEX 200 MG: 100 INJECTION, SOLUTION INTRAVENOUS at 11:06

## 2022-06-01 RX ADMIN — ROCURONIUM BROMIDE 40 MG: 10 INJECTION, SOLUTION INTRAVENOUS at 10:06

## 2022-06-01 RX ADMIN — SODIUM CHLORIDE, SODIUM GLUCONATE, SODIUM ACETATE, POTASSIUM CHLORIDE, MAGNESIUM CHLORIDE, SODIUM PHOSPHATE, DIBASIC, AND POTASSIUM PHOSPHATE: .53; .5; .37; .037; .03; .012; .00082 INJECTION, SOLUTION INTRAVENOUS at 06:06

## 2022-06-01 RX ADMIN — SUCCINYLCHOLINE CHLORIDE 160 MG: 20 INJECTION, SOLUTION INTRAMUSCULAR; INTRAVENOUS; PARENTERAL at 06:06

## 2022-06-01 RX ADMIN — SODIUM CHLORIDE: 0.9 INJECTION, SOLUTION INTRAVENOUS at 10:06

## 2022-06-01 RX ADMIN — PROPOFOL 110 MG: 10 INJECTION, EMULSION INTRAVENOUS at 06:06

## 2022-06-01 RX ADMIN — FENTANYL CITRATE 100 MCG: 50 INJECTION INTRAMUSCULAR; INTRAVENOUS at 06:06

## 2022-06-01 RX ADMIN — HYDROMORPHONE HYDROCHLORIDE 1 MG: 1 INJECTION, SOLUTION INTRAMUSCULAR; INTRAVENOUS; SUBCUTANEOUS at 03:06

## 2022-06-01 RX ADMIN — HYDROMORPHONE HYDROCHLORIDE 1 MG: 1 INJECTION, SOLUTION INTRAMUSCULAR; INTRAVENOUS; SUBCUTANEOUS at 02:06

## 2022-06-01 RX ADMIN — Medication 10 MG: at 06:06

## 2022-06-01 RX ADMIN — FENTANYL CITRATE 100 MCG: 50 INJECTION, SOLUTION INTRAMUSCULAR; INTRAVENOUS at 10:06

## 2022-06-01 RX ADMIN — MIDAZOLAM HYDROCHLORIDE 2 MG: 1 INJECTION, SOLUTION INTRAMUSCULAR; INTRAVENOUS at 10:06

## 2022-06-01 RX ADMIN — HYDROMORPHONE HYDROCHLORIDE 0.2 MG: 1 INJECTION, SOLUTION INTRAMUSCULAR; INTRAVENOUS; SUBCUTANEOUS at 11:06

## 2022-06-01 RX ADMIN — HALOPERIDOL LACTATE 0.5 MG: 5 INJECTION, SOLUTION INTRAMUSCULAR at 02:06

## 2022-06-01 RX ADMIN — HYDROMORPHONE HYDROCHLORIDE 0.2 MG: 1 INJECTION, SOLUTION INTRAMUSCULAR; INTRAVENOUS; SUBCUTANEOUS at 09:06

## 2022-06-01 RX ADMIN — DEXAMETHASONE SODIUM PHOSPHATE 4 MG: 4 INJECTION INTRA-ARTICULAR; INTRALESIONAL; INTRAMUSCULAR; INTRAVENOUS; SOFT TISSUE at 06:06

## 2022-06-01 RX ADMIN — SODIUM CHLORIDE: 0.9 INJECTION, SOLUTION INTRAVENOUS at 05:06

## 2022-06-01 RX ADMIN — Medication 20 MG: at 06:06

## 2022-06-01 RX ADMIN — Medication 10 MG: at 07:06

## 2022-06-01 RX ADMIN — MIDAZOLAM 2 MG: 1 INJECTION INTRAMUSCULAR; INTRAVENOUS at 06:06

## 2022-06-01 RX ADMIN — LIDOCAINE HYDROCHLORIDE 80 MG: 20 INJECTION, SOLUTION EPIDURAL; INFILTRATION; INTRACAUDAL at 06:06

## 2022-06-01 RX ADMIN — SODIUM CHLORIDE: 0.9 INJECTION, SOLUTION INTRAVENOUS at 08:06

## 2022-06-01 RX ADMIN — ONDANSETRON 4 MG: 2 INJECTION, SOLUTION INTRAMUSCULAR; INTRAVENOUS at 11:06

## 2022-06-01 NOTE — ANESTHESIA POSTPROCEDURE EVALUATION
Anesthesia Post Evaluation    Patient: Gabi Newton    Procedure(s) Performed: * No procedures listed *    Final Anesthesia Type: general      Patient location during evaluation: PACU  Patient participation: Yes- Able to Participate  Level of consciousness: awake and alert and oriented  Post-procedure vital signs: reviewed and stable  Pain management: adequate  Airway patency: patent    PONV status at discharge: No PONV  Anesthetic complications: no      Cardiovascular status: blood pressure returned to baseline  Respiratory status: unassisted, room air and spontaneous ventilation  Hydration status: euvolemic  Follow-up not needed.          Vitals Value Taken Time   /63 06/01/22 1148   Temp 37 06/01/22 1152   Pulse 84 06/01/22 1151   Resp 0 06/01/22 1151   SpO2 92 % 06/01/22 1151   Vitals shown include unvalidated device data.      No case tracking events are documented in the log.      Pain/Mao Score: Mao Score: 9 (6/1/2022 11:47 AM)

## 2022-06-01 NOTE — NURSING TRANSFER
Nursing Transfer Note      6/1/2022     Reason patient is being transferred: Meets criteria for transfer to [facility]    Transfer To: [facility] 30    Transfer via stretcher    Transfer with 2L to O2    Transported by RN    Medicines sent: none    Any special needs or follow-up needed: discharge isntructions    Chart send with patient: Yes    Notified: Sister    Patient reassessed at: 6/1

## 2022-06-01 NOTE — H&P
Inpatient Radiology Pre-procedure Note    History of Present Illness:  Gabi Newton is a 56 y.o. female with who presented for right renal mass biopsy. Post biopsy in recovery area pt complained of 10/10 pain, US taken with to exam biopsy site. No abnormality could be visualized, however the kidney could not be clearly identified either. CT abdomen and pelvis obtained and on reviewing demonstrates Perirenal hemorrhage. Will plan on taking patient to IR suite for visceral angiogram and possible embolization. Will obtain CBC and type and screen prior to procedure.     Admission H&P reviewed.    Past Medical History:   Diagnosis Date    Abdominal pain     Anemia     Anxiety     Back pain     Deep vein thrombosis     Disorder of kidney and ureter     Fatty liver 2018    Gastric lymphoma 2019    Gastric tumor     GERD (gastroesophageal reflux disease)     Hyperlipidemia     Hypertension     Splenomegaly 2018     Past Surgical History:   Procedure Laterality Date    bilateral iliac vein stenosis with stenting      CARDIAC CATHETERIZATION      CARDIAC CATHETERIZATION  2018    had chest pains . states vessels at the bottom of heart collapsed     SECTION      x3    CHOLECYSTECTOMY      COLONOSCOPY      ENDOSCOPIC ULTRASOUND OF UPPER GASTROINTESTINAL TRACT Left 11/15/2019    Procedure: ULTRASOUND, UPPER GI TRACT, ENDOSCOPIC;  Surgeon: Mike Seay MD;  Location: [facility] ([address] [place] [address]);  Service: Endoscopy;  Laterality: Left;  Ok to hold xarelto per protocol per Dr. Lloyd see media file 10/25/19-tb    ESOPHAGOGASTRODUODENOSCOPY      ESOPHAGOGASTRODUODENOSCOPY N/A 2018    Procedure: EGD (ESOPHAGOGASTRODUODENOSCOPY);  Surgeon: Ant Camejo MD;  Location: [facility];  Service: Endoscopy;  Laterality: N/A;    ESOPHAGOGASTRODUODENOSCOPY N/A 10/15/2019    Procedure: EGD (ESOPHAGOGASTRODUODENOSCOPY);  Surgeon: Melanie Cedeno MD;  Location: [facility];  Service: Endoscopy;   Laterality: N/A;    ESOPHAGOGASTRODUODENOSCOPY N/A 11/15/2019    Procedure: EGD (ESOPHAGOGASTRODUODENOSCOPY);  Surgeon: Mike Seay MD;  Location: [facility] ENDO (2ND FLR);  Service: Endoscopy;  Laterality: N/A;    EXCISION OF LESION OF LIP Left 1/12/2022    Procedure: EXCISION, LESION, LIP;  Surgeon: Glen Ginag MD;  [facility];  Service: ENT;  Laterality: Left;  Lip resection    HYSTERECTOMY      PHLEBOGRAPHY Bilateral 10/16/2018    Procedure: VENOGRAM;  Surgeon: Colin Mathis MD;  [facility] C[facility];  Service: Cardiology;  Laterality: Bilateral;    MS RT/LT HEART CATHETERS Left     Coronary Arteriogram-2 Cath    RHINOPLASTY      SENTINEL LYMPH NODE BIOPSY  1/12/2022    Procedure: BIOPSY, LYMPH NODE, SENTINEL;  Surgeon: Glen Giang MD;  Location: [facility] OR [address] [place] [address];  Service: ENT;;    TUBAL LIGATION      venogram Bilateral 10/16/2018       Review of Systems:   As documented in primary team H&P    Home Meds:   Prior to Admission medications    Medication Sig Start Date End Date Taking? Authorizing Provider   allopurinoL (ZYLOPRIM) 300 MG tablet Take 1 tablet (300 mg total) by mouth once daily.  Patient taking differently: Take 300 mg by mouth every evening. 2/1/22 2/1/23 Yes Shukri Shea MD   hydroxyzine HCL (ATARAX) 25 MG tablet Take 25 mg by mouth every evening. 3/4/20  Yes Historical Provider   lisinopriL 10 MG tablet Take 10 mg by mouth once daily. 1/3/22  Yes Historical Provider   methadone (METHADOSE) 40 mg disintegrating tablet Take 40 mg by mouth once daily. PATIENT USUALLY TAKES AT 10AM   Yes Historical Provider   ondansetron (ZOFRAN-ODT) 8 MG TbDL Dissolve 1 tablet (8 mg total) by mouth every 8 (eight) hours as needed (nausea). 1/12/22  Yes Flaco Rose MD   simvastatin (ZOCOR) 40 MG tablet Take 40 mg by mouth every evening.  7/21/14  Yes Historical Provider   triamcinolone acetonide 0.1% (KENALOG) 0.1 % ointment Apply topically 2 (two) times daily.  To be applied to itch rash on abdomen once daily 11/23/21  Yes Robles Jara MD   aspirin (ECOTRIN) 81 MG EC tablet Take 81 mg by mouth every evening.    Historical Provider   naloxone (NARCAN) 4 mg/actuation Spry 4mg by nasal route as needed for opioid overdose; may repeat every 2-3 minutes in alternating nostrils until medical help arrives. Call 911 1/20/22   Yolande Cunha NP   nicotine (NICODERM CQ) 21 mg/24 hr 1 patch once daily. 3/11/22   Historical Provider   nitroGLYCERIN (NITROSTAT) 0.4 MG SL tablet Place 0.4 mg under the tongue every 5 (five) minutes as needed for Chest pain.    Historical Provider   amLODIPine (NORVASC) 5 MG tablet Take 5 mg by mouth once daily.  11/8/17  Historical Provider   furosemide (LASIX) 40 MG tablet Take 1 tablet (40 mg total) by mouth once daily. 12/21/21 6/1/22  Shukri Shea MD   gemfibroziL (LOPID) 600 MG tablet Take 600 mg by mouth 2 (two) times daily. 11/14/21 6/1/22  Historical Provider   isosorbide mononitrate (IMDUR) 60 MG 24 hr tablet Take 60 mg by mouth once daily.  11/8/17  Historical Provider     Scheduled Meds:    HYDROmorphone        LIDOcaine (PF) 10 mg/ml (1%)  1 mL Other Once     Continuous Infusions:    sodium chloride 0.9%       PRN Meds:HYDROmorphone, sodium chloride 0.9%  Anticoagulants/Antiplatelets: aspirin    Allergies:   Review of patient's allergies indicates:   Allergen Reactions    Azithromycin Anaphylaxis     Other reaction(s): swelling to entire body  Swelling (tongue / lips)^      Ciprofloxacin Swelling     Throat swells    Codeine      Swelling (throat)^    Tolerates Morphine      Codeine sulfate      Swelling (throat)^    Tolerates Morphine     Sedation Hx: have not been any systemic reactions    Labs:  No results for input(s): INR in the last 168 hours.    Invalid input(s):  PT,  PTT    Recent Labs   Lab 05/26/22  1357   WBC 4.80   HGB 13.7   HCT 43.8   MCV 95         Recent Labs   Lab 05/26/22  1357   *       K 4.0   CL 98   CO2 26   BUN 6   CREATININE 0.8   CALCIUM 9.4   ALT 47*   AST 40   ALBUMIN 3.8   BILITOT 0.4         Vitals:  Temp: 97.3 °F (36.3 °C) (06/01/22 1335)  Pulse: 83 (06/01/22 1700)  Resp: 20 (06/01/22 1700)  BP: (!) 169/85 (06/01/22 1545)  SpO2: 98 % (06/01/22 1700)     Physical Exam:  ASA: Per anesthesia   Mallampati: Per anesthesia     General: no acute distress  Mental Status: alert and oriented to person, place and time  HEENT: normocephalic, atraumatic  Chest: unlabored breathing  Heart: regular heart rate  Abdomen: nondistended  Extremity: moves all extremities      Plan:  Sedation Plan: Per anesthesia  Patient will undergo visceral angiogram with possible embolization.          Reynaldo Ryder MD  Radiology Resident PGY- 2  [facility]

## 2022-06-01 NOTE — ANESTHESIA PREPROCEDURE EVALUATION
06/01/2022  Gabi Newton is a 56 y.o., female.      Pre-op Assessment    I have reviewed the Patient Summary Reports.     I have reviewed the Nursing Notes. I have reviewed the NPO Status.   I have reviewed the Medications.     Review of Systems  Anesthesia Hx:  No problems with previous Anesthesia  History of prior surgery of interest to airway management or planning: Denies Family Hx of Anesthesia complications.   Denies Personal Hx of Anesthesia complications.   Hematology/Oncology:        Hematology Comments: Lymphoma  Hx of DVT RLE Current/Recent Cancer.   EENT/Dental:EENT/Dental Normal   Cardiovascular:   Exercise tolerance: good Hypertension CAD asymptomatic  hyperlipidemia ECG has been reviewed.    Pulmonary:  Pulmonary Normal    Renal/:  Renal/ Normal     Hepatic/GI:   GERD Liver Disease,    Musculoskeletal:  Musculoskeletal Normal    Neurological:  Neurology Normal    Endocrine:  Endocrine Normal  Morbid Obesity / BMI > 40  Dermatological:  Skin Normal    Psych:  Psychiatric Normal           Physical Exam  General: Cooperative, Alert and Oriented    Airway:  Mallampati: II   Mouth Opening: Normal  TM Distance: Normal  Tongue: Normal  Neck ROM: Normal ROM    Dental:  Intact        Anesthesia Plan  Type of Anesthesia, risks & benefits discussed:    Anesthesia Type: Gen ETT  Intra-op Monitoring Plan: Standard ASA Monitors  Post Op Pain Control Plan: multimodal analgesia and IV/PO Opioids PRN  Induction:  IV  Airway Plan: Direct, Post-Induction  Informed Consent: Informed consent signed with the Patient and all parties understand the risks and agree with anesthesia plan.  All questions answered.   ASA Score: 3  Day of Surgery Review of History & Physical: H&P Update referred to the surgeon/provider.    Ready For Surgery From Anesthesia Perspective.     .

## 2022-06-01 NOTE — Clinical Note
A pre-sedation assessment was completed by the physician immediately prior to sedation start.   See anesthesia records

## 2022-06-01 NOTE — ANESTHESIA PROCEDURE NOTES
Intubation    Date/Time: 6/1/2022 10:49 AM  Performed by: Katelyn Herron CRNA  Authorized by: Abdullahi Cohn MD     Intubation:     Induction:  Intravenous    Intubated:  Postinduction    Mask Ventilation:  Easy with oral airway    Attempts:  1    Attempted By:  Staff anesthesiologist    Method of Intubation:  Direct    Blade:  Gee 2    Laryngeal View Grade: Grade I - full view of cords      Difficult Airway Encountered?: No      Complications:  None    Airway Device:  Oral endotracheal tube    Airway Device Size:  7.0    Style/Cuff Inflation:  Cuffed (inflated to minimal occlusive pressure)    Tube secured:  22    Secured at:  The lips    Placement Verified By:  Capnometry    Complicating Factors:  Obesity and oropharyngeal edema or fat    Findings Post-Intubation:  BS equal bilateral and atraumatic/condition of teeth unchanged    
independent

## 2022-06-01 NOTE — TRANSFER OF CARE
"Anesthesia Transfer of Care Note    Patient: Gabi Newton    Procedure(s) Performed: * No procedures listed *    Patient location: PACU    Anesthesia Type: general    Transport from OR: Transported from OR on 6-10 L/min O2 by face mask with adequate spontaneous ventilation    Post pain: adequate analgesia    Post assessment: no apparent anesthetic complications and tolerated procedure well    Post vital signs: stable    Level of consciousness: awake and alert    Nausea/Vomiting: no nausea/vomiting    Complications: none    Transfer of care protocol was followed      Last vitals:   Visit Vitals  BP (!) 156/119 (BP Location: Left arm, Patient Position: Lying)   Pulse 77   Temp 36.2 °C (97.2 °F) (Temporal)   Resp 20   Ht 5' 1" (1.549 m)   Wt 99.8 kg (220 lb)   LMP 02/11/2015 (Approximate)   SpO2 (!) 93%   Breastfeeding No   BMI 41.57 kg/m²     "

## 2022-06-01 NOTE — ANESTHESIA PROCEDURE NOTES
Intubation    Date/Time: 6/1/2022 6:28 PM  Performed by: Marco Antonio Javier MD  Authorized by: Nicolette Russ MD     Intubation:     Induction:  Intravenous    Intubated:  Postinduction    Mask Ventilation:  Easy mask    Attempts:  1    Attempted By:  Resident anesthesiologist    Method of Intubation:  Direct    Blade:  Gee 2    Laryngeal View Grade: Grade I - full view of cords      Difficult Airway Encountered?: No      Complications:  None    Airway Device:  Oral endotracheal tube    Airway Device Size:  7.5    Style/Cuff Inflation:  Cuffed (inflated to minimal occlusive pressure)    Tube secured:  22    Secured at:  The lips    Placement Verified By:  Capnometry    Complicating Factors:  None    Findings Post-Intubation:  BS equal bilateral and atraumatic/condition of teeth unchanged

## 2022-06-01 NOTE — PROGRESS NOTES
"Went to bedside to assess patient secondary to 10/10 pain. US taken with to exam biopsy site. No abnormality could be visualized, however the kidney could not be clearly identified either. Will obtain a CT to assess for hemorrhage.    Maykel Weaver MD ([place])  Interventional Radiology  [phone]      " 5

## 2022-06-01 NOTE — PLAN OF CARE
Pt IV patent and infusing, R lower back dressing clean dry and intact, no drainage. Pt reports pain controlled with medication

## 2022-06-01 NOTE — H&P
"Radiology Pre-procedure Note    History of Present Illness:  Gabi Newton is a 56 y.o. female with history of lymphoma and lip squamous cell carcinoma. Mass was initially identified in 2019. An ultrasound obtained on 2022 noted "Hypoechoic mass at the inferior pole of the right kidney 5.9 x 4.8 x 5.2 cm (previously measuring 4.2 x 4.2 x 4.2 cm.)."      Admission H&P reviewed.    Past Medical History:   Diagnosis Date    Abdominal pain     Anemia     Anxiety     Back pain     Deep vein thrombosis     Disorder of kidney and ureter     Fatty liver 2018    Gastric lymphoma 2019    Gastric tumor     GERD (gastroesophageal reflux disease)     Hyperlipidemia     Hypertension     Splenomegaly 2018     Past Surgical History:   Procedure Laterality Date    bilateral iliac vein stenosis with stenting      CARDIAC CATHETERIZATION      CARDIAC CATHETERIZATION  2018    had chest pains . states vessels at the bottom of heart collapsed     SECTION      x3    CHOLECYSTECTOMY      COLONOSCOPY      ENDOSCOPIC ULTRASOUND OF UPPER GASTROINTESTINAL TRACT Left 11/15/2019    Procedure: ULTRASOUND, UPPER GI TRACT, ENDOSCOPIC;  Surgeon: Mike Seay MD;  Location: [facility] ([address] [place] [address]);  Service: Endoscopy;  Laterality: Left;  Ok to hold xarelto per protocol per Dr. Lloyd see media file 10/25/19-tb    ESOPHAGOGASTRODUODENOSCOPY      ESOPHAGOGASTRODUODENOSCOPY N/A 2018    Procedure: EGD (ESOPHAGOGASTRODUODENOSCOPY);  Surgeon: Ant Camejo MD;  Location: [facility];  Service: Endoscopy;  Laterality: N/A;    ESOPHAGOGASTRODUODENOSCOPY N/A 10/15/2019    Procedure: EGD (ESOPHAGOGASTRODUODENOSCOPY);  Surgeon: Melanie Cedeno MD;  Location: [facility];  Service: Endoscopy;  Laterality: N/A;    ESOPHAGOGASTRODUODENOSCOPY N/A 11/15/2019    Procedure: EGD (ESOPHAGOGASTRODUODENOSCOPY);  Surgeon: Mike Seay MD;  Location: [facility] ([address] [place] [address]);  Service: Endoscopy;  Laterality: N/A; "    EXCISION OF LESION OF LIP Left 1/12/2022    Procedure: EXCISION, LESION, LIP;  Surgeon: Glen Giang MD;  Location: [facility] OR [address] [place] [address];  Service: ENT;  Laterality: Left;  Lip resection    HYSTERECTOMY      PHLEBOGRAPHY Bilateral 10/16/2018    Procedure: VENOGRAM;  Surgeon: Colin Mathis MD;  Location: [facility] CATH LAB;  Service: Cardiology;  Laterality: Bilateral;    TN RT/LT HEART CATHETERS Left     Coronary Arteriogram-2 Cath    RHINOPLASTY      SENTINEL LYMPH NODE BIOPSY  1/12/2022    Procedure: BIOPSY, LYMPH NODE, SENTINEL;  Surgeon: Glen Giang MD;  Location: [facility] OR [address] [place] [address];  Service: ENT;;    TUBAL LIGATION      venogram Bilateral 10/16/2018       Review of Systems:   As documented in primary team H&P    Home Meds:   Prior to Admission medications    Medication Sig Start Date End Date Taking? Authorizing Provider   allopurinoL (ZYLOPRIM) 300 MG tablet Take 1 tablet (300 mg total) by mouth once daily.  Patient taking differently: Take 300 mg by mouth every evening. 2/1/22 2/1/23 Yes Shukri Shea MD   hydroxyzine HCL (ATARAX) 25 MG tablet Take 25 mg by mouth every evening. 3/4/20  Yes Historical Provider   lisinopriL 10 MG tablet Take 10 mg by mouth once daily. 1/3/22  Yes Historical Provider   methadone (METHADOSE) 40 mg disintegrating tablet Take 40 mg by mouth once daily. PATIENT USUALLY TAKES AT 10AM   Yes Historical Provider   ondansetron (ZOFRAN-ODT) 8 MG TbDL Dissolve 1 tablet (8 mg total) by mouth every 8 (eight) hours as needed (nausea). 1/12/22  Yes Flaco Rose MD   simvastatin (ZOCOR) 40 MG tablet Take 40 mg by mouth every evening.  7/21/14  Yes Historical Provider   triamcinolone acetonide 0.1% (KENALOG) 0.1 % ointment Apply topically 2 (two) times daily. To be applied to itch rash on abdomen once daily 11/23/21  Yes Robles Jara MD   aspirin (ECOTRIN) 81 MG EC tablet Take 81 mg by mouth every evening.    Historical Provider   naloxone (NARCAN) 4 mg/actuation  Spry 4mg by nasal route as needed for opioid overdose; may repeat every 2-3 minutes in alternating nostrils until medical help arrives. Call 911 1/20/22   Yolande Cunha NP   nicotine (NICODERM CQ) 21 mg/24 hr 1 patch once daily. 3/11/22   Historical Provider   nitroGLYCERIN (NITROSTAT) 0.4 MG SL tablet Place 0.4 mg under the tongue every 5 (five) minutes as needed for Chest pain.    Historical Provider   amLODIPine (NORVASC) 5 MG tablet Take 5 mg by mouth once daily.  11/8/17  Historical Provider   furosemide (LASIX) 40 MG tablet Take 1 tablet (40 mg total) by mouth once daily. 12/21/21 6/1/22  Shukri Shea MD   gemfibroziL (LOPID) 600 MG tablet Take 600 mg by mouth 2 (two) times daily. 11/14/21 6/1/22  Historical Provider   isosorbide mononitrate (IMDUR) 60 MG 24 hr tablet Take 60 mg by mouth once daily.  11/8/17  Historical Provider     Scheduled Meds:    LIDOcaine (PF) 10 mg/ml (1%)  1 mL Other Once     Continuous Infusions:    sodium chloride 0.9%       PRN Meds:  Anticoagulants/Antiplatelets: aspirin    Allergies:   Review of patient's allergies indicates:   Allergen Reactions    Azithromycin Anaphylaxis     Other reaction(s): swelling to entire body  Swelling (tongue / lips)^      Ciprofloxacin Swelling     Throat swells    Codeine      Swelling (throat)^    Tolerates Morphine      Codeine sulfate      Swelling (throat)^    Tolerates Morphine     Sedation Hx: have not been any systemic reactions    Labs:  No results for input(s): INR in the last 168 hours.    Invalid input(s):  PT,  PTT    Recent Labs   Lab 05/26/22  1357   WBC 4.80   HGB 13.7   HCT 43.8   MCV 95         Recent Labs   Lab 05/26/22  1357   *      K 4.0   CL 98   CO2 26   BUN 6   CREATININE 0.8   CALCIUM 9.4   ALT 47*   AST 40   ALBUMIN 3.8   BILITOT 0.4         Vitals:  Temp: 99 °F (37.2 °C) (06/01/22 0900)  Pulse: 84 (06/01/22 0900)  Resp: 16 (06/01/22 0900)  BP: (!) 153/70 (06/01/22 0900)  SpO2: (!) 93 %  (06/01/22 0900)     Physical Exam:  ASA: per anesthesia.   Mallampati: per anesthesia.     General: no acute distress  Mental Status: alert and oriented to person, place and time  HEENT: normocephalic, atraumatic  Chest: unlabored breathing  Heart: regular heart rate  Abdomen: nondistended  Extremity: moves all extremities      Plan:  Sedation Plan: per anesthesia.   Patient will undergo right renal mass biopsy.          Reynaldo Ryder MD  Radiology Resident PGY- 2  [facility]

## 2022-06-01 NOTE — ANESTHESIA PREPROCEDURE EVALUATION
06/01/2022  Gabi Newton is a 56 y.o., female.    Pt had a right renal mass biopsy. Now with concern for bleed in post procedure imaging. Pt to be taken to OR with IR.    Pre-op Assessment    I have reviewed the Patient Summary Reports.     I have reviewed the Nursing Notes. I have reviewed the NPO Status.   I have reviewed the Medications.     Review of Systems  Anesthesia Hx:  No problems with previous Anesthesia  History of prior surgery of interest to airway management or planning: Denies Family Hx of Anesthesia complications.   Denies Personal Hx of Anesthesia complications.   Hematology/Oncology:        Hematology Comments: Lymphoma  Hx of DVT RLE Current/Recent Cancer.   EENT/Dental:EENT/Dental Normal   Cardiovascular:   Exercise tolerance: good Hypertension CAD asymptomatic  hyperlipidemia ECG has been reviewed.    Pulmonary:  Pulmonary Normal    Renal/:  Renal/ Normal     Hepatic/GI:   GERD Liver Disease,    Musculoskeletal:  Musculoskeletal Normal    Neurological:  Neurology Normal    Endocrine:  Endocrine Normal  Morbid Obesity / BMI > 40  Dermatological:  Skin Normal    Psych:  Psychiatric Normal           Physical Exam  General: Cooperative, Alert and Oriented    Airway:  Mallampati: II   Mouth Opening: Normal  TM Distance: Normal  Tongue: Normal  Neck ROM: Normal ROM    Dental:  Intact        Anesthesia Plan  Type of Anesthesia, risks & benefits discussed:    Anesthesia Type: Gen ETT  Intra-op Monitoring Plan: Standard ASA Monitors  Post Op Pain Control Plan: multimodal analgesia and IV/PO Opioids PRN  Induction:  IV  Airway Plan: Direct, Post-Induction  Informed Consent: Informed consent signed with the Patient and all parties understand the risks and agree with anesthesia plan.  All questions answered.   ASA Score: 3 Emergent  Day of Surgery Review of History & Physical: H&P Update  referred to the surgeon/provider.    Ready For Surgery From Anesthesia Perspective.     .

## 2022-06-01 NOTE — PLAN OF CARE
Procedure complete. Pt tolerated well. Anes monitoring pt. Awaiting orders and spot in PACU. 4hr recovery.

## 2022-06-01 NOTE — PROGRESS NOTES
Pt progressing towards baseline. VSS. Dressing to surgical site CDI. Pt oriented x4. Drowsy but arousable to soft voice and following all commands. No post-operative nausea. Tolerating sips of clear liquids without issue. NSR on the monitor. Pain well tolerated post-operatively. WCM.

## 2022-06-01 NOTE — PROGRESS NOTES
1515 Walked in to check on patient. Patient sitting on the side of the bed trying to urinate. External catheter in place. Informed patient again that she is on bedrest until 1530. Patient remained on the side of the bed.     1545 Ultrasound obtained per IR. Patient taken to CT after ultrasound per IR.     1617 Patient back in [facility] 30 awaiting CT results.     1730 Patient awaiting to go back to OR.

## 2022-06-01 NOTE — PROGRESS NOTES
Patient arrived to [facility] 30 from PACU until bedrest is up at 1530. Patient hooked to monitors. VSS. Procedural site dressing C/D/I. No drainage noted. Will continue to monitor.

## 2022-06-01 NOTE — PROGRESS NOTES
"CT reviewed and demonstrates Perirenal hemorrhage. Will plan on taking patient to IR suite for angiogram and possible embolization.     Will obtain CBC and type and screen prior to procedure.     Will plan on admitting patient following procedure.     Maykel Weaver MD ([facility])  Interventional Radiology  [phone]       "

## 2022-06-01 NOTE — PLAN OF CARE
Pt arrived to  for right kidney mass bx. Pt oriented to unit and staff. Plan of care reviewed with patient, patient verbalizes understanding. Comfort measures utilized. Pt safely transferred from stretcher to procedural table. Fall risk reviewed with patient, fall risk interventions maintained. Safety strap applied, positioner pillows utilized to minimize pressure points. Blankets applied. Pt prepped and draped utilizing standard sterile technique. Patient placed on continuous monitoring and monitored by anesthesia. Timeouts completed utilizing standard universal time-out, per department and facility policy. RN to remain at bedside. Pt resting comfortably. Denies pain/discomfort. See flow sheets for monitoring, medication administration, and updates.

## 2022-06-02 PROBLEM — I20.89 STABLE ANGINA: Status: ACTIVE | Noted: 2017-09-25

## 2022-06-02 PROBLEM — F43.20 ADJUSTMENT DISORDER: Status: ACTIVE | Noted: 2020-07-21

## 2022-06-02 PROBLEM — C00.9: Status: ACTIVE | Noted: 2022-03-10

## 2022-06-02 PROBLEM — E66.9 OBESE: Status: ACTIVE | Noted: 2022-06-02

## 2022-06-02 PROBLEM — E87.5 HYPERKALEMIA: Status: ACTIVE | Noted: 2022-06-02

## 2022-06-02 PROBLEM — D62 ACUTE BLOOD LOSS ANEMIA: Status: ACTIVE | Noted: 2022-06-02

## 2022-06-02 LAB
ANION GAP SERPL CALC-SCNC: 9 MMOL/L (ref 8–16)
BASOPHILS # BLD AUTO: 0.01 K/UL (ref 0–0.2)
BASOPHILS NFR BLD: 0.1 % (ref 0–1.9)
BUN SERPL-MCNC: 7 MG/DL (ref 6–20)
CALCIUM SERPL-MCNC: 8.2 MG/DL (ref 8.7–10.5)
CHLORIDE SERPL-SCNC: 102 MMOL/L (ref 95–110)
CO2 SERPL-SCNC: 25 MMOL/L (ref 23–29)
CREAT SERPL-MCNC: 0.7 MG/DL (ref 0.5–1.4)
DIFFERENTIAL METHOD: ABNORMAL
EOSINOPHIL # BLD AUTO: 0 K/UL (ref 0–0.5)
EOSINOPHIL NFR BLD: 0.2 % (ref 0–8)
ERYTHROCYTE [DISTWIDTH] IN BLOOD BY AUTOMATED COUNT: 13.8 % (ref 11.5–14.5)
ERYTHROCYTE [DISTWIDTH] IN BLOOD BY AUTOMATED COUNT: 14.2 % (ref 11.5–14.5)
ERYTHROCYTE [DISTWIDTH] IN BLOOD BY AUTOMATED COUNT: 14.4 % (ref 11.5–14.5)
EST. GFR  (AFRICAN AMERICAN): >60 ML/MIN/1.73 M^2
EST. GFR  (NON AFRICAN AMERICAN): >60 ML/MIN/1.73 M^2
GLUCOSE SERPL-MCNC: 143 MG/DL (ref 70–110)
HCT VFR BLD AUTO: 30.8 % (ref 37–48.5)
HCT VFR BLD AUTO: 32.8 % (ref 37–48.5)
HCT VFR BLD AUTO: 33.7 % (ref 37–48.5)
HGB BLD-MCNC: 10.2 G/DL (ref 12–16)
HGB BLD-MCNC: 10.7 G/DL (ref 12–16)
HGB BLD-MCNC: 9.8 G/DL (ref 12–16)
IMM GRANULOCYTES # BLD AUTO: 0.04 K/UL (ref 0–0.04)
IMM GRANULOCYTES NFR BLD AUTO: 0.5 % (ref 0–0.5)
LACTATE SERPL-SCNC: 1 MMOL/L (ref 0.5–2.2)
LACTATE SERPL-SCNC: 1.5 MMOL/L (ref 0.5–2.2)
LYMPHOCYTES # BLD AUTO: 0.3 K/UL (ref 1–4.8)
LYMPHOCYTES NFR BLD: 3.4 % (ref 18–48)
MAGNESIUM SERPL-MCNC: 2 MG/DL (ref 1.6–2.6)
MCH RBC QN AUTO: 29 PG (ref 27–31)
MCH RBC QN AUTO: 29.6 PG (ref 27–31)
MCH RBC QN AUTO: 29.7 PG (ref 27–31)
MCHC RBC AUTO-ENTMCNC: 31.1 G/DL (ref 32–36)
MCHC RBC AUTO-ENTMCNC: 31.8 G/DL (ref 32–36)
MCHC RBC AUTO-ENTMCNC: 31.8 G/DL (ref 32–36)
MCV RBC AUTO: 91 FL (ref 82–98)
MCV RBC AUTO: 94 FL (ref 82–98)
MCV RBC AUTO: 95 FL (ref 82–98)
MONOCYTES # BLD AUTO: 0.6 K/UL (ref 0.3–1)
MONOCYTES NFR BLD: 6.7 % (ref 4–15)
NEUTROPHILS # BLD AUTO: 7.4 K/UL (ref 1.8–7.7)
NEUTROPHILS NFR BLD: 89.1 % (ref 38–73)
NRBC BLD-RTO: 0 /100 WBC
PHOSPHATE SERPL-MCNC: 3.2 MG/DL (ref 2.7–4.5)
PLATELET # BLD AUTO: 137 K/UL (ref 150–450)
PLATELET # BLD AUTO: 147 K/UL (ref 150–450)
PLATELET # BLD AUTO: 150 K/UL (ref 150–450)
PMV BLD AUTO: 10.1 FL (ref 9.2–12.9)
PMV BLD AUTO: 10.4 FL (ref 9.2–12.9)
PMV BLD AUTO: 11 FL (ref 9.2–12.9)
POTASSIUM SERPL-SCNC: 4.4 MMOL/L (ref 3.5–5.1)
RBC # BLD AUTO: 3.38 M/UL (ref 4–5.4)
RBC # BLD AUTO: 3.45 M/UL (ref 4–5.4)
RBC # BLD AUTO: 3.6 M/UL (ref 4–5.4)
SARS-COV-2 RNA RESP QL NAA+PROBE: NOT DETECTED
SODIUM SERPL-SCNC: 136 MMOL/L (ref 136–145)
WBC # BLD AUTO: 8.26 K/UL (ref 3.9–12.7)
WBC # BLD AUTO: 8.3 K/UL (ref 3.9–12.7)
WBC # BLD AUTO: 8.89 K/UL (ref 3.9–12.7)

## 2022-06-02 PROCEDURE — 85025 COMPLETE CBC W/AUTO DIFF WBC: CPT | Performed by: HOSPITALIST

## 2022-06-02 PROCEDURE — 83735 ASSAY OF MAGNESIUM: CPT | Performed by: HOSPITALIST

## 2022-06-02 PROCEDURE — 63600175 PHARM REV CODE 636 W HCPCS: Performed by: HOSPITALIST

## 2022-06-02 PROCEDURE — 36415 COLL VENOUS BLD VENIPUNCTURE: CPT | Performed by: HOSPITALIST

## 2022-06-02 PROCEDURE — 25000003 PHARM REV CODE 250: Performed by: ANESTHESIOLOGY

## 2022-06-02 PROCEDURE — 94761 N-INVAS EAR/PLS OXIMETRY MLT: CPT

## 2022-06-02 PROCEDURE — U0003 INFECTIOUS AGENT DETECTION BY NUCLEIC ACID (DNA OR RNA); SEVERE ACUTE RESPIRATORY SYNDROME CORONAVIRUS 2 (SARS-COV-2) (CORONAVIRUS DISEASE [COVID-19]), AMPLIFIED PROBE TECHNIQUE, MAKING USE OF HIGH THROUGHPUT TECHNOLOGIES AS DESCRIBED BY CMS-2020-01-R: HCPCS | Performed by: HOSPITALIST

## 2022-06-02 PROCEDURE — 27000221 HC OXYGEN, UP TO 24 HOURS

## 2022-06-02 PROCEDURE — 99900035 HC TECH TIME PER 15 MIN (STAT)

## 2022-06-02 PROCEDURE — 93005 ELECTROCARDIOGRAM TRACING: CPT

## 2022-06-02 PROCEDURE — 93010 EKG 12-LEAD: ICD-10-PCS | Mod: ,,, | Performed by: INTERNAL MEDICINE

## 2022-06-02 PROCEDURE — U0005 INFEC AGEN DETEC AMPLI PROBE: HCPCS | Performed by: HOSPITALIST

## 2022-06-02 PROCEDURE — 99223 1ST HOSP IP/OBS HIGH 75: CPT | Mod: ,,, | Performed by: HOSPITALIST

## 2022-06-02 PROCEDURE — 25000003 PHARM REV CODE 250: Performed by: HOSPITALIST

## 2022-06-02 PROCEDURE — 80048 BASIC METABOLIC PNL TOTAL CA: CPT | Performed by: HOSPITALIST

## 2022-06-02 PROCEDURE — 83605 ASSAY OF LACTIC ACID: CPT | Mod: 91 | Performed by: HOSPITALIST

## 2022-06-02 PROCEDURE — 20600001 HC STEP DOWN PRIVATE ROOM

## 2022-06-02 PROCEDURE — 83605 ASSAY OF LACTIC ACID: CPT | Performed by: HOSPITALIST

## 2022-06-02 PROCEDURE — 85027 COMPLETE CBC AUTOMATED: CPT | Mod: 91 | Performed by: HOSPITALIST

## 2022-06-02 PROCEDURE — 84100 ASSAY OF PHOSPHORUS: CPT | Performed by: HOSPITALIST

## 2022-06-02 PROCEDURE — 93010 ELECTROCARDIOGRAM REPORT: CPT | Mod: ,,, | Performed by: INTERNAL MEDICINE

## 2022-06-02 PROCEDURE — 99223 PR INITIAL HOSPITAL CARE,LEVL III: ICD-10-PCS | Mod: ,,, | Performed by: HOSPITALIST

## 2022-06-02 RX ORDER — MUPIROCIN 20 MG/G
OINTMENT TOPICAL 2 TIMES DAILY
Status: DISPENSED | OUTPATIENT
Start: 2022-06-02 | End: 2022-06-07

## 2022-06-02 RX ORDER — NITROGLYCERIN 0.4 MG/1
0.4 TABLET SUBLINGUAL EVERY 5 MIN PRN
Status: DISCONTINUED | OUTPATIENT
Start: 2022-06-02 | End: 2022-06-10 | Stop reason: HOSPADM

## 2022-06-02 RX ORDER — ACETAMINOPHEN 325 MG/1
650 TABLET ORAL EVERY 4 HOURS PRN
Status: DISCONTINUED | OUTPATIENT
Start: 2022-06-02 | End: 2022-06-10 | Stop reason: HOSPADM

## 2022-06-02 RX ORDER — METHADONE HYDROCHLORIDE 10 MG/1
40 TABLET ORAL DAILY
Status: DISCONTINUED | OUTPATIENT
Start: 2022-06-02 | End: 2022-06-02

## 2022-06-02 RX ORDER — ATORVASTATIN CALCIUM 20 MG/1
20 TABLET, FILM COATED ORAL NIGHTLY
Status: DISCONTINUED | OUTPATIENT
Start: 2022-06-02 | End: 2022-06-10 | Stop reason: HOSPADM

## 2022-06-02 RX ORDER — HYDROMORPHONE HYDROCHLORIDE 1 MG/ML
0.5 INJECTION, SOLUTION INTRAMUSCULAR; INTRAVENOUS; SUBCUTANEOUS EVERY 4 HOURS PRN
Status: DISCONTINUED | OUTPATIENT
Start: 2022-06-02 | End: 2022-06-02

## 2022-06-02 RX ORDER — AMOXICILLIN 250 MG
1 CAPSULE ORAL DAILY PRN
Status: DISCONTINUED | OUTPATIENT
Start: 2022-06-02 | End: 2022-06-04

## 2022-06-02 RX ORDER — METHOCARBAMOL 500 MG/1
500 TABLET, FILM COATED ORAL 4 TIMES DAILY
Status: DISCONTINUED | OUTPATIENT
Start: 2022-06-02 | End: 2022-06-10 | Stop reason: HOSPADM

## 2022-06-02 RX ORDER — MORPHINE SULFATE 15 MG/1
15 TABLET ORAL EVERY 4 HOURS PRN
Status: DISCONTINUED | OUTPATIENT
Start: 2022-06-02 | End: 2022-06-10 | Stop reason: HOSPADM

## 2022-06-02 RX ORDER — MAG HYDROX/ALUMINUM HYD/SIMETH 200-200-20
30 SUSPENSION, ORAL (FINAL DOSE FORM) ORAL 4 TIMES DAILY PRN
Status: DISCONTINUED | OUTPATIENT
Start: 2022-06-02 | End: 2022-06-10 | Stop reason: HOSPADM

## 2022-06-02 RX ORDER — NALOXONE HCL 0.4 MG/ML
0.02 VIAL (ML) INJECTION
Status: DISCONTINUED | OUTPATIENT
Start: 2022-06-02 | End: 2022-06-10 | Stop reason: HOSPADM

## 2022-06-02 RX ORDER — SODIUM CHLORIDE 0.9 % (FLUSH) 0.9 %
10 SYRINGE (ML) INJECTION EVERY 6 HOURS PRN
Status: DISCONTINUED | OUTPATIENT
Start: 2022-06-02 | End: 2022-06-10 | Stop reason: HOSPADM

## 2022-06-02 RX ORDER — ONDANSETRON 2 MG/ML
4 INJECTION INTRAMUSCULAR; INTRAVENOUS EVERY 8 HOURS PRN
Status: DISCONTINUED | OUTPATIENT
Start: 2022-06-02 | End: 2022-06-10 | Stop reason: HOSPADM

## 2022-06-02 RX ORDER — METHADONE HYDROCHLORIDE 10 MG/1
90 TABLET ORAL DAILY
Status: DISCONTINUED | OUTPATIENT
Start: 2022-06-02 | End: 2022-06-10 | Stop reason: HOSPADM

## 2022-06-02 RX ORDER — HYDROMORPHONE HYDROCHLORIDE 1 MG/ML
1 INJECTION, SOLUTION INTRAMUSCULAR; INTRAVENOUS; SUBCUTANEOUS EVERY 4 HOURS PRN
Status: DISCONTINUED | OUTPATIENT
Start: 2022-06-02 | End: 2022-06-10 | Stop reason: HOSPADM

## 2022-06-02 RX ORDER — POLYETHYLENE GLYCOL 3350 17 G/17G
17 POWDER, FOR SOLUTION ORAL 2 TIMES DAILY PRN
Status: DISCONTINUED | OUTPATIENT
Start: 2022-06-02 | End: 2022-06-10 | Stop reason: HOSPADM

## 2022-06-02 RX ORDER — TALC
6 POWDER (GRAM) TOPICAL NIGHTLY PRN
Status: DISCONTINUED | OUTPATIENT
Start: 2022-06-02 | End: 2022-06-10 | Stop reason: HOSPADM

## 2022-06-02 RX ORDER — HYDRALAZINE HYDROCHLORIDE 20 MG/ML
10 INJECTION INTRAMUSCULAR; INTRAVENOUS EVERY 6 HOURS PRN
Status: DISCONTINUED | OUTPATIENT
Start: 2022-06-02 | End: 2022-06-10 | Stop reason: HOSPADM

## 2022-06-02 RX ORDER — ALLOPURINOL 100 MG/1
300 TABLET ORAL NIGHTLY
Status: DISCONTINUED | OUTPATIENT
Start: 2022-06-02 | End: 2022-06-10 | Stop reason: HOSPADM

## 2022-06-02 RX ADMIN — HYDROMORPHONE HYDROCHLORIDE 1 MG: 1 INJECTION, SOLUTION INTRAMUSCULAR; INTRAVENOUS; SUBCUTANEOUS at 07:06

## 2022-06-02 RX ADMIN — ONDANSETRON 4 MG: 2 INJECTION INTRAMUSCULAR; INTRAVENOUS at 09:06

## 2022-06-02 RX ADMIN — MUPIROCIN: 20 OINTMENT TOPICAL at 09:06

## 2022-06-02 RX ADMIN — METHOCARBAMOL 500 MG: 500 TABLET ORAL at 01:06

## 2022-06-02 RX ADMIN — HYDROMORPHONE HYDROCHLORIDE 1 MG: 1 INJECTION, SOLUTION INTRAMUSCULAR; INTRAVENOUS; SUBCUTANEOUS at 06:06

## 2022-06-02 RX ADMIN — METHOCARBAMOL 500 MG: 500 TABLET ORAL at 09:06

## 2022-06-02 RX ADMIN — MORPHINE SULFATE 15 MG: 15 TABLET ORAL at 05:06

## 2022-06-02 RX ADMIN — PROMETHAZINE HYDROCHLORIDE 12.5 MG: 25 INJECTION INTRAMUSCULAR; INTRAVENOUS at 07:06

## 2022-06-02 RX ADMIN — ATORVASTATIN CALCIUM 20 MG: 20 TABLET, FILM COATED ORAL at 09:06

## 2022-06-02 RX ADMIN — METHADONE HYDROCHLORIDE 90 MG: 10 TABLET ORAL at 12:06

## 2022-06-02 RX ADMIN — METHOCARBAMOL 500 MG: 500 TABLET ORAL at 06:06

## 2022-06-02 RX ADMIN — ONDANSETRON 4 MG: 2 INJECTION INTRAMUSCULAR; INTRAVENOUS at 12:06

## 2022-06-02 RX ADMIN — ALLOPURINOL 300 MG: 100 TABLET ORAL at 09:06

## 2022-06-02 NOTE — PLAN OF CARE
Hemostasis obtained @1945. 2 hours of flat time ordered by MD. Pt tolerated procedure well. RIGHT groin site CDI without bleeding, swelling, or hematoma noted. Angioseal used as closure device.

## 2022-06-02 NOTE — ANESTHESIA POSTPROCEDURE EVALUATION
Anesthesia Post Evaluation    Patient: Gabi Newton    Procedure(s) Performed: angiogram    Final Anesthesia Type: general      Patient location during evaluation: PACU  Patient participation: Yes- Able to Participate  Level of consciousness: awake and alert  Post-procedure vital signs: reviewed and stable  Pain management: adequate  Airway patency: patent  CHLOÉ mitigation strategies: Extubation while patient is awake  PONV status at discharge: No PONV  Anesthetic complications: no      Cardiovascular status: stable  Respiratory status: unassisted and spontaneous ventilation  Hydration status: euvolemic  Follow-up not needed.          Vitals Value Taken Time   /77 06/01/22 2130   Temp 36.5 °C (97.7 °F) 06/01/22 2003   Pulse 69 06/01/22 2130   Resp 14 06/01/22 2130   SpO2 98 % 06/01/22 2130         No case tracking events are documented in the log.      Pain/Mao Score: Pain Rating Prior to Med Admin: 6 (6/1/2022  9:05 PM)  Pain Rating Post Med Admin: 0 (6/1/2022  9:15 PM)  Mao Score: 8 (6/1/2022  9:30 PM)

## 2022-06-02 NOTE — ASSESSMENT & PLAN NOTE
· Body mass index is 41.57 kg/m².   · Morbid obesity complicates all aspects of disease management from diagnostic modalities to treatment.   · Weight loss encouraged and health benefits explained to patient.

## 2022-06-02 NOTE — H&P
[facility] ([address] Medicine  History & Physical    Patient Name: Gabi Newton  MRN: [id number]  Patient Class: IP- Inpatient  Admission Date: 6/2/2022  Attending Physician: Darby Dillard MD [facility] MED B  Admitting Physician: Bonifacio Cancino MD  Primary Care Provider: Yanet Anderson MD       Patient information was obtained from patient, past medical records and ER records.     Subjective:     Principal Problem:Retroperitoneal bleeding    Chief Complaint: No chief complaint on file.       HPI: 57 y/o Woman hx of Marginal Zone Lymphoma, GERD, HTN, and Inferior Pole Right renal mass, patient was here on Wed 6/1 for ambulatory IR Renal mass biopsy, Biopsy completed and post-procedure patient developed acute abdominal pain.  She complained of 10/10 pain at the biopsy site.  IR team performed ultrasound and no acute abnormality notified, however poor kidney visualization and a subsequent CT obtianed.  CT demonstrated Nithya-renal hemorrhage, IR took patient from PACU then for angiogram with possible embolization due to concern of post-procedure retroperitoneal bleed.      Angiogram completed this evening and no active extravasation noted, but irregular flow to inferior kidney and patient underwent embolization of these vessels, hemostasis achieved.     Post renal mass biopsy her Hgb was 13.1  and post-angiogram/embolization hgb is 11.4.  Patient was seen in PACU this evening but still just starting to come awake from anesthesia.   She will be admitted for observation.               No new subjective & objective note has been filed under this hospital service since the last note was generated.    Assessment/Plan:     * Retroperitoneal bleeding  -obtain serial CBC every 8 hours   -type & screen sent   -will obtain blood product consent if patient less somnolent this evening  -will enter PRN pain medications        Right renal mass  S/p biopsy today - patient established with  hematology/oncology, has f/u in July       Hyperkalemia  Will give dose of lokelma x 1 -   =hold lisinopril until value normalized      Essential hypertension  Will hold lisinopril as per hyperkalemia   -Ideally keep BP <160     VTE Risk Mitigation (From admission, onward)         Ordered     Reason for No Pharmacological VTE Prophylaxis  Once        Question:  Reasons:  Answer:  Active Bleeding    06/02/22 0219     IP VTE HIGH RISK PATIENT  Once         06/02/22 0219     Place sequential compression device  Until discontinued         06/02/22 0219                   Bonifacio Cancino MD  Department of Hospital Medicine   [facility] - Intensive Care ([place]-[facility])

## 2022-06-02 NOTE — PROGRESS NOTES
Received pt from PACU at the said time  A&O x 4 with c/o of pain in the rt back  Currently on O2 at 2L/min sat at 98%  abd looks distended and pt endorses it been bigger than normal, denies abd pain  On continuous NS at 75mls/hr  VSS

## 2022-06-02 NOTE — CONSULTS
Psychiatry Plan of Care    Consult received for this 57 yo female with pphx opioid use disorder in sustained remission through MAT. Psychiatry consulted as pt's methadone ordered by primary was 40 mg as this is what was documented through [facility], however pharmacy confirmed with [facility] who manages the methadone that pt's outpatient dosage is 90 mg. Pharmacy recommended psychiatry consult if planning to reduce the dosage. Of note, pt also has hx of ECG in 4/2022 concerning for Qtc >500.     Discussed with pharmacy and primary team, we recommend repeating her EKG, and if Qtc remains high (>500) would reach out to cardiology to weigh in on the risks of continuing. She's been stable with methadone treatment for years, and the risk of reduction or discontinuing could be very high for relapse. If the Qtc is normal, recommend continuing the methadone at her G-confirmed dose. If need arises to reduce methadone dosage, recommend contacting [facility] provider as well to collaborate, as they are familiar with the patient and responsible for management.     Please contact or re-consult if any further questions arise.     Case discussed with Addiction psychiatry attending Dr. Summer Sterling MD  [facility] -II

## 2022-06-02 NOTE — ASSESSMENT & PLAN NOTE
-obtain serial CBC every 6 hours   -type & screen sent   -will obtain blood product consent if patient less somnolent this evening  -will enter PRN pain medications

## 2022-06-02 NOTE — PROCEDURES
"  Pre Op Diagnosis: perirenal hematoma  Post Op Diagnosis: Same    Procedure: Angiogram and embolization    Procedure performed by: Owen    Written Informed Consent Obtained: Yes  Specimen Removed: NO  Estimated Blood Loss: Minimal    Findings:   Angiogram of the right kidney demonstrated no active extravasation, however there was irregular flow to the inferior portion of the right kidney supplied by 2 vessels. Both of these vessels were cannulated and embolized.     Patient tolerated procedure well. Angioseal used for closure.     Maykel Weaver MD ([facility])  Interventional Radiology  [phone]        "

## 2022-06-02 NOTE — TRANSFER OF CARE
Anesthesia Transfer of Care Note    Patient: Gabi Newton    Procedure(s) Performed: * No procedures listed *    Patient location: PACU    Anesthesia Type: general    Transport from OR: Transported from OR on 6-10 L/min O2 by face mask with adequate spontaneous ventilation    Post pain: adequate analgesia    Post assessment: no apparent anesthetic complications and tolerated procedure well    Post vital signs: stable    Level of consciousness: alert, awake and oriented    Nausea/Vomiting: no nausea/vomiting    Complications: none    Transfer of care protocol was followed      Last vitals:   Visit Vitals  LMP 02/11/2015 (Approximate)

## 2022-06-02 NOTE — HOSPITAL COURSE
Ms. Newton was admitted to Hospital Medicine for management of a retroperitoneal bleed s/p kidney biopsy.  Hgb trended down to 10.  Pain meds were adjusted.  Psych was consulted to approve home Methadone per Pharmacy request.  On 6/3, she was found to be hypoxic to 89%.  CXR showed RLL pneumonia.  She was started on Zosyn given her allergies.  She continued to endorse worsening shortness of breath, abdominal distension, and back pain.  CT abdomen was ordered to evaluate retroperitoneal bleed which was improving, but showed bilateral pneumonia.  Vanc was added and RVP was ordered given the lack of WBC or fevers and she completed a course of Vanc/Zosyn.  She qualified for home oxygen given her COPD.  She was given a referral for a sleep study for CHLOÉ.

## 2022-06-02 NOTE — PLAN OF CARE
[facility] - [facility] ([place] [address])  Initial Discharge Assessment       Primary Care Provider: Yanet Anderson MD    Admission Diagnosis: Right renal mass [N28.89]    Admission Date: 6/2/2022  Expected Discharge Date: 6/3/2022   Met with patient no family at bedside. Initial discharge planning completed. Patient lives in a single-story dwelling with no stairs. lives with two dependent sons. Patient was independent with ADL, prior to admission. Patient sister(Leanna) will provide support upon discharge.       Discharge Barriers Identified: None    Payor: MEDICAID / Plan: [place] [employer] / Product Type: Managed Medicaid /     Extended Emergency Contact Information  Primary Emergency Contact: TonioLeanna   [facility]  Home Phone: [phone]  Mobile Phone: [phone]  Relation: Sister    Discharge Plan A: Home  Discharge Plan B: Home with family      [facility] [place] - [address]  [address] [place]) [place] [address]  Phone: [phone] Fax: [phone]      Initial Assessment (most recent)     Adult Discharge Assessment - 06/02/22 1210        Discharge Assessment    Assessment Type Discharge Planning Assessment     Confirmed/corrected address, phone number and insurance Yes     Confirmed Demographics Correct on Facesheet     Source of Information patient     Does patient/caregiver understand observation status Yes     Communicated FARZANA with patient/caregiver Yes     Reason For Admission Retroperitoneal bleeding     Lives With child(miladis), dependent     Facility Arrived From: .home     Do you expect to return to your current living situation? Yes     Do you have help at home or someone to help you manage your care at home? Yes     Who are your caregiver(s) and their phone number(s)? Leanna Elizalde- Sister- [phone]     Prior to hospitilization cognitive status: Alert/Oriented     Current cognitive status: Alert/Oriented     Walking or Climbing  Stairs Difficulty none     Dressing/Bathing Difficulty none     Home Layout Able to live on 1st floor     Equipment Currently Used at Home none     Readmission within 30 days? No     Do you take prescription medications? Yes     Do you have prescription coverage? Yes     Coverage [place] health connect     Is the patient taking medications as prescribed? yes     Who is going to help you get home at discharge? Leanna Elizalde- [facility]- [phone]     How do you get to doctors appointments? car, drives self     Are you on dialysis? No     Do you take coumadin? No     Discharge Plan A Home     Discharge Plan B Home with family     DME Needed Upon Discharge  none     Discharge Plan discussed with: Patient     Discharge Barriers Identified None

## 2022-06-02 NOTE — ASSESSMENT & PLAN NOTE
· Hgb 13 prior to biopsy, down to about 10  · Seems to be stabilizing  · Monitor  · Pain control with prn MS IR and IV Dilaudid, scheduled Robaxin

## 2022-06-02 NOTE — NURSING TRANSFER
Nursing Transfer Note      6/2/2022     Reason patient is being transferred: post op    Transfer : [id number]    Transfer via bed    Transfer with IVFs    Transported by transport/RN    Medicines sent: none    Any special needs or follow-up needed: no    Chart send with patient: YES     Notified: Pt will call children in AM    Patient reassessed at: 06/02/22 @0102

## 2022-06-02 NOTE — PLAN OF CARE
Pt AAOX4. VSS. Pt voices c/o pain and nausea. PRN Zofran and hydromorphone given with mild to moderate success. Pt restarted on methadone. H/H stable. Possible discharge tomorrow pending stable CBC.

## 2022-06-02 NOTE — CARE UPDATE
Seen on rounds this morning.  Pain not controlled, meds adjusted.  Discussed trending Hgb and if stable ok to DC tomorrow.  Informed of needing Methadone approval from Psych and EKG.    Benita Mancia MD  [facility] Medicine

## 2022-06-02 NOTE — PLAN OF CARE
Problem: Adult Inpatient Plan of Care  Goal: Plan of Care Review  Outcome: Ongoing, Progressing  Goal: Absence of Hospital-Acquired Illness or Injury  Outcome: Ongoing, Progressing  Goal: Optimal Comfort and Wellbeing  Outcome: Ongoing, Progressing     Problem: Bariatric Environmental Safety  Goal: Safety Maintained with Care  Outcome: Ongoing, Progressing     Problem: Infection  Goal: Absence of Infection Signs and Symptoms  Outcome: Ongoing, Progressing

## 2022-06-02 NOTE — HPI
57 y/o Woman hx of Marginal Zone Lymphoma, GERD, HTN, and Inferior Pole Right renal mass, patient was here on Wed 6/1 for ambulatory IR Renal mass biopsy, Biopsy completed and post-procedure patient developed acute abdominal pain.  She complained of 10/10 pain at the biopsy site.  IR team performed ultrasound and no acute abnormality notified, however poor kidney visualization and a subsequent CT obtianed.  CT demonstrated Nithya-renal hemorrhage, IR took patient from PACU then for angiogram with possible embolization due to concern of post-procedure retroperitoneal bleed.      Angiogram completed this evening and no active extravasation noted, but irregular flow to inferior kidney and patient underwent embolization of these vessels, hemostasis achieved.     Post renal mass biopsy her Hgb was 13.1  and post-angiogram/embolization hgb is 11.4.  Patient was seen in PACU this evening but still just starting to come awake from anesthesia.   She will be admitted for observation.

## 2022-06-02 NOTE — SUBJECTIVE & OBJECTIVE
Past Medical History:   Diagnosis Date    Abdominal pain 2018    Anemia     Anxiety     Back pain     Deep vein thrombosis     Disorder of kidney and ureter     Fatty liver 2018    Gastric lymphoma 2019    Gastric tumor     GERD (gastroesophageal reflux disease)     Hyperlipidemia     Hypertension     Splenomegaly 2018       Past Surgical History:   Procedure Laterality Date    bilateral iliac vein stenosis with stenting      CARDIAC CATHETERIZATION      CARDIAC CATHETERIZATION  2018    had chest pains . states vessels at the bottom of heart collapsed     SECTION      x3    CHOLECYSTECTOMY      COLONOSCOPY      ENDOSCOPIC ULTRASOUND OF UPPER GASTROINTESTINAL TRACT Left 11/15/2019    Procedure: ULTRASOUND, UPPER GI TRACT, ENDOSCOPIC;  Surgeon: Mike Seay MD;  Location: [facility] (2ND FLR);  Service: Endoscopy;  Laterality: Left;  Ok to hold xarelto per protocol per Dr. Lloyd see media file 10/25/19-tb    ESOPHAGOGASTRODUODENOSCOPY      ESOPHAGOGASTRODUODENOSCOPY N/A 2018    Procedure: EGD (ESOPHAGOGASTRODUODENOSCOPY);  Surgeon: Ant Camejo MD;  Location: [facility];  Service: Endoscopy;  Laterality: N/A;    ESOPHAGOGASTRODUODENOSCOPY N/A 10/15/2019    Procedure: EGD (ESOPHAGOGASTRODUODENOSCOPY);  Surgeon: Melanie Cedeno MD;  Location: [facility];  Service: Endoscopy;  Laterality: N/A;    ESOPHAGOGASTRODUODENOSCOPY N/A 11/15/2019    Procedure: EGD (ESOPHAGOGASTRODUODENOSCOPY);  Surgeon: Mike Seay MD;  Location: [facility] (2ND FLR);  Service: Endoscopy;  Laterality: N/A;    EXCISION OF LESION OF LIP Left 2022    Procedure: EXCISION, LESION, LIP;  Surgeon: Glen Giang MD;  Location: [facility] OR 2ND FLR;  Service: ENT;  Laterality: Left;  Lip resection    HYSTERECTOMY      PHLEBOGRAPHY Bilateral 10/16/2018    Procedure: VENOGRAM;  Surgeon: Colin Mathis MD;  Location: [facility] CATH LAB;  Service: Cardiology;  Laterality: Bilateral;    AR RT/LT HEART CATHETERS Left      Coronary Arteriogram-2 Cath    RHINOPLASTY      SENTINEL LYMPH NODE BIOPSY  1/12/2022    Procedure: BIOPSY, LYMPH NODE, SENTINEL;  Surgeon: Glen Giang MD;  Location: [facility] OR [address] [place] [address];  Service: ENT;;    TUBAL LIGATION      venogram Bilateral 10/16/2018       Review of patient's allergies indicates:   Allergen Reactions    Azithromycin Anaphylaxis     Other reaction(s): swelling to entire body  Swelling (tongue / lips)^      Ciprofloxacin Swelling     Throat swells    Codeine      Swelling (throat)^    Tolerates Morphine      Codeine sulfate      Swelling (throat)^    Tolerates Morphine       No current facility-administered medications on file prior to encounter.     Current Outpatient Medications on File Prior to Encounter   Medication Sig    allopurinoL (ZYLOPRIM) 300 MG tablet Take 1 tablet (300 mg total) by mouth once daily. (Patient taking differently: Take 300 mg by mouth every evening.)    hydroxyzine HCL (ATARAX) 25 MG tablet Take 25 mg by mouth every evening.    lisinopriL 10 MG tablet Take 10 mg by mouth once daily.    methadone (METHADOSE) 40 mg disintegrating tablet Take 40 mg by mouth once daily. PATIENT USUALLY TAKES AT 10AM    ondansetron (ZOFRAN-ODT) 8 MG TbDL Dissolve 1 tablet (8 mg total) by mouth every 8 (eight) hours as needed (nausea).    simvastatin (ZOCOR) 40 MG tablet Take 40 mg by mouth every evening.     triamcinolone acetonide 0.1% (KENALOG) 0.1 % ointment Apply topically 2 (two) times daily. To be applied to itch rash on abdomen once daily    aspirin (ECOTRIN) 81 MG EC tablet Take 81 mg by mouth every evening.    naloxone (NARCAN) 4 mg/actuation Spry 4mg by nasal route as needed for opioid overdose; may repeat every 2-3 minutes in alternating nostrils until medical help arrives. Call 911    nicotine (NICODERM CQ) 21 mg/24 hr 1 patch once daily.    nitroGLYCERIN (NITROSTAT) 0.4 MG SL tablet Place 0.4 mg under the tongue every 5 (five) minutes as needed for Chest pain.     [DISCONTINUED] amLODIPine (NORVASC) 5 MG tablet Take 5 mg by mouth once daily.    [DISCONTINUED] isosorbide mononitrate (IMDUR) 60 MG 24 hr tablet Take 60 mg by mouth once daily.     Family History       Problem Relation (Age of Onset)    Aneurysm Father    Atrial fibrillation Mother    Breast cancer Maternal Grandfather    Deep vein thrombosis Mother    Dementia Mother    Pulmonary embolism Mother    Stroke Mother          Tobacco Use    Smoking status: Current Every Day Smoker     Packs/day: 0.50     Years: 40.00     Pack years: 20.00     Types: Cigarettes    Smokeless tobacco: Never Used    Tobacco comment: trying to quit; in a program; at 1/2 pack per day   Substance and Sexual Activity    Alcohol use: No    Drug use: No     Comment: former opioid addiction  quit 8 yrs ago- pain management    Sexual activity: Not Currently     Partners: Male     Review of Systems   Unable to perform ROS: Other (somnolent from anesthetics)   Objective:     Vital Signs (Most Recent):  Temp: 99.1 °F (37.3 °C) (06/02/22 0140)  Pulse: 82 (06/02/22 0140)  Resp: 20 (06/02/22 0140)  BP: (!) 152/77 (06/02/22 0140)  SpO2: 98 % (06/02/22 0140)   Vital Signs (24h Range):  Temp:  [97.2 °F (36.2 °C)-99.1 °F (37.3 °C)] 99.1 °F (37.3 °C)  Pulse:  [65-87] 82  Resp:  [10-31] 20  SpO2:  [91 %-100 %] 98 %  BP: (116-187)/() 152/77     Weight: 99.8 kg (220 lb)  Body mass index is 41.57 kg/m².    Physical Exam  Constitutional:       General: She is sleeping.      Appearance: She is obese.   Eyes:      General: No scleral icterus.  Cardiovascular:      Rate and Rhythm: Normal rate and regular rhythm.   Pulmonary:      Effort: No respiratory distress.      Breath sounds: Normal breath sounds. No wheezing or rales.   Abdominal:      General: There is no distension.      Palpations: Abdomen is soft.      Comments: Right flank tenderness   Musculoskeletal:      Right lower leg: No edema.      Left lower leg: No edema.   Skin:     General: Skin is  warm and dry.   Psychiatric:      Comments: sedated           Significant Labs: All pertinent labs within the past 24 hours have been reviewed.  CBC:   Recent Labs   Lab 06/01/22  1716 06/01/22  2101   WBC 9.18 8.96   HGB 13.1 11.4*   HCT 40.7 36.2*    143*     CMP:   Recent Labs   Lab 06/01/22  2101   *   K 5.4*      CO2 23   *   BUN 6   CREATININE 0.7   CALCIUM 7.8*   ANIONGAP 8   EGFRNONAA >60.0     Cardiac Markers: No results for input(s): CKMB, MYOGLOBIN, BNP, TROPISTAT in the last 48 hours.  Coagulation: No results for input(s): PT, INR, APTT in the last 48 hours.  Lactic Acid: No results for input(s): LACTATE in the last 48 hours.    Significant Imaging: I have reviewed all pertinent imaging results/findings within the past 24 hours.

## 2022-06-03 PROBLEM — J18.9 RIGHT LOWER LOBE PNEUMONIA: Status: ACTIVE | Noted: 2022-06-03

## 2022-06-03 LAB
ANION GAP SERPL CALC-SCNC: 8 MMOL/L (ref 8–16)
BUN SERPL-MCNC: 9 MG/DL (ref 6–20)
CALCIUM SERPL-MCNC: 8.6 MG/DL (ref 8.7–10.5)
CHLORIDE SERPL-SCNC: 99 MMOL/L (ref 95–110)
CO2 SERPL-SCNC: 26 MMOL/L (ref 23–29)
CREAT SERPL-MCNC: 0.7 MG/DL (ref 0.5–1.4)
ERYTHROCYTE [DISTWIDTH] IN BLOOD BY AUTOMATED COUNT: 14.5 % (ref 11.5–14.5)
EST. GFR  (AFRICAN AMERICAN): >60 ML/MIN/1.73 M^2
EST. GFR  (NON AFRICAN AMERICAN): >60 ML/MIN/1.73 M^2
GLUCOSE SERPL-MCNC: 138 MG/DL (ref 70–110)
HCT VFR BLD AUTO: 31 % (ref 37–48.5)
HGB BLD-MCNC: 9.8 G/DL (ref 12–16)
MAGNESIUM SERPL-MCNC: 2.1 MG/DL (ref 1.6–2.6)
MCH RBC QN AUTO: 29.4 PG (ref 27–31)
MCHC RBC AUTO-ENTMCNC: 31.6 G/DL (ref 32–36)
MCV RBC AUTO: 93 FL (ref 82–98)
PHOSPHATE SERPL-MCNC: 2.8 MG/DL (ref 2.7–4.5)
PLATELET # BLD AUTO: 149 K/UL (ref 150–450)
PMV BLD AUTO: 11.2 FL (ref 9.2–12.9)
POTASSIUM SERPL-SCNC: 4.7 MMOL/L (ref 3.5–5.1)
RBC # BLD AUTO: 3.33 M/UL (ref 4–5.4)
SODIUM SERPL-SCNC: 133 MMOL/L (ref 136–145)
WBC # BLD AUTO: 9.45 K/UL (ref 3.9–12.7)

## 2022-06-03 PROCEDURE — 63600175 PHARM REV CODE 636 W HCPCS: Performed by: HOSPITALIST

## 2022-06-03 PROCEDURE — 84100 ASSAY OF PHOSPHORUS: CPT | Performed by: HOSPITALIST

## 2022-06-03 PROCEDURE — 27000221 HC OXYGEN, UP TO 24 HOURS

## 2022-06-03 PROCEDURE — 99233 SBSQ HOSP IP/OBS HIGH 50: CPT | Mod: ,,, | Performed by: HOSPITALIST

## 2022-06-03 PROCEDURE — 25000003 PHARM REV CODE 250: Performed by: HOSPITALIST

## 2022-06-03 PROCEDURE — 83735 ASSAY OF MAGNESIUM: CPT | Performed by: HOSPITALIST

## 2022-06-03 PROCEDURE — 80048 BASIC METABOLIC PNL TOTAL CA: CPT | Performed by: HOSPITALIST

## 2022-06-03 PROCEDURE — 85027 COMPLETE CBC AUTOMATED: CPT | Performed by: HOSPITALIST

## 2022-06-03 PROCEDURE — 20600001 HC STEP DOWN PRIVATE ROOM

## 2022-06-03 PROCEDURE — 99233 PR SUBSEQUENT HOSPITAL CARE,LEVL III: ICD-10-PCS | Mod: ,,, | Performed by: HOSPITALIST

## 2022-06-03 PROCEDURE — 36415 COLL VENOUS BLD VENIPUNCTURE: CPT | Performed by: HOSPITALIST

## 2022-06-03 RX ORDER — AMOXICILLIN AND CLAVULANATE POTASSIUM 875; 125 MG/1; MG/1
1 TABLET, FILM COATED ORAL EVERY 12 HOURS
Qty: 14 TABLET | Refills: 0 | Status: SHIPPED | OUTPATIENT
Start: 2022-06-03 | End: 2022-06-09 | Stop reason: HOSPADM

## 2022-06-03 RX ORDER — SIMETHICONE 80 MG
1 TABLET,CHEWABLE ORAL
Status: DISCONTINUED | OUTPATIENT
Start: 2022-06-03 | End: 2022-06-10 | Stop reason: HOSPADM

## 2022-06-03 RX ORDER — ONDANSETRON 4 MG/1
4 TABLET, FILM COATED ORAL 2 TIMES DAILY
Qty: 30 TABLET | Refills: 0 | Status: SHIPPED | OUTPATIENT
Start: 2022-06-03 | End: 2022-06-18

## 2022-06-03 RX ORDER — AMOXICILLIN AND CLAVULANATE POTASSIUM 875; 125 MG/1; MG/1
1 TABLET, FILM COATED ORAL EVERY 12 HOURS
Status: DISCONTINUED | OUTPATIENT
Start: 2022-06-03 | End: 2022-06-03

## 2022-06-03 RX ORDER — MORPHINE SULFATE 15 MG/1
15 TABLET ORAL EVERY 4 HOURS PRN
Qty: 20 TABLET | Refills: 0 | Status: SHIPPED | OUTPATIENT
Start: 2022-06-03 | End: 2022-06-29

## 2022-06-03 RX ORDER — ASPIRIN 81 MG/1
81 TABLET ORAL NIGHTLY
Refills: 0
Start: 2022-06-03

## 2022-06-03 RX ADMIN — SIMETHICONE 80 MG: 80 TABLET, CHEWABLE ORAL at 09:06

## 2022-06-03 RX ADMIN — HYDROMORPHONE HYDROCHLORIDE 1 MG: 1 INJECTION, SOLUTION INTRAMUSCULAR; INTRAVENOUS; SUBCUTANEOUS at 07:06

## 2022-06-03 RX ADMIN — PIPERACILLIN SODIUM AND TAZOBACTAM SODIUM 4.5 G: 4; .5 INJECTION, POWDER, LYOPHILIZED, FOR SOLUTION INTRAVENOUS at 10:06

## 2022-06-03 RX ADMIN — ACETAMINOPHEN 650 MG: 325 TABLET ORAL at 07:06

## 2022-06-03 RX ADMIN — ACETAMINOPHEN 650 MG: 325 TABLET ORAL at 06:06

## 2022-06-03 RX ADMIN — ONDANSETRON 4 MG: 2 INJECTION INTRAMUSCULAR; INTRAVENOUS at 07:06

## 2022-06-03 RX ADMIN — HYDROMORPHONE HYDROCHLORIDE 1 MG: 1 INJECTION, SOLUTION INTRAMUSCULAR; INTRAVENOUS; SUBCUTANEOUS at 12:06

## 2022-06-03 RX ADMIN — ATORVASTATIN CALCIUM 20 MG: 20 TABLET, FILM COATED ORAL at 10:06

## 2022-06-03 RX ADMIN — MUPIROCIN: 20 OINTMENT TOPICAL at 09:06

## 2022-06-03 RX ADMIN — METHADONE HYDROCHLORIDE 90 MG: 10 TABLET ORAL at 08:06

## 2022-06-03 RX ADMIN — SIMETHICONE 80 MG: 80 TABLET, CHEWABLE ORAL at 02:06

## 2022-06-03 RX ADMIN — SIMETHICONE 80 MG: 80 TABLET, CHEWABLE ORAL at 10:06

## 2022-06-03 RX ADMIN — SODIUM CHLORIDE 1000 ML: 0.9 INJECTION, SOLUTION INTRAVENOUS at 10:06

## 2022-06-03 RX ADMIN — AMOXICILLIN AND CLAVULANATE POTASSIUM 1 TABLET: 875; 125 TABLET, FILM COATED ORAL at 09:06

## 2022-06-03 RX ADMIN — METHOCARBAMOL 500 MG: 500 TABLET ORAL at 08:06

## 2022-06-03 RX ADMIN — ALLOPURINOL 300 MG: 100 TABLET ORAL at 10:06

## 2022-06-03 RX ADMIN — Medication 6 MG: at 10:06

## 2022-06-03 RX ADMIN — MUPIROCIN: 20 OINTMENT TOPICAL at 11:06

## 2022-06-03 RX ADMIN — PIPERACILLIN SODIUM AND TAZOBACTAM SODIUM 4.5 G: 4; .5 INJECTION, POWDER, LYOPHILIZED, FOR SOLUTION INTRAVENOUS at 12:06

## 2022-06-03 NOTE — SIGNIFICANT EVENT
Hospital Medicine Brief Note    Called to the bedside for evaluation of abdominal pain and distension. Per patient, has had progressive abdominal cramping pain for a few hours, associated with nausea. Has had some difficulty with urinating x 20 minutes or so.     Received methadone, hydromorphone, ondansetron with modest relief.     On my eval, patient had a large volume urinary output which relieved some of her discomfort.     Bladder scan: 30ml  Patient administered antiemetic by RN while I was at the bedside.     Exam  GEN:  appears uncomfortable, but chatty and friendly  HEENT: clear  GI: abdomen distended, non-tympanitic,  Dense mass-like hardness over anterior (reportely related to a known mass), soft in both mid-clavicular lines. No rebound. Hyperactive bowel sounds, no tinkling sounds  Skin: no ecchymoses on bilateral flanks, no rui-umbilical ecchymosis    Assessment  - Cramping, hyperactive BS without high-pitched tinkling seem most consistent with constipation/gas pains. DDx SBO versus worsened RP hemorrhage vs urinary retention. Bladder scan negative for urinary retention.       Plan  1. CBC, lactate ordered to eval for further hemorrhage  2. Simethicone added  3. X-ray abdomen ordered to eval for SBO/perforation    Discussed plan of care with the patient who is in agreement.     Time at the bedside and on the care of this patient: 10 minutes.     Ingrid Pepper M.D., M.P.H.  Department of Hospital Medicine  [facility]        
No

## 2022-06-03 NOTE — PROGRESS NOTES
Pt continues to have very poor appetite. Pt continues to have adequate urine output via external catheter. CXR reveals pneumonia. Pt started on IV zoysn. Pt on 4L NC. Call light in reach, will continue to monitor.

## 2022-06-03 NOTE — PROGRESS NOTES
Pt voices c/o increased SOB. Pt currently on 1LNC with O2 sats 85%. Fine crackles noted to bilateral lung bases IV bolus stopped. Pt increased to 5Lhigh flow NC. Now sating at 94%. Incentive Spirometer at the bedside. Pt educated on how to use and reason for usage.

## 2022-06-03 NOTE — PROGRESS NOTES
[facility] ([place] [id number])  [facility] Hospital Medicine  Progress Note    Patient Name: Gabi Newton  MRN: [id number]  Patient Class: IP- Inpatient   Admission Date: 6/2/2022  Length of Stay: 1 days  Attending Physician: Benita Mancia MD  Primary Care Provider: Yanet Anderson MD        Subjective:     Principal Problem:Retroperitoneal bleeding        HPI:  57 y/o Woman hx of Marginal Zone Lymphoma, GERD, HTN, and Inferior Pole Right renal mass, patient was here on Wed 6/1 for ambulatory IR Renal mass biopsy, Biopsy completed and post-procedure patient developed acute abdominal pain.  She complained of 10/10 pain at the biopsy site.  IR team performed ultrasound and no acute abnormality notified, however poor kidney visualization and a subsequent CT obtianed.  CT demonstrated Nithya-renal hemorrhage, IR took patient from PACU then for angiogram with possible embolization due to concern of post-procedure retroperitoneal bleed.      Angiogram completed this evening and no active extravasation noted, but irregular flow to inferior kidney and patient underwent embolization of these vessels, hemostasis achieved.     Post renal mass biopsy her Hgb was 13.1  and post-angiogram/embolization hgb is 11.4.  Patient was seen in PACU this evening but still just starting to come awake from anesthesia.   She will be admitted for observation.               Overview/Hospital Course:  Ms. Newton was admitted to Hospital Medicine for management of a retroperitoneal bleed s/p kidney biopsy.  Hgb trended down to 10.  Pain meds were adjusted.  Psych was consulted to approve home Methadone per Pharmacy request.  On 6/3, she was found to be hypoxic to 89%.  CXR showed RLL pneumonia.  She was started on Zosyn given her allergies.      Interval History: No acute events overnight other than belly pain that was thought to be flatulence.  Simethicone ordered.  This morning wanting to go home, but reports back pain and not feeling well.   Endorses shortness of breath and oxygen 89%.  CXR with RLL PNA.  Started on Zosyn.  Hgb stable    Review of Systems   Constitutional:  Negative for chills, fatigue and fever.   Respiratory:  Positive for shortness of breath. Negative for cough.    Cardiovascular:  Negative for chest pain, palpitations and leg swelling.   Gastrointestinal:  Positive for abdominal distention. Negative for abdominal pain, diarrhea, nausea and vomiting.   Genitourinary:  Negative for dysuria and urgency.   Musculoskeletal:  Positive for back pain.   Neurological:  Negative for dizziness and headaches.   All other systems reviewed and are negative.  Objective:     Vital Signs (Most Recent):  Temp: 98.5 °F (36.9 °C) (06/03/22 1105)  Pulse: 108 (06/03/22 1105)  Resp: 18 (06/03/22 1255)  BP: (!) 123/56 (06/03/22 1110)  SpO2: 95 % (06/03/22 1105)   Vital Signs (24h Range):  Temp:  [98.2 °F (36.8 °C)-99.5 °F (37.5 °C)] 98.5 °F (36.9 °C)  Pulse:  [] 108  Resp:  [16-29] 18  SpO2:  [89 %-97 %] 95 %  BP: (111-150)/(56-73) 123/56     Weight: 99.8 kg (220 lb)  Body mass index is 41.57 kg/m².    Intake/Output Summary (Last 24 hours) at 6/3/2022 1326  Last data filed at 6/3/2022 1110  Gross per 24 hour   Intake 277 ml   Output 700 ml   Net -423 ml      Physical Exam  Constitutional:       Appearance: Normal appearance. She is well-developed.   HENT:      Head: Normocephalic and atraumatic.   Cardiovascular:      Rate and Rhythm: Normal rate and regular rhythm.      Heart sounds: No murmur heard.  Pulmonary:      Effort: Pulmonary effort is normal. No respiratory distress.      Breath sounds: Normal breath sounds. No wheezing or rales.      Comments: On NC  Abdominal:      General: There is distension.      Palpations: Abdomen is soft.      Tenderness: There is abdominal tenderness.   Musculoskeletal:         General: No deformity.   Skin:     General: Skin is warm.   Neurological:      General: No focal deficit present.      Mental Status: She  is alert and oriented to person, place, and time.       Significant Labs: All pertinent labs within the past 24 hours have been reviewed.    Significant Imaging: I have reviewed all pertinent imaging results/findings within the past 24 hours.  CXR: I have reviewed all pertinent results/findings within the past 24 hours and my personal findings are:  RLL PNA      Assessment/Plan:      * Retroperitoneal bleeding  · Hgb 13 prior to biopsy, down to about 10  · Seems to be stabilizing  · Monitor  · Pain control with prn MS IR and IV Dilaudid, scheduled Robaxin      Right lower lobe pneumonia  · Found to be satting 89% on room air, that improved to 95% on 5L NC  · CXR with RLL PNA  · Start Zosyn given significant allergies to Azithro and Cipro (start date 6/3)      Obese  · Body mass index is 41.57 kg/m².   · Morbid obesity complicates all aspects of disease management from diagnostic modalities to treatment.   · Weight loss encouraged and health benefits explained to patient.         Acute blood loss anemia  · As above  Transfuse <7      Chronic low back pain  · Chronic issue  · On Methadone daily      Hyperkalemia  · Resolved      Right renal mass  · S/p biopsy 6/1  · Patient established with hematology/oncology, has f/u in July       Essential hypertension  · Will hold lisinopril as per hyperkalemia   · Ideally keep BP <160     VTE Risk Mitigation (From admission, onward)         Ordered     Reason for No Pharmacological VTE Prophylaxis  Once        Question:  Reasons:  Answer:  Active Bleeding    06/02/22 0219     IP VTE HIGH RISK PATIENT  Once         06/02/22 0219     Place sequential compression device  Until discontinued         06/02/22 0219                Discharge Planning   FARZANA: 6/5/2022     Code Status: Full Code   Is the patient medically ready for discharge?: No    Reason for patient still in hospital (select all that apply): Patient new problem  Discharge Plan A: Home                  Benita Mancia,  MD  Department of Hospital Medicine   [facility] ([place])

## 2022-06-03 NOTE — SUBJECTIVE & OBJECTIVE
Interval History: No acute events overnight other than belly pain that was thought to be flatulence.  Simethicone ordered.  This morning wanting to go home, but reports back pain and not feeling well.  Endorses shortness of breath and oxygen 89%.  CXR with RLL PNA.  Started on Zosyn.  Hgb stable    Review of Systems   Constitutional:  Negative for chills, fatigue and fever.   Respiratory:  Positive for shortness of breath. Negative for cough.    Cardiovascular:  Negative for chest pain, palpitations and leg swelling.   Gastrointestinal:  Positive for abdominal distention. Negative for abdominal pain, diarrhea, nausea and vomiting.   Genitourinary:  Negative for dysuria and urgency.   Musculoskeletal:  Positive for back pain.   Neurological:  Negative for dizziness and headaches.   All other systems reviewed and are negative.  Objective:     Vital Signs (Most Recent):  Temp: 98.5 °F (36.9 °C) (06/03/22 1105)  Pulse: 108 (06/03/22 1105)  Resp: 18 (06/03/22 1255)  BP: (!) 123/56 (06/03/22 1110)  SpO2: 95 % (06/03/22 1105)   Vital Signs (24h Range):  Temp:  [98.2 °F (36.8 °C)-99.5 °F (37.5 °C)] 98.5 °F (36.9 °C)  Pulse:  [] 108  Resp:  [16-29] 18  SpO2:  [89 %-97 %] 95 %  BP: (111-150)/(56-73) 123/56     Weight: 99.8 kg (220 lb)  Body mass index is 41.57 kg/m².    Intake/Output Summary (Last 24 hours) at 6/3/2022 1326  Last data filed at 6/3/2022 1110  Gross per 24 hour   Intake 277 ml   Output 700 ml   Net -423 ml      Physical Exam  Constitutional:       Appearance: Normal appearance. She is well-developed.   HENT:      Head: Normocephalic and atraumatic.   Cardiovascular:      Rate and Rhythm: Normal rate and regular rhythm.      Heart sounds: No murmur heard.  Pulmonary:      Effort: Pulmonary effort is normal. No respiratory distress.      Breath sounds: Normal breath sounds. No wheezing or rales.      Comments: On NC  Abdominal:      General: There is distension.      Palpations: Abdomen is soft.       Tenderness: There is abdominal tenderness.   Musculoskeletal:         General: No deformity.   Skin:     General: Skin is warm.   Neurological:      General: No focal deficit present.      Mental Status: She is alert and oriented to person, place, and time.       Significant Labs: All pertinent labs within the past 24 hours have been reviewed.    Significant Imaging: I have reviewed all pertinent imaging results/findings within the past 24 hours.  CXR: I have reviewed all pertinent results/findings within the past 24 hours and my personal findings are:  RLL PNA

## 2022-06-03 NOTE — ASSESSMENT & PLAN NOTE
· Found to be satting 89% on room air, that improved to 95% on 5L NC  · CXR with RLL PNA  · Start Zosyn given significant allergies to Azithro and Cipro (start date 6/3)

## 2022-06-03 NOTE — PLAN OF CARE
Problem: Adult Inpatient Plan of Care  Goal: Plan of Care Review  Outcome: Ongoing, Progressing  Goal: Patient-Specific Goal (Individualized)  Outcome: Ongoing, Progressing  Goal: Absence of Hospital-Acquired Illness or Injury  Outcome: Ongoing, Progressing  Goal: Optimal Comfort and Wellbeing  Outcome: Ongoing, Progressing  Goal: Readiness for Transition of Care  Outcome: Ongoing, Progressing     Problem: Bariatric Environmental Safety  Goal: Safety Maintained with Care  Outcome: Ongoing, Progressing     Problem: Infection  Goal: Absence of Infection Signs and Symptoms  Outcome: Ongoing, Progressing     Problem: Fluid Imbalance (Pneumonia)  Goal: Fluid Balance  Outcome: Ongoing, Progressing     Problem: Infection (Pneumonia)  Goal: Resolution of Infection Signs and Symptoms  Outcome: Ongoing, Progressing     Problem: Respiratory Compromise (Pneumonia)  Goal: Effective Oxygenation and Ventilation  Outcome: Ongoing, Progressing

## 2022-06-03 NOTE — CARE UPDATE
Informed by nursing of new onset abdominal tenderness and distension.  Stat KUB, CBC, Latic ordered.  Onsite MD asked to evaluate at beside.    Benita Mancia MD  [facility]

## 2022-06-04 PROBLEM — R14.0 GASEOUS ABDOMINAL DISTENTION: Status: ACTIVE | Noted: 2022-06-04

## 2022-06-04 LAB
ADEQUACY: NORMAL
ALBUMIN SERPL BCP-MCNC: 2.9 G/DL (ref 3.5–5.2)
ALP SERPL-CCNC: 135 U/L (ref 55–135)
ALT SERPL W/O P-5'-P-CCNC: 70 U/L (ref 10–44)
ANION GAP SERPL CALC-SCNC: 9 MMOL/L (ref 8–16)
AST SERPL-CCNC: 64 U/L (ref 10–40)
BASOPHILS # BLD AUTO: 0.02 K/UL (ref 0–0.2)
BASOPHILS NFR BLD: 0.3 % (ref 0–1.9)
BILIRUB SERPL-MCNC: 1.1 MG/DL (ref 0.1–1)
BUN SERPL-MCNC: 9 MG/DL (ref 6–20)
CALCIUM SERPL-MCNC: 8.9 MG/DL (ref 8.7–10.5)
CHLORIDE SERPL-SCNC: 98 MMOL/L (ref 95–110)
CO2 SERPL-SCNC: 29 MMOL/L (ref 23–29)
CREAT SERPL-MCNC: 0.7 MG/DL (ref 0.5–1.4)
DIFFERENTIAL METHOD: ABNORMAL
EOSINOPHIL # BLD AUTO: 0 K/UL (ref 0–0.5)
EOSINOPHIL NFR BLD: 0.4 % (ref 0–8)
ERYTHROCYTE [DISTWIDTH] IN BLOOD BY AUTOMATED COUNT: 14.7 % (ref 11.5–14.5)
EST. GFR  (AFRICAN AMERICAN): >60 ML/MIN/1.73 M^2
EST. GFR  (NON AFRICAN AMERICAN): >60 ML/MIN/1.73 M^2
FINAL PATHOLOGIC DIAGNOSIS: NORMAL
FLOW CYTOMETRY ANTIBODIES ANALYZED - TISSUE: NORMAL
FLOW CYTOMETRY COMMENT - TISSUE: NORMAL
FLOW CYTOMETRY INTERPRETATION - TISSUE: NORMAL
GLUCOSE SERPL-MCNC: 130 MG/DL (ref 70–110)
GROSS: NORMAL
HCT VFR BLD AUTO: 28.8 % (ref 37–48.5)
HGB BLD-MCNC: 9 G/DL (ref 12–16)
IMM GRANULOCYTES # BLD AUTO: 0.07 K/UL (ref 0–0.04)
IMM GRANULOCYTES NFR BLD AUTO: 1 % (ref 0–0.5)
LYMPHOCYTES # BLD AUTO: 0.2 K/UL (ref 1–4.8)
LYMPHOCYTES NFR BLD: 2.2 % (ref 18–48)
Lab: NORMAL
MAGNESIUM SERPL-MCNC: 2.1 MG/DL (ref 1.6–2.6)
MCH RBC QN AUTO: 29.4 PG (ref 27–31)
MCHC RBC AUTO-ENTMCNC: 31.3 G/DL (ref 32–36)
MCV RBC AUTO: 94 FL (ref 82–98)
MICROSCOPIC EXAM: NORMAL
MONOCYTES # BLD AUTO: 0.4 K/UL (ref 0.3–1)
MONOCYTES NFR BLD: 6 % (ref 4–15)
NEUTROPHILS # BLD AUTO: 6.5 K/UL (ref 1.8–7.7)
NEUTROPHILS NFR BLD: 90.1 % (ref 38–73)
NRBC BLD-RTO: 0 /100 WBC
PHOSPHATE SERPL-MCNC: 2.3 MG/DL (ref 2.7–4.5)
PLATELET # BLD AUTO: 147 K/UL (ref 150–450)
PMV BLD AUTO: 10.2 FL (ref 9.2–12.9)
POTASSIUM SERPL-SCNC: 4.5 MMOL/L (ref 3.5–5.1)
PROCALCITONIN SERPL IA-MCNC: 0.22 NG/ML
PROT SERPL-MCNC: 6.5 G/DL (ref 6–8.4)
RBC # BLD AUTO: 3.06 M/UL (ref 4–5.4)
SODIUM SERPL-SCNC: 136 MMOL/L (ref 136–145)
SPECIMEN TYPE - TISSUE: NORMAL
TROPONIN I SERPL DL<=0.01 NG/ML-MCNC: <0.006 NG/ML (ref 0–0.03)
WBC # BLD AUTO: 7.18 K/UL (ref 3.9–12.7)

## 2022-06-04 PROCEDURE — 99233 PR SUBSEQUENT HOSPITAL CARE,LEVL III: ICD-10-PCS | Mod: ,,, | Performed by: HOSPITALIST

## 2022-06-04 PROCEDURE — 25000003 PHARM REV CODE 250: Performed by: HOSPITALIST

## 2022-06-04 PROCEDURE — 93010 EKG 12-LEAD: ICD-10-PCS | Mod: ,,, | Performed by: INTERNAL MEDICINE

## 2022-06-04 PROCEDURE — 25000242 PHARM REV CODE 250 ALT 637 W/ HCPCS: Performed by: HOSPITALIST

## 2022-06-04 PROCEDURE — 94761 N-INVAS EAR/PLS OXIMETRY MLT: CPT

## 2022-06-04 PROCEDURE — 85025 COMPLETE CBC W/AUTO DIFF WBC: CPT | Performed by: HOSPITALIST

## 2022-06-04 PROCEDURE — 84484 ASSAY OF TROPONIN QUANT: CPT | Performed by: HOSPITALIST

## 2022-06-04 PROCEDURE — 36415 COLL VENOUS BLD VENIPUNCTURE: CPT | Performed by: HOSPITALIST

## 2022-06-04 PROCEDURE — 83735 ASSAY OF MAGNESIUM: CPT | Performed by: HOSPITALIST

## 2022-06-04 PROCEDURE — 99900035 HC TECH TIME PER 15 MIN (STAT)

## 2022-06-04 PROCEDURE — 93010 ELECTROCARDIOGRAM REPORT: CPT | Mod: ,,, | Performed by: INTERNAL MEDICINE

## 2022-06-04 PROCEDURE — 63600175 PHARM REV CODE 636 W HCPCS: Performed by: HOSPITALIST

## 2022-06-04 PROCEDURE — 93005 ELECTROCARDIOGRAM TRACING: CPT

## 2022-06-04 PROCEDURE — 94640 AIRWAY INHALATION TREATMENT: CPT

## 2022-06-04 PROCEDURE — 80053 COMPREHEN METABOLIC PANEL: CPT | Performed by: HOSPITALIST

## 2022-06-04 PROCEDURE — 27000221 HC OXYGEN, UP TO 24 HOURS

## 2022-06-04 PROCEDURE — 84145 PROCALCITONIN (PCT): CPT | Performed by: HOSPITALIST

## 2022-06-04 PROCEDURE — 84100 ASSAY OF PHOSPHORUS: CPT | Performed by: HOSPITALIST

## 2022-06-04 PROCEDURE — 20600001 HC STEP DOWN PRIVATE ROOM

## 2022-06-04 PROCEDURE — 99233 SBSQ HOSP IP/OBS HIGH 50: CPT | Mod: ,,, | Performed by: HOSPITALIST

## 2022-06-04 RX ORDER — IPRATROPIUM BROMIDE AND ALBUTEROL SULFATE 2.5; .5 MG/3ML; MG/3ML
3 SOLUTION RESPIRATORY (INHALATION)
Status: DISCONTINUED | OUTPATIENT
Start: 2022-06-04 | End: 2022-06-04

## 2022-06-04 RX ORDER — IPRATROPIUM BROMIDE AND ALBUTEROL SULFATE 2.5; .5 MG/3ML; MG/3ML
3 SOLUTION RESPIRATORY (INHALATION) EVERY 4 HOURS PRN
Status: DISCONTINUED | OUTPATIENT
Start: 2022-06-04 | End: 2022-06-08

## 2022-06-04 RX ORDER — AMOXICILLIN 250 MG
1 CAPSULE ORAL 2 TIMES DAILY
Status: DISCONTINUED | OUTPATIENT
Start: 2022-06-04 | End: 2022-06-10 | Stop reason: HOSPADM

## 2022-06-04 RX ADMIN — METHOCARBAMOL 500 MG: 500 TABLET ORAL at 09:06

## 2022-06-04 RX ADMIN — ALLOPURINOL 300 MG: 100 TABLET ORAL at 08:06

## 2022-06-04 RX ADMIN — SIMETHICONE 80 MG: 80 TABLET, CHEWABLE ORAL at 08:06

## 2022-06-04 RX ADMIN — MUPIROCIN: 20 OINTMENT TOPICAL at 09:06

## 2022-06-04 RX ADMIN — PIPERACILLIN SODIUM AND TAZOBACTAM SODIUM 4.5 G: 4; .5 INJECTION, POWDER, LYOPHILIZED, FOR SOLUTION INTRAVENOUS at 01:06

## 2022-06-04 RX ADMIN — ONDANSETRON 4 MG: 2 INJECTION INTRAMUSCULAR; INTRAVENOUS at 09:06

## 2022-06-04 RX ADMIN — MORPHINE SULFATE 15 MG: 15 TABLET ORAL at 03:06

## 2022-06-04 RX ADMIN — ATORVASTATIN CALCIUM 20 MG: 20 TABLET, FILM COATED ORAL at 08:06

## 2022-06-04 RX ADMIN — METHOCARBAMOL 500 MG: 500 TABLET ORAL at 08:06

## 2022-06-04 RX ADMIN — PIPERACILLIN SODIUM AND TAZOBACTAM SODIUM 4.5 G: 4; .5 INJECTION, POWDER, LYOPHILIZED, FOR SOLUTION INTRAVENOUS at 09:06

## 2022-06-04 RX ADMIN — PIPERACILLIN SODIUM AND TAZOBACTAM SODIUM 4.5 G: 4; .5 INJECTION, POWDER, LYOPHILIZED, FOR SOLUTION INTRAVENOUS at 04:06

## 2022-06-04 RX ADMIN — PROMETHAZINE HYDROCHLORIDE 12.5 MG: 25 INJECTION INTRAMUSCULAR; INTRAVENOUS at 06:06

## 2022-06-04 RX ADMIN — METHOCARBAMOL 500 MG: 500 TABLET ORAL at 04:06

## 2022-06-04 RX ADMIN — SIMETHICONE 80 MG: 80 TABLET, CHEWABLE ORAL at 09:06

## 2022-06-04 RX ADMIN — IPRATROPIUM BROMIDE AND ALBUTEROL SULFATE 3 ML: 2.5; .5 SOLUTION RESPIRATORY (INHALATION) at 08:06

## 2022-06-04 RX ADMIN — HYDROMORPHONE HYDROCHLORIDE 1 MG: 1 INJECTION, SOLUTION INTRAMUSCULAR; INTRAVENOUS; SUBCUTANEOUS at 09:06

## 2022-06-04 RX ADMIN — MORPHINE SULFATE 15 MG: 15 TABLET ORAL at 09:06

## 2022-06-04 RX ADMIN — Medication 6 MG: at 08:06

## 2022-06-04 RX ADMIN — SENNOSIDES AND DOCUSATE SODIUM 1 TABLET: 50; 8.6 TABLET ORAL at 09:06

## 2022-06-04 NOTE — ASSESSMENT & PLAN NOTE
· Hgb 13 prior to biopsy, down to 9 today  · Given persistent abdominal pain and acute drop in Hgb, will check CT abdomen/pelvis to check for expanding bleed  · Pain control with prn MS IR and IV Dilaudid, scheduled Robaxin

## 2022-06-04 NOTE — ASSESSMENT & PLAN NOTE
· Found to be satting 89% on room air, that improved to 95% on 5L NC - on 2L NC  · CXR with RLL PNA  · Continue Zosyn given significant allergies to Azithro and Cipro (start date 6/3)

## 2022-06-04 NOTE — PLAN OF CARE
Problem: Adult Inpatient Plan of Care  Goal: Plan of Care Review  Outcome: Ongoing, Progressing  Goal: Patient-Specific Goal (Individualized)  Outcome: Ongoing, Progressing  Goal: Absence of Hospital-Acquired Illness or Injury  Outcome: Ongoing, Progressing  Goal: Optimal Comfort and Wellbeing  Outcome: Ongoing, Progressing  Goal: Readiness for Transition of Care  Outcome: Ongoing, Progressing     Problem: Infection  Goal: Absence of Infection Signs and Symptoms  Outcome: Ongoing, Progressing     Problem: Fluid Imbalance (Pneumonia)  Goal: Fluid Balance  Outcome: Ongoing, Progressing     Problem: Infection (Pneumonia)  Goal: Resolution of Infection Signs and Symptoms  Outcome: Ongoing, Progressing     Problem: Respiratory Compromise (Pneumonia)  Goal: Effective Oxygenation and Ventilation  Outcome: Ongoing, Progressing

## 2022-06-04 NOTE — ASSESSMENT & PLAN NOTE
· Abdominal distension and tenderness  · KUB with gas  · Continue Simethicone TID  · Schedule Senna BID - refuses anything more as it hurts to move  · CT as above

## 2022-06-04 NOTE — PROGRESS NOTES
[facility] - Intensive Care ([place] [address])  [facility] Medicine  Progress Note    Patient Name: Gabi Newton  MRN: [id number]  Patient Class: IP- Inpatient   Admission Date: 6/2/2022  Length of Stay: 2 days  Attending Physician: Benita Mancia MD  Primary Care Provider: Yanet Anderson MD        Subjective:     Principal Problem:Retroperitoneal bleeding        HPI:  55 y/o Woman hx of Marginal Zone Lymphoma, GERD, HTN, and Inferior Pole Right renal mass, patient was here on Wed 6/1 for ambulatory IR Renal mass biopsy, Biopsy completed and post-procedure patient developed acute abdominal pain.  She complained of 10/10 pain at the biopsy site.  IR team performed ultrasound and no acute abnormality notified, however poor kidney visualization and a subsequent CT obtianed.  CT demonstrated Nithya-renal hemorrhage, IR took patient from PACU then for angiogram with possible embolization due to concern of post-procedure retroperitoneal bleed.      Angiogram completed this evening and no active extravasation noted, but irregular flow to inferior kidney and patient underwent embolization of these vessels, hemostasis achieved.     Post renal mass biopsy her Hgb was 13.1  and post-angiogram/embolization hgb is 11.4.  Patient was seen in PACU this evening but still just starting to come awake from anesthesia.   She will be admitted for observation.               Overview/Hospital Course:  Ms. Newton was admitted to Hospital Medicine for management of a retroperitoneal bleed s/p kidney biopsy.  Hgb trended down to 10.  Pain meds were adjusted.  Psych was consulted to approve home Methadone per Pharmacy request.  On 6/3, she was found to be hypoxic to 89%.  CXR showed RLL pneumonia.  She was started on Zosyn given her allergies.      Interval History: No acute events overnight.  Reports not feeling well.  Having abdominal pain and back pain.  Still no BM, passing gas.  Doesn't want anything to have a BM, she says it hurts to move.   Remains on Simethicone.  Hgb down to 9, will check CT to evaluate for bleed expansion    Review of Systems   Constitutional:  Positive for activity change and appetite change. Negative for chills, fatigue and fever.   Respiratory:  Positive for shortness of breath. Negative for cough.    Cardiovascular:  Negative for chest pain, palpitations and leg swelling.   Gastrointestinal:  Positive for abdominal distention and constipation. Negative for abdominal pain, diarrhea, nausea and vomiting.   Genitourinary:  Negative for dysuria and urgency.   Musculoskeletal:  Positive for back pain.   Neurological:  Negative for dizziness and headaches.   All other systems reviewed and are negative.  Objective:     Vital Signs (Most Recent):  Temp: 98.2 °F (36.8 °C) (06/04/22 1154)  Pulse: 90 (06/04/22 1154)  Resp: (!) 25 (06/04/22 1154)  BP: 135/62 (06/04/22 1154)  SpO2: (!) 92 % (06/04/22 1154)   Vital Signs (24h Range):  Temp:  [98.2 °F (36.8 °C)-98.8 °F (37.1 °C)] 98.2 °F (36.8 °C)  Pulse:  [72-92] 90  Resp:  [15-27] 25  SpO2:  [92 %-100 %] 92 %  BP: (118-144)/(62-72) 135/62     Weight: 99.8 kg (220 lb)  Body mass index is 41.57 kg/m².    Intake/Output Summary (Last 24 hours) at 6/4/2022 1343  Last data filed at 6/3/2022 1800  Gross per 24 hour   Intake --   Output 600 ml   Net -600 ml        Physical Exam  Constitutional:       Appearance: Normal appearance. She is well-developed.   HENT:      Head: Normocephalic and atraumatic.   Cardiovascular:      Rate and Rhythm: Normal rate and regular rhythm.      Heart sounds: No murmur heard.  Pulmonary:      Effort: Pulmonary effort is normal. No respiratory distress.      Breath sounds: Normal breath sounds. No wheezing or rales.      Comments: On NC  Abdominal:      General: There is distension.      Palpations: Abdomen is soft.      Tenderness: There is abdominal tenderness.   Musculoskeletal:         General: No deformity.   Skin:     General: Skin is warm.   Neurological:       General: No focal deficit present.      Mental Status: She is alert and oriented to person, place, and time.       Significant Labs: All pertinent labs within the past 24 hours have been reviewed.    Significant Imaging: I have reviewed all pertinent imaging results/findings within the past 24 hours.  CXR: I have reviewed all pertinent results/findings within the past 24 hours and my personal findings are:  RLL PNA      Assessment/Plan:      * Retroperitoneal bleeding  · Hgb 13 prior to biopsy, down to 9 today  · Given persistent abdominal pain and acute drop in Hgb, will check CT abdomen/pelvis to check for expanding bleed  · Pain control with prn MS IR and IV Dilaudid, scheduled Robaxin      Gaseous abdominal distention  · Abdominal distension and tenderness  · KUB with gas  · Continue Simethicone TID  · Schedule Senna BID - refuses anything more as it hurts to move  · CT as above      Right lower lobe pneumonia  · Found to be satting 89% on room air, that improved to 95% on 5L NC - on 2L NC  · CXR with RLL PNA  · Continue Zosyn given significant allergies to Azithro and Cipro (start date 6/3)      Obese  · Body mass index is 41.57 kg/m².   · Morbid obesity complicates all aspects of disease management from diagnostic modalities to treatment.   · Weight loss encouraged and health benefits explained to patient.         Acute blood loss anemia  · As above  Transfuse <7      Chronic low back pain  · Chronic issue  · On Methadone daily      Hyperkalemia  Resolved      Right renal mass  · S/p biopsy 6/1  · Patient established with hematology/oncology, has f/u in July       Essential hypertension  · Will hold lisinopril as per hyperkalemia   · Ideally keep BP <160     VTE Risk Mitigation (From admission, onward)         Ordered     Reason for No Pharmacological VTE Prophylaxis  Once        Question:  Reasons:  Answer:  Active Bleeding    06/02/22 0219     IP VTE HIGH RISK PATIENT  Once         06/02/22 0219     Place  sequential compression device  Until discontinued         06/02/22 0219                Discharge Planning   FARZANA: 6/8/2022     Code Status: Full Code   Is the patient medically ready for discharge?: No    Reason for patient still in hospital (select all that apply): Patient trending condition  Discharge Plan A: Home                  Benita Mancia MD  Department of Hospital Medicine   [facility] - Intensive Care ([address]

## 2022-06-04 NOTE — NURSING
Patient in stable condition, drowsy yet arousable; AAO x4. Complains of 10/10 pain to abdomen. Resp even and unlabored at 12, decided to avoid narcs. Non-opiod pain relief offered, patient refused. IV abt continues, no adverse reaction noted, patient is afebrile. Safety maintained throughout shift, patient able to voice complaints; none voiced at this time.

## 2022-06-04 NOTE — SUBJECTIVE & OBJECTIVE
Interval History: No acute events overnight.  Reports not feeling well.  Having abdominal pain and back pain.  Still no BM, passing gas.  Doesn't want anything to have a BM, she says it hurts to move.  Remains on Simethicone.  Hgb down to 9, will check CT to evaluate for bleed expansion    Review of Systems   Constitutional:  Positive for activity change and appetite change. Negative for chills, fatigue and fever.   Respiratory:  Positive for shortness of breath. Negative for cough.    Cardiovascular:  Negative for chest pain, palpitations and leg swelling.   Gastrointestinal:  Positive for abdominal distention and constipation. Negative for abdominal pain, diarrhea, nausea and vomiting.   Genitourinary:  Negative for dysuria and urgency.   Musculoskeletal:  Positive for back pain.   Neurological:  Negative for dizziness and headaches.   All other systems reviewed and are negative.  Objective:     Vital Signs (Most Recent):  Temp: 98.2 °F (36.8 °C) (06/04/22 1154)  Pulse: 90 (06/04/22 1154)  Resp: (!) 25 (06/04/22 1154)  BP: 135/62 (06/04/22 1154)  SpO2: (!) 92 % (06/04/22 1154)   Vital Signs (24h Range):  Temp:  [98.2 °F (36.8 °C)-98.8 °F (37.1 °C)] 98.2 °F (36.8 °C)  Pulse:  [72-92] 90  Resp:  [15-27] 25  SpO2:  [92 %-100 %] 92 %  BP: (118-144)/(62-72) 135/62     Weight: 99.8 kg (220 lb)  Body mass index is 41.57 kg/m².    Intake/Output Summary (Last 24 hours) at 6/4/2022 1343  Last data filed at 6/3/2022 1800  Gross per 24 hour   Intake --   Output 600 ml   Net -600 ml        Physical Exam  Constitutional:       Appearance: Normal appearance. She is well-developed.   HENT:      Head: Normocephalic and atraumatic.   Cardiovascular:      Rate and Rhythm: Normal rate and regular rhythm.      Heart sounds: No murmur heard.  Pulmonary:      Effort: Pulmonary effort is normal. No respiratory distress.      Breath sounds: Normal breath sounds. No wheezing or rales.      Comments: On NC  Abdominal:      General: There is  distension.      Palpations: Abdomen is soft.      Tenderness: There is abdominal tenderness.   Musculoskeletal:         General: No deformity.   Skin:     General: Skin is warm.   Neurological:      General: No focal deficit present.      Mental Status: She is alert and oriented to person, place, and time.       Significant Labs: All pertinent labs within the past 24 hours have been reviewed.    Significant Imaging: I have reviewed all pertinent imaging results/findings within the past 24 hours.  CXR: I have reviewed all pertinent results/findings within the past 24 hours and my personal findings are:  RLL PNA

## 2022-06-05 LAB
ABO + RH BLD: NORMAL
BLD GP AB SCN CELLS X3 SERPL QL: NORMAL

## 2022-06-05 PROCEDURE — 99233 PR SUBSEQUENT HOSPITAL CARE,LEVL III: ICD-10-PCS | Mod: ,,, | Performed by: HOSPITALIST

## 2022-06-05 PROCEDURE — 25000003 PHARM REV CODE 250: Performed by: HOSPITALIST

## 2022-06-05 PROCEDURE — 63600175 PHARM REV CODE 636 W HCPCS: Performed by: HOSPITALIST

## 2022-06-05 PROCEDURE — 99233 SBSQ HOSP IP/OBS HIGH 50: CPT | Mod: ,,, | Performed by: HOSPITALIST

## 2022-06-05 PROCEDURE — 36415 COLL VENOUS BLD VENIPUNCTURE: CPT | Performed by: ANESTHESIOLOGY

## 2022-06-05 PROCEDURE — 20600001 HC STEP DOWN PRIVATE ROOM

## 2022-06-05 PROCEDURE — 86850 RBC ANTIBODY SCREEN: CPT | Performed by: ANESTHESIOLOGY

## 2022-06-05 PROCEDURE — 63600175 PHARM REV CODE 636 W HCPCS: Performed by: ANESTHESIOLOGY

## 2022-06-05 PROCEDURE — 25000003 PHARM REV CODE 250: Performed by: ANESTHESIOLOGY

## 2022-06-05 RX ORDER — ENOXAPARIN SODIUM 100 MG/ML
40 INJECTION SUBCUTANEOUS EVERY 24 HOURS
Status: DISCONTINUED | OUTPATIENT
Start: 2022-06-05 | End: 2022-06-10 | Stop reason: HOSPADM

## 2022-06-05 RX ADMIN — SENNOSIDES AND DOCUSATE SODIUM 1 TABLET: 50; 8.6 TABLET ORAL at 09:06

## 2022-06-05 RX ADMIN — PIPERACILLIN SODIUM AND TAZOBACTAM SODIUM 4.5 G: 4; .5 INJECTION, POWDER, LYOPHILIZED, FOR SOLUTION INTRAVENOUS at 10:06

## 2022-06-05 RX ADMIN — VANCOMYCIN HYDROCHLORIDE 1500 MG: 1.5 INJECTION, POWDER, LYOPHILIZED, FOR SOLUTION INTRAVENOUS at 09:06

## 2022-06-05 RX ADMIN — METHOCARBAMOL 500 MG: 500 TABLET ORAL at 05:06

## 2022-06-05 RX ADMIN — MORPHINE SULFATE 15 MG: 15 TABLET ORAL at 09:06

## 2022-06-05 RX ADMIN — ATORVASTATIN CALCIUM 20 MG: 20 TABLET, FILM COATED ORAL at 09:06

## 2022-06-05 RX ADMIN — ALLOPURINOL 300 MG: 100 TABLET ORAL at 09:06

## 2022-06-05 RX ADMIN — SIMETHICONE 80 MG: 80 TABLET, CHEWABLE ORAL at 09:06

## 2022-06-05 RX ADMIN — PIPERACILLIN SODIUM AND TAZOBACTAM SODIUM 4.5 G: 4; .5 INJECTION, POWDER, LYOPHILIZED, FOR SOLUTION INTRAVENOUS at 01:06

## 2022-06-05 RX ADMIN — METHOCARBAMOL 500 MG: 500 TABLET ORAL at 09:06

## 2022-06-05 RX ADMIN — ONDANSETRON 4 MG: 2 INJECTION INTRAMUSCULAR; INTRAVENOUS at 09:06

## 2022-06-05 RX ADMIN — METHADONE HYDROCHLORIDE 90 MG: 10 TABLET ORAL at 09:06

## 2022-06-05 RX ADMIN — PIPERACILLIN SODIUM AND TAZOBACTAM SODIUM 4.5 G: 4; .5 INJECTION, POWDER, LYOPHILIZED, FOR SOLUTION INTRAVENOUS at 05:06

## 2022-06-05 RX ADMIN — VANCOMYCIN HYDROCHLORIDE 2250 MG: 1.25 INJECTION, POWDER, LYOPHILIZED, FOR SOLUTION INTRAVENOUS at 09:06

## 2022-06-05 RX ADMIN — SIMETHICONE 80 MG: 80 TABLET, CHEWABLE ORAL at 02:06

## 2022-06-05 RX ADMIN — ENOXAPARIN SODIUM 40 MG: 100 INJECTION SUBCUTANEOUS at 05:06

## 2022-06-05 RX ADMIN — MUPIROCIN: 20 OINTMENT TOPICAL at 09:06

## 2022-06-05 RX ADMIN — METHOCARBAMOL 500 MG: 500 TABLET ORAL at 02:06

## 2022-06-05 NOTE — NURSING
_ AAO*4 . Had bladder movement , external catheter in place. IV line changed .patient complained of nausea, PRN meds given .Acceptance to the care provided .   _Call light within reach , Safety precaution maintained . Will continue monitoring.

## 2022-06-05 NOTE — NURSING
Patient in stable condition, denies any discomfort. Drowsiness noted, yet easily arousable to voice. Patient reports passing lots of gas without BM, however refuses senna dose. Patient educated on action of drug, still refused. Routine meds tolerated well; IV abt continues, no adverse reaction noted, pt afebrile. Safety maintained, call light within reach. Report given to oncoming RN.

## 2022-06-05 NOTE — PROGRESS NOTES
Pharmacokinetic Initial Assessment: IV Vancomycin    Assessment/Plan:    Initiate intravenous vancomycin with loading dose of 2250 mg once followed by a maintenance dose of vancomycin 1500mg IV every 12 hours  Desired empiric serum trough concentration is 15 to 20 mcg/mL  Draw vancomycin trough level 60 min prior to fourth dose on 6/6/22 at approximately 2000  Pharmacy will continue to follow and monitor vancomycin.      Please contact pharmacy with any questions regarding this assessment.     Thank you for the consult,   PAMELA CESAR       Patient brief summary:  Gabi Newton is a 56 y.o. female initiated on antimicrobial therapy with IV Vancomycin for treatment of suspected lower respiratory infection    Drug Allergies:   Review of patient's allergies indicates:   Allergen Reactions    Azithromycin Anaphylaxis     Other reaction(s): swelling to entire body  Swelling (tongue / lips)^      Ciprofloxacin Swelling     Throat swells    Codeine      Swelling (throat)^    Tolerates Morphine      Codeine sulfate      Swelling (throat)^    Tolerates Morphine       Actual Body Weight:   99.8kg    Renal Function:   Estimated Creatinine Clearance: 97.2 mL/min (based on SCr of 0.7 mg/dL).,     Dialysis Method (if applicable):  N/A    CBC (last 72 hours):  Recent Labs   Lab Result Units 06/02/22  1321 06/02/22  1940 06/03/22 0527 06/04/22  0918   WBC K/uL 8.89 8.30 9.45 7.18   Hemoglobin g/dL 10.2* 9.8* 9.8* 9.0*   Hematocrit % 32.8* 30.8* 31.0* 28.8*   Platelets K/uL 150 137* 149* 147*   Gran % %  --  89.1*  --  90.1*   Lymph % %  --  3.4*  --  2.2*   Mono % %  --  6.7  --  6.0   Eosinophil % %  --  0.2  --  0.4   Basophil % %  --  0.1  --  0.3   Differential Method   --  Automated  --  Automated       Metabolic Panel (last 72 hours):  Recent Labs   Lab Result Units 06/03/22 0527 06/04/22 0918   Sodium mmol/L 133* 136   Potassium mmol/L 4.7 4.5   Chloride mmol/L 99 98   CO2 mmol/L 26 29   Glucose mg/dL 138* 130*    BUN mg/dL 9 9   Creatinine mg/dL 0.7 0.7   Albumin g/dL  --  2.9*   Total Bilirubin mg/dL  --  1.1*   Alkaline Phosphatase U/L  --  135   AST U/L  --  64*   ALT U/L  --  70*   Magnesium mg/dL 2.1 2.1   Phosphorus mg/dL 2.8 2.3*       Drug levels (last 3 results):  No results for input(s): VANCOMYCINRA, VANCORANDOM, VANCOMYCINPE, VANCOPEAK, VANCOMYCINTR, VANCOTROUGH in the last 72 hours.    Microbiologic Results:  Microbiology Results (last 7 days)     ** No results found for the last 168 hours. **

## 2022-06-05 NOTE — ASSESSMENT & PLAN NOTE
· Found to be satting 89% on room air, that improved to 95% on 5L NC - now on 4L   · CT with multifocal opacities  · Check RVP  · Continue Zosyn, add Vanc

## 2022-06-05 NOTE — PROGRESS NOTES
[facility] - Intensive Care ([place] [address])  [facility] Medicine  Progress Note    Patient Name: Gabi Newton  MRN: [id number]  Patient Class: IP- Inpatient   Admission Date: 6/2/2022  Length of Stay: 3 days  Attending Physician: Benita Mancia MD  Primary Care Provider: Yanet Anderson MD        Subjective:     Principal Problem:Retroperitoneal bleeding        HPI:  57 y/o Woman hx of Marginal Zone Lymphoma, GERD, HTN, and Inferior Pole Right renal mass, patient was here on Wed 6/1 for ambulatory IR Renal mass biopsy, Biopsy completed and post-procedure patient developed acute abdominal pain.  She complained of 10/10 pain at the biopsy site.  IR team performed ultrasound and no acute abnormality notified, however poor kidney visualization and a subsequent CT obtianed.  CT demonstrated Nithya-renal hemorrhage, IR took patient from PACU then for angiogram with possible embolization due to concern of post-procedure retroperitoneal bleed.      Angiogram completed this evening and no active extravasation noted, but irregular flow to inferior kidney and patient underwent embolization of these vessels, hemostasis achieved.     Post renal mass biopsy her Hgb was 13.1  and post-angiogram/embolization hgb is 11.4.  Patient was seen in PACU this evening but still just starting to come awake from anesthesia.   She will be admitted for observation.               Overview/Hospital Course:  Ms. Newton was admitted to Hospital Medicine for management of a retroperitoneal bleed s/p kidney biopsy.  Hgb trended down to 10.  Pain meds were adjusted.  Psych was consulted to approve home Methadone per Pharmacy request.  On 6/3, she was found to be hypoxic to 89%.  CXR showed RLL pneumonia.  She was started on Zosyn given her allergies.  She continued to endorse worsening shortness of breath, abdominal distension, and back pain.  CT abdomen was ordered to evaluate retroperitoneal bleed which was improving, but showed bilateral pneumonia.   Zosyn was added and RVP was ordered given the lack of WBC or fevers.      Interval History: No acute events overnight.  Up to 4L NC.  Refusing Senna.  Passing gas, no BM.    Review of Systems   Constitutional:  Positive for activity change and appetite change. Negative for chills, fatigue and fever.   Respiratory:  Positive for shortness of breath. Negative for cough.    Cardiovascular:  Negative for chest pain, palpitations and leg swelling.   Gastrointestinal:  Positive for abdominal distention and constipation. Negative for abdominal pain, diarrhea, nausea and vomiting.   Genitourinary:  Negative for dysuria and urgency.   Musculoskeletal:  Positive for back pain.   Neurological:  Negative for dizziness and headaches.   All other systems reviewed and are negative.  Objective:     Vital Signs (Most Recent):  Temp: 98.5 °F (36.9 °C) (06/05/22 0400)  Pulse: 78 (06/05/22 0904)  Resp: 20 (06/05/22 0915)  BP: 121/63 (06/05/22 0400)  SpO2: 96 % (06/05/22 0400)   Vital Signs (24h Range):  Temp:  [98.1 °F (36.7 °C)-98.9 °F (37.2 °C)] 98.5 °F (36.9 °C)  Pulse:  [76-93] 78  Resp:  [15-20] 20  SpO2:  [91 %-98 %] 96 %  BP: (120-137)/(55-63) 121/63     Weight: 99.8 kg (220 lb)  Body mass index is 41.57 kg/m².    Intake/Output Summary (Last 24 hours) at 6/5/2022 1035  Last data filed at 6/5/2022 0200  Gross per 24 hour   Intake 400 ml   Output 750 ml   Net -350 ml        Physical Exam  Constitutional:       Appearance: Normal appearance. She is well-developed.   HENT:      Head: Normocephalic and atraumatic.   Cardiovascular:      Rate and Rhythm: Normal rate and regular rhythm.      Heart sounds: No murmur heard.  Pulmonary:      Effort: Pulmonary effort is normal. No respiratory distress.      Breath sounds: Normal breath sounds. No wheezing or rales.      Comments: On NC  Abdominal:      General: There is distension.      Palpations: Abdomen is soft.      Tenderness: There is abdominal tenderness.   Musculoskeletal:          General: No deformity.   Skin:     General: Skin is warm.   Neurological:      General: No focal deficit present.      Mental Status: She is alert and oriented to person, place, and time.       Significant Labs: All pertinent labs within the past 24 hours have been reviewed.    Significant Imaging: I have reviewed all pertinent imaging results/findings within the past 24 hours.  CXR: I have reviewed all pertinent results/findings within the past 24 hours and my personal findings are:  RLL PNA      Assessment/Plan:      * Retroperitoneal bleeding  · Hgb 13 prior to biopsy, down to 9  · CT abdomen pelvis 6/4 with improved retroperitoneal bleed  · Pain control with prn MS IR and IV Dilaudid, scheduled Robaxin      Gaseous abdominal distention  · Abdominal distension and tenderness  · KUB with gas  · Continue Simethicone TID  · Schedule Senna BID - refuses anything more as it hurts to move      Multifocal pneumonia  · Found to be satting 89% on room air, that improved to 95% on 5L NC - now on 4L   · CT with multifocal opacities  · Check RVP  · Continue Zosyn, add Vanc      Obese  · Body mass index is 41.57 kg/m².   · Morbid obesity complicates all aspects of disease management from diagnostic modalities to treatment.   · Weight loss encouraged and health benefits explained to patient.         Acute blood loss anemia  · As above  Transfuse <7      Chronic low back pain  · Chronic issue  · On Methadone daily      Hyperkalemia  Resolved      Right renal mass  · S/p biopsy 6/1  · Patient established with hematology/oncology, has f/u in July       Essential hypertension  · Will hold lisinopril as per hyperkalemia   · Ideally keep BP <160       VTE Risk Mitigation (From admission, onward)         Ordered     enoxaparin injection 40 mg  Daily         06/05/22 0750     Reason for No Pharmacological VTE Prophylaxis  Once        Question:  Reasons:  Answer:  Active Bleeding    06/02/22 0219     IP VTE HIGH RISK PATIENT  Once          06/02/22 0219     Place sequential compression device  Until discontinued         06/02/22 0219                Discharge Planning   FARZANA: 6/8/2022     Code Status: Full Code   Is the patient medically ready for discharge?: No    Reason for patient still in hospital (select all that apply): Patient new problem  Discharge Plan A: Home                  Benita Mancia MD  Department of Hospital Medicine   [facility] - Intensive Care ([address]

## 2022-06-05 NOTE — NURSING
-  Patient was AAO* .  Had bladder movement , external catheter present . Patient is in O2 3liter with Nc .  Patient complains of pain couple of time , meds given . Patient had nausea , PRN meds given . Patient had mild  Chest pain , provider is aware , Troponin I came normal and Stat EKG was done .   Call light within reach , Safety precaution maintained . Will continue monitoring .

## 2022-06-05 NOTE — ASSESSMENT & PLAN NOTE
· Hgb 13 prior to biopsy, down to 9  · CT abdomen pelvis 6/4 with improved retroperitoneal bleed  · Pain control with prn MS IR and IV Dilaudid, scheduled Robaxin

## 2022-06-05 NOTE — ASSESSMENT & PLAN NOTE
· Abdominal distension and tenderness  · KUB with gas  · Continue Simethicone TID  · Schedule Senna BID - refuses anything more as it hurts to move

## 2022-06-05 NOTE — SUBJECTIVE & OBJECTIVE
Interval History: No acute events overnight.  Up to 4L NC.  Refusing Senna.  Passing gas, no BM.    Review of Systems   Constitutional:  Positive for activity change and appetite change. Negative for chills, fatigue and fever.   Respiratory:  Positive for shortness of breath. Negative for cough.    Cardiovascular:  Negative for chest pain, palpitations and leg swelling.   Gastrointestinal:  Positive for abdominal distention and constipation. Negative for abdominal pain, diarrhea, nausea and vomiting.   Genitourinary:  Negative for dysuria and urgency.   Musculoskeletal:  Positive for back pain.   Neurological:  Negative for dizziness and headaches.   All other systems reviewed and are negative.  Objective:     Vital Signs (Most Recent):  Temp: 98.5 °F (36.9 °C) (06/05/22 0400)  Pulse: 78 (06/05/22 0904)  Resp: 20 (06/05/22 0915)  BP: 121/63 (06/05/22 0400)  SpO2: 96 % (06/05/22 0400)   Vital Signs (24h Range):  Temp:  [98.1 °F (36.7 °C)-98.9 °F (37.2 °C)] 98.5 °F (36.9 °C)  Pulse:  [76-93] 78  Resp:  [15-20] 20  SpO2:  [91 %-98 %] 96 %  BP: (120-137)/(55-63) 121/63     Weight: 99.8 kg (220 lb)  Body mass index is 41.57 kg/m².    Intake/Output Summary (Last 24 hours) at 6/5/2022 1035  Last data filed at 6/5/2022 0200  Gross per 24 hour   Intake 400 ml   Output 750 ml   Net -350 ml        Physical Exam  Constitutional:       Appearance: Normal appearance. She is well-developed.   HENT:      Head: Normocephalic and atraumatic.   Cardiovascular:      Rate and Rhythm: Normal rate and regular rhythm.      Heart sounds: No murmur heard.  Pulmonary:      Effort: Pulmonary effort is normal. No respiratory distress.      Breath sounds: Normal breath sounds. No wheezing or rales.      Comments: On NC  Abdominal:      General: There is distension.      Palpations: Abdomen is soft.      Tenderness: There is abdominal tenderness.   Musculoskeletal:         General: No deformity.   Skin:     General: Skin is warm.   Neurological:       General: No focal deficit present.      Mental Status: She is alert and oriented to person, place, and time.       Significant Labs: All pertinent labs within the past 24 hours have been reviewed.    Significant Imaging: I have reviewed all pertinent imaging results/findings within the past 24 hours.  CXR: I have reviewed all pertinent results/findings within the past 24 hours and my personal findings are:  RLL PNA

## 2022-06-06 LAB
ADENOVIRUS: NOT DETECTED
BORDETELLA PARAPERTUSSIS (IS1001): NOT DETECTED
BORDETELLA PERTUSSIS (PTXP): NOT DETECTED
CHLAMYDIA PNEUMONIAE: NOT DETECTED
CORONAVIRUS 229E, COMMON COLD VIRUS: NOT DETECTED
CORONAVIRUS HKU1, COMMON COLD VIRUS: NOT DETECTED
CORONAVIRUS NL63, COMMON COLD VIRUS: NOT DETECTED
CORONAVIRUS OC43, COMMON COLD VIRUS: NOT DETECTED
FLUBV RNA NPH QL NAA+NON-PROBE: NOT DETECTED
HPIV1 RNA NPH QL NAA+NON-PROBE: NOT DETECTED
HPIV2 RNA NPH QL NAA+NON-PROBE: NOT DETECTED
HPIV3 RNA NPH QL NAA+NON-PROBE: NOT DETECTED
HPIV4 RNA NPH QL NAA+NON-PROBE: NOT DETECTED
HUMAN METAPNEUMOVIRUS: NOT DETECTED
INFLUENZA A (SUBTYPES H1,H1-2009,H3): NOT DETECTED
MYCOPLASMA PNEUMONIAE: NOT DETECTED
RESPIRATORY INFECTION PANEL SOURCE: NORMAL
RSV RNA NPH QL NAA+NON-PROBE: NOT DETECTED
RV+EV RNA NPH QL NAA+NON-PROBE: NOT DETECTED
SARS-COV-2 RNA RESP QL NAA+PROBE: NOT DETECTED
VANCOMYCIN TROUGH SERPL-MCNC: 13 UG/ML (ref 10–22)

## 2022-06-06 PROCEDURE — 36415 COLL VENOUS BLD VENIPUNCTURE: CPT | Performed by: ANESTHESIOLOGY

## 2022-06-06 PROCEDURE — 25000003 PHARM REV CODE 250: Performed by: HOSPITALIST

## 2022-06-06 PROCEDURE — 25000003 PHARM REV CODE 250: Performed by: ANESTHESIOLOGY

## 2022-06-06 PROCEDURE — 20600001 HC STEP DOWN PRIVATE ROOM

## 2022-06-06 PROCEDURE — 63600175 PHARM REV CODE 636 W HCPCS: Performed by: HOSPITALIST

## 2022-06-06 PROCEDURE — 87798 DETECT AGENT NOS DNA AMP: CPT | Performed by: HOSPITALIST

## 2022-06-06 PROCEDURE — 99232 SBSQ HOSP IP/OBS MODERATE 35: CPT | Mod: ,,, | Performed by: HOSPITALIST

## 2022-06-06 PROCEDURE — 63600175 PHARM REV CODE 636 W HCPCS: Performed by: ANESTHESIOLOGY

## 2022-06-06 PROCEDURE — 99232 PR SUBSEQUENT HOSPITAL CARE,LEVL II: ICD-10-PCS | Mod: ,,, | Performed by: HOSPITALIST

## 2022-06-06 PROCEDURE — 80202 ASSAY OF VANCOMYCIN: CPT | Performed by: ANESTHESIOLOGY

## 2022-06-06 RX ORDER — DIPHENHYDRAMINE HCL 25 MG
25 CAPSULE ORAL EVERY 6 HOURS PRN
Status: DISCONTINUED | OUTPATIENT
Start: 2022-06-06 | End: 2022-06-07

## 2022-06-06 RX ADMIN — MUPIROCIN: 20 OINTMENT TOPICAL at 09:06

## 2022-06-06 RX ADMIN — PIPERACILLIN SODIUM AND TAZOBACTAM SODIUM 4.5 G: 4; .5 INJECTION, POWDER, LYOPHILIZED, FOR SOLUTION INTRAVENOUS at 03:06

## 2022-06-06 RX ADMIN — SIMETHICONE 80 MG: 80 TABLET, CHEWABLE ORAL at 08:06

## 2022-06-06 RX ADMIN — VANCOMYCIN HYDROCHLORIDE 1500 MG: 1.5 INJECTION, POWDER, LYOPHILIZED, FOR SOLUTION INTRAVENOUS at 11:06

## 2022-06-06 RX ADMIN — MUPIROCIN: 20 OINTMENT TOPICAL at 08:06

## 2022-06-06 RX ADMIN — VANCOMYCIN HYDROCHLORIDE 1500 MG: 1.5 INJECTION, POWDER, LYOPHILIZED, FOR SOLUTION INTRAVENOUS at 09:06

## 2022-06-06 RX ADMIN — METHOCARBAMOL 500 MG: 500 TABLET ORAL at 03:06

## 2022-06-06 RX ADMIN — PIPERACILLIN SODIUM AND TAZOBACTAM SODIUM 4.5 G: 4; .5 INJECTION, POWDER, LYOPHILIZED, FOR SOLUTION INTRAVENOUS at 08:06

## 2022-06-06 RX ADMIN — METHOCARBAMOL 500 MG: 500 TABLET ORAL at 09:06

## 2022-06-06 RX ADMIN — HYDROMORPHONE HYDROCHLORIDE 1 MG: 1 INJECTION, SOLUTION INTRAMUSCULAR; INTRAVENOUS; SUBCUTANEOUS at 03:06

## 2022-06-06 RX ADMIN — ALLOPURINOL 300 MG: 100 TABLET ORAL at 08:06

## 2022-06-06 RX ADMIN — METHOCARBAMOL 500 MG: 500 TABLET ORAL at 08:06

## 2022-06-06 RX ADMIN — METHADONE HYDROCHLORIDE 90 MG: 10 TABLET ORAL at 09:06

## 2022-06-06 RX ADMIN — ATORVASTATIN CALCIUM 20 MG: 20 TABLET, FILM COATED ORAL at 08:06

## 2022-06-06 RX ADMIN — ENOXAPARIN SODIUM 40 MG: 100 INJECTION SUBCUTANEOUS at 05:06

## 2022-06-06 RX ADMIN — PIPERACILLIN SODIUM AND TAZOBACTAM SODIUM 4.5 G: 4; .5 INJECTION, POWDER, LYOPHILIZED, FOR SOLUTION INTRAVENOUS at 05:06

## 2022-06-06 RX ADMIN — SIMETHICONE 80 MG: 80 TABLET, CHEWABLE ORAL at 09:06

## 2022-06-06 NOTE — ASSESSMENT & PLAN NOTE
· Found to be satting 89% on room air, that improved to 95% on 5L NC - now down to 3L  · CT with multifocal opacities  · Check RVP  · Continue Zosyn and Vanc

## 2022-06-06 NOTE — SUBJECTIVE & OBJECTIVE
Interval History: No acute events overnight.  More awake today.  RVP waiting to be collected, but down to 3LNC.  She reports she has been asking to get out of bed, but nursing told her that they are not able to get her out of bed without therapy.  She has had progressive mobility ordered.    Review of Systems   Constitutional:  Positive for activity change and appetite change. Negative for chills, fatigue and fever.   Respiratory:  Positive for shortness of breath. Negative for cough.    Cardiovascular:  Negative for chest pain, palpitations and leg swelling.   Gastrointestinal:  Positive for abdominal distention and constipation. Negative for abdominal pain, diarrhea, nausea and vomiting.   Genitourinary:  Negative for dysuria and urgency.   Musculoskeletal:  Positive for back pain.   Neurological:  Negative for dizziness and headaches.   All other systems reviewed and are negative.  Objective:     Vital Signs (Most Recent):  Temp: 98.3 °F (36.8 °C) (06/05/22 1932)  Pulse: 73 (06/06/22 0740)  Resp: 16 (06/06/22 0939)  BP: 135/67 (06/06/22 0740)  SpO2: 96 % (06/06/22 0740)   Vital Signs (24h Range):  Temp:  [98.2 °F (36.8 °C)-98.3 °F (36.8 °C)] 98.3 °F (36.8 °C)  Pulse:  [73-83] 73  Resp:  [16-20] 16  SpO2:  [93 %-96 %] 96 %  BP: (126-154)/(60-67) 135/67     Weight: 99.8 kg (220 lb)  Body mass index is 41.57 kg/m².    Intake/Output Summary (Last 24 hours) at 6/6/2022 1027  Last data filed at 6/5/2022 1327  Gross per 24 hour   Intake 200 ml   Output 800 ml   Net -600 ml        Physical Exam  Constitutional:       Appearance: Normal appearance. She is well-developed.   HENT:      Head: Normocephalic and atraumatic.   Cardiovascular:      Rate and Rhythm: Normal rate and regular rhythm.      Heart sounds: No murmur heard.  Pulmonary:      Effort: Pulmonary effort is normal. No respiratory distress.      Breath sounds: Normal breath sounds. No wheezing or rales.      Comments: On NC  Abdominal:      General: There is  distension.      Palpations: Abdomen is soft.      Tenderness: There is abdominal tenderness.   Musculoskeletal:         General: No deformity.   Skin:     General: Skin is warm.   Neurological:      General: No focal deficit present.      Mental Status: She is alert and oriented to person, place, and time.       Significant Labs: All pertinent labs within the past 24 hours have been reviewed.    Significant Imaging: I have reviewed all pertinent imaging results/findings within the past 24 hours.  CXR: I have reviewed all pertinent results/findings within the past 24 hours and my personal findings are:  RLL PNA

## 2022-06-06 NOTE — PROGRESS NOTES
[facility] - Intensive Care ([place] [address])  [facility] Medicine  Progress Note    Patient Name: Gabi Newton  MRN: [id number]  Patient Class: IP- Inpatient   Admission Date: 6/2/2022  Length of Stay: 4 days  Attending Physician: Benita Mancia MD  Primary Care Provider: Yanet Anderson MD        Subjective:     Principal Problem:Retroperitoneal bleeding        HPI:  57 y/o Woman hx of Marginal Zone Lymphoma, GERD, HTN, and Inferior Pole Right renal mass, patient was here on Wed 6/1 for ambulatory IR Renal mass biopsy, Biopsy completed and post-procedure patient developed acute abdominal pain.  She complained of 10/10 pain at the biopsy site.  IR team performed ultrasound and no acute abnormality notified, however poor kidney visualization and a subsequent CT obtianed.  CT demonstrated Nithya-renal hemorrhage, IR took patient from PACU then for angiogram with possible embolization due to concern of post-procedure retroperitoneal bleed.      Angiogram completed this evening and no active extravasation noted, but irregular flow to inferior kidney and patient underwent embolization of these vessels, hemostasis achieved.     Post renal mass biopsy her Hgb was 13.1  and post-angiogram/embolization hgb is 11.4.  Patient was seen in PACU this evening but still just starting to come awake from anesthesia.   She will be admitted for observation.               Overview/Hospital Course:  Ms. Newton was admitted to Hospital Medicine for management of a retroperitoneal bleed s/p kidney biopsy.  Hgb trended down to 10.  Pain meds were adjusted.  Psych was consulted to approve home Methadone per Pharmacy request.  On 6/3, she was found to be hypoxic to 89%.  CXR showed RLL pneumonia.  She was started on Zosyn given her allergies.  She continued to endorse worsening shortness of breath, abdominal distension, and back pain.  CT abdomen was ordered to evaluate retroperitoneal bleed which was improving, but showed bilateral pneumonia.   Zosyn was added and RVP was ordered given the lack of WBC or fevers.      Interval History: No acute events overnight.  More awake today.  RVP waiting to be collected, but down to 3LNC.  She reports she has been asking to get out of bed, but nursing told her that they are not able to get her out of bed without therapy.  She has had progressive mobility ordered.    Review of Systems   Constitutional:  Positive for activity change and appetite change. Negative for chills, fatigue and fever.   Respiratory:  Positive for shortness of breath. Negative for cough.    Cardiovascular:  Negative for chest pain, palpitations and leg swelling.   Gastrointestinal:  Positive for abdominal distention and constipation. Negative for abdominal pain, diarrhea, nausea and vomiting.   Genitourinary:  Negative for dysuria and urgency.   Musculoskeletal:  Positive for back pain.   Neurological:  Negative for dizziness and headaches.   All other systems reviewed and are negative.  Objective:     Vital Signs (Most Recent):  Temp: 98.3 °F (36.8 °C) (06/05/22 1932)  Pulse: 73 (06/06/22 0740)  Resp: 16 (06/06/22 0939)  BP: 135/67 (06/06/22 0740)  SpO2: 96 % (06/06/22 0740)   Vital Signs (24h Range):  Temp:  [98.2 °F (36.8 °C)-98.3 °F (36.8 °C)] 98.3 °F (36.8 °C)  Pulse:  [73-83] 73  Resp:  [16-20] 16  SpO2:  [93 %-96 %] 96 %  BP: (126-154)/(60-67) 135/67     Weight: 99.8 kg (220 lb)  Body mass index is 41.57 kg/m².    Intake/Output Summary (Last 24 hours) at 6/6/2022 1027  Last data filed at 6/5/2022 1327  Gross per 24 hour   Intake 200 ml   Output 800 ml   Net -600 ml        Physical Exam  Constitutional:       Appearance: Normal appearance. She is well-developed.   HENT:      Head: Normocephalic and atraumatic.   Cardiovascular:      Rate and Rhythm: Normal rate and regular rhythm.      Heart sounds: No murmur heard.  Pulmonary:      Effort: Pulmonary effort is normal. No respiratory distress.      Breath sounds: Normal breath sounds. No  wheezing or rales.      Comments: On NC  Abdominal:      General: There is distension.      Palpations: Abdomen is soft.      Tenderness: There is abdominal tenderness.   Musculoskeletal:         General: No deformity.   Skin:     General: Skin is warm.   Neurological:      General: No focal deficit present.      Mental Status: She is alert and oriented to person, place, and time.       Significant Labs: All pertinent labs within the past 24 hours have been reviewed.    Significant Imaging: I have reviewed all pertinent imaging results/findings within the past 24 hours.  CXR: I have reviewed all pertinent results/findings within the past 24 hours and my personal findings are:  RLL PNA      Assessment/Plan:      * Retroperitoneal bleeding  · Hgb 13 prior to biopsy, down to 9  · CT abdomen pelvis 6/4 with improved retroperitoneal bleed  · Pain control with prn MS IR and IV Dilaudid, scheduled Robaxin      Gaseous abdominal distention  · Abdominal distension and tenderness  · KUB with gas  · Continue Simethicone TID  · Schedule Senna BID - refuses anything more as it hurts to move      Multifocal pneumonia  · Found to be satting 89% on room air, that improved to 95% on 5L NC - now down to 3L  · CT with multifocal opacities  · Check RVP  · Continue Zosyn and Vanc      Obese  · Body mass index is 41.57 kg/m².   · Morbid obesity complicates all aspects of disease management from diagnostic modalities to treatment.   · Weight loss encouraged and health benefits explained to patient.         Acute blood loss anemia  · As above  Transfuse <7      Chronic low back pain  · Chronic issue  · On Methadone daily      Hyperkalemia  Resolved      Right renal mass  · S/p biopsy 6/1  · Patient established with hematology/oncology, has f/u in July       Essential hypertension  · Will hold lisinopril as per hyperkalemia   · Ideally keep BP <160       VTE Risk Mitigation (From admission, onward)         Ordered     enoxaparin injection  40 mg  Daily         06/05/22 0750     Reason for No Pharmacological VTE Prophylaxis  Once        Question:  Reasons:  Answer:  Active Bleeding    06/02/22 0219     IP VTE HIGH RISK PATIENT  Once         06/02/22 0219     Place sequential compression device  Until discontinued         06/02/22 0219                Discharge Planning   FARZANA: 6/8/2022     Code Status: Full Code   Is the patient medically ready for discharge?: No    Reason for patient still in hospital (select all that apply): Patient trending condition  Discharge Plan A: Home                  Benita Mancia MD  Department of Hospital Medicine   [facility] - Intensive Care ([address])

## 2022-06-06 NOTE — NURSING
Patient is AOx4, resting comfortably in chair, call light in reach, able to make needs known. No concerns at this time. Vital signs WDL on 3L.

## 2022-06-07 PROCEDURE — 25000003 PHARM REV CODE 250: Performed by: ANESTHESIOLOGY

## 2022-06-07 PROCEDURE — 63600175 PHARM REV CODE 636 W HCPCS: Performed by: HOSPITALIST

## 2022-06-07 PROCEDURE — 25000003 PHARM REV CODE 250: Performed by: HOSPITALIST

## 2022-06-07 PROCEDURE — 20600001 HC STEP DOWN PRIVATE ROOM

## 2022-06-07 PROCEDURE — 63600175 PHARM REV CODE 636 W HCPCS: Performed by: ANESTHESIOLOGY

## 2022-06-07 PROCEDURE — 99233 PR SUBSEQUENT HOSPITAL CARE,LEVL III: ICD-10-PCS | Mod: ,,, | Performed by: HOSPITALIST

## 2022-06-07 PROCEDURE — 99233 SBSQ HOSP IP/OBS HIGH 50: CPT | Mod: ,,, | Performed by: HOSPITALIST

## 2022-06-07 RX ORDER — HYDROXYZINE HYDROCHLORIDE 25 MG/1
25 TABLET, FILM COATED ORAL 3 TIMES DAILY PRN
Status: DISCONTINUED | OUTPATIENT
Start: 2022-06-07 | End: 2022-06-10 | Stop reason: HOSPADM

## 2022-06-07 RX ADMIN — SENNOSIDES AND DOCUSATE SODIUM 1 TABLET: 50; 8.6 TABLET ORAL at 09:06

## 2022-06-07 RX ADMIN — SIMETHICONE 80 MG: 80 TABLET, CHEWABLE ORAL at 09:06

## 2022-06-07 RX ADMIN — MORPHINE SULFATE 15 MG: 15 TABLET ORAL at 05:06

## 2022-06-07 RX ADMIN — HYDROMORPHONE HYDROCHLORIDE 1 MG: 1 INJECTION, SOLUTION INTRAMUSCULAR; INTRAVENOUS; SUBCUTANEOUS at 06:06

## 2022-06-07 RX ADMIN — VANCOMYCIN HYDROCHLORIDE 1500 MG: 1.5 INJECTION, POWDER, LYOPHILIZED, FOR SOLUTION INTRAVENOUS at 11:06

## 2022-06-07 RX ADMIN — PIPERACILLIN SODIUM AND TAZOBACTAM SODIUM 4.5 G: 4; .5 INJECTION, POWDER, LYOPHILIZED, FOR SOLUTION INTRAVENOUS at 12:06

## 2022-06-07 RX ADMIN — ALLOPURINOL 300 MG: 100 TABLET ORAL at 09:06

## 2022-06-07 RX ADMIN — METHOCARBAMOL 500 MG: 500 TABLET ORAL at 09:06

## 2022-06-07 RX ADMIN — SODIUM CHLORIDE 1000 ML: 0.9 INJECTION, SOLUTION INTRAVENOUS at 01:06

## 2022-06-07 RX ADMIN — PIPERACILLIN SODIUM AND TAZOBACTAM SODIUM 4.5 G: 4; .5 INJECTION, POWDER, LYOPHILIZED, FOR SOLUTION INTRAVENOUS at 05:06

## 2022-06-07 RX ADMIN — METHADONE HYDROCHLORIDE 90 MG: 10 TABLET ORAL at 08:06

## 2022-06-07 RX ADMIN — ENOXAPARIN SODIUM 40 MG: 100 INJECTION SUBCUTANEOUS at 05:06

## 2022-06-07 RX ADMIN — METHOCARBAMOL 500 MG: 500 TABLET ORAL at 08:06

## 2022-06-07 RX ADMIN — PIPERACILLIN SODIUM AND TAZOBACTAM SODIUM 4.5 G: 4; .5 INJECTION, POWDER, LYOPHILIZED, FOR SOLUTION INTRAVENOUS at 09:06

## 2022-06-07 RX ADMIN — Medication 6 MG: at 09:06

## 2022-06-07 RX ADMIN — METHOCARBAMOL 500 MG: 500 TABLET ORAL at 05:06

## 2022-06-07 RX ADMIN — VANCOMYCIN HYDROCHLORIDE 1500 MG: 1.5 INJECTION, POWDER, LYOPHILIZED, FOR SOLUTION INTRAVENOUS at 09:06

## 2022-06-07 RX ADMIN — ATORVASTATIN CALCIUM 20 MG: 20 TABLET, FILM COATED ORAL at 09:06

## 2022-06-07 RX ADMIN — METHOCARBAMOL 500 MG: 500 TABLET ORAL at 12:06

## 2022-06-07 NOTE — PROGRESS NOTES
Pharmacokinetic Assessment Follow Up: IV Vancomycin    Vancomycin serum concentration assessment(s):    The trough level was drawn correctly and can be used to guide therapy at this time. The measurement is within the desired definitive target range of 10 to 20 mcg/mL for empiric pneumonia coverage    Vancomycin Regimen Plan:    Continue regimen to Vancomycin 1500 mg IV every 12 hours with next serum trough concentration measured in ~3 days or sooner if kidney function changes      Drug levels (last 3 results):  Recent Labs   Lab Result Units 06/06/22  2050   Vancomycin-Trough ug/mL 13.0       Pharmacy will continue to follow and monitor vancomycin.    Please contact pharmacy at extension [phone] for questions regarding this assessment.    Thank you for the consult,   Karen Zambrano       Patient brief summary:  Gabi Newton is a 56 y.o. female initiated on antimicrobial therapy with IV Vancomycin for treatment of lower respiratory infection    The patient's current regimen is 1500mg IV every 12 hours    Drug Allergies:   Review of patient's allergies indicates:   Allergen Reactions    Azithromycin Anaphylaxis     Other reaction(s): swelling to entire body  Swelling (tongue / lips)^      Ciprofloxacin Swelling     Throat swells    Codeine      Swelling (throat)^    Tolerates Morphine      Codeine sulfate      Swelling (throat)^    Tolerates Morphine       Actual Body Weight:   99.8 kg    Renal Function:   Estimated Creatinine Clearance: 97.2 mL/min (based on SCr of 0.7 mg/dL).,         CBC (last 72 hours):  Recent Labs   Lab Result Units 06/04/22  0918   WBC K/uL 7.18   Hemoglobin g/dL 9.0*   Hematocrit % 28.8*   Platelets K/uL 147*   Gran % % 90.1*   Lymph % % 2.2*   Mono % % 6.0   Eosinophil % % 0.4   Basophil % % 0.3   Differential Method  Automated       Metabolic Panel (last 72 hours):  Recent Labs   Lab Result Units 06/04/22  0918   Sodium mmol/L 136   Potassium mmol/L 4.5   Chloride mmol/L 98   CO2 mmol/L 29    Glucose mg/dL 130*   BUN mg/dL 9   Creatinine mg/dL 0.7   Albumin g/dL 2.9*   Total Bilirubin mg/dL 1.1*   Alkaline Phosphatase U/L 135   AST U/L 64*   ALT U/L 70*   Magnesium mg/dL 2.1   Phosphorus mg/dL 2.3*       Vancomycin Administrations:  vancomycin given in the last 96 hours                   vancomycin 1.5 g in dextrose 5 % 250 mL IVPB (ready to mix) (mg) 1,500 mg New Bag 06/06/22 0940     1,500 mg New Bag 06/05/22 2146    vancomycin (VANCOCIN) 2,250 mg in dextrose 5 % 500 mL IVPB (mg) 2,250 mg New Bag 06/05/22 0916                Microbiologic Results:  Microbiology Results (last 7 days)     Procedure Component Value Units Date/Time    Respiratory Infection Panel (PCR), Nasopharyngeal [[id number]] Collected: 06/06/22 1000    Order Status: Completed Specimen: Nasopharyngeal Swab Updated: 06/06/22 1436     Respiratory Infection Panel Source NP Swab     Adenovirus Not Detected     Coronavirus 229E, Common Cold Virus Not Detected     Coronavirus HKU1, Common Cold Virus Not Detected     Coronavirus NL63, Common Cold Virus Not Detected     Coronavirus OC43, Common Cold Virus Not Detected     Comment: The Coronavirus strains detected in this test cause the common cold.  These strains are not the COVID-19 (novel Coronavirus)strain   associated with the respiratory disease outbreak.          SARS-CoV2 (COVID-19) Qualitative PCR Not Detected     Human Metapneumovirus Not Detected     Human Rhinovirus/Enterovirus Not Detected     Influenza A (subtypes H1, H1-2009,H3) Not Detected     Influenza B Not Detected     Parainfluenza Virus 1 Not Detected     Parainfluenza Virus 2 Not Detected     Parainfluenza Virus 3 Not Detected     Parainfluenza Virus 4 Not Detected     Respiratory Syncytial Virus Not Detected     Bordetella Parapertussis (AK9088) Not Detected     Bordetella pertussis (ptxP) Not Detected     Chlamydia pneumoniae Not Detected     Mycoplasma pneumoniae Not Detected    Narrative:      For all other  respiratory sources order [id number] Respiratory  Viral Panel by PCR (RVPCR)

## 2022-06-07 NOTE — NURSING
Pt AOx3, stable , O2 3l NC in place. Medicated for pain effective. Safety measures in place call light within reach

## 2022-06-07 NOTE — ASSESSMENT & PLAN NOTE
· Found to be satting 89% on room air, that improved to 95% on 5L NC - now down to 2L  · CT with multifocal opacities  · RVP negative  · Continue Zosyn and Vanc

## 2022-06-07 NOTE — PROGRESS NOTES
[facility] - Intensive Care ([place] [address])  [facility] Medicine  Progress Note    Patient Name: Gabi Newton  MRN: [id number]  Patient Class: IP- Inpatient   Admission Date: 6/2/2022  Length of Stay: 5 days  Attending Physician: Benita Mancia MD  Primary Care Provider: Yanet Anderson MD        Subjective:     Principal Problem:Retroperitoneal bleeding        HPI:  55 y/o Woman hx of Marginal Zone Lymphoma, GERD, HTN, and Inferior Pole Right renal mass, patient was here on Wed 6/1 for ambulatory IR Renal mass biopsy, Biopsy completed and post-procedure patient developed acute abdominal pain.  She complained of 10/10 pain at the biopsy site.  IR team performed ultrasound and no acute abnormality notified, however poor kidney visualization and a subsequent CT obtianed.  CT demonstrated Nithya-renal hemorrhage, IR took patient from PACU then for angiogram with possible embolization due to concern of post-procedure retroperitoneal bleed.      Angiogram completed this evening and no active extravasation noted, but irregular flow to inferior kidney and patient underwent embolization of these vessels, hemostasis achieved.     Post renal mass biopsy her Hgb was 13.1  and post-angiogram/embolization hgb is 11.4.  Patient was seen in PACU this evening but still just starting to come awake from anesthesia.   She will be admitted for observation.               Overview/Hospital Course:  Ms. Newton was admitted to Hospital Medicine for management of a retroperitoneal bleed s/p kidney biopsy.  Hgb trended down to 10.  Pain meds were adjusted.  Psych was consulted to approve home Methadone per Pharmacy request.  On 6/3, she was found to be hypoxic to 89%.  CXR showed RLL pneumonia.  She was started on Zosyn given her allergies.  She continued to endorse worsening shortness of breath, abdominal distension, and back pain.  CT abdomen was ordered to evaluate retroperitoneal bleed which was improving, but showed bilateral pneumonia.   Vanc was added and RVP was ordered given the lack of WBC or fevers.      Interval History: No acute events overnight.  Wants to go home, but explained she needs to be off oxygen.  Still no BM, starting to eat.    Review of Systems   Constitutional:  Positive for activity change and appetite change. Negative for chills, fatigue and fever.   Respiratory:  Positive for shortness of breath. Negative for cough.    Cardiovascular:  Negative for chest pain, palpitations and leg swelling.   Gastrointestinal:  Positive for abdominal distention and constipation. Negative for abdominal pain, diarrhea, nausea and vomiting.   Genitourinary:  Negative for dysuria and urgency.   Musculoskeletal:  Positive for back pain.   Neurological:  Negative for dizziness and headaches.   All other systems reviewed and are negative.  Objective:     Vital Signs (Most Recent):  Temp: 98.9 °F (37.2 °C) (06/07/22 0850)  Pulse: 75 (06/07/22 0850)  Resp: 20 (06/07/22 0850)  BP: 139/64 (06/07/22 0850)  SpO2: 100 % (06/07/22 0850)   Vital Signs (24h Range):  Temp:  [98.4 °F (36.9 °C)-99 °F (37.2 °C)] 98.9 °F (37.2 °C)  Pulse:  [68-84] 75  Resp:  [18-20] 20  SpO2:  [96 %-100 %] 100 %  BP: (135-159)/(64-74) 139/64     Weight: 99.8 kg (220 lb)  Body mass index is 41.57 kg/m².    Intake/Output Summary (Last 24 hours) at 6/7/2022 1151  Last data filed at 6/6/2022 1700  Gross per 24 hour   Intake --   Output 700 ml   Net -700 ml        Physical Exam  Constitutional:       Appearance: Normal appearance. She is well-developed.   HENT:      Head: Normocephalic and atraumatic.   Cardiovascular:      Rate and Rhythm: Normal rate and regular rhythm.      Heart sounds: No murmur heard.  Pulmonary:      Effort: Pulmonary effort is normal. No respiratory distress.      Breath sounds: Normal breath sounds. No wheezing or rales.      Comments: On NC  Abdominal:      General: There is distension.      Palpations: Abdomen is soft.      Tenderness: There is abdominal  tenderness.   Musculoskeletal:         General: No deformity.   Skin:     General: Skin is warm.   Neurological:      General: No focal deficit present.      Mental Status: She is alert and oriented to person, place, and time.       Significant Labs: All pertinent labs within the past 24 hours have been reviewed.    Significant Imaging: I have reviewed all pertinent imaging results/findings within the past 24 hours.  CXR: I have reviewed all pertinent results/findings within the past 24 hours and my personal findings are:  RLL PNA      Assessment/Plan:      * Retroperitoneal bleeding  · Hgb 13 prior to biopsy, down to 9  · CT abdomen pelvis 6/4 with improved retroperitoneal bleed  · Pain control with prn MS IR and IV Dilaudid, scheduled Robaxin      Gaseous abdominal distention  · Abdominal distension and tenderness  · KUB with gas  · Continue Simethicone TID  · Schedule Senna BID - refuses anything more as it hurts to move      Multifocal pneumonia  · Found to be satting 89% on room air, that improved to 95% on 5L NC - now down to 2L  · CT with multifocal opacities  · RVP negative  · Continue Zosyn and Vanc      Obese  · Body mass index is 41.57 kg/m².   · Morbid obesity complicates all aspects of disease management from diagnostic modalities to treatment.   · Weight loss encouraged and health benefits explained to patient.         Acute blood loss anemia  · As above  Transfuse <7      Chronic low back pain  · Chronic issue  · On Methadone daily      Hyperkalemia  Resolved      Right renal mass  · S/p biopsy 6/1  · Patient established with hematology/oncology, has f/u in July       Essential hypertension  · Will hold lisinopril as per hyperkalemia   · Ideally keep BP <160       VTE Risk Mitigation (From admission, onward)         Ordered     enoxaparin injection 40 mg  Daily         06/05/22 0750     Reason for No Pharmacological VTE Prophylaxis  Once        Question:  Reasons:  Answer:  Active Bleeding    06/02/22  0219     IP VTE HIGH RISK PATIENT  Once         06/02/22 0219     Place sequential compression device  Until discontinued         06/02/22 0219                Discharge Planning   FARZANA: 6/8/2022     Code Status: Full Code   Is the patient medically ready for discharge?: No    Reason for patient still in hospital (select all that apply): Patient trending condition  Discharge Plan A: Home                  Benita Mancia MD  Department of Hospital Medicine   [facility] - Intensive Care ([address]

## 2022-06-07 NOTE — SUBJECTIVE & OBJECTIVE
Interval History: No acute events overnight.  Wants to go home, but explained she needs to be off oxygen.  Still no BM, starting to eat.    Review of Systems   Constitutional:  Positive for activity change and appetite change. Negative for chills, fatigue and fever.   Respiratory:  Positive for shortness of breath. Negative for cough.    Cardiovascular:  Negative for chest pain, palpitations and leg swelling.   Gastrointestinal:  Positive for abdominal distention and constipation. Negative for abdominal pain, diarrhea, nausea and vomiting.   Genitourinary:  Negative for dysuria and urgency.   Musculoskeletal:  Positive for back pain.   Neurological:  Negative for dizziness and headaches.   All other systems reviewed and are negative.  Objective:     Vital Signs (Most Recent):  Temp: 98.9 °F (37.2 °C) (06/07/22 0850)  Pulse: 75 (06/07/22 0850)  Resp: 20 (06/07/22 0850)  BP: 139/64 (06/07/22 0850)  SpO2: 100 % (06/07/22 0850)   Vital Signs (24h Range):  Temp:  [98.4 °F (36.9 °C)-99 °F (37.2 °C)] 98.9 °F (37.2 °C)  Pulse:  [68-84] 75  Resp:  [18-20] 20  SpO2:  [96 %-100 %] 100 %  BP: (135-159)/(64-74) 139/64     Weight: 99.8 kg (220 lb)  Body mass index is 41.57 kg/m².    Intake/Output Summary (Last 24 hours) at 6/7/2022 1151  Last data filed at 6/6/2022 1700  Gross per 24 hour   Intake --   Output 700 ml   Net -700 ml        Physical Exam  Constitutional:       Appearance: Normal appearance. She is well-developed.   HENT:      Head: Normocephalic and atraumatic.   Cardiovascular:      Rate and Rhythm: Normal rate and regular rhythm.      Heart sounds: No murmur heard.  Pulmonary:      Effort: Pulmonary effort is normal. No respiratory distress.      Breath sounds: Normal breath sounds. No wheezing or rales.      Comments: On NC  Abdominal:      General: There is distension.      Palpations: Abdomen is soft.      Tenderness: There is abdominal tenderness.   Musculoskeletal:         General: No deformity.   Skin:      General: Skin is warm.   Neurological:      General: No focal deficit present.      Mental Status: She is alert and oriented to person, place, and time.       Significant Labs: All pertinent labs within the past 24 hours have been reviewed.    Significant Imaging: I have reviewed all pertinent imaging results/findings within the past 24 hours.  CXR: I have reviewed all pertinent results/findings within the past 24 hours and my personal findings are:  RLL PNA

## 2022-06-08 LAB
ALBUMIN SERPL BCP-MCNC: 2.3 G/DL (ref 3.5–5.2)
ALP SERPL-CCNC: 142 U/L (ref 55–135)
ALT SERPL W/O P-5'-P-CCNC: 46 U/L (ref 10–44)
ANION GAP SERPL CALC-SCNC: 10 MMOL/L (ref 8–16)
AST SERPL-CCNC: 46 U/L (ref 10–40)
BASOPHILS # BLD AUTO: 0.02 K/UL (ref 0–0.2)
BASOPHILS NFR BLD: 0.4 % (ref 0–1.9)
BILIRUB SERPL-MCNC: 1.3 MG/DL (ref 0.1–1)
BUN SERPL-MCNC: 7 MG/DL (ref 6–20)
CALCIUM SERPL-MCNC: 8.9 MG/DL (ref 8.7–10.5)
CHLORIDE SERPL-SCNC: 95 MMOL/L (ref 95–110)
CO2 SERPL-SCNC: 32 MMOL/L (ref 23–29)
CREAT SERPL-MCNC: 0.6 MG/DL (ref 0.5–1.4)
DIFFERENTIAL METHOD: ABNORMAL
EOSINOPHIL # BLD AUTO: 0.2 K/UL (ref 0–0.5)
EOSINOPHIL NFR BLD: 2.9 % (ref 0–8)
ERYTHROCYTE [DISTWIDTH] IN BLOOD BY AUTOMATED COUNT: 14.6 % (ref 11.5–14.5)
EST. GFR  (AFRICAN AMERICAN): >60 ML/MIN/1.73 M^2
EST. GFR  (NON AFRICAN AMERICAN): >60 ML/MIN/1.73 M^2
GLUCOSE SERPL-MCNC: 100 MG/DL (ref 70–110)
HCT VFR BLD AUTO: 27.4 % (ref 37–48.5)
HGB BLD-MCNC: 8.4 G/DL (ref 12–16)
IMM GRANULOCYTES # BLD AUTO: 0.12 K/UL (ref 0–0.04)
IMM GRANULOCYTES NFR BLD AUTO: 2.2 % (ref 0–0.5)
LYMPHOCYTES # BLD AUTO: 0.3 K/UL (ref 1–4.8)
LYMPHOCYTES NFR BLD: 5 % (ref 18–48)
MAGNESIUM SERPL-MCNC: 1.8 MG/DL (ref 1.6–2.6)
MCH RBC QN AUTO: 29 PG (ref 27–31)
MCHC RBC AUTO-ENTMCNC: 30.7 G/DL (ref 32–36)
MCV RBC AUTO: 95 FL (ref 82–98)
MONOCYTES # BLD AUTO: 0.5 K/UL (ref 0.3–1)
MONOCYTES NFR BLD: 9 % (ref 4–15)
NEUTROPHILS # BLD AUTO: 4.5 K/UL (ref 1.8–7.7)
NEUTROPHILS NFR BLD: 80.5 % (ref 38–73)
NRBC BLD-RTO: 1 /100 WBC
PHOSPHATE SERPL-MCNC: 3.4 MG/DL (ref 2.7–4.5)
PLATELET # BLD AUTO: 191 K/UL (ref 150–450)
PMV BLD AUTO: 9.6 FL (ref 9.2–12.9)
POTASSIUM SERPL-SCNC: 3.3 MMOL/L (ref 3.5–5.1)
PROT SERPL-MCNC: 6.1 G/DL (ref 6–8.4)
RBC # BLD AUTO: 2.9 M/UL (ref 4–5.4)
SODIUM SERPL-SCNC: 137 MMOL/L (ref 136–145)
WBC # BLD AUTO: 5.56 K/UL (ref 3.9–12.7)

## 2022-06-08 PROCEDURE — 85025 COMPLETE CBC W/AUTO DIFF WBC: CPT | Performed by: HOSPITALIST

## 2022-06-08 PROCEDURE — 25000003 PHARM REV CODE 250: Performed by: HOSPITALIST

## 2022-06-08 PROCEDURE — 36415 COLL VENOUS BLD VENIPUNCTURE: CPT | Performed by: HOSPITALIST

## 2022-06-08 PROCEDURE — 63600175 PHARM REV CODE 636 W HCPCS: Performed by: ANESTHESIOLOGY

## 2022-06-08 PROCEDURE — 84100 ASSAY OF PHOSPHORUS: CPT | Performed by: HOSPITALIST

## 2022-06-08 PROCEDURE — 80053 COMPREHEN METABOLIC PANEL: CPT | Performed by: HOSPITALIST

## 2022-06-08 PROCEDURE — 63600175 PHARM REV CODE 636 W HCPCS: Performed by: HOSPITALIST

## 2022-06-08 PROCEDURE — 25000003 PHARM REV CODE 250: Performed by: ANESTHESIOLOGY

## 2022-06-08 PROCEDURE — 27000221 HC OXYGEN, UP TO 24 HOURS

## 2022-06-08 PROCEDURE — 20600001 HC STEP DOWN PRIVATE ROOM

## 2022-06-08 PROCEDURE — 83735 ASSAY OF MAGNESIUM: CPT | Performed by: HOSPITALIST

## 2022-06-08 PROCEDURE — 99233 PR SUBSEQUENT HOSPITAL CARE,LEVL III: ICD-10-PCS | Mod: ,,, | Performed by: HOSPITALIST

## 2022-06-08 PROCEDURE — 99233 SBSQ HOSP IP/OBS HIGH 50: CPT | Mod: ,,, | Performed by: HOSPITALIST

## 2022-06-08 PROCEDURE — 94760 N-INVAS EAR/PLS OXIMETRY 1: CPT

## 2022-06-08 RX ADMIN — VANCOMYCIN HYDROCHLORIDE 1500 MG: 1.5 INJECTION, POWDER, LYOPHILIZED, FOR SOLUTION INTRAVENOUS at 08:06

## 2022-06-08 RX ADMIN — METHOCARBAMOL 500 MG: 500 TABLET ORAL at 12:06

## 2022-06-08 RX ADMIN — PIPERACILLIN SODIUM AND TAZOBACTAM SODIUM 4.5 G: 4; .5 INJECTION, POWDER, LYOPHILIZED, FOR SOLUTION INTRAVENOUS at 08:06

## 2022-06-08 RX ADMIN — METHOCARBAMOL 500 MG: 500 TABLET ORAL at 04:06

## 2022-06-08 RX ADMIN — PIPERACILLIN SODIUM AND TAZOBACTAM SODIUM 4.5 G: 4; .5 INJECTION, POWDER, LYOPHILIZED, FOR SOLUTION INTRAVENOUS at 05:06

## 2022-06-08 RX ADMIN — ALLOPURINOL 300 MG: 100 TABLET ORAL at 08:06

## 2022-06-08 RX ADMIN — SIMETHICONE 80 MG: 80 TABLET, CHEWABLE ORAL at 10:06

## 2022-06-08 RX ADMIN — Medication 6 MG: at 08:06

## 2022-06-08 RX ADMIN — ONDANSETRON 4 MG: 2 INJECTION INTRAMUSCULAR; INTRAVENOUS at 08:06

## 2022-06-08 RX ADMIN — SIMETHICONE 80 MG: 80 TABLET, CHEWABLE ORAL at 08:06

## 2022-06-08 RX ADMIN — METHOCARBAMOL 500 MG: 500 TABLET ORAL at 08:06

## 2022-06-08 RX ADMIN — PIPERACILLIN SODIUM AND TAZOBACTAM SODIUM 4.5 G: 4; .5 INJECTION, POWDER, LYOPHILIZED, FOR SOLUTION INTRAVENOUS at 11:06

## 2022-06-08 RX ADMIN — SENNOSIDES AND DOCUSATE SODIUM 1 TABLET: 50; 8.6 TABLET ORAL at 08:06

## 2022-06-08 RX ADMIN — METHADONE HYDROCHLORIDE 90 MG: 10 TABLET ORAL at 08:06

## 2022-06-08 RX ADMIN — HYDROMORPHONE HYDROCHLORIDE 1 MG: 1 INJECTION, SOLUTION INTRAMUSCULAR; INTRAVENOUS; SUBCUTANEOUS at 11:06

## 2022-06-08 RX ADMIN — ATORVASTATIN CALCIUM 20 MG: 20 TABLET, FILM COATED ORAL at 08:06

## 2022-06-08 RX ADMIN — ENOXAPARIN SODIUM 40 MG: 100 INJECTION SUBCUTANEOUS at 04:06

## 2022-06-08 NOTE — PHYSICIAN QUERY
"PT Name: Gabi Newton  MR #: [id number]    DOCUMENTATION CLARIFICATION   Yolande Giang RN, CCDS    [email or web address]    Documentation Excellence  This form is a permanent document in the medical record.     Query Date: June 8, 2022    Dear Provider,  By submitting this query, we are merely seeking further clarification of documentation.   Please utilize your independent clinical judgment when addressing the question(s) below.    The medical record contains the following:  Supporting Clinical Findings Location in Medical Record   Attending physician(s): Owen  Pre-procedure diagnosis: Left renal mass   Post-procedure diagnosis: Same   Indication: Histopathologic diagnosis  Complications: No immediate complications.   TECHNIQUE:  - Percutaneous CT -guided coaxial core needle biopsy  Electronically signed by: Maykel Weaver   Date:    06/01/2022   IR proc 6/1   . CT abdomen and pelvis obtained and on reviewing demonstrates Perirenal hemorrhage. Will plan on taking patient to IR suite for visceral angiogram and possible embolization.  IR H&P 6/1   Retroperitoneal bleeding  -obtain serial CBC every 8 hours   -type & screen sent   -will obtain blood product consent if patient less somnolent this evening  -will enter PRN pain medications    Angiogram completed this evening and no active extravasation noted, but irregular flow to inferior kidney and patient underwent embolization of these vessels, hemostasis achieved.     6/2 Hosp H&P   Renal transarterial embolization  Procedural Personnel   Attending physician(s): Owen  Impression:   Renal angiography demonstrates no active extravasation, however there are multiple irregular appearing arteries supplying area where biopsy was performed and decision was made to embolize these vessels.  Successful embolization of concerning vessels in the inferior aspect of the right kidney. 6/1  IR Owen BARTHOLOMEW     Please clarify if "Retroperitoneal Bleeding"      (as it relates to 6/1 " "L Renal Mass "Percutaneous CT -guided coaxial core needle biopsy") is:    [  ] Inherent/Integral to the procedure   [ x ] Complication of the procedure   [  ] Present, but not a complication of the procedure   [  ] Incidental finding, not clinically significant   [  ] Intended/required to complete procedure   [  ] Other (please specify): __________________   [  ] Clinically Undetermined     Please document in your progress notes daily for the duration of treatment until resolved and include in your discharge summary.  "

## 2022-06-08 NOTE — NURSING
Pt AOx3, stable , O2 3l NC in place. Denies pian or discomfort,. Safety measures in place call light within reach

## 2022-06-08 NOTE — SUBJECTIVE & OBJECTIVE
Interval History: No acute events overnight.  Wants to go home, turned off oxygen.  Requesting bedside commode and walker.  Hopeful to DC eva.  Resp status improving.  Discussed lymphoma on kidney biopsy    Review of Systems   Constitutional:  Positive for activity change and appetite change. Negative for chills, fatigue and fever.   Respiratory:  Positive for shortness of breath. Negative for cough.    Cardiovascular:  Negative for chest pain, palpitations and leg swelling.   Gastrointestinal:  Positive for abdominal distention and constipation. Negative for abdominal pain, diarrhea, nausea and vomiting.   Genitourinary:  Negative for dysuria and urgency.   Musculoskeletal:  Positive for back pain.   Neurological:  Negative for dizziness and headaches.   All other systems reviewed and are negative.  Objective:     Vital Signs (Most Recent):  Temp: 98.7 °F (37.1 °C) (06/08/22 0754)  Pulse: 73 (06/08/22 0754)  Resp: 18 (06/08/22 0819)  BP: 130/68 (06/08/22 0754)  SpO2: 98 % (06/08/22 0754)   Vital Signs (24h Range):  Temp:  [98.2 °F (36.8 °C)-98.7 °F (37.1 °C)] 98.7 °F (37.1 °C)  Pulse:  [66-88] 73  Resp:  [18-19] 18  SpO2:  [95 %-98 %] 98 %  BP: (115-150)/(59-76) 130/68     Weight: 99.8 kg (220 lb)  Body mass index is 41.57 kg/m².    Intake/Output Summary (Last 24 hours) at 6/8/2022 1058  Last data filed at 6/8/2022 0600  Gross per 24 hour   Intake --   Output 300 ml   Net -300 ml        Physical Exam  Constitutional:       Appearance: Normal appearance. She is well-developed.   HENT:      Head: Normocephalic and atraumatic.   Cardiovascular:      Rate and Rhythm: Normal rate and regular rhythm.      Heart sounds: No murmur heard.  Pulmonary:      Effort: Pulmonary effort is normal. No respiratory distress.      Breath sounds: Normal breath sounds. No wheezing or rales.      Comments: On NC  Abdominal:      General: There is distension.      Palpations: Abdomen is soft.      Tenderness: There is abdominal  tenderness.   Musculoskeletal:         General: No deformity.   Skin:     General: Skin is warm.   Neurological:      General: No focal deficit present.      Mental Status: She is alert and oriented to person, place, and time.       Significant Labs: All pertinent labs within the past 24 hours have been reviewed.    Significant Imaging: I have reviewed all pertinent imaging results/findings within the past 24 hours.  CXR: I have reviewed all pertinent results/findings within the past 24 hours and my personal findings are:  RLL PNA

## 2022-06-08 NOTE — ASSESSMENT & PLAN NOTE
· Hgb 13 prior to biopsy, down to 8.4  · CT abdomen pelvis 6/4 with improved retroperitoneal bleed  · Pain control with prn MS IR and IV Dilaudid, scheduled Robaxin

## 2022-06-08 NOTE — PHYSICIAN QUERY
PT Name: Gabi Newton  MR #: [id number]     DOCUMENTATION CLARIFICATION   Yolande Giang, RN, CCDS    [email or web address]    Documentation Excellence  This form is a permanent document in the medical record.    Query Date: June 8, 2022    By submitting this query, we are merely seeking further clarification of documentation.    Please utilize your independent clinical judgment when addressing the question(s) below.    The Medical Record contains the following:   Indicators   Supporting Clinical Findings Location in Medical Record   x Pneumonia documented Right lower lobe pneumonia  Multifocal pneumonia Hosp PN 6/3  6/5 Hosp Pn      x Chest X-Ray/CT Scan CXR w/RLL PNA  CT with multifocal opacities    CT Scan Abd:  3. Minimal ground-glass infiltrates at the lung bases. Hosp Pn 6/3  6/5 Hosp PN    6/1 CT     x PaO2    PaCO2       O2 sat  hypoxic  SOB  Oxygen 89% Hosp PN 6/3   x WBC 9.18 - 8.26 - 8.89 - 8.30 - 9.45 - 5.56 Lab 6/1-8   x Vital Signs 6/1     Temp: 99 °F    Pulse: 84    Resp: 16    BP: 153/70       SpO2:  93 %   6/2     T99.5              P 85            R 20           /72         Sat 89%   6/3      T 98.2 - 98.5   P     R  16-29    Sat 89 %-97 %    >/= 111/56   6/4      T  98.2            P  90          R 25          BP: 135/62          SpO2:  92 %   6/5-8   T  98.5            P 74-78      R16-20      >/= 121/63          Sat 96%  IR H&P   VS Flow Sheet  Hosp PN        Cultures      x Treatment  Zosyn started  · Start Zosyn given significant allergies to Azithro and Cipro (start date 6/3)    · Check RVP  · Continue Zosyn, add Vanc   Hosp PN 6/3      6/5 Hosp PN    x Supplemental O2 Currently on O2 at 2L/min sat at 98%  Found to be satting 89% on room air, that improved to 95% on 5L NC  now on 4L   Down to 3L 6/1 RN PN   6/3 Hosp PN  Hosp PN 6/5  Hosp PN 6/6      Dysphagia/  Swallow study      Other       Provider,         - 2 part question -     Part 1 of 2:   please further specify  ""Pneumonia".   [ y  ] Bacterial, pneumonia unknown organism    [   ] Other type of pneumonia (please specify): ________   [   ] Other respiratory diagnosis (please specify): _________   [  ] Clinically undetermined     Part 2 of 2 Present on admission (POA) status:  [   ] Yes (Y)   [  y ] No (N)   [   ] Documentation insufficient to determine if condition is POA (U)   [  ] Clinically Undetermined (W)     Please document in your progress notes daily for the duration of treatment, until resolved, and include in your discharge summary.     Form No. [id number]                                                                                                                                                                    "

## 2022-06-08 NOTE — PROGRESS NOTES
[facility] - Intensive [facility] ([place] [address])  [facility] Medicine  Progress Note    Patient Name: Gabi Newton  MRN: [id number]  Patient Class: IP- Inpatient   Admission Date: 6/2/2022  Length of Stay: 6 days  Attending Physician: Benita Mancia MD  Primary Care Provider: Yanet Anderson MD        Subjective:     Principal Problem:Retroperitoneal bleeding        HPI:  57 y/o Woman hx of Marginal Zone Lymphoma, GERD, HTN, and Inferior Pole Right renal mass, patient was here on Wed 6/1 for ambulatory IR Renal mass biopsy, Biopsy completed and post-procedure patient developed acute abdominal pain.  She complained of 10/10 pain at the biopsy site.  IR team performed ultrasound and no acute abnormality notified, however poor kidney visualization and a subsequent CT obtianed.  CT demonstrated Nithya-renal hemorrhage, IR took patient from PACU then for angiogram with possible embolization due to concern of post-procedure retroperitoneal bleed.      Angiogram completed this evening and no active extravasation noted, but irregular flow to inferior kidney and patient underwent embolization of these vessels, hemostasis achieved.     Post renal mass biopsy her Hgb was 13.1  and post-angiogram/embolization hgb is 11.4.  Patient was seen in PACU this evening but still just starting to come awake from anesthesia.   She will be admitted for observation.               Overview/Hospital Course:  Ms. Newton was admitted to Hospital Medicine for management of a retroperitoneal bleed s/p kidney biopsy.  Hgb trended down to 10.  Pain meds were adjusted.  Psych was consulted to approve home Methadone per Pharmacy request.  On 6/3, she was found to be hypoxic to 89%.  CXR showed RLL pneumonia.  She was started on Zosyn given her allergies.  She continued to endorse worsening shortness of breath, abdominal distension, and back pain.  CT abdomen was ordered to evaluate retroperitoneal bleed which was improving, but showed bilateral pneumonia.   Vanc was added and RVP was ordered given the lack of WBC or fevers.      Interval History: No acute events overnight.  Wants to go home, turned off oxygen.  Requesting bedside commode and walker.  Hopeful to DC eva.  Resp status improving.  Discussed lymphoma on kidney biopsy    Review of Systems   Constitutional:  Positive for activity change and appetite change. Negative for chills, fatigue and fever.   Respiratory:  Positive for shortness of breath. Negative for cough.    Cardiovascular:  Negative for chest pain, palpitations and leg swelling.   Gastrointestinal:  Positive for abdominal distention and constipation. Negative for abdominal pain, diarrhea, nausea and vomiting.   Genitourinary:  Negative for dysuria and urgency.   Musculoskeletal:  Positive for back pain.   Neurological:  Negative for dizziness and headaches.   All other systems reviewed and are negative.  Objective:     Vital Signs (Most Recent):  Temp: 98.7 °F (37.1 °C) (06/08/22 0754)  Pulse: 73 (06/08/22 0754)  Resp: 18 (06/08/22 0819)  BP: 130/68 (06/08/22 0754)  SpO2: 98 % (06/08/22 0754)   Vital Signs (24h Range):  Temp:  [98.2 °F (36.8 °C)-98.7 °F (37.1 °C)] 98.7 °F (37.1 °C)  Pulse:  [66-88] 73  Resp:  [18-19] 18  SpO2:  [95 %-98 %] 98 %  BP: (115-150)/(59-76) 130/68     Weight: 99.8 kg (220 lb)  Body mass index is 41.57 kg/m².    Intake/Output Summary (Last 24 hours) at 6/8/2022 1058  Last data filed at 6/8/2022 0600  Gross per 24 hour   Intake --   Output 300 ml   Net -300 ml        Physical Exam  Constitutional:       Appearance: Normal appearance. She is well-developed.   HENT:      Head: Normocephalic and atraumatic.   Cardiovascular:      Rate and Rhythm: Normal rate and regular rhythm.      Heart sounds: No murmur heard.  Pulmonary:      Effort: Pulmonary effort is normal. No respiratory distress.      Breath sounds: Normal breath sounds. No wheezing or rales.      Comments: On NC  Abdominal:      General: There is distension.       Palpations: Abdomen is soft.      Tenderness: There is abdominal tenderness.   Musculoskeletal:         General: No deformity.   Skin:     General: Skin is warm.   Neurological:      General: No focal deficit present.      Mental Status: She is alert and oriented to person, place, and time.       Significant Labs: All pertinent labs within the past 24 hours have been reviewed.    Significant Imaging: I have reviewed all pertinent imaging results/findings within the past 24 hours.  CXR: I have reviewed all pertinent results/findings within the past 24 hours and my personal findings are:  RLL PNA      Assessment/Plan:      * Retroperitoneal bleeding  · Hgb 13 prior to biopsy, down to 8.4  · CT abdomen pelvis 6/4 with improved retroperitoneal bleed  · Pain control with prn MS IR and IV Dilaudid, scheduled Robaxin      Gaseous abdominal distention  · Abdominal distension and tenderness  · KUB with gas  · Continue Simethicone TID  · Schedule Senna BID - refuses anything more as it hurts to move      Multifocal pneumonia  · Found to be satting 89% on room air, that improved to 95% on 5L NC - now down to 2L  · CT with multifocal opacities  · RVP negative  · Continue Zosyn and Vanc      Obese  · Body mass index is 41.57 kg/m².   · Morbid obesity complicates all aspects of disease management from diagnostic modalities to treatment.   · Weight loss encouraged and health benefits explained to patient.         Acute blood loss anemia  · As above  Transfuse <7      Chronic low back pain  · Chronic issue  · On Methadone daily      Hyperkalemia  Resolved      Right renal mass  · S/p biopsy 6/1  · Path with lymphoma  · Patient established with hematology/oncology, has f/u in July       Essential hypertension  · Will hold lisinopril as per hyperkalemia   · Ideally keep BP <160     VTE Risk Mitigation (From admission, onward)         Ordered     enoxaparin injection 40 mg  Daily         06/05/22 0750     Reason for No Pharmacological  VTE Prophylaxis  Once        Question:  Reasons:  Answer:  Active Bleeding    06/02/22 0219     IP VTE HIGH RISK PATIENT  Once         06/02/22 0219     Place sequential compression device  Until discontinued         06/02/22 0219                Discharge Planning   FARZANA: 6/9/2022     Code Status: Full Code   Is the patient medically ready for discharge?: No    Reason for patient still in hospital (select all that apply): Patient trending condition  Discharge Plan A: Home                  Benita Mancia MD  Department of Hospital Medicine   [facility] - Intensive Care ([address]

## 2022-06-08 NOTE — ASSESSMENT & PLAN NOTE
· S/p biopsy 6/1  · Path with lymphoma  · Patient established with hematology/oncology, has f/u in July

## 2022-06-09 PROCEDURE — 94640 AIRWAY INHALATION TREATMENT: CPT

## 2022-06-09 PROCEDURE — 25000242 PHARM REV CODE 250 ALT 637 W/ HCPCS: Performed by: HOSPITALIST

## 2022-06-09 PROCEDURE — 27000173 HC ACAPELLA DEVICE DH OR DM

## 2022-06-09 PROCEDURE — 99233 PR SUBSEQUENT HOSPITAL CARE,LEVL III: ICD-10-PCS | Mod: ,,, | Performed by: HOSPITALIST

## 2022-06-09 PROCEDURE — 27000221 HC OXYGEN, UP TO 24 HOURS

## 2022-06-09 PROCEDURE — 25000003 PHARM REV CODE 250: Performed by: HOSPITALIST

## 2022-06-09 PROCEDURE — 94761 N-INVAS EAR/PLS OXIMETRY MLT: CPT

## 2022-06-09 PROCEDURE — 63600175 PHARM REV CODE 636 W HCPCS: Performed by: HOSPITALIST

## 2022-06-09 PROCEDURE — 25000003 PHARM REV CODE 250: Performed by: NURSE PRACTITIONER

## 2022-06-09 PROCEDURE — 99900035 HC TECH TIME PER 15 MIN (STAT)

## 2022-06-09 PROCEDURE — 94664 DEMO&/EVAL PT USE INHALER: CPT

## 2022-06-09 PROCEDURE — 99233 SBSQ HOSP IP/OBS HIGH 50: CPT | Mod: ,,, | Performed by: HOSPITALIST

## 2022-06-09 PROCEDURE — 20600001 HC STEP DOWN PRIVATE ROOM

## 2022-06-09 RX ORDER — DOXYCYCLINE 50 MG/1
100 CAPSULE ORAL EVERY 12 HOURS
Status: COMPLETED | OUTPATIENT
Start: 2022-06-09 | End: 2022-06-09

## 2022-06-09 RX ORDER — FLUTICASONE PROPIONATE 50 MCG
2 SPRAY, SUSPENSION (ML) NASAL DAILY
Status: DISCONTINUED | OUTPATIENT
Start: 2022-06-09 | End: 2022-06-10 | Stop reason: HOSPADM

## 2022-06-09 RX ORDER — IPRATROPIUM BROMIDE AND ALBUTEROL SULFATE 2.5; .5 MG/3ML; MG/3ML
3 SOLUTION RESPIRATORY (INHALATION)
Status: DISCONTINUED | OUTPATIENT
Start: 2022-06-09 | End: 2022-06-10 | Stop reason: HOSPADM

## 2022-06-09 RX ORDER — IPRATROPIUM BROMIDE AND ALBUTEROL SULFATE 2.5; .5 MG/3ML; MG/3ML
3 SOLUTION RESPIRATORY (INHALATION) EVERY 4 HOURS PRN
Status: DISCONTINUED | OUTPATIENT
Start: 2022-06-09 | End: 2022-06-10 | Stop reason: HOSPADM

## 2022-06-09 RX ADMIN — METHOCARBAMOL 500 MG: 500 TABLET ORAL at 05:06

## 2022-06-09 RX ADMIN — HYPROMELLOSE 2910 1 DROP: 5 SOLUTION OPHTHALMIC at 11:06

## 2022-06-09 RX ADMIN — SENNOSIDES AND DOCUSATE SODIUM 1 TABLET: 50; 8.6 TABLET ORAL at 09:06

## 2022-06-09 RX ADMIN — METHOCARBAMOL 500 MG: 500 TABLET ORAL at 10:06

## 2022-06-09 RX ADMIN — Medication 6 MG: at 09:06

## 2022-06-09 RX ADMIN — METHOCARBAMOL 500 MG: 500 TABLET ORAL at 09:06

## 2022-06-09 RX ADMIN — IPRATROPIUM BROMIDE AND ALBUTEROL SULFATE 3 ML: 2.5; .5 SOLUTION RESPIRATORY (INHALATION) at 08:06

## 2022-06-09 RX ADMIN — SIMETHICONE 80 MG: 80 TABLET, CHEWABLE ORAL at 10:06

## 2022-06-09 RX ADMIN — PIPERACILLIN SODIUM AND TAZOBACTAM SODIUM 4.5 G: 4; .5 INJECTION, POWDER, LYOPHILIZED, FOR SOLUTION INTRAVENOUS at 05:06

## 2022-06-09 RX ADMIN — HYDROMORPHONE HYDROCHLORIDE 1 MG: 1 INJECTION, SOLUTION INTRAMUSCULAR; INTRAVENOUS; SUBCUTANEOUS at 05:06

## 2022-06-09 RX ADMIN — IPRATROPIUM BROMIDE AND ALBUTEROL SULFATE 3 ML: 2.5; .5 SOLUTION RESPIRATORY (INHALATION) at 12:06

## 2022-06-09 RX ADMIN — ATORVASTATIN CALCIUM 20 MG: 20 TABLET, FILM COATED ORAL at 09:06

## 2022-06-09 RX ADMIN — ENOXAPARIN SODIUM 40 MG: 100 INJECTION SUBCUTANEOUS at 05:06

## 2022-06-09 RX ADMIN — IPRATROPIUM BROMIDE AND ALBUTEROL SULFATE 3 ML: 2.5; .5 SOLUTION RESPIRATORY (INHALATION) at 04:06

## 2022-06-09 RX ADMIN — FLUTICASONE PROPIONATE 100 MCG: 50 SPRAY, METERED NASAL at 10:06

## 2022-06-09 RX ADMIN — DOXYCYCLINE 100 MG: 50 CAPSULE ORAL at 09:06

## 2022-06-09 RX ADMIN — METHOCARBAMOL 500 MG: 500 TABLET ORAL at 03:06

## 2022-06-09 RX ADMIN — ALLOPURINOL 300 MG: 100 TABLET ORAL at 09:06

## 2022-06-09 RX ADMIN — SIMETHICONE 80 MG: 80 TABLET, CHEWABLE ORAL at 09:06

## 2022-06-09 RX ADMIN — DOXYCYCLINE 100 MG: 50 CAPSULE ORAL at 10:06

## 2022-06-09 RX ADMIN — METHADONE HYDROCHLORIDE 90 MG: 10 TABLET ORAL at 10:06

## 2022-06-09 RX ADMIN — SIMETHICONE 80 MG: 80 TABLET, CHEWABLE ORAL at 03:06

## 2022-06-09 RX ADMIN — HYDROMORPHONE HYDROCHLORIDE 1 MG: 1 INJECTION, SOLUTION INTRAMUSCULAR; INTRAVENOUS; SUBCUTANEOUS at 06:06

## 2022-06-09 NOTE — PROGRESS NOTES
Therapy with Vancomycin complete and/or consult discontinued by provider.  Pharmacy will sign off, please re-consult as needed.     Tristian Gabriel Pharm.D  Ext: [phone]

## 2022-06-09 NOTE — PLAN OF CARE
Problem: Adult Inpatient Plan of Care  Goal: Plan of Care Review  Outcome: Ongoing, Not Progressing  Goal: Patient-Specific Goal (Individualized)  Outcome: Ongoing, Not Progressing  Goal: Absence of Hospital-Acquired Illness or Injury  Outcome: Ongoing, Not Progressing  Goal: Optimal Comfort and Wellbeing  Outcome: Ongoing, Not Progressing  Goal: Readiness for Transition of Care  Outcome: Ongoing, Not Progressing     Problem: Bariatric Environmental Safety  Goal: Safety Maintained with Care  Outcome: Ongoing, Not Progressing     Problem: Infection  Goal: Absence of Infection Signs and Symptoms  Outcome: Ongoing, Not Progressing

## 2022-06-09 NOTE — NURSING
Pt AOx3, stable , O2 2l NC in place. Medicated for pain effective. Safety measures in place call light within reach.

## 2022-06-09 NOTE — ASSESSMENT & PLAN NOTE
· Found to be satting 89% on room air, that improved to 95% on 5L NC - now down to 2L and room air on occasion  · CT with multifocal opacities  · RVP negative  · Breathing treatments q4h and prn  · S/p 7 days of Zosyn and 4 days of Vanc.  Requests to change to pills so will do Doxy for 1 day

## 2022-06-09 NOTE — NURSING
- Patient is AAO*4. Patient had  Bladder movement , walked to the bathroom , Had bladder movement .Patient is in room air .   Call light within reach . Safety precaution maintained . Will continue monitoring .

## 2022-06-09 NOTE — PROGRESS NOTES
[facility] - Int[facility] ([place] [address])  [facility] Medicine  Progress Note    Patient Name: Gabi Newton  MRN: [id number]  Patient Class: IP- Inpatient   Admission Date: 6/2/2022  Length of Stay: 7 days  Attending Physician: Benita Mancia MD  Primary Care Provider: Yanet Anderson MD        Subjective:     Principal Problem:Retroperitoneal bleeding        HPI:  55 y/o Woman hx of Marginal Zone Lymphoma, GERD, HTN, and Inferior Pole Right renal mass, patient was here on Wed 6/1 for ambulatory IR Renal mass biopsy, Biopsy completed and post-procedure patient developed acute abdominal pain.  She complained of 10/10 pain at the biopsy site.  IR team performed ultrasound and no acute abnormality notified, however poor kidney visualization and a subsequent CT obtianed.  CT demonstrated Nithya-renal hemorrhage, IR took patient from PACU then for angiogram with possible embolization due to concern of post-procedure retroperitoneal bleed.      Angiogram completed this evening and no active extravasation noted, but irregular flow to inferior kidney and patient underwent embolization of these vessels, hemostasis achieved.     Post renal mass biopsy her Hgb was 13.1  and post-angiogram/embolization hgb is 11.4.  Patient was seen in PACU this evening but still just starting to come awake from anesthesia.   She will be admitted for observation.               Overview/Hospital Course:  Ms. Newton was admitted to Hospital Medicine for management of a retroperitoneal bleed s/p kidney biopsy.  Hgb trended down to 10.  Pain meds were adjusted.  Psych was consulted to approve home Methadone per Pharmacy request.  On 6/3, she was found to be hypoxic to 89%.  CXR showed RLL pneumonia.  She was started on Zosyn given her allergies.  She continued to endorse worsening shortness of breath, abdominal distension, and back pain.  CT abdomen was ordered to evaluate retroperitoneal bleed which was improving, but showed bilateral pneumonia.   Vanc was added and RVP was ordered given the lack of WBC or fevers      Interval History: No acute events overnight.  Requests more breathing treatments, still coughing.  Hopeful to go home tomorrow.  Had a BM.    Review of Systems   Constitutional:  Positive for activity change and appetite change. Negative for chills, fatigue and fever.   Respiratory:  Positive for cough and shortness of breath.    Cardiovascular:  Negative for chest pain, palpitations and leg swelling.   Gastrointestinal:  Negative for abdominal distention, abdominal pain, constipation, diarrhea, nausea and vomiting.   Genitourinary:  Negative for dysuria and urgency.   Musculoskeletal:  Negative for back pain.   Neurological:  Negative for dizziness and headaches.   All other systems reviewed and are negative.  Objective:     Vital Signs (Most Recent):  Temp: 98.4 °F (36.9 °C) (06/09/22 0811)  Pulse: 86 (06/09/22 1131)  Resp: 15 (06/09/22 1131)  BP: 136/74 (06/09/22 1131)  SpO2: (!) 92 % (06/09/22 1131)   Vital Signs (24h Range):  Temp:  [97.8 °F (36.6 °C)-98.9 °F (37.2 °C)] 98.4 °F (36.9 °C)  Pulse:  [44-86] 86  Resp:  [11-21] 15  SpO2:  [86 %-99 %] 92 %  BP: (113-160)/(57-77) 136/74     Weight: 99.8 kg (220 lb)  Body mass index is 41.57 kg/m².    Intake/Output Summary (Last 24 hours) at 6/9/2022 1138  Last data filed at 6/9/2022 0523  Gross per 24 hour   Intake 840 ml   Output 500 ml   Net 340 ml        Physical Exam  Constitutional:       Appearance: Normal appearance. She is well-developed.   HENT:      Head: Normocephalic and atraumatic.   Cardiovascular:      Rate and Rhythm: Normal rate and regular rhythm.      Heart sounds: No murmur heard.  Pulmonary:      Effort: Pulmonary effort is normal. No respiratory distress.      Breath sounds: Normal breath sounds. No wheezing or rales.      Comments: On NC  Abdominal:      General: There is distension.      Palpations: Abdomen is soft.      Tenderness: There is no abdominal tenderness.    Musculoskeletal:         General: No deformity.   Skin:     General: Skin is warm.   Neurological:      General: No focal deficit present.      Mental Status: She is alert and oriented to person, place, and time.       Significant Labs: All pertinent labs within the past 24 hours have been reviewed.    Significant Imaging: I have reviewed all pertinent imaging results/findings within the past 24 hours.  CXR: I have reviewed all pertinent results/findings within the past 24 hours and my personal findings are:  RLL PNA      Assessment/Plan:      * Retroperitoneal bleeding  · Hgb 13 prior to biopsy, down to 8.4  · CT abdomen pelvis 6/4 with improved retroperitoneal bleed  · Pain control with prn MS IR and IV Dilaudid, scheduled Robaxin      Gaseous abdominal distention  · Resolved with BM      Multifocal pneumonia  · Found to be satting 89% on room air, that improved to 95% on 5L NC - now down to 2L and room air on occasion  · CT with multifocal opacities  · RVP negative  · Breathing treatments q4h and prn  · S/p 7 days of Zosyn and 4 days of Vanc.  Requests to change to pills so will do Doxy for 1 day      Obese  · Body mass index is 41.57 kg/m².   · Morbid obesity complicates all aspects of disease management from diagnostic modalities to treatment.   · Weight loss encouraged and health benefits explained to patient.         Acute blood loss anemia  · As above  Transfuse <7      Chronic low back pain  · Chronic issue  · On Methadone daily      Hyperkalemia  · Resolved      Right renal mass  · S/p biopsy 6/1  · Path with lymphoma  · Patient established with hematology/oncology, has f/u in July       Essential hypertension  · Will hold lisinopril as per hyperkalemia   · Ideally keep BP <160       VTE Risk Mitigation (From admission, onward)         Ordered     enoxaparin injection 40 mg  Daily         06/05/22 0750     Reason for No Pharmacological VTE Prophylaxis  Once        Question:  Reasons:  Answer:  Active  Bleeding    06/02/22 0219     IP VTE HIGH RISK PATIENT  Once         06/02/22 0219     Place sequential compression device  Until discontinued         06/02/22 0219                Discharge Planning   FARZANA: 6/10/2022     Code Status: Full Code   Is the patient medically ready for discharge?: No    Reason for patient still in hospital (select all that apply): Patient trending condition  Discharge Plan A: Home                  Benita Mancia MD  Department of Hospital Medicine   [facility] - Intensive Care ([address])

## 2022-06-09 NOTE — SUBJECTIVE & OBJECTIVE
Interval History: No acute events overnight.  Requests more breathing treatments, still coughing.  Hopeful to go home tomorrow.  Had a BM.    Review of Systems   Constitutional:  Positive for activity change and appetite change. Negative for chills, fatigue and fever.   Respiratory:  Positive for cough and shortness of breath.    Cardiovascular:  Negative for chest pain, palpitations and leg swelling.   Gastrointestinal:  Negative for abdominal distention, abdominal pain, constipation, diarrhea, nausea and vomiting.   Genitourinary:  Negative for dysuria and urgency.   Musculoskeletal:  Negative for back pain.   Neurological:  Negative for dizziness and headaches.   All other systems reviewed and are negative.  Objective:     Vital Signs (Most Recent):  Temp: 98.4 °F (36.9 °C) (06/09/22 0811)  Pulse: 86 (06/09/22 1131)  Resp: 15 (06/09/22 1131)  BP: 136/74 (06/09/22 1131)  SpO2: (!) 92 % (06/09/22 1131)   Vital Signs (24h Range):  Temp:  [97.8 °F (36.6 °C)-98.9 °F (37.2 °C)] 98.4 °F (36.9 °C)  Pulse:  [44-86] 86  Resp:  [11-21] 15  SpO2:  [86 %-99 %] 92 %  BP: (113-160)/(57-77) 136/74     Weight: 99.8 kg (220 lb)  Body mass index is 41.57 kg/m².    Intake/Output Summary (Last 24 hours) at 6/9/2022 1138  Last data filed at 6/9/2022 0523  Gross per 24 hour   Intake 840 ml   Output 500 ml   Net 340 ml        Physical Exam  Constitutional:       Appearance: Normal appearance. She is well-developed.   HENT:      Head: Normocephalic and atraumatic.   Cardiovascular:      Rate and Rhythm: Normal rate and regular rhythm.      Heart sounds: No murmur heard.  Pulmonary:      Effort: Pulmonary effort is normal. No respiratory distress.      Breath sounds: Normal breath sounds. No wheezing or rales.      Comments: On NC  Abdominal:      General: There is distension.      Palpations: Abdomen is soft.      Tenderness: There is no abdominal tenderness.   Musculoskeletal:         General: No deformity.   Skin:     General: Skin is  warm.   Neurological:      General: No focal deficit present.      Mental Status: She is alert and oriented to person, place, and time.       Significant Labs: All pertinent labs within the past 24 hours have been reviewed.    Significant Imaging: I have reviewed all pertinent imaging results/findings within the past 24 hours.  CXR: I have reviewed all pertinent results/findings within the past 24 hours and my personal findings are:  RLL PNA

## 2022-06-09 NOTE — PLAN OF CARE
Problem: Adult Inpatient Plan of Care  Goal: Plan of Care Review  Outcome: Ongoing, Progressing  Goal: Patient-Specific Goal (Individualized)  Outcome: Ongoing, Progressing  Goal: Absence of Hospital-Acquired Illness or Injury  Outcome: Ongoing, Progressing  Goal: Optimal Comfort and Wellbeing  Outcome: Ongoing, Progressing  Goal: Readiness for Transition of Care  Outcome: Ongoing, Progressing     Problem: Bariatric Environmental Safety  Goal: Safety Maintained with Care  Outcome: Ongoing, Progressing     Problem: Infection  Goal: Absence of Infection Signs and Symptoms  Outcome: Ongoing, Progressing     Problem: Fluid Imbalance (Pneumonia)  Goal: Fluid Balance  Outcome: Ongoing, Progressing     Problem: Infection (Pneumonia)  Goal: Resolution of Infection Signs and Symptoms  Outcome: Ongoing, Progressing     Problem: Respiratory Compromise (Pneumonia)  Goal: Effective Oxygenation and Ventilation  Outcome: Ongoing, Progressing     Problem: Skin Injury Risk Increased  Goal: Skin Health and Integrity  Outcome: Ongoing, Progressing

## 2022-06-10 VITALS
BODY MASS INDEX: 41.54 KG/M2 | HEART RATE: 86 BPM | TEMPERATURE: 98 F | WEIGHT: 220 LBS | DIASTOLIC BLOOD PRESSURE: 62 MMHG | OXYGEN SATURATION: 96 % | HEIGHT: 61 IN | SYSTOLIC BLOOD PRESSURE: 125 MMHG | RESPIRATION RATE: 16 BRPM

## 2022-06-10 PROBLEM — J44.9 COPD (CHRONIC OBSTRUCTIVE PULMONARY DISEASE): Status: ACTIVE | Noted: 2022-06-10

## 2022-06-10 PROBLEM — G47.33 OSA (OBSTRUCTIVE SLEEP APNEA): Chronic | Status: ACTIVE | Noted: 2022-06-10

## 2022-06-10 PROCEDURE — 99900035 HC TECH TIME PER 15 MIN (STAT)

## 2022-06-10 PROCEDURE — 94640 AIRWAY INHALATION TREATMENT: CPT

## 2022-06-10 PROCEDURE — 94664 DEMO&/EVAL PT USE INHALER: CPT

## 2022-06-10 PROCEDURE — 94799 UNLISTED PULMONARY SVC/PX: CPT

## 2022-06-10 PROCEDURE — 99239 PR HOSPITAL DISCHARGE DAY,>30 MIN: ICD-10-PCS | Mod: ,,, | Performed by: HOSPITALIST

## 2022-06-10 PROCEDURE — 94761 N-INVAS EAR/PLS OXIMETRY MLT: CPT

## 2022-06-10 PROCEDURE — 25000242 PHARM REV CODE 250 ALT 637 W/ HCPCS: Performed by: HOSPITALIST

## 2022-06-10 PROCEDURE — 99239 HOSP IP/OBS DSCHRG MGMT >30: CPT | Mod: ,,, | Performed by: HOSPITALIST

## 2022-06-10 PROCEDURE — 25000003 PHARM REV CODE 250: Performed by: HOSPITALIST

## 2022-06-10 PROCEDURE — 27000646 HC AEROBIKA DEVICE

## 2022-06-10 PROCEDURE — 27000221 HC OXYGEN, UP TO 24 HOURS

## 2022-06-10 RX ORDER — LOPERAMIDE HYDROCHLORIDE 2 MG/1
2 CAPSULE ORAL 4 TIMES DAILY PRN
Qty: 30 CAPSULE | Refills: 0 | Status: SHIPPED | OUTPATIENT
Start: 2022-06-10 | End: 2022-06-20

## 2022-06-10 RX ORDER — LISINOPRIL 10 MG/1
10 TABLET ORAL DAILY
Status: DISCONTINUED | OUTPATIENT
Start: 2022-06-10 | End: 2022-06-10 | Stop reason: HOSPADM

## 2022-06-10 RX ORDER — LOPERAMIDE HYDROCHLORIDE 2 MG/1
2 CAPSULE ORAL 4 TIMES DAILY PRN
Status: DISCONTINUED | OUTPATIENT
Start: 2022-06-10 | End: 2022-06-10 | Stop reason: HOSPADM

## 2022-06-10 RX ORDER — FLUTICASONE PROPIONATE AND SALMETEROL 100; 50 UG/1; UG/1
1 POWDER RESPIRATORY (INHALATION) DAILY
Qty: 60 EACH | Refills: 0 | Status: SHIPPED | OUTPATIENT
Start: 2022-06-10 | End: 2023-04-24

## 2022-06-10 RX ORDER — FLUTICASONE FUROATE AND VILANTEROL 100; 25 UG/1; UG/1
1 POWDER RESPIRATORY (INHALATION) DAILY
Status: DISCONTINUED | OUTPATIENT
Start: 2022-06-10 | End: 2022-06-10 | Stop reason: HOSPADM

## 2022-06-10 RX ORDER — ALBUTEROL SULFATE 90 UG/1
2 AEROSOL, METERED RESPIRATORY (INHALATION) EVERY 6 HOURS PRN
Qty: 18 G | Refills: 0 | Status: SHIPPED | OUTPATIENT
Start: 2022-06-10 | End: 2023-11-07

## 2022-06-10 RX ADMIN — METHOCARBAMOL 500 MG: 500 TABLET ORAL at 09:06

## 2022-06-10 RX ADMIN — SENNOSIDES AND DOCUSATE SODIUM 1 TABLET: 50; 8.6 TABLET ORAL at 09:06

## 2022-06-10 RX ADMIN — IPRATROPIUM BROMIDE AND ALBUTEROL SULFATE 3 ML: 2.5; .5 SOLUTION RESPIRATORY (INHALATION) at 07:06

## 2022-06-10 RX ADMIN — LOPERAMIDE HYDROCHLORIDE 2 MG: 2 CAPSULE ORAL at 09:06

## 2022-06-10 RX ADMIN — IPRATROPIUM BROMIDE AND ALBUTEROL SULFATE 3 ML: 2.5; .5 SOLUTION RESPIRATORY (INHALATION) at 11:06

## 2022-06-10 RX ADMIN — FLUTICASONE FUROATE AND VILANTEROL TRIFENATATE 1 PUFF: 100; 25 POWDER RESPIRATORY (INHALATION) at 09:06

## 2022-06-10 RX ADMIN — FLUTICASONE PROPIONATE 100 MCG: 50 SPRAY, METERED NASAL at 09:06

## 2022-06-10 RX ADMIN — HYPROMELLOSE 2910 1 DROP: 5 SOLUTION OPHTHALMIC at 09:06

## 2022-06-10 RX ADMIN — METHADONE HYDROCHLORIDE 90 MG: 10 TABLET ORAL at 09:06

## 2022-06-10 RX ADMIN — LISINOPRIL 10 MG: 10 TABLET ORAL at 09:06

## 2022-06-10 RX ADMIN — SIMETHICONE 80 MG: 80 TABLET, CHEWABLE ORAL at 09:06

## 2022-06-10 NOTE — ASSESSMENT & PLAN NOTE
· Found to be satting 89% on room air, that improved to 95% on 5L NC - now down to 2L and room air on occasion  · CT with multifocal opacities  · RVP negative  · Breathing treatments q4h and prn  · S/p 7 days of Zosyn and Vanc/Doxy

## 2022-06-10 NOTE — ASSESSMENT & PLAN NOTE
 Tobacco cessation discussed with patient for greater than 5 minutes.    Offered Nicotine patch while hospitalized   Patient is aware of need to quit tobacco products

## 2022-06-10 NOTE — DISCHARGE SUMMARY
[facility] - Int[facility] Care ([place] [address])  [facility]  Discharge Summary      Patient Name: Gabi Newton  MRN: [id number]  Patient Class: IP- Inpatient  Admission Date: 6/2/2022  Hospital Length of Stay: 8 days  Discharge Date and Time:  06/10/2022 1:41 PM  Attending Physician: Benita Mancia MD   Discharging Provider: Benita Mancia MD  Primary Care Provider: Yanet Anderson MD  [facility] Team: [facility]  Benita Mancia MD    HPI:   55 y/o Woman hx of Marginal Zone Lymphoma, GERD, HTN, and Inferior Pole Right renal mass, patient was here on Wed 6/1 for ambulatory IR Renal mass biopsy, Biopsy completed and post-procedure patient developed acute abdominal pain.  She complained of 10/10 pain at the biopsy site.  IR team performed ultrasound and no acute abnormality notified, however poor kidney visualization and a subsequent CT obtianed.  CT demonstrated Nithya-renal hemorrhage, IR took patient from PACU then for angiogram with possible embolization due to concern of post-procedure retroperitoneal bleed.      Angiogram completed this evening and no active extravasation noted, but irregular flow to inferior kidney and patient underwent embolization of these vessels, hemostasis achieved.     Post renal mass biopsy her Hgb was 13.1  and post-angiogram/embolization hgb is 11.4.  Patient was seen in PACU this evening but still just starting to come awake from anesthesia.   She will be admitted for observation.               * No procedures listed *      Hospital Course:   Ms. Newton was admitted to Hospital Medicine for management of a retroperitoneal bleed s/p kidney biopsy.  Hgb trended down to 10.  Pain meds were adjusted.  Psych was consulted to approve home Methadone per Pharmacy request.  On 6/3, she was found to be hypoxic to 89%.  CXR showed RLL pneumonia.  She was started on Zosyn given her allergies.  She continued to endorse worsening shortness of breath, abdominal distension, and back  pain.  CT abdomen was ordered to evaluate retroperitoneal bleed which was improving, but showed bilateral pneumonia.  Vanc was added and RVP was ordered given the lack of WBC or fevers and she completed a course of Vanc/Zosyn.  She qualified for home oxygen given her COPD.  She was given a referral for a sleep study for CHLOÉ.       Goals of Care Treatment Preferences:  Code Status: Full Code      Consults:   Consults (From admission, onward)        Status Ordering Provider     Inpatient consult to Psychiatry  Once        Provider:  (Not yet assigned)    Completed GIL MAK          No new Assessment & Plan notes have been filed under this hospital service since the last note was generated.  Service: Hospital Medicine    Final Active Diagnoses:    Diagnosis Date Noted POA    PRINCIPAL PROBLEM:  Retroperitoneal bleeding [R58] 06/01/2022 Yes    CHLOÉ (obstructive sleep apnea) [G47.33] 06/10/2022 Yes     Chronic    COPD (chronic obstructive pulmonary disease) [J44.9] 06/10/2022 Yes    Gaseous abdominal distention [R14.0] 06/04/2022 No    Multifocal pneumonia [J18.9] 06/03/2022 Yes    Hyperkalemia [E87.5] 06/02/2022 Yes    Acute blood loss anemia [D62] 06/02/2022 Yes    Obese [E66.9] 06/02/2022 Yes    Right renal mass [N28.89] 09/23/2021 Yes    Essential hypertension [I10] 07/24/2018 Yes    Chronic low back pain [M54.50, G89.29] 05/12/2016 Yes    Tobacco abuse [Z72.0] 09/04/2014 Yes      Problems Resolved During this Admission:       Discharged Condition: good    Disposition:     Follow Up:   Follow-up Information     Moo Christianson MD Follow up on 6/25/2022.    Specialty: Internal Medicine  Why: F/U appointment 10.15 AM  Contact information:  [address]  [address] [id number] [phone]                       Patient Instructions:      OXYGEN FOR HOME USE     Order Specific Question Answer Comments   Liter Flow 2    Duration Continuous    Qualifying Test Performed at: Activity   "  Oxygen saturation at rest 91    Oxygen saturation with activity 86    Oxygen saturation with activity on oxygen 88    Portable mode: continuous    Route nasal cannula    Device: home concentrator with portable tanks    Length of need (in months): 99 mos    Patient condition with qualifying saturation COPD    Height: 5' 1" (1.549 m)    Weight: 99.8 kg (220 lb)    Alternative treatment measures have been tried or considered and deemed clinically ineffective. Yes      Polysomnogram (CPAP will be added if patient meets diagnostic criteria.)   Standing Status: Future Standing Exp. Date: 06/10/23       Pending Diagnostic Studies:     Procedure Component Value Units Date/Time    IR Angiogram Visceral [[id number]]     Order Status: Sent Lab Status: No result          Medications:  Reconciled Home Medications:      Medication List      START taking these medications    ADVAIR DISKUS 100-50 mcg/dose diskus inhaler  Generic drug: fluticasone-salmeterol 100-50 mcg/dose  Inhale 1 puff into the lungs once daily at 6am. Controller     albuterol 90 mcg/actuation inhaler  Commonly known as: PROAIR HFA  Inhale 2 puffs into the lungs every 6 (six) hours as needed for Wheezing. Rescue     loperamide 2 mg capsule  Commonly known as: IMODIUM  Take 1 capsule (2 mg total) by mouth 4 (four) times daily as needed for Diarrhea.     morphine 15 MG tablet  Commonly known as: MSIR  Take 1 tablet (15 mg total) by mouth every 4 (four) hours as needed.     ondansetron 4 MG tablet  Commonly known as: ZOFRAN  Take 1 tablet (4 mg total) by mouth 2 (two) times daily. for 30 doses     OPW TEST CLAIM - DO NOT FILL  OPW test claim. Do not fill.        CHANGE how you take these medications    allopurinoL 300 MG tablet  Commonly known as: ZYLOPRIM  Take 1 tablet (300 mg total) by mouth once daily.  What changed: when to take this     aspirin 81 MG EC tablet  Commonly known as: ECOTRIN  Take 1 tablet (81 mg total) by mouth every evening. Hold until PCP " follow up  What changed: additional instructions        CONTINUE taking these medications    hydrOXYzine HCL 25 MG tablet  Commonly known as: ATARAX  Take 25 mg by mouth every evening.     lisinopriL 10 MG tablet  Take 10 mg by mouth once daily.     methadone 40 mg disintegrating tablet  Commonly known as: METHADOSE  Take 40 mg by mouth once daily. PATIENT USUALLY TAKES AT 10AM     nitroGLYCERIN 0.4 MG SL tablet  Commonly known as: NITROSTAT  Place 0.4 mg under the tongue every 5 (five) minutes as needed for Chest pain.     ondansetron 8 MG Tbdl  Commonly known as: ZOFRAN-ODT  Dissolve 1 tablet (8 mg total) by mouth every 8 (eight) hours as needed (nausea).     simvastatin 40 MG tablet  Commonly known as: ZOCOR  Take 40 mg by mouth every evening.     triamcinolone acetonide 0.1% 0.1 % ointment  Commonly known as: KENALOG  Apply topically 2 (two) times daily. To be applied to itch rash on abdomen once daily        STOP taking these medications    naloxone 4 mg/actuation Spry  Commonly known as: NARCAN     nicotine 21 mg/24 hr  Commonly known as: NICODERM CQ              Time spent on the discharge of patient: 35 minutes         Benita Mancia MD  Department of Hospital Medicine  [facility] - Intensive Care ([place])

## 2022-06-10 NOTE — NURSING
Pt AOx3, stable, Pt was on RA, dessating when sleeping, pt stated having CHLOÉ forget to mention to the Dr. O2 2l NC in place. Medicated for pain effective. Pt C/o of L eye pain, redness, blurry no drainage. NP Regina [place] notified eye drop ordered. Pt wake up this mornig stated her eye feel better but still blurry. Safety measures in place call light within reach.

## 2022-06-10 NOTE — ASSESSMENT & PLAN NOTE
· 2/2 chronic tobacco use  · Continue breathing treatments  · Start Breo daily to optimize lung function  · Goal oxygen sats 88-92% to maintain respiratory drive

## 2022-06-10 NOTE — NURSING
Home Oxygen Evaluation    Date Performed: 6/10/2022    1) Patient's Home O2 Sat on room air, while at rest: 91%        If O2 sats on room air at rest are 88% or below, patient qualifies. No additional testing needed. Document N/A in steps 2 and 3. If 89% or above, complete steps 2.      2) Patient's O2 Sat on room air while exercisin%      If O2 sats on room air while exercising remain 89% or above patient does not qualify, no further testing needed Document N/A in step 3. If O2 sats on room air while exercising are 88% or below, continue to step 3.      3) Patient's O2 Sat while exercising on O2: 88 at 3 LPM         (Must show improvement from #2 for patients to qualify)    If O2 sats improve on oxygen, patient qualifies for portable oxygen. If not, the patient does not qualify.

## 2022-06-10 NOTE — PROGRESS NOTES
[facility] - [facility] ([place] [address])  [facility]  Progress Note    Patient Name: Gabi Newton  MRN: [id number]  Patient Class: IP- Inpatient   Admission Date: 6/2/2022  Length of Stay: 8 days  Attending Physician: Benita Mancia MD  Primary Care Provider: Yanet Anderson MD        Subjective:     Principal Problem:Retroperitoneal bleeding        HPI:  57 y/o Woman hx of Marginal Zone Lymphoma, GERD, HTN, and Inferior Pole Right renal mass, patient was here on Wed 6/1 for ambulatory IR Renal mass biopsy, Biopsy completed and post-procedure patient developed acute abdominal pain.  She complained of 10/10 pain at the biopsy site.  IR team performed ultrasound and no acute abnormality notified, however poor kidney visualization and a subsequent CT obtianed.  CT demonstrated Nithya-renal hemorrhage, IR took patient from PACU then for angiogram with possible embolization due to concern of post-procedure retroperitoneal bleed.      Angiogram completed this evening and no active extravasation noted, but irregular flow to inferior kidney and patient underwent embolization of these vessels, hemostasis achieved.     Post renal mass biopsy her Hgb was 13.1  and post-angiogram/embolization hgb is 11.4.  Patient was seen in PACU this evening but still just starting to come awake from anesthesia.   She will be admitted for observation.               Overview/Hospital Course:  Ms. Newton was admitted to Hospital Medicine for management of a retroperitoneal bleed s/p kidney biopsy.  Hgb trended down to 10.  Pain meds were adjusted.  Psych was consulted to approve home Methadone per Pharmacy request.  On 6/3, she was found to be hypoxic to 89%.  CXR showed RLL pneumonia.  She was started on Zosyn given her allergies.  She continued to endorse worsening shortness of breath, abdominal distension, and back pain.  CT abdomen was ordered to evaluate retroperitoneal bleed which was improving, but showed bilateral pneumonia.   Vanc was added and RVP was ordered given the lack of WBC or fevers      Interval History: No acute events overnight.  Occasionally hypoxic.  Reports she isn't on treatment for her COPD.  Will add inhalers and check for oxygen.    Review of Systems   Constitutional:  Positive for activity change and appetite change. Negative for chills, fatigue and fever.   Respiratory:  Positive for cough and shortness of breath.    Cardiovascular:  Negative for chest pain, palpitations and leg swelling.   Gastrointestinal:  Negative for abdominal distention, abdominal pain, constipation, diarrhea, nausea and vomiting.   Genitourinary:  Negative for dysuria and urgency.   Musculoskeletal:  Negative for back pain.   Neurological:  Negative for dizziness and headaches.   All other systems reviewed and are negative.  Objective:     Vital Signs (Most Recent):  Temp: 97.9 °F (36.6 °C) (06/10/22 0715)  Pulse: 86 (06/10/22 1143)  Resp: 16 (06/10/22 1143)  BP: (!) 144/74 (06/10/22 0715)  SpO2: 96 % (06/10/22 1143)   Vital Signs (24h Range):  Temp:  [97.9 °F (36.6 °C)-98.6 °F (37 °C)] 97.9 °F (36.6 °C)  Pulse:  [16-86] 86  Resp:  [12-20] 16  SpO2:  [93 %-100 %] 96 %  BP: (142-150)/(65-74) 144/74     Weight: 99.8 kg (220 lb)  Body mass index is 41.57 kg/m².    Intake/Output Summary (Last 24 hours) at 6/10/2022 1235  Last data filed at 6/9/2022 1755  Gross per 24 hour   Intake 370 ml   Output --   Net 370 ml        Physical Exam  Constitutional:       Appearance: Normal appearance. She is well-developed.   HENT:      Head: Normocephalic and atraumatic.   Cardiovascular:      Rate and Rhythm: Normal rate and regular rhythm.      Heart sounds: No murmur heard.  Pulmonary:      Effort: Pulmonary effort is normal. No respiratory distress.      Breath sounds: Normal breath sounds. No wheezing or rales.      Comments: On NC  Abdominal:      General: There is distension.      Palpations: Abdomen is soft.      Tenderness: There is no abdominal  tenderness.   Musculoskeletal:         General: No deformity.   Skin:     General: Skin is warm.   Neurological:      General: No focal deficit present.      Mental Status: She is alert and oriented to person, place, and time.       Significant Labs: All pertinent labs within the past 24 hours have been reviewed.    Significant Imaging: I have reviewed all pertinent imaging results/findings within the past 24 hours.  CXR: I have reviewed all pertinent results/findings within the past 24 hours and my personal findings are:  RLL PNA      Assessment/Plan:      * Retroperitoneal bleeding  · Hgb 13 prior to biopsy, down to 8.4  · CT abdomen pelvis 6/4 with improved retroperitoneal bleed  · Pain control with prn MS IR and IV Dilaudid, scheduled Robaxin      COPD (chronic obstructive pulmonary disease)  · 2/2 chronic tobacco use  · Continue breathing treatments  · Start Breo daily to optimize lung function  · Goal oxygen sats 88-92% to maintain respiratory drive      CHLOÉ (obstructive sleep apnea)  · Reports breathlessness at night  · Polysomnography outpatient      Gaseous abdominal distention  · Resolved with BM      Multifocal pneumonia  · Found to be satting 89% on room air, that improved to 95% on 5L NC - now down to 2L and room air on occasion  · CT with multifocal opacities  · RVP negative  · Breathing treatments q4h and prn  · S/p 7 days of Zosyn and Vanc/Doxy      Obese  · Body mass index is 41.57 kg/m².   · Morbid obesity complicates all aspects of disease management from diagnostic modalities to treatment.   · Weight loss encouraged and health benefits explained to patient.         Acute blood loss anemia  · As above  Transfuse <7      Chronic low back pain  · Chronic issue  · On Methadone daily      Hyperkalemia  · Resolved      Right renal mass  · S/p biopsy 6/1  · Path with lymphoma  · Patient established with hematology/oncology, has f/u in July       Tobacco abuse   Tobacco cessation discussed with patient  for greater than 5 minutes.    Offered Nicotine patch while hospitalized   Patient is aware of need to quit tobacco products        Essential hypertension  · Restart Lisinopril      VTE Risk Mitigation (From admission, onward)         Ordered     enoxaparin injection 40 mg  Daily         06/05/22 0750     Reason for No Pharmacological VTE Prophylaxis  Once        Question:  Reasons:  Answer:  Active Bleeding    06/02/22 0219     IP VTE HIGH RISK PATIENT  Once         06/02/22 0219     Place sequential compression device  Until discontinued         06/02/22 0219                Discharge Planning   FARZANA: 6/10/2022     Code Status: Full Code   Is the patient medically ready for discharge?: No    Reason for patient still in hospital (select all that apply): Pending disposition  Discharge Plan A: Home                  Benita Mancia MD  Department of Hospital Medicine   [facility] - Intensive Care ([place])

## 2022-06-10 NOTE — SUBJECTIVE & OBJECTIVE
Interval History: No acute events overnight.  Occasionally hypoxic.  Reports she isn't on treatment for her COPD.  Will add inhalers and check for oxygen.    Review of Systems   Constitutional:  Positive for activity change and appetite change. Negative for chills, fatigue and fever.   Respiratory:  Positive for cough and shortness of breath.    Cardiovascular:  Negative for chest pain, palpitations and leg swelling.   Gastrointestinal:  Negative for abdominal distention, abdominal pain, constipation, diarrhea, nausea and vomiting.   Genitourinary:  Negative for dysuria and urgency.   Musculoskeletal:  Negative for back pain.   Neurological:  Negative for dizziness and headaches.   All other systems reviewed and are negative.  Objective:     Vital Signs (Most Recent):  Temp: 97.9 °F (36.6 °C) (06/10/22 0715)  Pulse: 86 (06/10/22 1143)  Resp: 16 (06/10/22 1143)  BP: (!) 144/74 (06/10/22 0715)  SpO2: 96 % (06/10/22 1143)   Vital Signs (24h Range):  Temp:  [97.9 °F (36.6 °C)-98.6 °F (37 °C)] 97.9 °F (36.6 °C)  Pulse:  [16-86] 86  Resp:  [12-20] 16  SpO2:  [93 %-100 %] 96 %  BP: (142-150)/(65-74) 144/74     Weight: 99.8 kg (220 lb)  Body mass index is 41.57 kg/m².    Intake/Output Summary (Last 24 hours) at 6/10/2022 1235  Last data filed at 6/9/2022 1755  Gross per 24 hour   Intake 370 ml   Output --   Net 370 ml        Physical Exam  Constitutional:       Appearance: Normal appearance. She is well-developed.   HENT:      Head: Normocephalic and atraumatic.   Cardiovascular:      Rate and Rhythm: Normal rate and regular rhythm.      Heart sounds: No murmur heard.  Pulmonary:      Effort: Pulmonary effort is normal. No respiratory distress.      Breath sounds: Normal breath sounds. No wheezing or rales.      Comments: On NC  Abdominal:      General: There is distension.      Palpations: Abdomen is soft.      Tenderness: There is no abdominal tenderness.   Musculoskeletal:         General: No deformity.   Skin:      General: Skin is warm.   Neurological:      General: No focal deficit present.      Mental Status: She is alert and oriented to person, place, and time.       Significant Labs: All pertinent labs within the past 24 hours have been reviewed.    Significant Imaging: I have reviewed all pertinent imaging results/findings within the past 24 hours.  CXR: I have reviewed all pertinent results/findings within the past 24 hours and my personal findings are:  RLL PNA

## 2022-06-12 ENCOUNTER — NURSE TRIAGE (OUTPATIENT)
Dept: ADMINISTRATIVE | Facility: CLINIC | Age: 57
End: 2022-06-12
Payer: MEDICAID

## 2022-06-12 NOTE — TELEPHONE ENCOUNTER
Spoke with patient states she was recently discharged from the hospital.  Patient states she has a sore/abrasion where the tape was removed.  States the area is red with blisters forming inside.  Redness is 2 inches or greater in length.  Patient denies having a fever.  Advised patient to be seen in 3-4 hours.  Patient verbalized understanding.     Reason for Disposition   [1] Looks infected (spreading redness, pus) AND [2] large red area (> 2 in. or 5 cm)    Additional Information   Negative: Sounds like a life-threatening emergency to the triager   Negative: Patient sounds very sick or weak to the triager   Negative: [1] Red area or streak AND [2] fever    Protocols used: SORES-A-AH

## 2022-06-29 ENCOUNTER — OFFICE VISIT (OUTPATIENT)
Dept: SLEEP MEDICINE | Facility: CLINIC | Age: 57
End: 2022-06-29
Payer: MEDICAID

## 2022-06-29 DIAGNOSIS — F11.90 CHRONIC NARCOTIC USE: ICD-10-CM

## 2022-06-29 DIAGNOSIS — F51.09 OTHER INSOMNIA NOT DUE TO A SUBSTANCE OR KNOWN PHYSIOLOGICAL CONDITION: ICD-10-CM

## 2022-06-29 DIAGNOSIS — G47.10 HYPERSOMNOLENCE: ICD-10-CM

## 2022-06-29 DIAGNOSIS — R35.1 NOCTURIA: ICD-10-CM

## 2022-06-29 DIAGNOSIS — G47.30 SLEEP APNEA, UNSPECIFIED TYPE: Primary | ICD-10-CM

## 2022-06-29 PROCEDURE — 4010F PR ACE/ARB THEARPY RXD/TAKEN: ICD-10-PCS | Mod: CPTII,95,, | Performed by: INTERNAL MEDICINE

## 2022-06-29 PROCEDURE — 4010F ACE/ARB THERAPY RXD/TAKEN: CPT | Mod: CPTII,95,, | Performed by: INTERNAL MEDICINE

## 2022-06-29 PROCEDURE — 99204 PR OFFICE/OUTPT VISIT, NEW, LEVL IV, 45-59 MIN: ICD-10-PCS | Mod: 95,,, | Performed by: INTERNAL MEDICINE

## 2022-06-29 PROCEDURE — 1111F DSCHRG MED/CURRENT MED MERGE: CPT | Mod: CPTII,95,, | Performed by: INTERNAL MEDICINE

## 2022-06-29 PROCEDURE — 1111F PR DISCHARGE MEDS RECONCILED W/ CURRENT OUTPATIENT MED LIST: ICD-10-PCS | Mod: CPTII,95,, | Performed by: INTERNAL MEDICINE

## 2022-06-29 PROCEDURE — 99204 OFFICE O/P NEW MOD 45 MIN: CPT | Mod: 95,,, | Performed by: INTERNAL MEDICINE

## 2022-06-29 RX ORDER — ZOLPIDEM TARTRATE 10 MG/1
10 TABLET ORAL NIGHTLY PRN
Qty: 2 TABLET | Refills: 0 | Status: SHIPPED | OUTPATIENT
Start: 2022-06-29 | End: 2023-04-24

## 2022-06-29 NOTE — PROGRESS NOTES
The chief complaint leading to consultation is: sleep problems    Visit type: audiovisual    20 minutes of total time spent on the encounter, which includes face to face time and non-face to face time preparing to see the patient (eg, review of tests), Obtaining and/or reviewing separately obtained history, Documenting clinical information in the electronic or other health record, Independently interpreting results (not separately reported) and communicating results to the patient/family/caregiver, or Care coordination (not separately reported).     Each patient to whom he or she provides medical services by telemedicine is:  (1) informed of the relationship between the physician and patient and the respective role of any other health care provider with respect to management of the patient; and (2) notified that he or she may decline to receive medical services by telemedicine and may withdraw from such care at any time.      The patient location is: [place]    Referred by Benita Mancia MD     NEW PATIENT VISIT    Gabi Newton  is a pleasant 56 y.o. female  with PMH significant for COPD, HTN, CAD, MALT, BMI 40, GERD who presents today for evaluation of sleepiness, witnessed apneas.    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    Gastric tumor     GERD (gastroesophageal reflux disease)     Hyperlipidemia     Hypertension     Splenomegaly 7/24/2018     Patient Active Problem List   Diagnosis    Functional abdominal pain syndrome    Fatty liver    Essential hypertension     Hyperlipidemia    Constipation    Splenomegaly    Venous insufficiency (chronic) (peripheral)    Peripheral edema    Lung nodule    Tobacco abuse    Shortness of breath    GERD (gastroesophageal reflux disease)    GIST (gastrointestinal stromal tumor), non-malignant    Gastric lymphoma    Right lower lobe lung mass    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)    Marginal zone lymphoma of extranodal and solid organ sites    Iron deficiency anemia    Right renal mass    Cancer of kidney    Squamous cell carcinoma, lip    CAD (coronary artery disease), native coronary artery    Abnormal finding on mammography    Lichenification    BMI 40.0-44.9, adult    Retroperitoneal bleeding    Hyperkalemia    Stable angina    Shoulder pain, left    Severe cervical dysplasia    HGSIL (high grade squamous intraepithelial dysplasia)    Chronic low back pain    Carcinoma of lip    Adjustment disorder    Acute blood loss anemia    Obese    Multifocal pneumonia    Gaseous abdominal distention    CHLOÉ (obstructive sleep apnea)    COPD (chronic obstructive pulmonary disease)       Current Outpatient Medications:     albuterol (PROAIR HFA) 90 mcg/actuation inhaler, Inhale 2 puffs into the lungs every 6 (six) hours as needed for Wheezing. Rescue, Disp: 18 g, Rfl: 0    allopurinoL (ZYLOPRIM) 300 MG tablet, Take 1 tablet (300 mg total) by mouth once daily. (Patient taking differently: Take 300 mg by mouth every evening.), Disp: 30 tablet, Rfl: 5    aspirin (ECOTRIN) 81 MG EC tablet, Take 1 tablet (81 mg total) by mouth every evening. Hold until PCP follow up, Disp: , Rfl: 0    fluticasone-salmeterol diskus inhaler 100-50 mcg, Inhale 1 puff into the lungs once daily at 6am. Controller, Disp: 60 each, Rfl: 0    hydroxyzine HCL (ATARAX) 25 MG tablet, Take 25 mg by mouth every evening., Disp: , Rfl:     lisinopriL 10 MG tablet, Take 10 mg by mouth once daily.,  Disp: , Rfl:     methadone (METHADOSE) 40 mg disintegrating tablet, Take 40 mg by mouth once daily. PATIENT USUALLY TAKES AT 10AM, Disp: , Rfl:     morphine (MSIR) 15 MG tablet, Take 1 tablet (15 mg total) by mouth every 4 (four) hours as needed., Disp: 20 tablet, Rfl: 0    nitroGLYCERIN (NITROSTAT) 0.4 MG SL tablet, Place 0.4 mg under the tongue every 5 (five) minutes as needed for Chest pain., Disp: , Rfl:     ondansetron (ZOFRAN-ODT) 8 MG TbDL, Dissolve 1 tablet (8 mg total) by mouth every 8 (eight) hours as needed (nausea)., Disp: 15 tablet, Rfl: 0    OPW TEST CLAIM - DO NOT FILL, OPW test claim. Do not fill., Disp: 1 tablet, Rfl: 0    simvastatin (ZOCOR) 40 MG tablet, Take 40 mg by mouth every evening. , Disp: , Rfl:     triamcinolone acetonide 0.1% (KENALOG) 0.1 % ointment, Apply topically 2 (two) times daily. To be applied to itch rash on abdomen once daily, Disp: 80 g, Rfl: 0     There were no vitals filed for this visit.  Physical Exam:    GEN:   Well-appearing  Psych:  Appropriate affect, demonstrates insight  SKIN:  No rash on the face or bridge of the nose    LABS:   Lab Results   Component Value Date    HGB 8.4 (L) 06/08/2022    CO2 32 (H) 06/08/2022       RECORDS REVIEWED PREVIOUSLY:    No prior sleep testing.    ASSESSMENT    PROBLEM DESCRIPTION/ Sx on Presentation  STATUS   suspectd CHLOÉ   Snoring, witnessed apneas by her friends.    New   Daytime Sx   For years has been sleepy   sleepiness when inactive (falling asleep at night)  + sleepiness when driving (falling asleep at red lights)  ESS 22/24 on intake  New   Insomnia   Waking frequently, hard to get back to sleep sometimes  Prior hypnotics:        Current hypnotics:     New   Nocturia   x 3 per sleep period  New   Chronic narcotics   Methadone 40mg once daily in AM  New   Possible hypoventilation Elevated HCO3  methadone 40mg for chronic pain  New   Other issues: [facility]    PLAN     -recommend sleep testing   -discussed risk of narcotics and  possible hypoventilation  -discussed trial therapy if CHLOÉ present and the patient is  open to a trial of CPAP therapy  -follow-up PFTs ordered by another provider  -driving precautions were discussed with the patient    RTC          The patient was given open opportunity to ask questions and/or express concerns about treatment plan.   All questions/concerns were discussed.     Two patient identifiers used prior to evaluation.

## 2022-07-11 ENCOUNTER — TELEPHONE (OUTPATIENT)
Dept: SLEEP MEDICINE | Facility: OTHER | Age: 57
End: 2022-07-11
Payer: MEDICAID

## 2022-07-14 ENCOUNTER — TUMOR BOARD CONFERENCE (OUTPATIENT)
Dept: UROLOGY | Facility: CLINIC | Age: 57
End: 2022-07-14
Payer: MEDICAID

## 2022-07-14 NOTE — PROGRESS NOTES
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 No treatment indicated for renal mass which is biopsy proven Lymphoma.         Cancer of kidney   9/23/2021 Tumor Conference    [facility]      GENITOURINARY MULTIDISCIPLINARY TUMOR BOARD  PATIENT REVIEW FORM     CLINIC #: [id number]  DATE: 09/23/2021    TUMOR SITE:   Right kidney    ATTENDING:   Chao Calero MD (urology); Marco Antonio Malloy MD (heme/onc)    PATIENT SUMMARY:   Gabi Newton is a 55 y.o. 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 s/p 6 cycles of maintenance Rituximab. She was discovered to have a right renal mass. Disease chronology: PET CT 11/2019: 4.4 cm right lower pole renal mass, SUV 10.48 (not visualized on MRI abdomen 9/2019). PET CT 4/2020: decrease in size and SUV of right lower lobe lung lesion and gastric wall (both bx proven lymphoma), no new pulmonary lesions, right lower pole renal mass now 5.0 cm in greatest dimension. Established care with urology in 5/2020. Favored lymphoma, opted to initiate active surveillance. Renal US 9/2021: RLP mass now 4.2 cm    DISCUSSION:  Utility of renal mass biopsy to confirm secondary renal lymphoma (SRL) vs RCC?    PERFORMANCE STATUS:  ECOG 2    Estimated GFR/CKD Stage: >60 (Cr 0.7)    Clinical/Pathologic Stage (TNM): [id number] (if RCC)    FACULTY IN ATTENDANCE:    Urologic Oncology: Hemanth Vale MD; Chao Calero MD    Radiation Oncology: N/A    Hematology/Oncology: Sarah Ron MD; George  MD Pool    Pathology: Brandon Bradley MD    CONSULT NEEDED:     [] Urologic Oncology    [] Hem/Onc    [] Rad/Onc   []     [] Physical/Occupational Therapy    [] Psychology  [x] Other: __Interventional radiology____    [x] Treatment Guidelines (NCCN and AUA) reviewed and care planned is consistent with guidelines.    PRESENTATION AT CANCER CONFERENCE:         [x] Prospective    [] Retrospective     [] Follow-Up          [] Eligible for clinical trial    TUMOR BOARD RECOMMENDATIONS/PLAN/CONSENSUS:     Case discussed among group. Pathology and radiologic images were reviewed (if applicable).    Dr. Ron expanded upon the prognosis of patient's lymphoma and that it typically has an indolent course. Her disease has been stable and has a good prognosis at this time. The consensus was to obtain a biopsy of patient's renal mass to rule out potential RCC.    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 She s/p 6 cycles of maintenance Rituximab. 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 She was discovered to have a right renal mass in 2019.     PET CT 11/2019: 4.4 cm RLP renal mass, SUV 10.48 (not visualized on MRI abdomen 9/2019).     PET CT 4/2020: decrease in size and SUV of right lower lobe lung lesion and gastric wall (both bx proven lymphoma), no new pulmonary lesions, right lower pole renal mass now 5.0 cm in greatest dimension.     Established care with urology in 5/2020. Favored lymphoma, opted to initiate active surveillance.     Renal US 9/2021: RLP mass now 4.2 cm    Discussed at Tumor Board Conference on 9/23/22 - Plan for IR biopsy of renal mass.   Renal US 4/11/22 - RLP mass now 5.9 cm    6/1/22 - IR biopsy of Renal Mass. Post-op bleed embolized.  Path consistent with low grade B cell lymphoma.     PERFORMANCE STATUS:  ECOG 2    Estimated GFR/CKD Stage: >60    Clinical/Pathologic Stage (TNM): Stage IV marginal zone B cell lymphoma     DISCUSSION:  Path review and management of Lymphoma proven renal mass.    FACULTY IN ATTENDANCE:    Urologic Oncology: Hemanth Vale MD [], Chao Calero MD [x] , Rashard Nobles MD [x]    CONSULT NEEDED:     [] Urologic Oncology    [] Hem/Onc    [] Rad/Onc     [x] Treatment Guidelines (NCCN and AUA) reviewed and care planned is consistent with guidelines.    TUMOR BOARD RECOMMENDATIONS/PLAN/CONSENSUS:     Case discussed among group. Pathology and radiologic images were reviewed (if applicable).    Continue Rituximab treatment for Lymphoma. No treatment indicated for renal mass which is now biopsy proven Lymphoma.

## 2022-07-20 ENCOUNTER — PATIENT MESSAGE (OUTPATIENT)
Dept: HEMATOLOGY/ONCOLOGY | Facility: CLINIC | Age: 57
End: 2022-07-20
Payer: MEDICAID

## 2022-07-25 ENCOUNTER — PATIENT MESSAGE (OUTPATIENT)
Dept: UROLOGY | Facility: CLINIC | Age: 57
End: 2022-07-25
Payer: MEDICAID

## 2022-08-03 ENCOUNTER — PATIENT MESSAGE (OUTPATIENT)
Dept: HEMATOLOGY/ONCOLOGY | Facility: CLINIC | Age: 57
End: 2022-08-03
Payer: MEDICAID

## 2022-08-03 DIAGNOSIS — F41.9 ANXIETY: Primary | ICD-10-CM

## 2022-08-03 RX ORDER — LORAZEPAM 1 MG/1
1 TABLET ORAL ONCE
Qty: 1 TABLET | Refills: 0 | Status: SHIPPED | OUTPATIENT
Start: 2022-08-03 | End: 2022-08-03

## 2022-08-04 ENCOUNTER — LAB VISIT (OUTPATIENT)
Dept: LAB | Facility: HOSPITAL | Age: 57
End: 2022-08-04
Attending: INTERNAL MEDICINE
Payer: MEDICAID

## 2022-08-04 ENCOUNTER — HOSPITAL ENCOUNTER (OUTPATIENT)
Dept: RADIOLOGY | Facility: HOSPITAL | Age: 57
Discharge: HOME | End: 2022-08-04
Attending: INTERNAL MEDICINE
Payer: MEDICAID

## 2022-08-04 DIAGNOSIS — C85.89 MARGINAL ZONE LYMPHOMA OF EXTRANODAL AND SOLID ORGAN SITES: ICD-10-CM

## 2022-08-04 LAB
ALBUMIN SERPL BCP-MCNC: 3.8 G/DL (ref 3.5–5.2)
ALP SERPL-CCNC: 145 U/L (ref 55–135)
ALT SERPL W/O P-5'-P-CCNC: 35 U/L (ref 10–44)
ANION GAP SERPL CALC-SCNC: 8 MMOL/L (ref 8–16)
AST SERPL-CCNC: 35 U/L (ref 10–40)
BASOPHILS # BLD AUTO: 0.02 K/UL (ref 0–0.2)
BASOPHILS NFR BLD: 0.4 % (ref 0–1.9)
BILIRUB SERPL-MCNC: 0.5 MG/DL (ref 0.1–1)
BUN SERPL-MCNC: 7 MG/DL (ref 6–20)
CALCIUM SERPL-MCNC: 9.8 MG/DL (ref 8.7–10.5)
CHLORIDE SERPL-SCNC: 98 MMOL/L (ref 95–110)
CO2 SERPL-SCNC: 32 MMOL/L (ref 23–29)
CREAT SERPL-MCNC: 0.8 MG/DL (ref 0.5–1.4)
DIFFERENTIAL METHOD: ABNORMAL
EOSINOPHIL # BLD AUTO: 0.3 K/UL (ref 0–0.5)
EOSINOPHIL NFR BLD: 5.6 % (ref 0–8)
ERYTHROCYTE [DISTWIDTH] IN BLOOD BY AUTOMATED COUNT: 14.7 % (ref 11.5–14.5)
EST. GFR  (NO RACE VARIABLE): >60 ML/MIN/1.73 M^2
GLUCOSE SERPL-MCNC: 103 MG/DL (ref 70–110)
HCT VFR BLD AUTO: 41 % (ref 37–48.5)
HGB BLD-MCNC: 12.9 G/DL (ref 12–16)
IMM GRANULOCYTES # BLD AUTO: 0.03 K/UL (ref 0–0.04)
IMM GRANULOCYTES NFR BLD AUTO: 0.6 % (ref 0–0.5)
LDH SERPL L TO P-CCNC: 280 U/L (ref 110–260)
LYMPHOCYTES # BLD AUTO: 0.3 K/UL (ref 1–4.8)
LYMPHOCYTES NFR BLD: 6.8 % (ref 18–48)
MCH RBC QN AUTO: 28.9 PG (ref 27–31)
MCHC RBC AUTO-ENTMCNC: 31.5 G/DL (ref 32–36)
MCV RBC AUTO: 92 FL (ref 82–98)
MONOCYTES # BLD AUTO: 0.4 K/UL (ref 0.3–1)
MONOCYTES NFR BLD: 7.2 % (ref 4–15)
NEUTROPHILS # BLD AUTO: 4 K/UL (ref 1.8–7.7)
NEUTROPHILS NFR BLD: 79.4 % (ref 38–73)
NRBC BLD-RTO: 0 /100 WBC
PLATELET # BLD AUTO: 185 K/UL (ref 150–450)
PMV BLD AUTO: 10.7 FL (ref 9.2–12.9)
POCT GLUCOSE: 111 MG/DL (ref 70–110)
POTASSIUM SERPL-SCNC: 4.3 MMOL/L (ref 3.5–5.1)
PROT SERPL-MCNC: 7.1 G/DL (ref 6–8.4)
RBC # BLD AUTO: 4.46 M/UL (ref 4–5.4)
SODIUM SERPL-SCNC: 138 MMOL/L (ref 136–145)
WBC # BLD AUTO: 5.03 K/UL (ref 3.9–12.7)

## 2022-08-04 PROCEDURE — 87340 HEPATITIS B SURFACE AG IA: CPT | Performed by: INTERNAL MEDICINE

## 2022-08-04 PROCEDURE — 86704 HEP B CORE ANTIBODY TOTAL: CPT | Performed by: INTERNAL MEDICINE

## 2022-08-04 PROCEDURE — 78815 PET IMAGE W/CT SKULL-THIGH: CPT | Mod: 26,PS,, | Performed by: RADIOLOGY

## 2022-08-04 PROCEDURE — 83615 LACTATE (LD) (LDH) ENZYME: CPT | Performed by: INTERNAL MEDICINE

## 2022-08-04 PROCEDURE — 78815 NM PET CT ROUTINE: ICD-10-PCS | Mod: 26,PS,, | Performed by: RADIOLOGY

## 2022-08-04 PROCEDURE — 25500020 PHARM REV CODE 255: Performed by: INTERNAL MEDICINE

## 2022-08-04 PROCEDURE — 78815 PET IMAGE W/CT SKULL-THIGH: CPT | Mod: TC

## 2022-08-04 PROCEDURE — 36415 COLL VENOUS BLD VENIPUNCTURE: CPT | Performed by: INTERNAL MEDICINE

## 2022-08-04 PROCEDURE — 85025 COMPLETE CBC W/AUTO DIFF WBC: CPT | Performed by: INTERNAL MEDICINE

## 2022-08-04 PROCEDURE — 80053 COMPREHEN METABOLIC PANEL: CPT | Performed by: INTERNAL MEDICINE

## 2022-08-04 PROCEDURE — A9698 NON-RAD CONTRAST MATERIALNOC: HCPCS | Performed by: INTERNAL MEDICINE

## 2022-08-04 RX ADMIN — IOHEXOL 500 ML: 9 SOLUTION ORAL at 07:08

## 2022-08-05 LAB
HBV CORE AB SERPL QL IA: NEGATIVE
HBV SURFACE AG SERPL QL IA: NEGATIVE

## 2022-08-09 ENCOUNTER — OFFICE VISIT (OUTPATIENT)
Dept: HEMATOLOGY/ONCOLOGY | Facility: CLINIC | Age: 57
End: 2022-08-09
Payer: MEDICAID

## 2022-08-09 ENCOUNTER — PATIENT MESSAGE (OUTPATIENT)
Dept: HEMATOLOGY/ONCOLOGY | Facility: CLINIC | Age: 57
End: 2022-08-09
Payer: MEDICAID

## 2022-08-09 VITALS
HEIGHT: 61 IN | HEART RATE: 77 BPM | WEIGHT: 223.13 LBS | BODY MASS INDEX: 42.13 KG/M2 | SYSTOLIC BLOOD PRESSURE: 129 MMHG | OXYGEN SATURATION: 92 % | DIASTOLIC BLOOD PRESSURE: 60 MMHG | RESPIRATION RATE: 20 BRPM | TEMPERATURE: 99 F

## 2022-08-09 DIAGNOSIS — C85.89 MARGINAL ZONE LYMPHOMA OF EXTRANODAL AND SOLID ORGAN SITES: Primary | ICD-10-CM

## 2022-08-09 DIAGNOSIS — M54.9 BACK PAIN, UNSPECIFIED BACK LOCATION, UNSPECIFIED BACK PAIN LATERALITY, UNSPECIFIED CHRONICITY: ICD-10-CM

## 2022-08-09 PROCEDURE — 3078F PR MOST RECENT DIASTOLIC BLOOD PRESSURE < 80 MM HG: ICD-10-PCS | Mod: CPTII,,, | Performed by: INTERNAL MEDICINE

## 2022-08-09 PROCEDURE — 99215 OFFICE O/P EST HI 40 MIN: CPT | Mod: S$PBB,,, | Performed by: INTERNAL MEDICINE

## 2022-08-09 PROCEDURE — 99999 PR PBB SHADOW E&M-EST. PATIENT-LVL V: CPT | Mod: PBBFAC,,, | Performed by: INTERNAL MEDICINE

## 2022-08-09 PROCEDURE — 3008F BODY MASS INDEX DOCD: CPT | Mod: CPTII,,, | Performed by: INTERNAL MEDICINE

## 2022-08-09 PROCEDURE — 3074F PR MOST RECENT SYSTOLIC BLOOD PRESSURE < 130 MM HG: ICD-10-PCS | Mod: CPTII,,, | Performed by: INTERNAL MEDICINE

## 2022-08-09 PROCEDURE — 99999 PR PBB SHADOW E&M-EST. PATIENT-LVL V: ICD-10-PCS | Mod: PBBFAC,,, | Performed by: INTERNAL MEDICINE

## 2022-08-09 PROCEDURE — 3078F DIAST BP <80 MM HG: CPT | Mod: CPTII,,, | Performed by: INTERNAL MEDICINE

## 2022-08-09 PROCEDURE — 4010F PR ACE/ARB THEARPY RXD/TAKEN: ICD-10-PCS | Mod: CPTII,,, | Performed by: INTERNAL MEDICINE

## 2022-08-09 PROCEDURE — 4010F ACE/ARB THERAPY RXD/TAKEN: CPT | Mod: CPTII,,, | Performed by: INTERNAL MEDICINE

## 2022-08-09 PROCEDURE — 3074F SYST BP LT 130 MM HG: CPT | Mod: CPTII,,, | Performed by: INTERNAL MEDICINE

## 2022-08-09 PROCEDURE — 1159F MED LIST DOCD IN RCRD: CPT | Mod: CPTII,,, | Performed by: INTERNAL MEDICINE

## 2022-08-09 PROCEDURE — 3008F PR BODY MASS INDEX (BMI) DOCUMENTED: ICD-10-PCS | Mod: CPTII,,, | Performed by: INTERNAL MEDICINE

## 2022-08-09 PROCEDURE — 1159F PR MEDICATION LIST DOCUMENTED IN MEDICAL RECORD: ICD-10-PCS | Mod: CPTII,,, | Performed by: INTERNAL MEDICINE

## 2022-08-09 PROCEDURE — 99215 PR OFFICE/OUTPT VISIT, EST, LEVL V, 40-54 MIN: ICD-10-PCS | Mod: S$PBB,,, | Performed by: INTERNAL MEDICINE

## 2022-08-09 PROCEDURE — 99215 OFFICE O/P EST HI 40 MIN: CPT | Mod: PBBFAC | Performed by: INTERNAL MEDICINE

## 2022-08-09 RX ORDER — ONDANSETRON 4 MG/1
8 TABLET, ORALLY DISINTEGRATING ORAL 2 TIMES DAILY
Qty: 60 TABLET | Refills: 0 | Status: SHIPPED | OUTPATIENT
Start: 2022-08-09

## 2022-08-09 RX ORDER — CYCLOBENZAPRINE HCL 5 MG
5 TABLET ORAL 3 TIMES DAILY PRN
Qty: 30 TABLET | Refills: 0 | Status: SHIPPED | OUTPATIENT
Start: 2022-08-09 | End: 2022-08-19

## 2022-08-09 NOTE — PROGRESS NOTES
PATIENT: Gabi Newton  MRN: [id number]  DATE: 8/9/2022      Diagnosis:   1. 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 Back pain, unspecified back location, unspecified back pain laterality, unspecified chronicity        Chief Complaint: No chief complaint on file.    Ms. Newton is a 54 year old female with Stage IV Marginal Zone Lymphoma (Gastric, Pulmonary, and Marrow) s/p 4 cycles of single agent Rituximab induction therapy completed on 4/14/20 followed by PET CT on 4/29/20 consistent with partial treatment response. She is here for a follow up visit. She has been on maintenance therapy since July 7, 2020    Oncologic History  Ms. Newton is a 54-year-old female with a past medical history of abdominal pain for the last 3-4 years with unknown etiology, significant smoking history for 40 years, back pain, hypertension presented to the Hematology Clinic for newly diagnosed marginal zone B-cell lymphoma     Patient complained of chronic epigastric/periumbilical abdominal pain for the last 3 years and has been receiving multiple abdominal imaging at [facility].  In 2016 her abdominal pain was thought to be from her gallbladder and she had underwent a cholecystectomy however she continued to have abdominal pain. A CT abdomen done in June 11, 2018 revealed wall thickening of the anterior gastric body suspicious for infectious or inflammatory but is concerning for soft tissue mass and a EGD was a recommended. MRI on June 19, 2018 which revealed diffuse hepatic steatosis without evidence of suspicious focal lesions, splenomegaly but no ascites.  She has been following  Dr. Cedeno since 2018 for her abdominal pain, however liver was not suspected to be the cause for her abdominal pain. She had negative workup for hepatitis-B, C, negative workup for autoimmune disease, negative for alpha 1 antitrypsin disease, negative for celiac sprue and her hepatitis panel has been negative so  far.  MRI of the abdomen was done on September 24, 2019 which revealed multifocal areas of asymmetric gastric wall thickening.  Recommend further evaluation with direct visualization to exclude neoplasm.  EGD was done on 10/15/2019 and Gastric tumor in the gastric fundus was found however no specimen was collected as she was on anticoagulation.  The EUS done on 11/15/2019 revealed gastric tumor in the gastric fundus with many abnormal lymph nodes visualized in the lower paraesophageal mediastinum (level 8L) and gastrohepatic ligament (level 18). Fine needle biopsy performed and pathology revealed marginal zone B cell lymphoma.     She had chest pain in 2016 and the imaging done at that time revealed pulmonary nodule and has been following pulmonary, Dr. Yeung at [facility].  She has a currently daily smoker, has been smoking for almost 40 years.  On the CT chest done in November 2017 revealed 1.7 x 1.3 cm lesion in the right lower lobe and 2 x 1 cm lesion in the anterior portion of the right lower lobe.  A CT scan done on September 25, 2019 revealed a complex poorly defined mass in the right lower lobe measuring 2.78 x 2.64 cm, suspicious for primary lung carcinoma     She was diagnosed with right lower extremity DVT and has been on Xarelto.  As per the patient she was hospitalized at that time and underwent procedure for her right lower extremity for venous insufficiency.  It appears that the right lower extremity DVT is a provoked clot.  She had history of bilateral iliac vein stenting 10/16/18 for venous outflow obsturction.    She was off rituxan      Subjective:    Initial History: Ms. Newton is a 56 y.o. female who returns for follow up.   Doing well overall. Has a lot of stress going to because her son was shot and had to undergo splenectomy. Will refer to psych onc for support.     Past Medical History:   Past Medical History:   Diagnosis Date    Abdominal pain 2018    Anemia     Anxiety     Back pain      COPD (chronic obstructive pulmonary disease) 6/10/2022    Deep vein thrombosis     Disorder of kidney and ureter     Fatty liver 2018    Gastric lymphoma 2019    Gastric tumor     GERD (gastroesophageal reflux disease)     Hyperlipidemia     Hypertension     Splenomegaly 2018       Past Surgical HIstory:   Past Surgical History:   Procedure Laterality Date    bilateral iliac vein stenosis with stenting      CARDIAC CATHETERIZATION      CARDIAC CATHETERIZATION  2018    had chest pains . states vessels at the bottom of heart collapsed     SECTION      x3    CHOLECYSTECTOMY      COLONOSCOPY      ENDOSCOPIC ULTRASOUND OF UPPER GASTROINTESTINAL TRACT Left 11/15/2019    Procedure: ULTRASOUND, UPPER GI TRACT, ENDOSCOPIC;  Surgeon: Mike Seay MD;  Location: [facility] (2ND FLR);  Service: Endoscopy;  Laterality: Left;  Ok to hold xarelto per protocol per Dr. Lloyd see media file 10/25/19-tb    ESOPHAGOGASTRODUODENOSCOPY      ESOPHAGOGASTRODUODENOSCOPY N/A 2018    Procedure: EGD (ESOPHAGOGASTRODUODENOSCOPY);  Surgeon: Ant Camejo MD;  Location: [facility];  Service: Endoscopy;  Laterality: N/A;    ESOPHAGOGASTRODUODENOSCOPY N/A 10/15/2019    Procedure: EGD (ESOPHAGOGASTRODUODENOSCOPY);  Surgeon: Melanie Cedeno MD;  Location: [facility];  Service: Endoscopy;  Laterality: N/A;    ESOPHAGOGASTRODUODENOSCOPY N/A 11/15/2019    Procedure: EGD (ESOPHAGOGASTRODUODENOSCOPY);  Surgeon: Mike Seay MD;  Location: [facility] (2ND FLR);  Service: Endoscopy;  Laterality: N/A;    EXCISION OF LESION OF LIP Left 2022    Procedure: EXCISION, LESION, LIP;  Surgeon: Glen Giang MD;  Location: [facility] OR 2ND FLR;  Service: ENT;  Laterality: Left;  Lip resection    HYSTERECTOMY      PHLEBOGRAPHY Bilateral 10/16/2018    Procedure: VENOGRAM;  Surgeon: Colin Mathis MD;  Location: [facility] CATH LAB;  Service: Cardiology;  Laterality: Bilateral;    OK RT/LT HEART CATHETERS Left      Coronary Arteriogram-2 Cath    RHINOPLASTY      SENTINEL LYMPH NODE BIOPSY  1/12/2022    Procedure: BIOPSY, LYMPH NODE, SENTINEL;  Surgeon: Glen Giang MD;  Location: [facility] OR [address] [place] [address];  Service: ENT;;    TUBAL LIGATION      venogram Bilateral 10/16/2018       Family History:   Family History   Problem Relation Age of Onset    Breast cancer Maternal Grandfather     Dementia Mother     Atrial fibrillation Mother     Deep vein thrombosis Mother     Pulmonary embolism Mother     Stroke Mother     Aneurysm Father        Social History:  reports that she has been smoking cigarettes. She has a 20.00 pack-year smoking history. She has never used smokeless tobacco. She reports that she does not drink alcohol and does not use drugs.    Allergies:  Review of patient's allergies indicates:   Allergen Reactions    Azithromycin Anaphylaxis     Other reaction(s): swelling to entire body  Swelling (tongue / lips)^      Ciprofloxacin Swelling     Throat swells    Codeine      Swelling (throat)^    Tolerates Morphine      Codeine sulfate      Swelling (throat)^    Tolerates Morphine       Medications:  Current Outpatient Medications   Medication Sig Dispense Refill    albuterol (PROAIR HFA) 90 mcg/actuation inhaler Inhale 2 puffs into the lungs every 6 (six) hours as needed for Wheezing. Rescue 18 g 0    allopurinoL (ZYLOPRIM) 300 MG tablet Take 1 tablet (300 mg total) by mouth once daily. (Patient taking differently: Take 300 mg by mouth every evening.) 30 tablet 5    aspirin (ECOTRIN) 81 MG EC tablet Take 1 tablet (81 mg total) by mouth every evening. Hold until PCP follow up  0    fluticasone-salmeterol diskus inhaler 100-50 mcg Inhale 1 puff into the lungs once daily at 6am. Controller 60 each 0    hydroxyzine HCL (ATARAX) 25 MG tablet Take 25 mg by mouth every evening.      methadone (METHADOSE) 40 mg disintegrating tablet Take 40 mg by mouth once daily. PATIENT USUALLY TAKES AT 10AM       "nitroGLYCERIN (NITROSTAT) 0.4 MG SL tablet Place 0.4 mg under the tongue every 5 (five) minutes as needed for Chest pain.      simvastatin (ZOCOR) 40 MG tablet Take 40 mg by mouth every evening.       cyclobenzaprine (FLEXERIL) 5 MG tablet Take 1 tablet (5 mg total) by mouth 3 (three) times daily as needed for Muscle spasms. 30 tablet 0    lisinopriL 10 MG tablet Take 10 mg by mouth once daily.      LORazepam (ATIVAN) 1 MG tablet Take 1 tablet (1 mg total) by mouth once. Prior to the pet scan for 1 dose 1 tablet 0    ondansetron (ZOFRAN-ODT) 4 MG TbDL Take 2 tablets (8 mg total) by mouth 2 (two) times daily. 60 tablet 0    zolpidem (AMBIEN) 10 mg Tab Take 1 tablet (10 mg total) by mouth nightly as needed (to be taken the night of the patient's sleep study). 2 tablet 0     No current facility-administered medications for this visit.       Review of Systems   Constitutional: Negative for appetite change, fatigue, fever and unexpected weight change.   HENT: Negative for nosebleeds and sore throat.    Respiratory: Negative for cough and shortness of breath.    Cardiovascular: Negative for chest pain and leg swelling.   Gastrointestinal: Negative for abdominal pain, blood in stool, diarrhea, nausea and vomiting.   Genitourinary: Negative for dysuria, flank pain, hematuria, urgency and vaginal bleeding.   Musculoskeletal: Positive for back pain.   Skin: Negative for rash.   Neurological: Negative for seizures and headaches.   Hematological: Negative for adenopathy.   Psychiatric/Behavioral: Negative for agitation.     ECOG Performance Status: 2   Objective:      Vitals:   Vitals:    08/09/22 1623   BP: 129/60   BP Location: Left arm   Patient Position: Sitting   BP Method: Medium (Automatic)   Pulse: 77   Resp: 20   Temp: 98.6 °F (37 °C)   TempSrc: Oral   SpO2: (!) 92%   Weight: 101.2 kg (223 lb 1.7 oz)   Height: 5' 1" (1.549 m)     BMI: Body mass index is 42.16 kg/m².    Physical Exam  Constitutional:       " Appearance: Normal appearance. She is obese.   HENT:      Head: Normocephalic and atraumatic.      Nose: Nose normal. No rhinorrhea.      Mouth/Throat:      Mouth: Mucous membranes are moist.   Eyes:      Pupils: Pupils are equal, round, and reactive to light.   Cardiovascular:      Rate and Rhythm: Normal rate and regular rhythm.      Heart sounds: No murmur heard.  Pulmonary:      Effort: Pulmonary effort is normal.      Breath sounds: Normal breath sounds.   Abdominal:      General: Abdomen is flat. There is no distension.      Palpations: Abdomen is soft.   Musculoskeletal:         General: No swelling or tenderness. Normal range of motion.      Cervical back: Normal range of motion and neck supple.      Right lower leg: Edema present.      Left lower leg: Edema present.   Skin:     General: Skin is warm.      Capillary Refill: Capillary refill takes less than 2 seconds.      Findings: Rash (b/l LE due to lymphedema ) present.   Neurological:      General: No focal deficit present.      Mental Status: She is alert and oriented to person, place, and time.   Psychiatric:         Mood and Affect: Mood normal.         Behavior: Behavior normal.       Laboratory Data:  Hospital Outpatient Visit on 08/04/2022   Component Date Value Ref Range Status    POCT Glucose 08/04/2022 111 (A) 70 - 110 mg/dL Final   Lab Visit on 08/04/2022   Component Date Value Ref Range Status    WBC 08/04/2022 5.03  3.90 - 12.70 K/uL Final    RBC 08/04/2022 4.46  4.00 - 5.40 M/uL Final    Hemoglobin 08/04/2022 12.9  12.0 - 16.0 g/dL Final    Hematocrit 08/04/2022 41.0  37.0 - 48.5 % Final    MCV 08/04/2022 92  82 - 98 fL Final    MCH 08/04/2022 28.9  27.0 - 31.0 pg Final    MCHC 08/04/2022 31.5 (A) 32.0 - 36.0 g/dL Final    RDW 08/04/2022 14.7 (A) 11.5 - 14.5 % Final    Platelets 08/04/2022 185  150 - 450 K/uL Final    MPV 08/04/2022 10.7  9.2 - 12.9 fL Final    Immature Granulocytes 08/04/2022 0.6 (A) 0.0 - 0.5 % Final    Gran #  (ANC) 08/04/2022 4.0  1.8 - 7.7 K/uL Final    Immature Grans (Abs) 08/04/2022 0.03  0.00 - 0.04 K/uL Final    Comment: Mild elevation in immature granulocytes is non specific and   can be seen in a variety of conditions including stress response,   acute inflammation, trauma and pregnancy. Correlation with other   laboratory and clinical findings is essential.      Lymph # 08/04/2022 0.3 (A) 1.0 - 4.8 K/uL Final    Mono # 08/04/2022 0.4  0.3 - 1.0 K/uL Final    Eos # 08/04/2022 0.3  0.0 - 0.5 K/uL Final    Baso # 08/04/2022 0.02  0.00 - 0.20 K/uL Final    nRBC 08/04/2022 0  0 /100 WBC Final    Gran % 08/04/2022 79.4 (A) 38.0 - 73.0 % Final    Lymph % 08/04/2022 6.8 (A) 18.0 - 48.0 % Final    Mono % 08/04/2022 7.2  4.0 - 15.0 % Final    Eosinophil % 08/04/2022 5.6  0.0 - 8.0 % Final    Basophil % 08/04/2022 0.4  0.0 - 1.9 % Final    Differential Method 08/04/2022 Automated   Final    Sodium 08/04/2022 138  136 - 145 mmol/L Final    Potassium 08/04/2022 4.3  3.5 - 5.1 mmol/L Final    Chloride 08/04/2022 98  95 - 110 mmol/L Final    CO2 08/04/2022 32 (A) 23 - 29 mmol/L Final    Glucose 08/04/2022 103  70 - 110 mg/dL Final    BUN 08/04/2022 7  6 - 20 mg/dL Final    Creatinine 08/04/2022 0.8  0.5 - 1.4 mg/dL Final    Calcium 08/04/2022 9.8  8.7 - 10.5 mg/dL Final    Total Protein 08/04/2022 7.1  6.0 - 8.4 g/dL Final    Albumin 08/04/2022 3.8  3.5 - 5.2 g/dL Final    Total Bilirubin 08/04/2022 0.5  0.1 - 1.0 mg/dL Final    Comment: For infants and newborns, interpretation of results should be based  on gestational age, weight and in agreement with clinical  observations.    Premature Infant recommended reference ranges:  Up to 24 hours.............<8.0 mg/dL  Up to 48 hours............<12.0 mg/dL  3-5 days..................<15.0 mg/dL  6-29 days.................<15.0 mg/dL      Alkaline Phosphatase 08/04/2022 145 (A) 55 - 135 U/L Final    AST 08/04/2022 35  10 - 40 U/L Final    ALT 08/04/2022 35   10 - 44 U/L Final    Anion Gap 08/04/2022 8  8 - 16 mmol/L Final    eGFR 08/04/2022 >60.0  >60 mL/min/1.73 m^2 Final    Hepatitis B Surface Ag 08/04/2022 Negative  Negative Final    Hep B Core Total Ab 08/04/2022 Negative   Final    LD 08/04/2022 280 (A) 110 - 260 U/L Final    Results are increased in hemolyzed samples.       Imaging:     PET CT- 4/29/2020  1.  Findings consistent with partial treatment response with decreased size of the stomach wall and decreased SUV throughout the stomach.  Metabolic activity on the baseline scan was atypically prominent for marginal zone lymphoma.  If a Deauville score were to be assigned, the score would be 4.  2.  Decreased size and SUV of the patient's right lower lobe pulmonary biopsy-proven lymphoma.  3.  Questionable slight increase in size of the patient's right lower pole lesion again concerning for RCC or oncocytoma.  Extranodal lymphoma could have a similar appearance.    PET CT 8/2022:  In this patient marginal zone lymphoma, there are No FDG avid foci to indicate active metastasis.  Postoperative changes of right renal segmental artery embolization with evolving changes of the inferior pole hematoma.  There is a focal area of increased uptake within the hematoma, nonspecific.    Assessment:       1. 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 Back pain, unspecified back location, unspecified back pain laterality, unspecified chronicity      1.  Sha MZL with coexisting Gastric/pulmonary/diffuse marrow Marginal zone B-cell lymphoma:    - MALT IPI score- 2, LDH elevated at 301, age less than 70,  [place] stage IV  - Patient complained of chronic epigastric/periumbilical abdominal pain for the last 3 years  - September 24, 2019-  MRI of the abdomen revealed multifocal areas of asymmetric gastric wall thickening.  Recommend further evaluation with direct visualization to exclude neoplasm.    - October 15, 2019- EGD revealed Gastric tumor in  the gastric fundus was found however no specimen was collected as she was on anticoagulation.    - November 15, 2019- EUS revealed gastric tumor in the gastric fundus with many abnormal lymph nodes visualized in the lower paraesophageal mediastinum (level 8L) and gastrohepatic ligament (level 18). Fine needle biopsy performed and pathology revealed marginal zone B cell lymphoma.  PET CT-12/10/2019- revealed gastric marginal zone lymphoma, with pulmonary manifestations of marginal zone lymphoma in right lower lobe along with diffuse marrow uptake throughout the axial skeleton with more focal uptake identified at the right femoral greater trochanter and left ischial tuberosity  - She has a currently daily smoker, has been smoking for almost 40 years.    4/14/20- s/p 4 cycles of single agent Rituximab induction therapy completed    4/29/20- PET/CT consistent with partial treatment response   7/7/2020- S/p maintenance cycle 1 Rituximab 375 mg/m2. Will schedule every 12 weeks for 2 years   9/29/2020- cycle 2 of maintenance Rituximab  12/22/20- cycle 3 of maintenance Rituximab  3/15/21- cycle 4 of maintenance Rituximab  6/8/21- Cycle 5 of  maintenance Rituximab  9/21/21 - cycle 6 of maintenance Rituximab   12/2021 - cycle 7 was held due to squamous cell carcinoma diagnosis. Plan to hold for two cycles. Resume in May 2022  5/2022 . Will proceed with cycle 7.   6/2022: renal mass increasing in size underwent bx which showed low grade b cell lymphoma consistent with her MZL. Developed retroperitoneal hemorrhage and was hospitalized and underwent embolization.   8/2022: PET CT negative for active disease.      2. Right lower extremity DVT, likely provoked: was on Xarelto for about 2 years. Stopped taking 8/21  3. Iron deficiency anemia - resolved   4. Chronic active smoker  5. squamous cell carcinoma of lower lip diagnosed 10/21 s/p Wedge resection of lower lip squamous cell carcinoma primary closure and Right submandibular  sentinel lymph node biopsy 1/12/22. Margins neg, LN negative.   6. LE edema      Plan:     1. rituxan was held for two cycles for her squamous cell carcinoma of lower lip. Cycle 7 resumed in May. Her renal mass path was consistent with MZL. PET CT done 8/2022 negative for active dz. Will resume rituxan and plan for repeat US renal in 2 months.   2. Off xarelto    3. Continue to monitor labs [id number]. Discussed need for EGD/colonoscopy, will need to be rescheduled   4. Educated patient regarding smoking cessations, she's cutting down   5. Follows with ENT and dermatology   6. Follow with cardiology         Shukri Shea MD  Hematology and Medical Oncology fellow         Route Chart for Scheduling    BMT Chart Routing  Urgent    Follow up with physician 3 months.   Follow up with GIRISH    Infusion scheduling note please schedule cycle 8 rituxan this week or next week and c9 in 3 months   Injection scheduling note    Labs CBC, CMP and LDH   Lab interval:  In 3 months prior to cycle 9   Imaging    US renal to be done in 2 months. thank you   Pharmacy appointment    Other referrals          Treatment Plan Information   OP RITUXIMAB Q3M (MAINTENANCE)   Ale Velez MD   Upcoming Treatment Dates - OP RITUXIMAB Q3M (MAINTENANCE)    9/11/2022       Pre-Medications       acetaminophen tablet 650 mg       predniSONE tablet 20 mg       famotidine tablet 20 mg       meperidine (PF) injection 25 mg       Chemotherapy       rituxan hycela 1400 mg/11.7 mL (120 mg/mL) injection 1,400 mg  12/4/2022       Pre-Medications       acetaminophen tablet 650 mg       predniSONE tablet 20 mg       famotidine tablet 20 mg       meperidine (PF) injection 25 mg       Chemotherapy       rituxan hycela 1400 mg/11.7 mL (120 mg/mL) injection 1,400 mg  2/26/2023       Pre-Medications       acetaminophen tablet 650 mg       predniSONE tablet 20 mg       famotidine tablet 20 mg       meperidine (PF) injection 25 mg       Chemotherapy        rituxan hycela 1400 mg/11.7 mL (120 mg/mL) injection 1,400 mg  5/21/2023       Pre-Medications       acetaminophen tablet 650 mg       predniSONE tablet 20 mg       famotidine tablet 20 mg       meperidine (PF) injection 25 mg       Chemotherapy       rituxan hycela 1400 mg/11.7 mL (120 mg/mL) injection 1,400 mg    Supportive Plan Information  OP FERRIC CARBOXYMALTOSE Q2W   Marco Antonio Malloy MD   Upcoming Treatment Dates - OP FERRIC CARBOXYMALTOSE Q2W    No upcoming days in selected categories.

## 2022-08-15 ENCOUNTER — TELEPHONE (OUTPATIENT)
Dept: INFUSION THERAPY | Facility: HOSPITAL | Age: 57
End: 2022-08-15
Payer: MEDICAID

## 2022-08-15 NOTE — PROGRESS NOTES
Subjective:      Gabi Newton is a 56 y.o. female who returns today regarding her   CHIEF COMPLAINT IS PAIN IN UPPER MIDLINE AT VENTRAL HERNIA  OCCASIONAL NV      Follow up from tumor board:    Discussed at Tumor Board Conference on 9/23/22 - Plan for IR biopsy of renal mass.   Renal US 4/11/22 - RLP mass now 5.9 cm     6/1/22 - IR biopsy of Renal Mass. Post-op bleed embolized. Path consistent with low grade B cell lymphoma.      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     FACULTY IN ATTENDANCE:    Urologic Oncology: Hemanth Vale MD []?, Chao Calero MD [x]? , Rashard Nobles MD [x]?     CONSULT NEEDED:     []? Urologic Oncology    []? Hem/Onc    []? Rad/Onc      [x]? Treatment Guidelines (NCCN and AUA) reviewed and care planned is consistent with guidelines.     TUMOR BOARD RECOMMENDATIONS/PLAN/CONSENSUS:      Case discussed among group. Pathology and radiologic images were reviewed (if applicable).     Continue Rituximab treatment for Lymphoma. No treatment indicated for renal mass which is now biopsy proven Lymphoma..    The following portions of the patient's history were reviewed and updated as appropriate: allergies, current medications, past family history, past medical history, past social history, past surgical history and problem list.    Review of Systems  Pertinent items are noted in HPI.  A comprehensive multipoint review of systems was negative except as otherwise stated in the HPI.    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 states vessels at the bottom of heart collapsed     SECTION      x3    CHOLECYSTECTOMY      COLONOSCOPY      ENDOSCOPIC ULTRASOUND OF UPPER GASTROINTESTINAL TRACT Left 11/15/2019    Procedure: ULTRASOUND, UPPER GI TRACT, ENDOSCOPIC;  Surgeon: Mike Seay MD;  Location: [address]);  Service: Endoscopy;  Laterality: Left;  Ok to hold xarelto per protocol per Dr. Lloyd see media file 10/25/19-tb    ESOPHAGOGASTRODUODENOSCOPY      ESOPHAGOGASTRODUODENOSCOPY N/A 2018    Procedure: EGD (ESOPHAGOGASTRODUODENOSCOPY);  Surgeon: Ant Camejo MD;  Location: [facility];  Service: Endoscopy;  Laterality: N/A;    ESOPHAGOGASTRODUODENOSCOPY N/A 10/15/2019    Procedure: EGD (ESOPHAGOGASTRODUODENOSCOPY);  Surgeon: Melanie Cedeno MD;  Location: [facility];  Service: Endoscopy;  Laterality: N/A;    ESOPHAGOGASTRODUODENOSCOPY N/A 11/15/2019    Procedure: EGD (ESOPHAGOGASTRODUODENOSCOPY);  Surgeon: Mike Seay MD;  Location: [facility] ([address] [place] [address]);  Service: Endoscopy;  Laterality: N/A;    EXCISION OF LESION OF LIP Left 2022    Procedure: EXCISION, LESION, LIP;  Surgeon: Glen Giang MD;  Location: [facility] OR [address] [place] [address];  Service: ENT;  Laterality: Left;  Lip resection    HYSTERECTOMY      PHLEBOGRAPHY Bilateral 10/16/2018    Procedure: VENOGRAM;  Surgeon: Colin Mathis MD;  Location: [facility] CATH LAB;  Service: Cardiology;  Laterality: Bilateral;    CO RT/LT HEART CATHETERS Left     Coronary Arteriogram-2 Cath    RHINOPLASTY      SENTINEL LYMPH NODE BIOPSY  2022    Procedure: BIOPSY, LYMPH NODE, SENTINEL;  Surgeon: Glen Giang MD;  Location: [facility] OR [address] [place] [address];  Service: ENT;;    TUBAL LIGATION      venogram Bilateral  10/16/2018       Review of patient's allergies indicates:   Allergen Reactions    Azithromycin Anaphylaxis     Other reaction(s): swelling to entire body  Swelling (tongue / lips)^      Ciprofloxacin Swelling     Throat swells    Codeine      Swelling (throat)^    Tolerates Morphine      Codeine sulfate      Swelling (throat)^    Tolerates Morphine          Objective:   Vitals: LMP 02/11/2015 (Approximate)     Physical Exam   General: alert and oriented, no acute distress  Respiratory: Symmetric expansion, non-labored breathing  Cardiovascular: no peripheral edema  Abdomen: soft, non distended  UPPER MIDLINE VENTRAL HERNIA  MILDLY TENDER  DIFFICULT TO REDUCE BUT AB SOFT AND BENIGN  Skin: normal coloration and turgor, no rashes, no suspicious skin lesions noted  Neuro: no gross deficits  Psych: normal judgment and insight, normal mood/affect and non-anxious    Physical Exam    Lab Review   Urinalysis demonstrates no specimen    Lab Results   Component Value Date    WBC 5.03 08/04/2022    HGB 12.9 08/04/2022    HCT 41.0 08/04/2022    MCV 92 08/04/2022     08/04/2022     Lab Results   Component Value Date    CREATININE 0.8 08/04/2022    BUN 7 08/04/2022       Imaging    Assessment and Plan:   Right renal mass; lymphoma    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

## 2022-08-16 ENCOUNTER — TELEPHONE (OUTPATIENT)
Dept: SLEEP MEDICINE | Facility: OTHER | Age: 57
End: 2022-08-16
Payer: MEDICAID

## 2022-08-16 ENCOUNTER — OFFICE VISIT (OUTPATIENT)
Dept: UROLOGY | Facility: CLINIC | Age: 57
End: 2022-08-16
Payer: MEDICAID

## 2022-08-16 VITALS
BODY MASS INDEX: 42.03 KG/M2 | HEART RATE: 110 BPM | DIASTOLIC BLOOD PRESSURE: 86 MMHG | HEIGHT: 61 IN | SYSTOLIC BLOOD PRESSURE: 149 MMHG | WEIGHT: 222.63 LBS | RESPIRATION RATE: 16 BRPM

## 2022-08-16 DIAGNOSIS — N28.89 RIGHT RENAL MASS: Primary | ICD-10-CM

## 2022-08-16 DIAGNOSIS — C88.4 EXTRANODAL MARGINAL ZONE B-CELL LYMPHOMA OF MUCOSA-ASSOCIATED LYMPHOID TISSUE (MALT): ICD-10-CM

## 2022-08-16 PROCEDURE — 3079F PR MOST RECENT DIASTOLIC BLOOD PRESSURE 80-89 MM HG: ICD-10-PCS | Mod: CPTII,,, | Performed by: UROLOGY

## 2022-08-16 PROCEDURE — 1159F PR MEDICATION LIST DOCUMENTED IN MEDICAL RECORD: ICD-10-PCS | Mod: CPTII,,, | Performed by: UROLOGY

## 2022-08-16 PROCEDURE — 3008F BODY MASS INDEX DOCD: CPT | Mod: CPTII,,, | Performed by: UROLOGY

## 2022-08-16 PROCEDURE — 99999 PR PBB SHADOW E&M-EST. PATIENT-LVL III: ICD-10-PCS | Mod: PBBFAC,,, | Performed by: UROLOGY

## 2022-08-16 PROCEDURE — 3077F SYST BP >= 140 MM HG: CPT | Mod: CPTII,,, | Performed by: UROLOGY

## 2022-08-16 PROCEDURE — 4010F ACE/ARB THERAPY RXD/TAKEN: CPT | Mod: CPTII,,, | Performed by: UROLOGY

## 2022-08-16 PROCEDURE — 4010F PR ACE/ARB THEARPY RXD/TAKEN: ICD-10-PCS | Mod: CPTII,,, | Performed by: UROLOGY

## 2022-08-16 PROCEDURE — 99999 PR PBB SHADOW E&M-EST. PATIENT-LVL III: CPT | Mod: PBBFAC,,, | Performed by: UROLOGY

## 2022-08-16 PROCEDURE — 99214 OFFICE O/P EST MOD 30 MIN: CPT | Mod: S$PBB,,, | Performed by: UROLOGY

## 2022-08-16 PROCEDURE — 1159F MED LIST DOCD IN RCRD: CPT | Mod: CPTII,,, | Performed by: UROLOGY

## 2022-08-16 PROCEDURE — 99213 OFFICE O/P EST LOW 20 MIN: CPT | Mod: PBBFAC | Performed by: UROLOGY

## 2022-08-16 PROCEDURE — 3079F DIAST BP 80-89 MM HG: CPT | Mod: CPTII,,, | Performed by: UROLOGY

## 2022-08-16 PROCEDURE — 99214 PR OFFICE/OUTPT VISIT, EST, LEVL IV, 30-39 MIN: ICD-10-PCS | Mod: S$PBB,,, | Performed by: UROLOGY

## 2022-08-16 PROCEDURE — 3008F PR BODY MASS INDEX (BMI) DOCUMENTED: ICD-10-PCS | Mod: CPTII,,, | Performed by: UROLOGY

## 2022-08-16 PROCEDURE — 3077F PR MOST RECENT SYSTOLIC BLOOD PRESSURE >= 140 MM HG: ICD-10-PCS | Mod: CPTII,,, | Performed by: UROLOGY

## 2022-08-18 ENCOUNTER — OFFICE VISIT (OUTPATIENT)
Dept: SURGERY | Facility: CLINIC | Age: 57
End: 2022-08-18
Payer: MEDICAID

## 2022-08-18 ENCOUNTER — PATIENT MESSAGE (OUTPATIENT)
Dept: SURGERY | Facility: CLINIC | Age: 57
End: 2022-08-18

## 2022-08-18 DIAGNOSIS — C88.4 EXTRANODAL MARGINAL ZONE B-CELL LYMPHOMA OF MUCOSA-ASSOCIATED LYMPHOID TISSUE (MALT): ICD-10-CM

## 2022-08-18 DIAGNOSIS — N28.89 RIGHT RENAL MASS: ICD-10-CM

## 2022-08-18 PROCEDURE — 99203 OFFICE O/P NEW LOW 30 MIN: CPT | Mod: 95,,, | Performed by: SURGERY

## 2022-08-18 PROCEDURE — 1159F MED LIST DOCD IN RCRD: CPT | Mod: CPTII,95,, | Performed by: SURGERY

## 2022-08-18 PROCEDURE — 99203 PR OFFICE/OUTPT VISIT, NEW, LEVL III, 30-44 MIN: ICD-10-PCS | Mod: 95,,, | Performed by: SURGERY

## 2022-08-18 PROCEDURE — 1160F RVW MEDS BY RX/DR IN RCRD: CPT | Mod: CPTII,95,, | Performed by: SURGERY

## 2022-08-18 PROCEDURE — 1160F PR REVIEW ALL MEDS BY PRESCRIBER/CLIN PHARMACIST DOCUMENTED: ICD-10-PCS | Mod: CPTII,95,, | Performed by: SURGERY

## 2022-08-18 PROCEDURE — 1159F PR MEDICATION LIST DOCUMENTED IN MEDICAL RECORD: ICD-10-PCS | Mod: CPTII,95,, | Performed by: SURGERY

## 2022-08-18 PROCEDURE — 4010F ACE/ARB THERAPY RXD/TAKEN: CPT | Mod: CPTII,95,, | Performed by: SURGERY

## 2022-08-18 PROCEDURE — 4010F PR ACE/ARB THEARPY RXD/TAKEN: ICD-10-PCS | Mod: CPTII,95,, | Performed by: SURGERY

## 2022-08-19 ENCOUNTER — INFUSION (OUTPATIENT)
Dept: INFUSION THERAPY | Facility: HOSPITAL | Age: 57
End: 2022-08-19
Payer: MEDICAID

## 2022-08-19 VITALS
BODY MASS INDEX: 41.82 KG/M2 | DIASTOLIC BLOOD PRESSURE: 63 MMHG | WEIGHT: 221.31 LBS | RESPIRATION RATE: 18 BRPM | HEART RATE: 80 BPM | TEMPERATURE: 99 F | SYSTOLIC BLOOD PRESSURE: 137 MMHG

## 2022-08-19 DIAGNOSIS — C88.4 EXTRANODAL MARGINAL ZONE B-CELL LYMPHOMA OF MUCOSA-ASSOCIATED LYMPHOID TISSUE (MALT): Primary | ICD-10-CM

## 2022-08-19 DIAGNOSIS — C85.89 MARGINAL ZONE LYMPHOMA OF EXTRANODAL AND SOLID ORGAN SITES: ICD-10-CM

## 2022-08-19 PROCEDURE — 96401 CHEMO ANTI-NEOPL SQ/IM: CPT

## 2022-08-19 PROCEDURE — 63600175 PHARM REV CODE 636 W HCPCS: Mod: TB | Performed by: INTERNAL MEDICINE

## 2022-08-19 PROCEDURE — 25000003 PHARM REV CODE 250: Performed by: INTERNAL MEDICINE

## 2022-08-19 RX ORDER — HEPARIN 100 UNIT/ML
500 SYRINGE INTRAVENOUS
Status: CANCELLED | OUTPATIENT
Start: 2022-08-19

## 2022-08-19 RX ORDER — FAMOTIDINE 20 MG/1
20 TABLET, FILM COATED ORAL
Status: CANCELLED
Start: 2022-08-19

## 2022-08-19 RX ORDER — SODIUM CHLORIDE 0.9 % (FLUSH) 0.9 %
10 SYRINGE (ML) INJECTION
Status: DISCONTINUED | OUTPATIENT
Start: 2022-08-19 | End: 2022-08-19 | Stop reason: HOSPADM

## 2022-08-19 RX ORDER — FAMOTIDINE 20 MG/1
20 TABLET, FILM COATED ORAL
Status: COMPLETED | OUTPATIENT
Start: 2022-08-19 | End: 2022-08-19

## 2022-08-19 RX ORDER — PREDNISONE 20 MG/1
20 TABLET ORAL
Status: COMPLETED | OUTPATIENT
Start: 2022-08-19 | End: 2022-08-19

## 2022-08-19 RX ORDER — PREDNISONE 5 MG/1
20 TABLET ORAL
Status: CANCELLED
Start: 2022-08-19 | End: 2022-09-11

## 2022-08-19 RX ORDER — SODIUM CHLORIDE 0.9 % (FLUSH) 0.9 %
10 SYRINGE (ML) INJECTION
Status: CANCELLED | OUTPATIENT
Start: 2022-08-19

## 2022-08-19 RX ORDER — HEPARIN 100 UNIT/ML
500 SYRINGE INTRAVENOUS
Status: DISCONTINUED | OUTPATIENT
Start: 2022-08-19 | End: 2022-08-19 | Stop reason: HOSPADM

## 2022-08-19 RX ORDER — MEPERIDINE HYDROCHLORIDE 50 MG/ML
25 INJECTION INTRAMUSCULAR; INTRAVENOUS; SUBCUTANEOUS
Status: DISCONTINUED | OUTPATIENT
Start: 2022-08-19 | End: 2022-08-19 | Stop reason: HOSPADM

## 2022-08-19 RX ORDER — ACETAMINOPHEN 325 MG/1
650 TABLET ORAL
Status: CANCELLED | OUTPATIENT
Start: 2022-08-19

## 2022-08-19 RX ORDER — ACETAMINOPHEN 325 MG/1
650 TABLET ORAL
Status: COMPLETED | OUTPATIENT
Start: 2022-08-19 | End: 2022-08-19

## 2022-08-19 RX ADMIN — FAMOTIDINE 20 MG: 20 TABLET ORAL at 10:08

## 2022-08-19 RX ADMIN — ACETAMINOPHEN 650 MG: 325 TABLET ORAL at 10:08

## 2022-08-19 RX ADMIN — PREDNISONE 20 MG: 20 TABLET ORAL at 10:08

## 2022-08-19 RX ADMIN — RITUXIMAB AND HYALURONIDASE 1400 MG: 120; 2000 INJECTION, SOLUTION SUBCUTANEOUS at 10:08

## 2022-08-19 NOTE — PROGRESS NOTES
Procedure complete. Patient tolerated well. vaseline gauze intact to right back . No bleeding noted. Patient to recover in pacu. accompanied per ir nursing staff and priyanka villegas crna. Chest xray to be done in 2 hours.(6364)Report given at bedside.   Normal

## 2022-08-19 NOTE — PLAN OF CARE
1000 pt here for rituxan hycela injection, hx, meds, allergies reviewed, pt with no new complaints at this time, reclined in chair, continue to monitor

## 2022-08-19 NOTE — PLAN OF CARE
1115 pt tolerated rituxan hycela injection to abdomen without issue, pt to rtc 11/15/22, no distress noted upon d/c to home

## 2022-08-24 NOTE — PROGRESS NOTES
History & Physical    SUBJECTIVE:     History of Present Illness:  Patient is a 56 y.o. female presents with a ventral hernia   Patient also has morbid obesity   Also with a history of kidney cancer and currently receiving Rituxan infusions.    We will discuss with oncologist need for either stopping or timing after Rituxan infusion which would be best for surgical intervention.    Also encouraged patient to lose weight if possible as she is morbidly obese with a BMI 41.    Patient expressed understanding.    We will contact patient back with discussion of best surgical management going forward.        No chief complaint on file.      Review of patient's allergies indicates:   Allergen Reactions    Azithromycin Anaphylaxis     Other reaction(s): swelling to entire body  Swelling (tongue / lips)^      Ciprofloxacin Swelling     Throat swells    Codeine      Swelling (throat)^    Tolerates Morphine      Codeine sulfate      Swelling (throat)^    Tolerates Morphine       Current Outpatient Medications   Medication Sig Dispense Refill    albuterol (PROAIR HFA) 90 mcg/actuation inhaler Inhale 2 puffs into the lungs every 6 (six) hours as needed for Wheezing. Rescue 18 g 0    allopurinoL (ZYLOPRIM) 300 MG tablet Take 1 tablet (300 mg total) by mouth once daily. (Patient taking differently: Take 300 mg by mouth every evening.) 30 tablet 5    aspirin (ECOTRIN) 81 MG EC tablet Take 1 tablet (81 mg total) by mouth every evening. Hold until PCP follow up  0    fluticasone-salmeterol diskus inhaler 100-50 mcg Inhale 1 puff into the lungs once daily at 6am. Controller 60 each 0    hydroxyzine HCL (ATARAX) 25 MG tablet Take 25 mg by mouth every evening.      lisinopriL 10 MG tablet Take 10 mg by mouth once daily.      LORazepam (ATIVAN) 1 MG tablet Take 1 tablet (1 mg total) by mouth once. Prior to the pet scan for 1 dose 1 tablet 0    methadone (METHADOSE) 40 mg disintegrating tablet Take 40 mg by mouth once daily.  PATIENT USUALLY TAKES AT 10AM      nitroGLYCERIN (NITROSTAT) 0.4 MG SL tablet Place 0.4 mg under the tongue every 5 (five) minutes as needed for Chest pain.      ondansetron (ZOFRAN-ODT) 4 MG TbDL Take 2 tablets (8 mg total) by mouth 2 (two) times daily. 60 tablet 0    simvastatin (ZOCOR) 40 MG tablet Take 40 mg by mouth every evening.       zolpidem (AMBIEN) 10 mg Tab Take 1 tablet (10 mg total) by mouth nightly as needed (to be taken the night of the patient's sleep study). 2 tablet 0     No current facility-administered medications for this visit.       Past Medical History:   Diagnosis Date    Abdominal pain     Anemia     Anxiety     Back pain     COPD (chronic obstructive pulmonary disease) 6/10/2022    Deep vein thrombosis     Disorder of kidney and ureter     Fatty liver 2018    Gastric lymphoma 2019    Gastric tumor     GERD (gastroesophageal reflux disease)     Hyperlipidemia     Hypertension     Splenomegaly 2018     Past Surgical History:   Procedure Laterality Date    bilateral iliac vein stenosis with stenting      CARDIAC CATHETERIZATION      CARDIAC CATHETERIZATION  2018    had chest pains . states vessels at the bottom of heart collapsed     SECTION      x3    CHOLECYSTECTOMY      COLONOSCOPY      ENDOSCOPIC ULTRASOUND OF UPPER GASTROINTESTINAL TRACT Left 11/15/2019    Procedure: ULTRASOUND, UPPER GI TRACT, ENDOSCOPIC;  Surgeon: Mike Seay MD;  Location: [facility] ([address] [place] [address]);  Service: Endoscopy;  Laterality: Left;  Ok to hold xarelto per protocol per Dr. Lloyd see media file 10/25/19-tb    ESOPHAGOGASTRODUODENOSCOPY      ESOPHAGOGASTRODUODENOSCOPY N/A 2018    Procedure: EGD (ESOPHAGOGASTRODUODENOSCOPY);  Surgeon: Ant Camejo MD;  Location: [facility];  Service: Endoscopy;  Laterality: N/A;    ESOPHAGOGASTRODUODENOSCOPY N/A 10/15/2019    Procedure: EGD (ESOPHAGOGASTRODUODENOSCOPY);  Surgeon: Melanie Cedeno MD;  Location: [facility]  ENDO;  Service: Endoscopy;  Laterality: N/A;    ESOPHAGOGASTRODUODENOSCOPY N/A 11/15/2019    Procedure: EGD (ESOPHAGOGASTRODUODENOSCOPY);  Surgeon: Mike Seay MD;  Location: [facility] ENDO (2ND FLR);  Service: Endoscopy;  Laterality: N/A;    EXCISION OF LESION OF LIP Left 1/12/2022    Procedure: EXCISION, LESION, LIP;  Surgeon: Glen Giang MD;  [facility];  Service: ENT;  Laterality: Left;  Lip resection    HYSTERECTOMY      PHLEBOGRAPHY Bilateral 10/16/2018    Procedure: VENOGRAM;  Surgeon: Colin Mathis MD;  Location: [facility] CATH LAB;  Service: Cardiology;  Laterality: Bilateral;    MO RT/LT HEART CATHETERS Left     Coronary Arteriogram-2 Cath    RHINOPLASTY      SENTINEL LYMPH NODE BIOPSY  1/12/2022    Procedure: BIOPSY, LYMPH NODE, SENTINEL;  Surgeon: Glen Giang MD;  Location: [facility] OR [address] [place] [address];  Service: ENT;;    TUBAL LIGATION      venogram Bilateral 10/16/2018     Family History   Problem Relation Age of Onset    Breast cancer Maternal Grandfather     Dementia Mother     Atrial fibrillation Mother     Deep vein thrombosis Mother     Pulmonary embolism Mother     Stroke Mother     Aneurysm Father      Social History     Tobacco Use    Smoking status: Current Every Day Smoker     Packs/day: 0.50     Years: 40.00     Pack years: 20.00     Types: Cigarettes    Smokeless tobacco: Never Used    Tobacco comment: trying to quit; in a program; at 1/2 pack per day   Substance Use Topics    Alcohol use: No    Drug use: No     Comment: former opioid addiction  quit 8 yrs ago- pain management        Review of Systems:  Review of Systems   Constitutional: Negative for appetite change, fatigue, fever and unexpected weight change.   HENT: Negative for sore throat and trouble swallowing.    Eyes: Negative.    Respiratory: Negative for cough, shortness of breath and wheezing.    Cardiovascular: Negative for chest pain and leg swelling.   Gastrointestinal: Positive for  abdominal pain ( at hernia and midabdomen). Negative for abdominal distention, blood in stool, constipation, diarrhea, nausea and vomiting.   Endocrine: Negative.    Genitourinary: Negative.    Musculoskeletal: Negative for back pain.   Skin: Negative.  Negative for rash.   Allergic/Immunologic: Negative.    Neurological: Negative.    Hematological: Negative.    Psychiatric/Behavioral: Negative for confusion.       OBJECTIVE:     Vital Signs (Most Recent)              Physical Exam:  Physical Exam  Vitals and nursing note reviewed.   Constitutional:       Appearance: She is well-developed.   HENT:      Head: Normocephalic and atraumatic.   Cardiovascular:      Rate and Rhythm: Normal rate.      Heart sounds: Normal heart sounds.   Pulmonary:      Effort: Pulmonary effort is normal.   Abdominal:      General: Bowel sounds are normal. There is no distension.      Palpations: Abdomen is soft.      Tenderness: There is no abdominal tenderness.      Hernia: A hernia (Midabdominal) is present.   Musculoskeletal:         General: Normal range of motion.      Cervical back: Normal range of motion.   Skin:     General: Skin is warm and dry.      Capillary Refill: Capillary refill takes less than 2 seconds.   Neurological:      Mental Status: She is alert and oriented to person, place, and time.   Psychiatric:         Behavior: Behavior normal.         Laboratory  CBC: Reviewed  CMP: Reviewed    Diagnostic Results:  CT: Reviewed  Ventral hernia    ASSESSMENT/PLAN:     56-year-old female with a ventral hernia with history of kidney cancer on Rituxan.    Will discuss with oncologist about timing with surgery and Rituxan.    Otherwise recommend laparoscopic versus robotic hernia repair.    Patient expressed understanding and agrees to this plan.

## 2022-10-07 ENCOUNTER — PATIENT MESSAGE (OUTPATIENT)
Dept: HEMATOLOGY/ONCOLOGY | Facility: CLINIC | Age: 57
End: 2022-10-07
Payer: MEDICAID

## 2022-11-08 RX ORDER — FAMOTIDINE 20 MG/1
20 TABLET, FILM COATED ORAL
Status: CANCELLED
Start: 2022-11-11

## 2022-11-08 RX ORDER — ACETAMINOPHEN 325 MG/1
650 TABLET ORAL
Status: CANCELLED | OUTPATIENT
Start: 2022-11-11

## 2022-11-08 RX ORDER — HEPARIN 100 UNIT/ML
500 SYRINGE INTRAVENOUS
Status: CANCELLED | OUTPATIENT
Start: 2022-11-11

## 2022-11-08 RX ORDER — PREDNISONE 20 MG/1
20 TABLET ORAL
Status: CANCELLED
Start: 2022-11-11 | End: 2022-11-11

## 2022-11-08 RX ORDER — SODIUM CHLORIDE 0.9 % (FLUSH) 0.9 %
10 SYRINGE (ML) INJECTION
Status: CANCELLED | OUTPATIENT
Start: 2022-11-11

## 2022-11-15 ENCOUNTER — INFUSION (OUTPATIENT)
Dept: INFUSION THERAPY | Facility: HOSPITAL | Age: 57
End: 2022-11-15
Attending: INTERNAL MEDICINE
Payer: MEDICAID

## 2022-11-15 ENCOUNTER — LAB VISIT (OUTPATIENT)
Dept: LAB | Facility: HOSPITAL | Age: 57
End: 2022-11-15
Attending: INTERNAL MEDICINE
Payer: MEDICAID

## 2022-11-15 ENCOUNTER — OFFICE VISIT (OUTPATIENT)
Dept: HEMATOLOGY/ONCOLOGY | Facility: CLINIC | Age: 57
End: 2022-11-15
Payer: MEDICAID

## 2022-11-15 VITALS
SYSTOLIC BLOOD PRESSURE: 126 MMHG | RESPIRATION RATE: 16 BRPM | WEIGHT: 212.44 LBS | TEMPERATURE: 97 F | DIASTOLIC BLOOD PRESSURE: 67 MMHG | BODY MASS INDEX: 40.11 KG/M2 | HEART RATE: 78 BPM | HEIGHT: 61 IN | OXYGEN SATURATION: 97 %

## 2022-11-15 VITALS
DIASTOLIC BLOOD PRESSURE: 74 MMHG | HEART RATE: 79 BPM | TEMPERATURE: 98 F | RESPIRATION RATE: 16 BRPM | OXYGEN SATURATION: 96 % | SYSTOLIC BLOOD PRESSURE: 132 MMHG

## 2022-11-15 DIAGNOSIS — C85.89 MARGINAL ZONE LYMPHOMA OF EXTRANODAL AND SOLID ORGAN SITES: ICD-10-CM

## 2022-11-15 DIAGNOSIS — C44.92 SQUAMOUS CELL SKIN CANCER: ICD-10-CM

## 2022-11-15 DIAGNOSIS — C88.4 EXTRANODAL MARGINAL ZONE B-CELL LYMPHOMA OF MUCOSA-ASSOCIATED LYMPHOID TISSUE (MALT): Primary | ICD-10-CM

## 2022-11-15 DIAGNOSIS — C85.89 MARGINAL ZONE LYMPHOMA OF EXTRANODAL AND SOLID ORGAN SITES: Primary | ICD-10-CM

## 2022-11-15 DIAGNOSIS — F17.210 HEAVY CIGARETTE SMOKER: ICD-10-CM

## 2022-11-15 LAB
ALBUMIN SERPL BCP-MCNC: 3.8 G/DL (ref 3.5–5.2)
ALP SERPL-CCNC: 161 U/L (ref 55–135)
ALT SERPL W/O P-5'-P-CCNC: 52 U/L (ref 10–44)
ANION GAP SERPL CALC-SCNC: 9 MMOL/L (ref 8–16)
AST SERPL-CCNC: 58 U/L (ref 10–40)
BASOPHILS # BLD AUTO: 0.02 K/UL (ref 0–0.2)
BASOPHILS NFR BLD: 0.4 % (ref 0–1.9)
BILIRUB SERPL-MCNC: 0.5 MG/DL (ref 0.1–1)
BUN SERPL-MCNC: 7 MG/DL (ref 6–20)
CALCIUM SERPL-MCNC: 9.7 MG/DL (ref 8.7–10.5)
CHLORIDE SERPL-SCNC: 96 MMOL/L (ref 95–110)
CO2 SERPL-SCNC: 32 MMOL/L (ref 23–29)
CREAT SERPL-MCNC: 0.9 MG/DL (ref 0.5–1.4)
DIFFERENTIAL METHOD: ABNORMAL
EOSINOPHIL # BLD AUTO: 0.3 K/UL (ref 0–0.5)
EOSINOPHIL NFR BLD: 6 % (ref 0–8)
ERYTHROCYTE [DISTWIDTH] IN BLOOD BY AUTOMATED COUNT: 15.1 % (ref 11.5–14.5)
EST. GFR  (NO RACE VARIABLE): >60 ML/MIN/1.73 M^2
GLUCOSE SERPL-MCNC: 102 MG/DL (ref 70–110)
HCT VFR BLD AUTO: 44 % (ref 37–48.5)
HGB BLD-MCNC: 13.4 G/DL (ref 12–16)
IMM GRANULOCYTES # BLD AUTO: 0.01 K/UL (ref 0–0.04)
IMM GRANULOCYTES NFR BLD AUTO: 0.2 % (ref 0–0.5)
LDH SERPL L TO P-CCNC: 293 U/L (ref 110–260)
LYMPHOCYTES # BLD AUTO: 0.5 K/UL (ref 1–4.8)
LYMPHOCYTES NFR BLD: 9.6 % (ref 18–48)
MCH RBC QN AUTO: 28.6 PG (ref 27–31)
MCHC RBC AUTO-ENTMCNC: 30.5 G/DL (ref 32–36)
MCV RBC AUTO: 94 FL (ref 82–98)
MONOCYTES # BLD AUTO: 0.3 K/UL (ref 0.3–1)
MONOCYTES NFR BLD: 5.6 % (ref 4–15)
NEUTROPHILS # BLD AUTO: 4.1 K/UL (ref 1.8–7.7)
NEUTROPHILS NFR BLD: 78.2 % (ref 38–73)
NRBC BLD-RTO: 0 /100 WBC
PLATELET # BLD AUTO: 199 K/UL (ref 150–450)
PMV BLD AUTO: 10.9 FL (ref 9.2–12.9)
POTASSIUM SERPL-SCNC: 4.4 MMOL/L (ref 3.5–5.1)
PROT SERPL-MCNC: 7.5 G/DL (ref 6–8.4)
RBC # BLD AUTO: 4.68 M/UL (ref 4–5.4)
SODIUM SERPL-SCNC: 137 MMOL/L (ref 136–145)
WBC # BLD AUTO: 5.2 K/UL (ref 3.9–12.7)

## 2022-11-15 PROCEDURE — 63600175 PHARM REV CODE 636 W HCPCS: Mod: TB | Performed by: INTERNAL MEDICINE

## 2022-11-15 PROCEDURE — 83615 LACTATE (LD) (LDH) ENZYME: CPT | Performed by: INTERNAL MEDICINE

## 2022-11-15 PROCEDURE — 3074F PR MOST RECENT SYSTOLIC BLOOD PRESSURE < 130 MM HG: ICD-10-PCS | Mod: CPTII,,, | Performed by: INTERNAL MEDICINE

## 2022-11-15 PROCEDURE — 3078F DIAST BP <80 MM HG: CPT | Mod: CPTII,,, | Performed by: INTERNAL MEDICINE

## 2022-11-15 PROCEDURE — 36415 COLL VENOUS BLD VENIPUNCTURE: CPT | Performed by: INTERNAL MEDICINE

## 2022-11-15 PROCEDURE — 99214 PR OFFICE/OUTPT VISIT, EST, LEVL IV, 30-39 MIN: ICD-10-PCS | Mod: S$PBB,,, | Performed by: INTERNAL MEDICINE

## 2022-11-15 PROCEDURE — 99214 OFFICE O/P EST MOD 30 MIN: CPT | Mod: PBBFAC,25 | Performed by: INTERNAL MEDICINE

## 2022-11-15 PROCEDURE — 99214 OFFICE O/P EST MOD 30 MIN: CPT | Mod: S$PBB,,, | Performed by: INTERNAL MEDICINE

## 2022-11-15 PROCEDURE — 85025 COMPLETE CBC W/AUTO DIFF WBC: CPT | Performed by: INTERNAL MEDICINE

## 2022-11-15 PROCEDURE — 3008F PR BODY MASS INDEX (BMI) DOCUMENTED: ICD-10-PCS | Mod: CPTII,,, | Performed by: INTERNAL MEDICINE

## 2022-11-15 PROCEDURE — 99999 PR PBB SHADOW E&M-EST. PATIENT-LVL IV: CPT | Mod: PBBFAC,,, | Performed by: INTERNAL MEDICINE

## 2022-11-15 PROCEDURE — 4010F ACE/ARB THERAPY RXD/TAKEN: CPT | Mod: CPTII,,, | Performed by: INTERNAL MEDICINE

## 2022-11-15 PROCEDURE — 3074F SYST BP LT 130 MM HG: CPT | Mod: CPTII,,, | Performed by: INTERNAL MEDICINE

## 2022-11-15 PROCEDURE — 25000003 PHARM REV CODE 250: Performed by: INTERNAL MEDICINE

## 2022-11-15 PROCEDURE — 3078F PR MOST RECENT DIASTOLIC BLOOD PRESSURE < 80 MM HG: ICD-10-PCS | Mod: CPTII,,, | Performed by: INTERNAL MEDICINE

## 2022-11-15 PROCEDURE — 3008F BODY MASS INDEX DOCD: CPT | Mod: CPTII,,, | Performed by: INTERNAL MEDICINE

## 2022-11-15 PROCEDURE — 96401 CHEMO ANTI-NEOPL SQ/IM: CPT

## 2022-11-15 PROCEDURE — 99999 PR PBB SHADOW E&M-EST. PATIENT-LVL IV: ICD-10-PCS | Mod: PBBFAC,,, | Performed by: INTERNAL MEDICINE

## 2022-11-15 PROCEDURE — 80053 COMPREHEN METABOLIC PANEL: CPT | Performed by: INTERNAL MEDICINE

## 2022-11-15 PROCEDURE — 4010F PR ACE/ARB THEARPY RXD/TAKEN: ICD-10-PCS | Mod: CPTII,,, | Performed by: INTERNAL MEDICINE

## 2022-11-15 RX ORDER — MEPERIDINE HYDROCHLORIDE 50 MG/ML
25 INJECTION INTRAMUSCULAR; INTRAVENOUS; SUBCUTANEOUS
Status: DISCONTINUED | OUTPATIENT
Start: 2022-11-15 | End: 2022-11-15 | Stop reason: HOSPADM

## 2022-11-15 RX ORDER — ACETAMINOPHEN 325 MG/1
650 TABLET ORAL
Status: COMPLETED | OUTPATIENT
Start: 2022-11-15 | End: 2022-11-15

## 2022-11-15 RX ORDER — HEPARIN 100 UNIT/ML
500 SYRINGE INTRAVENOUS
Status: DISCONTINUED | OUTPATIENT
Start: 2022-11-15 | End: 2022-11-15 | Stop reason: HOSPADM

## 2022-11-15 RX ORDER — FAMOTIDINE 20 MG/1
20 TABLET, FILM COATED ORAL
Status: COMPLETED | OUTPATIENT
Start: 2022-11-15 | End: 2022-11-15

## 2022-11-15 RX ORDER — PREDNISONE 20 MG/1
20 TABLET ORAL
Status: COMPLETED | OUTPATIENT
Start: 2022-11-15 | End: 2022-11-15

## 2022-11-15 RX ORDER — SODIUM CHLORIDE 0.9 % (FLUSH) 0.9 %
10 SYRINGE (ML) INJECTION
Status: DISCONTINUED | OUTPATIENT
Start: 2022-11-15 | End: 2022-11-15 | Stop reason: HOSPADM

## 2022-11-15 RX ADMIN — FAMOTIDINE 20 MG: 20 TABLET ORAL at 02:11

## 2022-11-15 RX ADMIN — PREDNISONE 20 MG: 20 TABLET ORAL at 02:11

## 2022-11-15 RX ADMIN — ACETAMINOPHEN 650 MG: 325 TABLET ORAL at 02:11

## 2022-11-15 RX ADMIN — RITUXIMAB AND HYALURONIDASE 1400 MG: 120; 2000 INJECTION, SOLUTION SUBCUTANEOUS at 03:11

## 2022-11-15 NOTE — PLAN OF CARE
"Pt ambulatoyry to clinic alone for Rituxan SQ injections. Denies any complaints. States has a "Sinus infection" and states she wasnt eating as much. PO meds given. Pt tolerated infusion well. From clinic in NAD.   "

## 2022-11-15 NOTE — PROGRESS NOTES
PATIENT: Gabi Newton  MRN: [id number]  DATE: 11/15/2022    Diagnosis:   1. 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 Squamous cell skin cancer    3. Heavy cigarette smoker        Chief Complaint: 3mth f/u, Sore Throat, Sinusitis, Emesis (11/14/22/), and Fatigue    Ms. Newton is a 54 year old female with Stage IV Marginal Zone Lymphoma (Gastric, Pulmonary, and Marrow) s/p 4 cycles of single agent Rituximab induction therapy completed on 4/14/20 followed by PET CT on 4/29/20 consistent with partial treatment response. She is here for a follow up visit. She has been on maintenance therapy since July 7, 2020    Oncologic History  Ms. Newton is a 54-year-old female with a past medical history of abdominal pain for the last 3-4 years with unknown etiology, significant smoking history for 40 years, back pain, hypertension presented to the Hematology Clinic for newly diagnosed marginal zone B-cell lymphoma     Patient complained of chronic epigastric/periumbilical abdominal pain for the last 3 years and has been receiving multiple abdominal imaging at [facility].  In 2016 her abdominal pain was thought to be from her gallbladder and she had underwent a cholecystectomy however she continued to have abdominal pain. A CT abdomen done in June 11, 2018 revealed wall thickening of the anterior gastric body suspicious for infectious or inflammatory but is concerning for soft tissue mass and a EGD was a recommended. MRI on June 19, 2018 which revealed diffuse hepatic steatosis without evidence of suspicious focal lesions, splenomegaly but no ascites.  She has been following  Dr. Cedeno since 2018 for her abdominal pain, however liver was not suspected to be the cause for her abdominal pain. She had negative workup for hepatitis-B, C, negative workup for autoimmune disease, negative for alpha 1 antitrypsin disease, negative for celiac sprue and her hepatitis panel has been negative so far.   MRI of the abdomen was done on September 24, 2019 which revealed multifocal areas of asymmetric gastric wall thickening.  Recommend further evaluation with direct visualization to exclude neoplasm.  EGD was done on 10/15/2019 and Gastric tumor in the gastric fundus was found however no specimen was collected as she was on anticoagulation.  The EUS done on 11/15/2019 revealed gastric tumor in the gastric fundus with many abnormal lymph nodes visualized in the lower paraesophageal mediastinum (level 8L) and gastrohepatic ligament (level 18). Fine needle biopsy performed and pathology revealed marginal zone B cell lymphoma.     She had chest pain in 2016 and the imaging done at that time revealed pulmonary nodule and has been following pulmonary, Dr. Yeung at [facility].  She has a currently daily smoker, has been smoking for almost 40 years.  On the CT chest done in November 2017 revealed 1.7 x 1.3 cm lesion in the right lower lobe and 2 x 1 cm lesion in the anterior portion of the right lower lobe.  A CT scan done on September 25, 2019 revealed a complex poorly defined mass in the right lower lobe measuring 2.78 x 2.64 cm, suspicious for primary lung carcinoma     She was diagnosed with right lower extremity DVT and has been on Xarelto.  As per the patient she was hospitalized at that time and underwent procedure for her right lower extremity for venous insufficiency.  It appears that the right lower extremity DVT is a provoked clot.  She had history of bilateral iliac vein stenting 10/16/18 for venous outflow obsturction.     Subjective:    Initial History: Ms. Newton is a 57 y.o. female who returns for follow up.     She experienced an upper respiratory tract infection last month and has been recovering since. Continues to have mild dry cough and headaches. No fevers, chills. Wasn't eating well with the viral illness and have lost weight.   She was seen by general surgery for ventral hernia repair.       Past Medical  History:   Past Medical History:   Diagnosis Date    Abdominal pain     Anemia     Anxiety     Back pain     COPD (chronic obstructive pulmonary disease) 6/10/2022    Deep vein thrombosis     Disorder of kidney and ureter     Fatty liver 2018    Gastric lymphoma 2019    Gastric tumor     GERD (gastroesophageal reflux disease)     Hyperlipidemia     Hypertension     Splenomegaly 2018       Past Surgical HIstory:   Past Surgical History:   Procedure Laterality Date    bilateral iliac vein stenosis with stenting      CARDIAC CATHETERIZATION      CARDIAC CATHETERIZATION  2018    had chest pains . states vessels at the bottom of heart collapsed     SECTION      x3    CHOLECYSTECTOMY      COLONOSCOPY      ENDOSCOPIC ULTRASOUND OF UPPER GASTROINTESTINAL TRACT Left 11/15/2019    Procedure: ULTRASOUND, UPPER GI TRACT, ENDOSCOPIC;  Surgeon: Mike Seay MD;  Location: [facility] (2ND FLR);  Service: Endoscopy;  Laterality: Left;  Ok to hold xarelto per protocol per Dr. Lloyd see media file 10/25/19-tb    ESOPHAGOGASTRODUODENOSCOPY      ESOPHAGOGASTRODUODENOSCOPY N/A 2018    Procedure: EGD (ESOPHAGOGASTRODUODENOSCOPY);  Surgeon: Ant Camejo MD;  Location: [facility];  Service: Endoscopy;  Laterality: N/A;    ESOPHAGOGASTRODUODENOSCOPY N/A 10/15/2019    Procedure: EGD (ESOPHAGOGASTRODUODENOSCOPY);  Surgeon: Melanie Cedeno MD;  Location: [facility];  Service: Endoscopy;  Laterality: N/A;    ESOPHAGOGASTRODUODENOSCOPY N/A 11/15/2019    Procedure: EGD (ESOPHAGOGASTRODUODENOSCOPY);  Surgeon: Mike Seay MD;  [facility]  Service: Endoscopy;  Laterality: N/A;    EXCISION OF LESION OF LIP Left 2022    Procedure: EXCISION, LESION, LIP;  Surgeon: Glen Giang MD;  Location: [facility] OR 2ND FLR;  Service: ENT;  Laterality: Left;  Lip resection    HYSTERECTOMY      PHLEBOGRAPHY Bilateral 10/16/2018    Procedure: VENOGRAM;  Surgeon: Colin Mathis MD;  Location: [facility]  CATH LAB;  Service: Cardiology;  Laterality: Bilateral;    VA RT/LT HEART CATHETERS Left     Coronary Arteriogram-2 Cath    RHINOPLASTY      SENTINEL LYMPH NODE BIOPSY  1/12/2022    Procedure: BIOPSY, LYMPH NODE, SENTINEL;  Surgeon: Glen Giang MD;  Location: [facility] OR [address] [place] [address];  Service: ENT;;    TUBAL LIGATION      venogram Bilateral 10/16/2018       Family History:   Family History   Problem Relation Age of Onset    Breast cancer Maternal Grandfather     Dementia Mother     Atrial fibrillation Mother     Deep vein thrombosis Mother     Pulmonary embolism Mother     Stroke Mother     Aneurysm Father        Social History:  reports that she has been smoking cigarettes. She has a 20.00 pack-year smoking history. She has never used smokeless tobacco. She reports that she does not drink alcohol and does not use drugs.    Allergies:  Review of patient's allergies indicates:   Allergen Reactions    Azithromycin Anaphylaxis     Other reaction(s): swelling to entire body  Swelling (tongue / lips)^      Ciprofloxacin Swelling     Throat swells    Codeine      Swelling (throat)^    Tolerates Morphine      Codeine sulfate      Swelling (throat)^    Tolerates Morphine       Medications:  Current Outpatient Medications   Medication Sig Dispense Refill    albuterol (PROAIR HFA) 90 mcg/actuation inhaler Inhale 2 puffs into the lungs every 6 (six) hours as needed for Wheezing. Rescue 18 g 0    allopurinoL (ZYLOPRIM) 300 MG tablet Take 1 tablet (300 mg total) by mouth once daily. (Patient taking differently: Take 300 mg by mouth every evening.) 30 tablet 5    aspirin (ECOTRIN) 81 MG EC tablet Take 1 tablet (81 mg total) by mouth every evening. Hold until PCP follow up  0    fluticasone-salmeterol diskus inhaler 100-50 mcg Inhale 1 puff into the lungs once daily at 6am. Controller 60 each 0    hydroxyzine HCL (ATARAX) 25 MG tablet Take 25 mg by mouth every evening.      lisinopriL 10 MG tablet Take 10 mg by mouth once daily.   "    LORazepam (ATIVAN) 1 MG tablet Take 1 tablet (1 mg total) by mouth once. Prior to the pet scan for 1 dose 1 tablet 0    methadone (METHADOSE) 40 mg disintegrating tablet Take 40 mg by mouth once daily. PATIENT USUALLY TAKES AT 10AM      nitroGLYCERIN (NITROSTAT) 0.4 MG SL tablet Place 0.4 mg under the tongue every 5 (five) minutes as needed for Chest pain.      ondansetron (ZOFRAN-ODT) 4 MG TbDL Take 2 tablets (8 mg total) by mouth 2 (two) times daily. 60 tablet 0    simvastatin (ZOCOR) 40 MG tablet Take 40 mg by mouth every evening.       zolpidem (AMBIEN) 10 mg Tab Take 1 tablet (10 mg total) by mouth nightly as needed (to be taken the night of the patient's sleep study). 2 tablet 0     No current facility-administered medications for this visit.       Review of Systems   Constitutional:  Positive for appetite change and fatigue. Negative for fever and unexpected weight change.   HENT:  Positive for sore throat. Negative for nosebleeds.    Respiratory:  Positive for cough. Negative for shortness of breath.    Cardiovascular:  Positive for leg swelling. Negative for chest pain.   Gastrointestinal:  Negative for abdominal pain, blood in stool, diarrhea, nausea and vomiting.   Genitourinary:  Negative for dysuria, flank pain, hematuria, urgency and vaginal bleeding.   Musculoskeletal:  Positive for back pain.   Skin:  Negative for rash.   Neurological:  Negative for seizures and headaches.   Hematological:  Negative for adenopathy.   Psychiatric/Behavioral:  Negative for agitation.    ECOG Performance Status: 2   Objective:      Vitals:   Vitals:    11/15/22 1336   BP: 126/67   BP Location: Left arm   Patient Position: Sitting   BP Method: Large (Automatic)   Pulse: 78   Resp: 16   Temp: 97 °F (36.1 °C)   TempSrc: Oral   SpO2: 97%   Weight: 96.3 kg (212 lb 6.6 oz)   Height: 5' 1" (1.549 m)       BMI: Body mass index is 40.14 kg/m².    Physical Exam  Constitutional:       General: She is not in acute distress.     " Appearance: Normal appearance. She is obese. She is not toxic-appearing.   HENT:      Head: Normocephalic and atraumatic.      Nose: Nose normal. No rhinorrhea.      Mouth/Throat:      Mouth: Mucous membranes are moist.      Pharynx: Oropharynx is clear. No oropharyngeal exudate or posterior oropharyngeal erythema.   Eyes:      Pupils: Pupils are equal, round, and reactive to light.   Cardiovascular:      Rate and Rhythm: Normal rate and regular rhythm.      Heart sounds: No murmur heard.  Pulmonary:      Effort: Pulmonary effort is normal.      Breath sounds: Normal breath sounds.   Abdominal:      General: Abdomen is flat. There is no distension.      Palpations: Abdomen is soft.      Hernia: A hernia is present.   Musculoskeletal:         General: No swelling or tenderness. Normal range of motion.      Cervical back: Normal range of motion and neck supple.      Right lower leg: Edema present.      Left lower leg: Edema present.   Skin:     General: Skin is warm.      Capillary Refill: Capillary refill takes less than 2 seconds.      Findings: Rash (b/l LE due to lymphedema ) present.   Neurological:      General: No focal deficit present.      Mental Status: She is alert and oriented to person, place, and time.   Psychiatric:         Mood and Affect: Mood normal.         Behavior: Behavior normal.     Laboratory Data:  Lab Visit on 11/15/2022   Component Date Value Ref Range Status    WBC 11/15/2022 5.20  3.90 - 12.70 K/uL Final    RBC 11/15/2022 4.68  4.00 - 5.40 M/uL Final    Hemoglobin 11/15/2022 13.4  12.0 - 16.0 g/dL Final    Hematocrit 11/15/2022 44.0  37.0 - 48.5 % Final    MCV 11/15/2022 94  82 - 98 fL Final    MCH 11/15/2022 28.6  27.0 - 31.0 pg Final    MCHC 11/15/2022 30.5 (L)  32.0 - 36.0 g/dL Final    RDW 11/15/2022 15.1 (H)  11.5 - 14.5 % Final    Platelets 11/15/2022 199  150 - 450 K/uL Final    MPV 11/15/2022 10.9  9.2 - 12.9 fL Final    Immature Granulocytes 11/15/2022 0.2  0.0 - 0.5 % Final     Gran # (ANC) 11/15/2022 4.1  1.8 - 7.7 K/uL Final    Immature Grans (Abs) 11/15/2022 0.01  0.00 - 0.04 K/uL Final    Comment: Mild elevation in immature granulocytes is non specific and   can be seen in a variety of conditions including stress response,   acute inflammation, trauma and pregnancy. Correlation with other   laboratory and clinical findings is essential.      Lymph # 11/15/2022 0.5 (L)  1.0 - 4.8 K/uL Final    Mono # 11/15/2022 0.3  0.3 - 1.0 K/uL Final    Eos # 11/15/2022 0.3  0.0 - 0.5 K/uL Final    Baso # 11/15/2022 0.02  0.00 - 0.20 K/uL Final    nRBC 11/15/2022 0  0 /100 WBC Final    Gran % 11/15/2022 78.2 (H)  38.0 - 73.0 % Final    Lymph % 11/15/2022 9.6 (L)  18.0 - 48.0 % Final    Mono % 11/15/2022 5.6  4.0 - 15.0 % Final    Eosinophil % 11/15/2022 6.0  0.0 - 8.0 % Final    Basophil % 11/15/2022 0.4  0.0 - 1.9 % Final    Differential Method 11/15/2022 Automated   Final    Sodium 11/15/2022 137  136 - 145 mmol/L Final    Potassium 11/15/2022 4.4  3.5 - 5.1 mmol/L Final    Chloride 11/15/2022 96  95 - 110 mmol/L Final    CO2 11/15/2022 32 (H)  23 - 29 mmol/L Final    Glucose 11/15/2022 102  70 - 110 mg/dL Final    BUN 11/15/2022 7  6 - 20 mg/dL Final    Creatinine 11/15/2022 0.9  0.5 - 1.4 mg/dL Final    Calcium 11/15/2022 9.7  8.7 - 10.5 mg/dL Final    Total Protein 11/15/2022 7.5  6.0 - 8.4 g/dL Final    Albumin 11/15/2022 3.8  3.5 - 5.2 g/dL Final    Total Bilirubin 11/15/2022 0.5  0.1 - 1.0 mg/dL Final    Comment: For infants and newborns, interpretation of results should be based  on gestational age, weight and in agreement with clinical  observations.    Premature Infant recommended reference ranges:  Up to 24 hours.............<8.0 mg/dL  Up to 48 hours............<12.0 mg/dL  3-5 days..................<15.0 mg/dL  6-29 days.................<15.0 mg/dL      Alkaline Phosphatase 11/15/2022 161 (H)  55 - 135 U/L Final    AST 11/15/2022 58 (H)  10 - 40 U/L Final    ALT 11/15/2022 52 (H)  10 -  44 U/L Final    Anion Gap 11/15/2022 9  8 - 16 mmol/L Final    eGFR 11/15/2022 >60.0  >60 mL/min/1.73 m^2 Final    LD 11/15/2022 293 (H)  110 - 260 U/L Final    Results are increased in hemolyzed samples.       Imaging:     PET CT- 4/29/2020  1.  Findings consistent with partial treatment response with decreased size of the stomach wall and decreased SUV throughout the stomach.  Metabolic activity on the baseline scan was atypically prominent for marginal zone lymphoma.  If a Deauville score were to be assigned, the score would be 4.  2.  Decreased size and SUV of the patient's right lower lobe pulmonary biopsy-proven lymphoma.  3.  Questionable slight increase in size of the patient's right lower pole lesion again concerning for RCC or oncocytoma.  Extranodal lymphoma could have a similar appearance.    PET CT 8/2022:  In this patient marginal zone lymphoma, there are No FDG avid foci to indicate active metastasis.  Postoperative changes of right renal segmental artery embolization with evolving changes of the inferior pole hematoma.  There is a focal area of increased uptake within the hematoma, nonspecific.    Assessment:       1. 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 Squamous cell skin cancer    3. Heavy cigarette smoker        1.  Sha MZL with coexisting Gastric/pulmonary/diffuse marrow Marginal zone B-cell lymphoma:    - MALT IPI score- 2, LDH elevated at 301, age less than 70,  Walker stage IV  - Patient complained of chronic epigastric/periumbilical abdominal pain for the last 3 years  - September 24, 2019-  MRI of the abdomen revealed multifocal areas of asymmetric gastric wall thickening.  Recommend further evaluation with direct visualization to exclude neoplasm.    - October 15, 2019- EGD revealed Gastric tumor in the gastric fundus was found however no specimen was collected as she was on anticoagulation.    - November 15, 2019- EUS revealed gastric tumor in the gastric  fundus with many abnormal lymph nodes visualized in the lower paraesophageal mediastinum (level 8L) and gastrohepatic ligament (level 18). Fine needle biopsy performed and pathology revealed marginal zone B cell lymphoma.  PET CT-12/10/2019- revealed gastric marginal zone lymphoma, with pulmonary manifestations of marginal zone lymphoma in right lower lobe along with diffuse marrow uptake throughout the axial skeleton with more focal uptake identified at the right femoral greater trochanter and left ischial tuberosity  - She has a currently daily smoker, has been smoking for almost 40 years.    4/14/20- s/p 4 cycles of single agent Rituximab induction therapy completed    4/29/20- PET/CT consistent with partial treatment response   7/7/2020- S/p maintenance cycle 1 Rituximab 375 mg/m2. Will schedule every 12 weeks for 2 years   9/29/2020- cycle 2 of maintenance Rituximab  12/22/20- cycle 3 of maintenance Rituximab  3/15/21- cycle 4 of maintenance Rituximab  6/8/21- Cycle 5 of  maintenance Rituximab  9/21/21 - cycle 6 of maintenance Rituximab   12/2021 - cycle 7 was held due to squamous cell carcinoma diagnosis. Plan to hold for two cycles. Resume in May 2022  5/2022 . Will proceed with cycle 7.   6/2022: renal mass increasing in size underwent bx which showed low grade b cell lymphoma consistent with her MZL. Developed retroperitoneal hemorrhage and was hospitalized and underwent embolization.   8/2022: PET CT negative for active disease.  Cycle 8 of Rituxan.   11/2022: cycle 9 of rituxan      2. Right lower extremity DVT, likely provoked: was on Xarelto for about 2 years. Stopped taking 8/21  3. Chronic active smoker  4. squamous cell carcinoma of lower lip diagnosed 10/21 s/p Wedge resection of lower lip squamous cell carcinoma primary closure and Right submandibular sentinel lymph node biopsy 1/12/22. Margins neg, LN negative.   5. LE edema   6. Ventral hernia      Plan:     1. rituxan was held for two cycles for  her squamous cell carcinoma of lower lip. Cycle 7 resumed in May. Her renal mass path was consistent with MZL. PET CT done 8/2022 negative for active dz. Cycle 8 was given in August. Repeat US renal was not scheduled. Will schedule this week. Continue with cycle 9 today.   2. Off xarelto    3. Educated patient regarding smoking cessations, she's cutting down. Referral for smoking cessation program.   4. Follows with ENT and dermatology   5. Follow with cardiology  6. Was seen by general surgery. No contraindications for ventral hernia repair. Rituxan infusion does not increase risk in terms of poor wound healing.        Shukri Shea MD  Hematology and Medical Oncology fellow         Route Chart for Scheduling    BMT Chart Routing  Urgent    Follow up with physician 3 months. follow up with me   Follow up with GIRISH    Provider visit type    Infusion scheduling note rituxan cycle 10 in 3 months after appt   Injection scheduling note cbc/cmp/ldh in 3 months prior to appt   Labs CBC, CMP and LDH   Lab interval:     Imaging   [facility]   Pharmacy appointment    Other referrals        Treatment Plan Information   OP RITUXIMAB Q3M (MAINTENANCE)   Ale Velez MD   Upcoming Treatment Dates - OP RITUXIMAB Q3M (MAINTENANCE)    11/11/2022       Pre-Medications       acetaminophen tablet 650 mg       predniSONE tablet 20 mg       famotidine tablet 20 mg       meperidine (PF) injection 25 mg       Chemotherapy       rituxan hycela 1400 mg/11.7 mL (120 mg/mL) injection 1,400 mg  2/3/2023       Pre-Medications       acetaminophen tablet 650 mg       predniSONE tablet 20 mg       famotidine tablet 20 mg       meperidine (PF) injection 25 mg       Chemotherapy       rituxan hycela 1400 mg/11.7 mL (120 mg/mL) injection 1,400 mg  4/28/2023       Pre-Medications       acetaminophen tablet 650 mg       predniSONE tablet 20 mg       famotidine tablet 20 mg       meperidine (PF) injection  25 mg       Chemotherapy       rituxan hycela 1400 mg/11.7 mL (120 mg/mL) injection 1,400 mg    Supportive Plan Information  OP FERRIC CARBOXYMALTOSE Q2W   Marco Antonio Malloy MD   Upcoming Treatment Dates - OP FERRIC CARBOXYMALTOSE Q2W    No upcoming days in selected categories.

## 2022-12-08 ENCOUNTER — TELEPHONE (OUTPATIENT)
Dept: HEMATOLOGY/ONCOLOGY | Facility: CLINIC | Age: 57
End: 2022-12-08
Payer: MEDICAID

## 2022-12-08 NOTE — TELEPHONE ENCOUNTER
"----- Message from Ana Jeffries sent at 12/8/2022  9:31 AM CST -----  Regarding: Consult/Advisory    Name Of Caller:Self      Contact Preference?:[phone]           What is the nature of the call?: Dr. Gayle need clearance from Dr. Velez so pt can get schedule for her surgery to get hernia remove.           Additional Notes:  "Thank you for all that you do for our patients'"     "

## 2022-12-13 ENCOUNTER — TELEPHONE (OUTPATIENT)
Dept: SURGERY | Facility: CLINIC | Age: 57
End: 2022-12-13
Payer: MEDICAID

## 2022-12-13 NOTE — TELEPHONE ENCOUNTER
----- Message from Jeri Peck sent at 12/13/2022 12:32 PM CST -----  Contact: pt @ [phone]  RENEE PINON calling regarding Patient Advice (message) for #pt is calling to get information on surgery she supposed to be having, asking for call back

## 2022-12-13 NOTE — TELEPHONE ENCOUNTER
Spoke with patient. Discussed scheduling her surgery to correct a ventral hernia on 12/27/22. Patient agreed to the date. She was informed that the PCP will need to be contacted for approval to hold her aspirin prior to the date. Verbalized understanding. No further issues discussed.

## 2022-12-19 ENCOUNTER — TELEPHONE (OUTPATIENT)
Dept: SURGERY | Facility: CLINIC | Age: 57
End: 2022-12-19
Payer: MEDICAID

## 2022-12-19 DIAGNOSIS — K43.6 INCARCERATED VENTRAL HERNIA: ICD-10-CM

## 2022-12-19 DIAGNOSIS — K43.9 VENTRAL HERNIA WITHOUT OBSTRUCTION OR GANGRENE: Primary | ICD-10-CM

## 2022-12-19 NOTE — TELEPHONE ENCOUNTER
----- Message from Sangita Stephens RN sent at 12/19/2022 12:15 PM CST -----  Patient wants to reschedule, she is unable to have surgery on date chosen.

## 2023-02-03 ENCOUNTER — TELEPHONE (OUTPATIENT)
Dept: HEMATOLOGY/ONCOLOGY | Facility: CLINIC | Age: 58
End: 2023-02-03
Payer: MEDICAID

## 2023-02-06 NOTE — PROGRESS NOTES
PATIENT: Gabi Newton  MRN: [id number]  DATE: 2/7/2023    Diagnosis:   1. 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 Squamous cell skin cancer    3. Heavy cigarette smoker    4. Leg edema    5. Deep vein thrombosis (DVT) of right lower extremity, unspecified chronicity, unspecified vein    6. Iron deficiency anemia, unspecified iron deficiency anemia type          Ms. Newton is a 54 year old female with Stage IV Marginal Zone Lymphoma (Gastric, Pulmonary, and Marrow) s/p 4 cycles of single agent Rituximab induction therapy completed on 4/14/20 followed by PET CT on 4/29/20 consistent with partial treatment response. She is here for a follow up visit. She has been on maintenance therapy since July 7, 2020    Oncologic History  Ms. Newton is a 54-year-old female with a past medical history of abdominal pain for the last 3-4 years with unknown etiology, significant smoking history for 40 years, back pain, hypertension presented to the Hematology Clinic for newly diagnosed marginal zone B-cell lymphoma     Patient complained of chronic epigastric/periumbilical abdominal pain for the last 3 years and has been receiving multiple abdominal imaging at [facility].  In 2016 her abdominal pain was thought to be from her gallbladder and she had underwent a cholecystectomy however she continued to have abdominal pain. A CT abdomen done in June 11, 2018 revealed wall thickening of the anterior gastric body suspicious for infectious or inflammatory but is concerning for soft tissue mass and a EGD was a recommended. MRI on June 19, 2018 which revealed diffuse hepatic steatosis without evidence of suspicious focal lesions, splenomegaly but no ascites.  She has been following  Dr. Cedeno since 2018 for her abdominal pain, however liver was not suspected to be the cause for her abdominal pain. She had negative workup for hepatitis-B, C, negative workup for autoimmune disease, negative for  alpha 1 antitrypsin disease, negative for celiac sprue and her hepatitis panel has been negative so far.  MRI of the abdomen was done on September 24, 2019 which revealed multifocal areas of asymmetric gastric wall thickening.  Recommend further evaluation with direct visualization to exclude neoplasm.  EGD was done on 10/15/2019 and Gastric tumor in the gastric fundus was found however no specimen was collected as she was on anticoagulation.  The EUS done on 11/15/2019 revealed gastric tumor in the gastric fundus with many abnormal lymph nodes visualized in the lower paraesophageal mediastinum (level 8L) and gastrohepatic ligament (level 18). Fine needle biopsy performed and pathology revealed marginal zone B cell lymphoma.     She had chest pain in 2016 and the imaging done at that time revealed pulmonary nodule and has been following pulmonary, Dr. Yeung at [facility].  She has a currently daily smoker, has been smoking for almost 40 years.  On the CT chest done in November 2017 revealed 1.7 x 1.3 cm lesion in the right lower lobe and 2 x 1 cm lesion in the anterior portion of the right lower lobe.  A CT scan done on September 25, 2019 revealed a complex poorly defined mass in the right lower lobe measuring 2.78 x 2.64 cm, suspicious for primary lung carcinoma     She was diagnosed with right lower extremity DVT and has been on Xarelto.  As per the patient she was hospitalized at that time and underwent procedure for her right lower extremity for venous insufficiency.  It appears that the right lower extremity DVT is a provoked clot.  She had history of bilateral iliac vein stenting 10/16/18 for venous outflow obsturction.     Subjective:    Initial History: Ms. Newton is a 57 y.o. female who returns for follow up.     Doing well overall. Continues to have abdominal pain due to ventral hernia, no plans yet for surgical repair.       Past Medical History:   Past Medical History:   Diagnosis Date    Abdominal pain 2018     Anemia     Anxiety     Back pain     COPD (chronic obstructive pulmonary disease) 6/10/2022    Deep vein thrombosis     Disorder of kidney and ureter     Fatty liver 2018    Gastric lymphoma 2019    Gastric tumor     GERD (gastroesophageal reflux disease)     Hyperlipidemia     Hypertension     Splenomegaly 2018       Past Surgical HIstory:   Past Surgical History:   Procedure Laterality Date    bilateral iliac vein stenosis with stenting      CARDIAC CATHETERIZATION      CARDIAC CATHETERIZATION  2018    had chest pains . states vessels at the bottom of heart collapsed     SECTION      x3    CHOLECYSTECTOMY      COLONOSCOPY      ENDOSCOPIC ULTRASOUND OF UPPER GASTROINTESTINAL TRACT Left 11/15/2019    Procedure: ULTRASOUND, UPPER GI TRACT, ENDOSCOPIC;  Surgeon: Mike Seay MD;  Location: [facility] (2ND FLR);  Service: Endoscopy;  Laterality: Left;  Ok to hold xarelto per protocol per Dr. Lloyd see media file 10/25/19-tb    ESOPHAGOGASTRODUODENOSCOPY      ESOPHAGOGASTRODUODENOSCOPY N/A 2018    Procedure: EGD (ESOPHAGOGASTRODUODENOSCOPY);  Surgeon: Ant Camejo MD;  Location: [facility];  Service: Endoscopy;  Laterality: N/A;    ESOPHAGOGASTRODUODENOSCOPY N/A 10/15/2019    Procedure: EGD (ESOPHAGOGASTRODUODENOSCOPY);  Surgeon: Melanie Cedeno MD;  Location: [facility];  Service: Endoscopy;  Laterality: N/A;    ESOPHAGOGASTRODUODENOSCOPY N/A 11/15/2019    Procedure: EGD (ESOPHAGOGASTRODUODENOSCOPY);  Surgeon: Mike Seay MD;  [facility]  Service: Endoscopy;  Laterality: N/A;    EXCISION OF LESION OF LIP Left 2022    Procedure: EXCISION, LESION, LIP;  Surgeon: Glen Giang MD;  [facility];  Service: ENT;  Laterality: Left;  Lip resection    HYSTERECTOMY      PHLEBOGRAPHY Bilateral 10/16/2018    Procedure: VENOGRAM;  Surgeon: Colin Mathis MD;  Location: [facility] CATH LAB;  Service: Cardiology;  Laterality: Bilateral;    NV RT/LT HEART  CATHETERS Left     Coronary Arteriogram-2 Cath    RHINOPLASTY      SENTINEL LYMPH NODE BIOPSY  1/12/2022    Procedure: BIOPSY, LYMPH NODE, SENTINEL;  Surgeon: Glen Giang MD;  Location: [facility] OR [address] [place] [address];  Service: ENT;;    TUBAL LIGATION      venogram Bilateral 10/16/2018       Family History:   Family History   Problem Relation Age of Onset    Breast cancer Maternal Grandfather     Dementia Mother     Atrial fibrillation Mother     Deep vein thrombosis Mother     Pulmonary embolism Mother     Stroke Mother     Aneurysm Father        Social History:  reports that she has been smoking cigarettes. She has a 20.00 pack-year smoking history. She has never used smokeless tobacco. She reports that she does not drink alcohol and does not use drugs.    Allergies:  Review of patient's allergies indicates:   Allergen Reactions    Azithromycin Anaphylaxis     Other reaction(s): swelling to entire body  Swelling (tongue / lips)^      Ciprofloxacin Swelling     Throat swells    Codeine      Swelling (throat)^    Tolerates Morphine      Codeine sulfate      Swelling (throat)^    Tolerates Morphine       Medications:  Current Outpatient Medications   Medication Sig Dispense Refill    albuterol (PROAIR HFA) 90 mcg/actuation inhaler Inhale 2 puffs into the lungs every 6 (six) hours as needed for Wheezing. Rescue 18 g 0    aspirin (ECOTRIN) 81 MG EC tablet Take 1 tablet (81 mg total) by mouth every evening. Hold until PCP follow up  0    hydroxyzine HCL (ATARAX) 25 MG tablet Take 25 mg by mouth every evening.      lisinopriL 10 MG tablet Take 10 mg by mouth once daily.      methadone (METHADOSE) 40 mg disintegrating tablet Take 40 mg by mouth once daily. PATIENT USUALLY TAKES AT 10AM      nitroGLYCERIN (NITROSTAT) 0.4 MG SL tablet Place 0.4 mg under the tongue every 5 (five) minutes as needed for Chest pain.      ondansetron (ZOFRAN-ODT) 4 MG TbDL Take 2 tablets (8 mg total) by mouth 2 (two) times daily. 60 tablet 0     "simvastatin (ZOCOR) 40 MG tablet Take 40 mg by mouth every evening.       fluticasone-salmeterol diskus inhaler 100-50 mcg Inhale 1 puff into the lungs once daily at 6am. Controller 60 each 0    LORazepam (ATIVAN) 1 MG tablet Take 1 tablet (1 mg total) by mouth once. Prior to the pet scan for 1 dose 1 tablet 0    zolpidem (AMBIEN) 10 mg Tab Take 1 tablet (10 mg total) by mouth nightly as needed (to be taken the night of the patient's sleep study). 2 tablet 0     No current facility-administered medications for this visit.       Review of Systems   Constitutional:  Negative for appetite change, fatigue, fever and unexpected weight change.   HENT:  Negative for nosebleeds and sore throat.    Respiratory:  Positive for cough. Negative for shortness of breath.    Cardiovascular:  Positive for leg swelling. Negative for chest pain.   Gastrointestinal:  Positive for abdominal pain. Negative for blood in stool, diarrhea, nausea and vomiting.   Genitourinary:  Negative for dysuria, flank pain, hematuria, urgency and vaginal bleeding.   Musculoskeletal:  Positive for back pain.   Skin:  Negative for rash.   Neurological:  Negative for seizures and headaches.   Hematological:  Negative for adenopathy.   Psychiatric/Behavioral:  Negative for agitation.    ECOG Performance Status: 2   Objective:      Vitals:   Vitals:    02/07/23 1335   BP: (!) 143/88   Pulse: 78   Resp: 16   SpO2: (!) 94%   Weight: 98.8 kg (217 lb 11.3 oz)   Height: 5' 1" (1.549 m)         BMI: Body mass index is 41.13 kg/m².    Physical Exam  Constitutional:       General: She is not in acute distress.     Appearance: Normal appearance. She is obese. She is not toxic-appearing.   HENT:      Head: Normocephalic and atraumatic.      Nose: Nose normal. No rhinorrhea.      Mouth/Throat:      Mouth: Mucous membranes are moist.      Pharynx: Oropharynx is clear. No oropharyngeal exudate or posterior oropharyngeal erythema.   Eyes:      Pupils: Pupils are equal, " round, and reactive to light.   Cardiovascular:      Rate and Rhythm: Normal rate and regular rhythm.      Heart sounds: No murmur heard.  Pulmonary:      Effort: Pulmonary effort is normal.      Breath sounds: Normal breath sounds.   Abdominal:      General: Abdomen is flat. There is no distension.      Palpations: Abdomen is soft.      Hernia: A hernia is present.   Musculoskeletal:         General: No swelling or tenderness. Normal range of motion.      Cervical back: Normal range of motion and neck supple.      Right lower leg: Edema present.      Left lower leg: Edema present.   Skin:     General: Skin is warm.      Capillary Refill: Capillary refill takes less than 2 seconds.      Findings: Rash (b/l LE due to lymphedema ) present.   Neurological:      General: No focal deficit present.      Mental Status: She is alert and oriented to person, place, and time.   Psychiatric:         Mood and Affect: Mood normal.         Behavior: Behavior normal.     Laboratory Data:  Lab Visit on 02/07/2023   Component Date Value Ref Range Status    WBC 02/07/2023 5.20  3.90 - 12.70 K/uL Final    RBC 02/07/2023 4.59  4.00 - 5.40 M/uL Final    Hemoglobin 02/07/2023 13.3  12.0 - 16.0 g/dL Final    Hematocrit 02/07/2023 42.7  37.0 - 48.5 % Final    MCV 02/07/2023 93  82 - 98 fL Final    MCH 02/07/2023 29.0  27.0 - 31.0 pg Final    MCHC 02/07/2023 31.1 (L)  32.0 - 36.0 g/dL Final    RDW 02/07/2023 14.5  11.5 - 14.5 % Final    Platelets 02/07/2023 189  150 - 450 K/uL Final    MPV 02/07/2023 10.5  9.2 - 12.9 fL Final    Immature Granulocytes 02/07/2023 0.6 (H)  0.0 - 0.5 % Final    Gran # (ANC) 02/07/2023 4.1  1.8 - 7.7 K/uL Final    Immature Grans (Abs) 02/07/2023 0.03  0.00 - 0.04 K/uL Final    Comment: Mild elevation in immature granulocytes is non specific and   can be seen in a variety of conditions including stress response,   acute inflammation, trauma and pregnancy. Correlation with other   laboratory and clinical findings  is essential.      Lymph # 02/07/2023 0.4 (L)  1.0 - 4.8 K/uL Final    Mono # 02/07/2023 0.3  0.3 - 1.0 K/uL Final    Eos # 02/07/2023 0.3  0.0 - 0.5 K/uL Final    Baso # 02/07/2023 0.02  0.00 - 0.20 K/uL Final    nRBC 02/07/2023 0  0 /100 WBC Final    Gran % 02/07/2023 78.4 (H)  38.0 - 73.0 % Final    Lymph % 02/07/2023 8.3 (L)  18.0 - 48.0 % Final    Mono % 02/07/2023 6.3  4.0 - 15.0 % Final    Eosinophil % 02/07/2023 6.0  0.0 - 8.0 % Final    Basophil % 02/07/2023 0.4  0.0 - 1.9 % Final    Differential Method 02/07/2023 Automated   Final    Sodium 02/07/2023 139  136 - 145 mmol/L Final    Potassium 02/07/2023 4.3  3.5 - 5.1 mmol/L Final    Chloride 02/07/2023 100  95 - 110 mmol/L Final    CO2 02/07/2023 29  23 - 29 mmol/L Final    Glucose 02/07/2023 110  70 - 110 mg/dL Final    BUN 02/07/2023 7  6 - 20 mg/dL Final    Creatinine 02/07/2023 0.8  0.5 - 1.4 mg/dL Final    Calcium 02/07/2023 9.6  8.7 - 10.5 mg/dL Final    Total Protein 02/07/2023 7.2  6.0 - 8.4 g/dL Final    Albumin 02/07/2023 3.6  3.5 - 5.2 g/dL Final    Total Bilirubin 02/07/2023 0.5  0.1 - 1.0 mg/dL Final    Comment: For infants and newborns, interpretation of results should be based  on gestational age, weight and in agreement with clinical  observations.    Premature Infant recommended reference ranges:  Up to 24 hours.............<8.0 mg/dL  Up to 48 hours............<12.0 mg/dL  3-5 days..................<15.0 mg/dL  6-29 days.................<15.0 mg/dL      Alkaline Phosphatase 02/07/2023 149 (H)  55 - 135 U/L Final    AST 02/07/2023 38  10 - 40 U/L Final    ALT 02/07/2023 32  10 - 44 U/L Final    Anion Gap 02/07/2023 10  8 - 16 mmol/L Final    eGFR 02/07/2023 >60.0  >60 mL/min/1.73 m^2 Final    LD 02/07/2023 236  110 - 260 U/L Final    Results are increased in hemolyzed samples.       Imaging:     PET CT- 4/29/2020  1.  Findings consistent with partial treatment response with decreased size of the stomach wall and decreased SUV throughout  the stomach.  Metabolic activity on the baseline scan was atypically prominent for marginal zone lymphoma.  If a Deauville score were to be assigned, the score would be 4.  2.  Decreased size and SUV of the patient's right lower lobe pulmonary biopsy-proven lymphoma.  3.  Questionable slight increase in size of the patient's right lower pole lesion again concerning for RCC or oncocytoma.  Extranodal lymphoma could have a similar appearance.    PET CT 8/2022:  In this patient marginal zone lymphoma, there are No FDG avid foci to indicate active metastasis.  Postoperative changes of right renal segmental artery embolization with evolving changes of the inferior pole hematoma.  There is a focal area of increased uptake within the hematoma, nonspecific.    Assessment and Plan:       1. 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 Squamous cell skin cancer    3. Heavy cigarette smoker    4. Leg edema    5. Deep vein thrombosis (DVT) of right lower extremity, unspecified chronicity, unspecified vein    6. Iron deficiency anemia, unspecified iron deficiency anemia type        1.  Sha MZL with coexisting Gastric/pulmonary/diffuse marrow Marginal zone B-cell lymphoma. Stage IV disease s/p induction with Rituxan for 4 cycles 4/14/2020. PET CT 4/2020 showed LA. Started maintenance Rituxan 7/2020. Planned for 2 years. However her treatment was held between 12 and may due to cutaneous squamous cell carcinoma diagnosis and management. She resumed tx with cycle 7 in may 2022. She underwent renal mass bx in June 2022 and it was complicated with retroperitoneal hemorrhage requiring embolization.  PET CT in august 2022 showed no active disease.   Due to cycle 11 today and cycle 12 end of April 2023  Plan for repeat PET CT post cycle 12   Follow up in 12 weeks with repeat labs and last cycle of Rituxan      2. Right lower extremity DVT, likely provoked: was on Xarelto for about 2 years. Stopped taking 8/21  3.  Chronic active smoker. Educated patient regarding smoking cessations, she's cutting down. Referral for smoking cessation program.   4. squamous cell carcinoma of lower lip diagnosed 10/21 s/p Wedge resection of lower lip squamous cell carcinoma primary closure and Right submandibular sentinel lymph node biopsy 1/12/22. Margins neg, LN negative. Continue follow up with dermatology.   5. LE edema   6. Ventral hernia follows with general surgery       Shukri Shea MD  Hematology and Medical Oncology fellow         Route Chart for Scheduling    BMT Chart Routing  Urgent    Follow up with physician . Cbc/cmp/ldh and appt april 25. reschedule rituxan to april 25 from 28th   Follow up with GIRISH    Provider visit type    Infusion scheduling note    Injection scheduling note    Labs CBC, CMP and LDH   Lab interval:     Imaging   PET CT a week after april 25th   Pharmacy appointment    Other referrals        Treatment Plan Information   OP RITUXIMAB Q3M (MAINTENANCE)   Ale Velez MD   Upcoming Treatment Dates - OP RITUXIMAB Q3M (MAINTENANCE)    2/3/2023       Pre-Medications       acetaminophen tablet 650 mg       predniSONE tablet 20 mg       famotidine tablet 20 mg       meperidine (PF) injection 25 mg       Chemotherapy       rituxan hycela 1400 mg/11.7 mL (120 mg/mL) injection 1,400 mg  4/28/2023       Pre-Medications       acetaminophen tablet 650 mg       predniSONE tablet 20 mg       famotidine tablet 20 mg       meperidine (PF) injection 25 mg       Chemotherapy       rituxan hycela 1400 mg/11.7 mL (120 mg/mL) injection 1,400 mg    Supportive Plan Information  OP FERRIC CARBOXYMALTOSE Q2W   Marco Antonio Malloy MD   Upcoming Treatment Dates - OP FERRIC CARBOXYMALTOSE Q2W    No upcoming days in selected categories.

## 2023-02-07 ENCOUNTER — OFFICE VISIT (OUTPATIENT)
Dept: HEMATOLOGY/ONCOLOGY | Facility: CLINIC | Age: 58
End: 2023-02-07
Payer: MEDICAID

## 2023-02-07 ENCOUNTER — LAB VISIT (OUTPATIENT)
Dept: LAB | Facility: HOSPITAL | Age: 58
End: 2023-02-07
Attending: INTERNAL MEDICINE
Payer: MEDICAID

## 2023-02-07 ENCOUNTER — INFUSION (OUTPATIENT)
Dept: INFUSION THERAPY | Facility: HOSPITAL | Age: 58
End: 2023-02-07
Payer: MEDICAID

## 2023-02-07 VITALS
BODY MASS INDEX: 41.1 KG/M2 | WEIGHT: 217.69 LBS | SYSTOLIC BLOOD PRESSURE: 143 MMHG | HEART RATE: 78 BPM | HEIGHT: 61 IN | RESPIRATION RATE: 16 BRPM | DIASTOLIC BLOOD PRESSURE: 88 MMHG | OXYGEN SATURATION: 94 %

## 2023-02-07 VITALS — OXYGEN SATURATION: 95 % | HEART RATE: 75 BPM | DIASTOLIC BLOOD PRESSURE: 65 MMHG | SYSTOLIC BLOOD PRESSURE: 134 MMHG

## 2023-02-07 DIAGNOSIS — C88.4 EXTRANODAL MARGINAL ZONE B-CELL LYMPHOMA OF MUCOSA-ASSOCIATED LYMPHOID TISSUE (MALT): Primary | ICD-10-CM

## 2023-02-07 DIAGNOSIS — C88.4 EXTRANODAL MARGINAL ZONE B-CELL LYMPHOMA OF MUCOSA-ASSOCIATED LYMPHOID TISSUE (MALT): ICD-10-CM

## 2023-02-07 DIAGNOSIS — R60.0 LEG EDEMA: ICD-10-CM

## 2023-02-07 DIAGNOSIS — C44.92 SQUAMOUS CELL SKIN CANCER: ICD-10-CM

## 2023-02-07 DIAGNOSIS — C85.89 MARGINAL ZONE LYMPHOMA OF EXTRANODAL AND SOLID ORGAN SITES: ICD-10-CM

## 2023-02-07 DIAGNOSIS — D50.9 IRON DEFICIENCY ANEMIA, UNSPECIFIED IRON DEFICIENCY ANEMIA TYPE: ICD-10-CM

## 2023-02-07 DIAGNOSIS — I82.401 DEEP VEIN THROMBOSIS (DVT) OF RIGHT LOWER EXTREMITY, UNSPECIFIED CHRONICITY, UNSPECIFIED VEIN: ICD-10-CM

## 2023-02-07 DIAGNOSIS — C85.99 GASTRIC LYMPHOMA: ICD-10-CM

## 2023-02-07 DIAGNOSIS — C85.89 MARGINAL ZONE LYMPHOMA OF EXTRANODAL AND SOLID ORGAN SITES: Primary | ICD-10-CM

## 2023-02-07 DIAGNOSIS — F17.210 HEAVY CIGARETTE SMOKER: ICD-10-CM

## 2023-02-07 DIAGNOSIS — D21.4 GIST (GASTROINTESTINAL STROMAL TUMOR), NON-MALIGNANT: ICD-10-CM

## 2023-02-07 LAB
ALBUMIN SERPL BCP-MCNC: 3.6 G/DL (ref 3.5–5.2)
ALP SERPL-CCNC: 149 U/L (ref 55–135)
ALT SERPL W/O P-5'-P-CCNC: 32 U/L (ref 10–44)
ANION GAP SERPL CALC-SCNC: 10 MMOL/L (ref 8–16)
AST SERPL-CCNC: 38 U/L (ref 10–40)
BASOPHILS # BLD AUTO: 0.02 K/UL (ref 0–0.2)
BASOPHILS NFR BLD: 0.4 % (ref 0–1.9)
BILIRUB SERPL-MCNC: 0.5 MG/DL (ref 0.1–1)
BUN SERPL-MCNC: 7 MG/DL (ref 6–20)
CALCIUM SERPL-MCNC: 9.6 MG/DL (ref 8.7–10.5)
CHLORIDE SERPL-SCNC: 100 MMOL/L (ref 95–110)
CO2 SERPL-SCNC: 29 MMOL/L (ref 23–29)
CREAT SERPL-MCNC: 0.8 MG/DL (ref 0.5–1.4)
DIFFERENTIAL METHOD: ABNORMAL
EOSINOPHIL # BLD AUTO: 0.3 K/UL (ref 0–0.5)
EOSINOPHIL NFR BLD: 6 % (ref 0–8)
ERYTHROCYTE [DISTWIDTH] IN BLOOD BY AUTOMATED COUNT: 14.5 % (ref 11.5–14.5)
EST. GFR  (NO RACE VARIABLE): >60 ML/MIN/1.73 M^2
GLUCOSE SERPL-MCNC: 110 MG/DL (ref 70–110)
HCT VFR BLD AUTO: 42.7 % (ref 37–48.5)
HGB BLD-MCNC: 13.3 G/DL (ref 12–16)
IMM GRANULOCYTES # BLD AUTO: 0.03 K/UL (ref 0–0.04)
IMM GRANULOCYTES NFR BLD AUTO: 0.6 % (ref 0–0.5)
LDH SERPL L TO P-CCNC: 236 U/L (ref 110–260)
LYMPHOCYTES # BLD AUTO: 0.4 K/UL (ref 1–4.8)
LYMPHOCYTES NFR BLD: 8.3 % (ref 18–48)
MCH RBC QN AUTO: 29 PG (ref 27–31)
MCHC RBC AUTO-ENTMCNC: 31.1 G/DL (ref 32–36)
MCV RBC AUTO: 93 FL (ref 82–98)
MONOCYTES # BLD AUTO: 0.3 K/UL (ref 0.3–1)
MONOCYTES NFR BLD: 6.3 % (ref 4–15)
NEUTROPHILS # BLD AUTO: 4.1 K/UL (ref 1.8–7.7)
NEUTROPHILS NFR BLD: 78.4 % (ref 38–73)
NRBC BLD-RTO: 0 /100 WBC
PLATELET # BLD AUTO: 189 K/UL (ref 150–450)
PMV BLD AUTO: 10.5 FL (ref 9.2–12.9)
POTASSIUM SERPL-SCNC: 4.3 MMOL/L (ref 3.5–5.1)
PROT SERPL-MCNC: 7.2 G/DL (ref 6–8.4)
RBC # BLD AUTO: 4.59 M/UL (ref 4–5.4)
SODIUM SERPL-SCNC: 139 MMOL/L (ref 136–145)
WBC # BLD AUTO: 5.2 K/UL (ref 3.9–12.7)

## 2023-02-07 PROCEDURE — 3077F PR MOST RECENT SYSTOLIC BLOOD PRESSURE >= 140 MM HG: ICD-10-PCS | Mod: CPTII,,, | Performed by: INTERNAL MEDICINE

## 2023-02-07 PROCEDURE — 3079F DIAST BP 80-89 MM HG: CPT | Mod: CPTII,,, | Performed by: INTERNAL MEDICINE

## 2023-02-07 PROCEDURE — 99215 OFFICE O/P EST HI 40 MIN: CPT | Mod: S$PBB,,, | Performed by: INTERNAL MEDICINE

## 2023-02-07 PROCEDURE — 99999 PR PBB SHADOW E&M-EST. PATIENT-LVL III: ICD-10-PCS | Mod: PBBFAC,,, | Performed by: INTERNAL MEDICINE

## 2023-02-07 PROCEDURE — 96401 CHEMO ANTI-NEOPL SQ/IM: CPT

## 2023-02-07 PROCEDURE — 85025 COMPLETE CBC W/AUTO DIFF WBC: CPT | Performed by: INTERNAL MEDICINE

## 2023-02-07 PROCEDURE — 36415 COLL VENOUS BLD VENIPUNCTURE: CPT | Performed by: INTERNAL MEDICINE

## 2023-02-07 PROCEDURE — 3008F PR BODY MASS INDEX (BMI) DOCUMENTED: ICD-10-PCS | Mod: CPTII,,, | Performed by: INTERNAL MEDICINE

## 2023-02-07 PROCEDURE — 3079F PR MOST RECENT DIASTOLIC BLOOD PRESSURE 80-89 MM HG: ICD-10-PCS | Mod: CPTII,,, | Performed by: INTERNAL MEDICINE

## 2023-02-07 PROCEDURE — 99215 PR OFFICE/OUTPT VISIT, EST, LEVL V, 40-54 MIN: ICD-10-PCS | Mod: S$PBB,,, | Performed by: INTERNAL MEDICINE

## 2023-02-07 PROCEDURE — 3008F BODY MASS INDEX DOCD: CPT | Mod: CPTII,,, | Performed by: INTERNAL MEDICINE

## 2023-02-07 PROCEDURE — 25000003 PHARM REV CODE 250: Performed by: INTERNAL MEDICINE

## 2023-02-07 PROCEDURE — 99213 OFFICE O/P EST LOW 20 MIN: CPT | Mod: PBBFAC | Performed by: INTERNAL MEDICINE

## 2023-02-07 PROCEDURE — 63600175 PHARM REV CODE 636 W HCPCS: Mod: TB | Performed by: INTERNAL MEDICINE

## 2023-02-07 PROCEDURE — 3077F SYST BP >= 140 MM HG: CPT | Mod: CPTII,,, | Performed by: INTERNAL MEDICINE

## 2023-02-07 PROCEDURE — 83615 LACTATE (LD) (LDH) ENZYME: CPT | Performed by: INTERNAL MEDICINE

## 2023-02-07 PROCEDURE — 80053 COMPREHEN METABOLIC PANEL: CPT | Performed by: INTERNAL MEDICINE

## 2023-02-07 PROCEDURE — 99999 PR PBB SHADOW E&M-EST. PATIENT-LVL III: CPT | Mod: PBBFAC,,, | Performed by: INTERNAL MEDICINE

## 2023-02-07 RX ORDER — HEPARIN 100 UNIT/ML
500 SYRINGE INTRAVENOUS
Status: DISCONTINUED | OUTPATIENT
Start: 2023-02-07 | End: 2023-02-07 | Stop reason: HOSPADM

## 2023-02-07 RX ORDER — ACETAMINOPHEN 325 MG/1
650 TABLET ORAL
Status: COMPLETED | OUTPATIENT
Start: 2023-02-07 | End: 2023-02-07

## 2023-02-07 RX ORDER — SODIUM CHLORIDE 0.9 % (FLUSH) 0.9 %
10 SYRINGE (ML) INJECTION
Status: DISCONTINUED | OUTPATIENT
Start: 2023-02-07 | End: 2023-02-07 | Stop reason: HOSPADM

## 2023-02-07 RX ORDER — FAMOTIDINE 20 MG/1
20 TABLET, FILM COATED ORAL
Status: COMPLETED | OUTPATIENT
Start: 2023-02-07 | End: 2023-02-07

## 2023-02-07 RX ORDER — ACETAMINOPHEN 325 MG/1
650 TABLET ORAL
Status: CANCELLED | OUTPATIENT
Start: 2023-02-07

## 2023-02-07 RX ORDER — SODIUM CHLORIDE 0.9 % (FLUSH) 0.9 %
10 SYRINGE (ML) INJECTION
Status: CANCELLED | OUTPATIENT
Start: 2023-02-07

## 2023-02-07 RX ORDER — HEPARIN 100 UNIT/ML
500 SYRINGE INTRAVENOUS
Status: CANCELLED | OUTPATIENT
Start: 2023-02-07

## 2023-02-07 RX ORDER — FAMOTIDINE 20 MG/1
20 TABLET, FILM COATED ORAL
Status: CANCELLED
Start: 2023-02-07

## 2023-02-07 RX ORDER — PREDNISONE 20 MG/1
20 TABLET ORAL
Status: COMPLETED | OUTPATIENT
Start: 2023-02-07 | End: 2023-02-07

## 2023-02-07 RX ORDER — PREDNISONE 20 MG/1
20 TABLET ORAL
Status: CANCELLED
Start: 2023-02-07 | End: 2023-02-07

## 2023-02-07 RX ORDER — MEPERIDINE HYDROCHLORIDE 50 MG/ML
25 INJECTION INTRAMUSCULAR; INTRAVENOUS; SUBCUTANEOUS
Status: DISCONTINUED | OUTPATIENT
Start: 2023-02-07 | End: 2023-02-07 | Stop reason: HOSPADM

## 2023-02-07 RX ADMIN — ACETAMINOPHEN 650 MG: 325 TABLET ORAL at 02:02

## 2023-02-07 RX ADMIN — PREDNISONE 20 MG: 20 TABLET ORAL at 02:02

## 2023-02-07 RX ADMIN — FAMOTIDINE 20 MG: 20 TABLET ORAL at 02:02

## 2023-02-07 RX ADMIN — RITUXIMAB AND HYALURONIDASE 1400 MG: 120; 2000 INJECTION, SOLUTION SUBCUTANEOUS at 02:02

## 2023-02-07 NOTE — PLAN OF CARE
1520 Patient tolerated C11D1 Rituxan Hycela without incident. Patient seen by Dr Shea prior to visit. Labs reviewed and within parameters. Pre-medicated per orders. No signs or symptoms of a reaction in 15 minute post observation period. Patient aware of her next appointment date/time. To contact provider with questions or concerns. D/C ambulatory and stable.

## 2023-02-13 NOTE — ASSESSMENT & PLAN NOTE
· Body mass index is 41.57 kg/m².   · Morbid obesity complicates all aspects of disease management from diagnostic modalities to treatment.   · Weight loss encouraged and health benefits explained to patient.        Anticipate return to [facility] facility with resumption of [facility] for visiting nurse/PT  Ambulance transportation/Home Care/Transportation

## 2023-04-24 ENCOUNTER — LAB VISIT (OUTPATIENT)
Dept: LAB | Facility: HOSPITAL | Age: 58
End: 2023-04-24
Attending: INTERNAL MEDICINE
Payer: MEDICAID

## 2023-04-24 ENCOUNTER — OFFICE VISIT (OUTPATIENT)
Dept: HEMATOLOGY/ONCOLOGY | Facility: CLINIC | Age: 58
End: 2023-04-24
Payer: MEDICAID

## 2023-04-24 VITALS
DIASTOLIC BLOOD PRESSURE: 84 MMHG | HEIGHT: 61 IN | SYSTOLIC BLOOD PRESSURE: 161 MMHG | TEMPERATURE: 98 F | HEART RATE: 73 BPM | BODY MASS INDEX: 42.46 KG/M2 | WEIGHT: 224.88 LBS | OXYGEN SATURATION: 96 % | RESPIRATION RATE: 18 BRPM

## 2023-04-24 DIAGNOSIS — C85.89 MARGINAL ZONE LYMPHOMA OF EXTRANODAL AND SOLID ORGAN SITES: ICD-10-CM

## 2023-04-24 DIAGNOSIS — C85.89 MARGINAL ZONE LYMPHOMA OF EXTRANODAL AND SOLID ORGAN SITES: Primary | ICD-10-CM

## 2023-04-24 DIAGNOSIS — R60.0 LEG EDEMA: Primary | ICD-10-CM

## 2023-04-24 LAB — LDH SERPL L TO P-CCNC: 306 U/L (ref 110–260)

## 2023-04-24 PROCEDURE — 83615 LACTATE (LD) (LDH) ENZYME: CPT | Performed by: INTERNAL MEDICINE

## 2023-04-24 PROCEDURE — 1159F MED LIST DOCD IN RCRD: CPT | Mod: CPTII,,, | Performed by: INTERNAL MEDICINE

## 2023-04-24 PROCEDURE — 99214 OFFICE O/P EST MOD 30 MIN: CPT | Mod: PBBFAC | Performed by: INTERNAL MEDICINE

## 2023-04-24 PROCEDURE — 99999 PR PBB SHADOW E&M-EST. PATIENT-LVL IV: CPT | Mod: PBBFAC,,, | Performed by: INTERNAL MEDICINE

## 2023-04-24 PROCEDURE — 3077F SYST BP >= 140 MM HG: CPT | Mod: CPTII,,, | Performed by: INTERNAL MEDICINE

## 2023-04-24 PROCEDURE — 99999 PR PBB SHADOW E&M-EST. PATIENT-LVL IV: ICD-10-PCS | Mod: PBBFAC,,, | Performed by: INTERNAL MEDICINE

## 2023-04-24 PROCEDURE — 3008F BODY MASS INDEX DOCD: CPT | Mod: CPTII,,, | Performed by: INTERNAL MEDICINE

## 2023-04-24 PROCEDURE — 1159F PR MEDICATION LIST DOCUMENTED IN MEDICAL RECORD: ICD-10-PCS | Mod: CPTII,,, | Performed by: INTERNAL MEDICINE

## 2023-04-24 PROCEDURE — 3079F PR MOST RECENT DIASTOLIC BLOOD PRESSURE 80-89 MM HG: ICD-10-PCS | Mod: CPTII,,, | Performed by: INTERNAL MEDICINE

## 2023-04-24 PROCEDURE — 99214 OFFICE O/P EST MOD 30 MIN: CPT | Mod: S$PBB,,, | Performed by: INTERNAL MEDICINE

## 2023-04-24 PROCEDURE — 3008F PR BODY MASS INDEX (BMI) DOCUMENTED: ICD-10-PCS | Mod: CPTII,,, | Performed by: INTERNAL MEDICINE

## 2023-04-24 PROCEDURE — 36415 COLL VENOUS BLD VENIPUNCTURE: CPT | Performed by: INTERNAL MEDICINE

## 2023-04-24 PROCEDURE — 3077F PR MOST RECENT SYSTOLIC BLOOD PRESSURE >= 140 MM HG: ICD-10-PCS | Mod: CPTII,,, | Performed by: INTERNAL MEDICINE

## 2023-04-24 PROCEDURE — 3079F DIAST BP 80-89 MM HG: CPT | Mod: CPTII,,, | Performed by: INTERNAL MEDICINE

## 2023-04-24 PROCEDURE — 99214 PR OFFICE/OUTPT VISIT, EST, LEVL IV, 30-39 MIN: ICD-10-PCS | Mod: S$PBB,,, | Performed by: INTERNAL MEDICINE

## 2023-04-24 RX ORDER — SODIUM CHLORIDE 0.9 % (FLUSH) 0.9 %
10 SYRINGE (ML) INJECTION
Status: CANCELLED | OUTPATIENT
Start: 2023-04-28

## 2023-04-24 RX ORDER — FLUTICASONE PROPIONATE AND SALMETEROL 50; 250 UG/1; UG/1
POWDER RESPIRATORY (INHALATION) 2 TIMES DAILY
COMMUNITY
Start: 2023-04-02

## 2023-04-24 RX ORDER — FAMOTIDINE 20 MG/1
20 TABLET, FILM COATED ORAL
Status: CANCELLED
Start: 2023-04-28

## 2023-04-24 RX ORDER — ALLOPURINOL 300 MG/1
300 TABLET ORAL
COMMUNITY
Start: 2023-04-02

## 2023-04-24 RX ORDER — OMEPRAZOLE 40 MG/1
CAPSULE, DELAYED RELEASE ORAL
COMMUNITY

## 2023-04-24 RX ORDER — PREDNISONE 20 MG/1
20 TABLET ORAL
Status: CANCELLED
Start: 2023-04-28 | End: 2023-04-28

## 2023-04-24 RX ORDER — GEMFIBROZIL 600 MG/1
1 TABLET, FILM COATED ORAL 2 TIMES DAILY
COMMUNITY
Start: 2023-03-27

## 2023-04-24 RX ORDER — HEPARIN 100 UNIT/ML
500 SYRINGE INTRAVENOUS
Status: CANCELLED | OUTPATIENT
Start: 2023-04-28

## 2023-04-24 RX ORDER — ACETAMINOPHEN 325 MG/1
650 TABLET ORAL
Status: CANCELLED | OUTPATIENT
Start: 2023-04-28

## 2023-04-24 NOTE — PROGRESS NOTES
Route Chart for Scheduling    BMT Chart Routing      Follow up with physician . May 9th afternoon [facility] Dr. Green   Follow up with GIRISH    Provider visit type    Infusion scheduling note    Injection scheduling note    Labs CBC, CMP, LDH and hepatitis panel   Scheduling:  Preferred lab:  Lab interval:  labs today   Imaging    Pharmacy appointment    Other referrals

## 2023-04-24 NOTE — PROGRESS NOTES
PATIENT: Gabi Newton  MRN: [id number]  DATE: 4/24/2023    Diagnosis:   1. Leg edema    2. Marginal zone lymphoma of extranodal and solid organ sites          Ms. Newton is a 54 year old female with Stage IV Marginal Zone Lymphoma (Gastric, Pulmonary, and Marrow) s/p 4 cycles of single agent Rituximab induction therapy completed on 4/14/20 followed by PET CT on 4/29/20 consistent with partial treatment response. She is here for a follow up visit. She has been on maintenance therapy since July 7, 2020 and last cycle is schedule tomorrow 4/25. US of kidney 4/19 shows ongoing response of exophytic lesion- biopsied 6/2022 and suspected site of b cell lymphoma. Notes worsening lower extremity edema with history of PVD. Last ECHO in 2018.    Oncologic History  Ms. Newton is a 54-year-old female with a past medical history of abdominal pain for the last 3-4 years with unknown etiology, significant smoking history for 40 years, back pain, hypertension presented to the [facility]ology [facility] for newly diagnosed marginal zone B-cell lymphoma     Patient complained of chronic epigastric/periumbilical abdominal pain for the last 3 years and has been receiving multiple abdominal imaging at [facility].  In 2016 her abdominal pain was thought to be from her gallbladder and she had underwent a cholecystectomy however she continued to have abdominal pain. A CT abdomen done in June 11, 2018 revealed wall thickening of the anterior gastric body suspicious for infectious or inflammatory but is concerning for soft tissue mass and a EGD was a recommended. MRI on June 19, 2018 which revealed diffuse hepatic steatosis without evidence of suspicious focal lesions, splenomegaly but no ascites.  She has been following  Dr. Cedeno since 2018 for her abdominal pain, however liver was not suspected to be the cause for her abdominal pain. She had negative workup for hepatitis-B, C, negative workup for autoimmune disease, negative  for alpha 1 antitrypsin disease, negative for celiac sprue and her hepatitis panel has been negative so far.  MRI of the abdomen was done on September 24, 2019 which revealed multifocal areas of asymmetric gastric wall thickening.  Recommend further evaluation with direct visualization to exclude neoplasm.  EGD was done on 10/15/2019 and Gastric tumor in the gastric fundus was found however no specimen was collected as she was on anticoagulation.  The EUS done on 11/15/2019 revealed gastric tumor in the gastric fundus with many abnormal lymph nodes visualized in the lower paraesophageal mediastinum (level 8L) and gastrohepatic ligament (level 18). Fine needle biopsy performed and pathology revealed marginal zone B cell lymphoma.     She had chest pain in 2016 and the imaging done at that time revealed pulmonary nodule and has been following pulmonary, Dr. Yeung at [facility].  She has a currently daily smoker, has been smoking for almost 40 years.  On the CT chest done in November 2017 revealed 1.7 x 1.3 cm lesion in the right lower lobe and 2 x 1 cm lesion in the anterior portion of the right lower lobe.  A CT scan done on September 25, 2019 revealed a complex poorly defined mass in the right lower lobe measuring 2.78 x 2.64 cm, suspicious for primary lung carcinoma     She was diagnosed with right lower extremity DVT and has been on Xarelto.  As per the patient she was hospitalized at that time and underwent procedure for her right lower extremity for venous insufficiency.  It appears that the right lower extremity DVT is a provoked clot.  She had history of bilateral iliac vein stenting 10/16/18 for venous outflow obsturction.     Subjective:    Initial History: Ms. Newton is a 57 y.o. female who returns for follow up.     Doing well overall. Continues to have abdominal pain due to ventral hernia, no plans yet for surgical repair.       Past Medical History:   Past Medical History:   Diagnosis Date    Abdominal pain  2018    Anemia     Anxiety     Back pain     COPD (chronic obstructive pulmonary disease) 6/10/2022    Deep vein thrombosis     Disorder of kidney and ureter     Fatty liver 2018    Gastric lymphoma 2019    Gastric tumor     GERD (gastroesophageal reflux disease)     Hyperlipidemia     Hypertension     Splenomegaly 2018       Past Surgical HIstory:   Past Surgical History:   Procedure Laterality Date    bilateral iliac vein stenosis with stenting      CARDIAC CATHETERIZATION      CARDIAC CATHETERIZATION  2018    had chest pains . states vessels at the bottom of heart collapsed     SECTION      x3    CHOLECYSTECTOMY      COLONOSCOPY      ENDOSCOPIC ULTRASOUND OF UPPER GASTROINTESTINAL TRACT Left 11/15/2019    Procedure: ULTRASOUND, UPPER GI TRACT, ENDOSCOPIC;  Surgeon: Mike Seay MD;  Location: [facility] (2ND FLR);  Service: Endoscopy;  Laterality: Left;  Ok to hold xarelto per protocol per Dr. Lloyd see media file 10/25/19-tb    ESOPHAGOGASTRODUODENOSCOPY      ESOPHAGOGASTRODUODENOSCOPY N/A 2018    Procedure: EGD (ESOPHAGOGASTRODUODENOSCOPY);  Surgeon: Ant Camejo MD;  Location: [facility];  Service: Endoscopy;  Laterality: N/A;    ESOPHAGOGASTRODUODENOSCOPY N/A 10/15/2019    Procedure: EGD (ESOPHAGOGASTRODUODENOSCOPY);  Surgeon: Melanie Cedeno MD;  Location: [facility];  Service: Endoscopy;  Laterality: N/A;    ESOPHAGOGASTRODUODENOSCOPY N/A 11/15/2019    Procedure: EGD (ESOPHAGOGASTRODUODENOSCOPY);  Surgeon: Mike Seay MD;  [facility]  Service: Endoscopy;  Laterality: N/A;    EXCISION OF LESION OF LIP Left 2022    Procedure: EXCISION, LESION, LIP;  Surgeon: Glen Giang MD;  Location: [facility] OR 2ND FLR;  Service: ENT;  Laterality: Left;  Lip resection    HYSTERECTOMY      PHLEBOGRAPHY Bilateral 10/16/2018    Procedure: VENOGRAM;  Surgeon: Colin Mathis MD;  Location: [facility] CATH LAB;  Service: Cardiology;  Laterality: Bilateral;    SC RT/LT  HEART CATHETERS Left     Coronary Arteriogram-2 Cath    RHINOPLASTY      SENTINEL LYMPH NODE BIOPSY  1/12/2022    Procedure: BIOPSY, LYMPH NODE, SENTINEL;  Surgeon: Glen Giang MD;  Location: [facility] OR [address] [place] [address];  Service: ENT;;    TUBAL LIGATION      venogram Bilateral 10/16/2018       Family History:   Family History   Problem Relation Age of Onset    Breast cancer Maternal Grandfather     Dementia Mother     Atrial fibrillation Mother     Deep vein thrombosis Mother     Pulmonary embolism Mother     Stroke Mother     Aneurysm Father        Social History:  reports that she has been smoking cigarettes. She has a 20.00 pack-year smoking history. She has never used smokeless tobacco. She reports that she does not drink alcohol and does not use drugs.    Allergies:  Review of patient's allergies indicates:   Allergen Reactions    Azithromycin Anaphylaxis     Other reaction(s): swelling to entire body  Swelling (tongue / lips)^      Ciprofloxacin Swelling     Throat swells  Other reaction(s): Angioedema    Codeine      Swelling (throat)^    Tolerates Morphine      Codeine sulfate      Swelling (throat)^    Tolerates Morphine       Medications:  Current Outpatient Medications   Medication Sig Dispense Refill    ADVAIR DISKUS 250-50 mcg/dose diskus inhaler Inhale into the lungs 2 (two) times daily.      albuterol (PROAIR HFA) 90 mcg/actuation inhaler Inhale 2 puffs into the lungs every 6 (six) hours as needed for Wheezing. Rescue 18 g 0    allopurinoL (ZYLOPRIM) 300 MG tablet Take 300 mg by mouth.      aspirin (ECOTRIN) 81 MG EC tablet Take 1 tablet (81 mg total) by mouth every evening. Hold until PCP follow up  0    gemfibroziL (LOPID) 600 MG tablet Take 1 tablet by mouth 2 (two) times daily.      hydroxyzine HCL (ATARAX) 25 MG tablet Take 25 mg by mouth every evening.      lisinopriL 10 MG tablet Take 10 mg by mouth once daily.      methadone (METHADOSE) 40 mg disintegrating tablet Take 40 mg by mouth once daily.  "PATIENT USUALLY TAKES AT 10AM      nitroGLYCERIN (NITROSTAT) 0.4 MG SL tablet Place 0.4 mg under the tongue every 5 (five) minutes as needed for Chest pain.      omeprazole (PRILOSEC) 40 MG capsule Take 1 capsule every day by oral route.      ondansetron (ZOFRAN-ODT) 4 MG TbDL Take 2 tablets (8 mg total) by mouth 2 (two) times daily. 60 tablet 0    simvastatin (ZOCOR) 40 MG tablet Take 40 mg by mouth every evening.       LORazepam (ATIVAN) 1 MG tablet Take 1 tablet (1 mg total) by mouth once. Prior to the pet scan for 1 dose 1 tablet 0     No current facility-administered medications for this visit.       Review of Systems   Constitutional:  Negative for appetite change, fatigue, fever and unexpected weight change.   HENT:  Negative for nosebleeds and sore throat.    Respiratory:  Positive for cough. Negative for shortness of breath.    Cardiovascular:  Positive for leg swelling. Negative for chest pain.   Gastrointestinal:  Positive for abdominal pain. Negative for blood in stool, diarrhea, nausea and vomiting.   Genitourinary:  Negative for dysuria, flank pain, hematuria, urgency and vaginal bleeding.   Musculoskeletal:  Positive for back pain.   Skin:  Negative for rash.   Neurological:  Negative for seizures and headaches.   Hematological:  Negative for adenopathy.   Psychiatric/Behavioral:  Negative for agitation.    ECOG Performance Status: 2   Objective:      Vitals:   Vitals:    04/24/23 1336   BP: (!) 161/84   BP Location: Left arm   Patient Position: Sitting   Pulse: 73   Resp: 18   Temp: 97.6 °F (36.4 °C)   TempSrc: Oral   SpO2: 96%   Weight: 102 kg (224 lb 13.9 oz)   Height: 5' 1" (1.549 m)         BMI: Body mass index is 42.49 kg/m².    Physical Exam  Constitutional:       General: She is not in acute distress.     Appearance: Normal appearance. She is obese. She is not toxic-appearing.   HENT:      Head: Normocephalic and atraumatic.      Nose: Nose normal. No rhinorrhea.      Mouth/Throat:      Mouth: " Mucous membranes are moist.      Pharynx: Oropharynx is clear. No oropharyngeal exudate or posterior oropharyngeal erythema.   Eyes:      Pupils: Pupils are equal, round, and reactive to light.   Cardiovascular:      Rate and Rhythm: Normal rate and regular rhythm.      Heart sounds: No murmur heard.  Pulmonary:      Effort: Pulmonary effort is normal.      Breath sounds: Normal breath sounds.   Abdominal:      General: Abdomen is flat. There is no distension.      Palpations: Abdomen is soft.      Hernia: A hernia is present.   Musculoskeletal:         General: No swelling or tenderness. Normal range of motion.      Cervical back: Normal range of motion and neck supple.      Right lower leg: Edema present.      Left lower leg: Edema present.   Skin:     General: Skin is warm.      Capillary Refill: Capillary refill takes less than 2 seconds.      Findings: Rash (b/l LE due to lymphedema ) present.   Neurological:      General: No focal deficit present.      Mental Status: She is alert and oriented to person, place, and time.   Psychiatric:         Mood and Affect: Mood normal.         Behavior: Behavior normal.     Laboratory Data:  Lab Visit on 04/24/2023   Component Date Value Ref Range Status    LD 04/24/2023 306 (H)  110 - 260 U/L Final    Results are increased in hemolyzed samples.       Imaging:     PET CT- 4/29/2020  1.  Findings consistent with partial treatment response with decreased size of the stomach wall and decreased SUV throughout the stomach.  Metabolic activity on the baseline scan was atypically prominent for marginal zone lymphoma.  If a Deauville score were to be assigned, the score would be 4.  2.  Decreased size and SUV of the patient's right lower lobe pulmonary biopsy-proven lymphoma.  3.  Questionable slight increase in size of the patient's right lower pole lesion again concerning for RCC or oncocytoma.  Extranodal lymphoma could have a similar appearance.    PET CT 8/2022:  In this  patient marginal zone lymphoma, there are No FDG avid foci to indicate active metastasis.  Postoperative changes of right renal segmental artery embolization with evolving changes of the inferior pole hematoma.  There is a focal area of increased uptake within the hematoma, nonspecific.    Assessment and Plan:       1. Leg edema    2. Marginal zone lymphoma of extranodal and solid organ sites        1.  Sha MZL with coexisting Gastric/pulmonary/diffuse marrow Marginal zone B-cell lymphoma. Stage IV disease s/p induction with Rituxan for 4 cycles 4/14/2020. PET CT 4/2020 showed AK. Started maintenance Rituxan 7/2020. Planned for 2 years. However her treatment was held between 12 and may due to cutaneous squamous cell carcinoma diagnosis and management. She resumed tx with cycle 7 in may 2022. She underwent renal mass bx in June 2022 and it was complicated with retroperitoneal hemorrhage requiring embolization.  PET CT in august 2022 showed no active disease.   Cycle 12 4/25  Plan for repeat PET CT post cycle 12 - scheduled 5/2/2023  Follow-up 5/9 to review PET results     2. Right lower extremity DVT, likely provoked: was on Xarelto for about 2 years. Stopped taking 8/21  3. Chronic active smoker. Educated patient regarding smoking cessations, she's cutting down. Referral for smoking cessation program.   4. squamous cell carcinoma of lower lip diagnosed 10/21 s/p Wedge resection of lower lip squamous cell carcinoma primary closure and Right submandibular sentinel lymph node biopsy 1/12/22. Margins neg, LN negative. Continue follow up with dermatology.   5. LE edema - ECHO ordered 4/24/2023 to evaluation for heart failure in setting of peripheral vascular disease  6. Ventral hernia follows with general surgery       A total of 20 minutes was spent in pre-visit chart review, personal interpretation of labs and imaging, and medication review. Total visit time 30 minutes, >50 % counseling.

## 2023-04-25 ENCOUNTER — INFUSION (OUTPATIENT)
Dept: INFUSION THERAPY | Facility: HOSPITAL | Age: 58
End: 2023-04-25
Payer: MEDICAID

## 2023-04-25 VITALS
BODY MASS INDEX: 42.49 KG/M2 | SYSTOLIC BLOOD PRESSURE: 148 MMHG | DIASTOLIC BLOOD PRESSURE: 88 MMHG | WEIGHT: 224.88 LBS | RESPIRATION RATE: 18 BRPM | TEMPERATURE: 98 F | HEART RATE: 80 BPM

## 2023-04-25 DIAGNOSIS — C85.89 MARGINAL ZONE LYMPHOMA OF EXTRANODAL AND SOLID ORGAN SITES: ICD-10-CM

## 2023-04-25 DIAGNOSIS — C88.4 EXTRANODAL MARGINAL ZONE B-CELL LYMPHOMA OF MUCOSA-ASSOCIATED LYMPHOID TISSUE (MALT): Primary | ICD-10-CM

## 2023-04-25 PROCEDURE — 63600175 PHARM REV CODE 636 W HCPCS: Mod: JZ,JG | Performed by: INTERNAL MEDICINE

## 2023-04-25 PROCEDURE — 25000003 PHARM REV CODE 250: Performed by: INTERNAL MEDICINE

## 2023-04-25 PROCEDURE — 96401 CHEMO ANTI-NEOPL SQ/IM: CPT

## 2023-04-25 RX ORDER — SODIUM CHLORIDE 0.9 % (FLUSH) 0.9 %
10 SYRINGE (ML) INJECTION
Status: DISCONTINUED | OUTPATIENT
Start: 2023-04-25 | End: 2023-04-25 | Stop reason: HOSPADM

## 2023-04-25 RX ORDER — HEPARIN 100 UNIT/ML
500 SYRINGE INTRAVENOUS
Status: DISCONTINUED | OUTPATIENT
Start: 2023-04-25 | End: 2023-04-25 | Stop reason: HOSPADM

## 2023-04-25 RX ORDER — PREDNISONE 5 MG/1
20 TABLET ORAL
Status: COMPLETED | OUTPATIENT
Start: 2023-04-25 | End: 2023-04-25

## 2023-04-25 RX ORDER — FAMOTIDINE 20 MG/1
20 TABLET, FILM COATED ORAL
Status: COMPLETED | OUTPATIENT
Start: 2023-04-25 | End: 2023-04-25

## 2023-04-25 RX ORDER — ACETAMINOPHEN 325 MG/1
650 TABLET ORAL
Status: COMPLETED | OUTPATIENT
Start: 2023-04-25 | End: 2023-04-25

## 2023-04-25 RX ADMIN — PREDNISONE 20 MG: 20 TABLET ORAL at 02:04

## 2023-04-25 RX ADMIN — FAMOTIDINE 20 MG: 20 TABLET ORAL at 02:04

## 2023-04-25 RX ADMIN — ACETAMINOPHEN 650 MG: 325 TABLET ORAL at 02:04

## 2023-04-25 RX ADMIN — RITUXIMAB AND HYALURONIDASE 1400 MG: 120; 2000 INJECTION, SOLUTION SUBCUTANEOUS at 02:04

## 2023-04-25 NOTE — PLAN OF CARE
1415 pt here for rituxan hycela injection, labs, hx, meds, allergies reviewed, pt with no new complaints at this time, reclined in chair, continue to monitor

## 2023-04-25 NOTE — PLAN OF CARE
1500 pt tolerated rituxan hycela injection without issue, pt has no upcoming appts at this time, no distress noted upon d/c to home

## 2023-04-27 DIAGNOSIS — C85.89 MARGINAL ZONE LYMPHOMA OF EXTRANODAL AND SOLID ORGAN SITES: Primary | ICD-10-CM

## 2023-05-02 ENCOUNTER — HOSPITAL ENCOUNTER (OUTPATIENT)
Dept: RADIOLOGY | Facility: HOSPITAL | Age: 58
Discharge: HOME | End: 2023-05-02
Attending: INTERNAL MEDICINE
Payer: MEDICAID

## 2023-05-03 DIAGNOSIS — C85.89 MARGINAL ZONE LYMPHOMA OF EXTRANODAL AND SOLID ORGAN SITES: Primary | ICD-10-CM

## 2023-05-18 ENCOUNTER — HOSPITAL ENCOUNTER (OUTPATIENT)
Dept: RADIOLOGY | Facility: HOSPITAL | Age: 58
Discharge: HOME | End: 2023-05-18
Attending: INTERNAL MEDICINE
Payer: MEDICAID

## 2023-05-18 DIAGNOSIS — C85.89 MARGINAL ZONE LYMPHOMA OF EXTRANODAL AND SOLID ORGAN SITES: ICD-10-CM

## 2023-05-18 LAB — POCT GLUCOSE: 107 MG/DL (ref 70–110)

## 2023-05-18 PROCEDURE — A9552 F18 FDG: HCPCS

## 2023-05-18 PROCEDURE — 78815 PET IMAGE W/CT SKULL-THIGH: CPT | Mod: 26,PS,, | Performed by: RADIOLOGY

## 2023-05-18 PROCEDURE — A9698 NON-RAD CONTRAST MATERIALNOC: HCPCS | Performed by: INTERNAL MEDICINE

## 2023-05-18 PROCEDURE — 25500020 PHARM REV CODE 255: Performed by: INTERNAL MEDICINE

## 2023-05-18 PROCEDURE — 78815 NM PET CT ROUTINE: ICD-10-PCS | Mod: 26,PS,, | Performed by: RADIOLOGY

## 2023-05-18 RX ADMIN — IOHEXOL 500 ML: 9 SOLUTION ORAL at 12:05

## 2023-05-23 ENCOUNTER — LAB VISIT (OUTPATIENT)
Dept: LAB | Facility: HOSPITAL | Age: 58
End: 2023-05-23
Attending: INTERNAL MEDICINE
Payer: MEDICAID

## 2023-05-23 ENCOUNTER — OFFICE VISIT (OUTPATIENT)
Dept: HEMATOLOGY/ONCOLOGY | Facility: CLINIC | Age: 58
End: 2023-05-23
Payer: MEDICAID

## 2023-05-23 VITALS
TEMPERATURE: 98 F | SYSTOLIC BLOOD PRESSURE: 132 MMHG | WEIGHT: 220.88 LBS | OXYGEN SATURATION: 97 % | HEART RATE: 66 BPM | HEIGHT: 61 IN | RESPIRATION RATE: 18 BRPM | DIASTOLIC BLOOD PRESSURE: 71 MMHG | BODY MASS INDEX: 41.7 KG/M2

## 2023-05-23 DIAGNOSIS — I44.7 LBBB (LEFT BUNDLE BRANCH BLOCK): ICD-10-CM

## 2023-05-23 DIAGNOSIS — K43.9 VENTRAL HERNIA WITHOUT OBSTRUCTION OR GANGRENE: ICD-10-CM

## 2023-05-23 DIAGNOSIS — C85.89 MARGINAL ZONE LYMPHOMA OF EXTRANODAL AND SOLID ORGAN SITES: ICD-10-CM

## 2023-05-23 DIAGNOSIS — C85.89 MARGINAL ZONE LYMPHOMA OF EXTRANODAL AND SOLID ORGAN SITES: Primary | ICD-10-CM

## 2023-05-23 LAB
ALBUMIN SERPL BCP-MCNC: 3.8 G/DL (ref 3.5–5.2)
ALP SERPL-CCNC: 165 U/L (ref 55–135)
ALT SERPL W/O P-5'-P-CCNC: 46 U/L (ref 10–44)
ANION GAP SERPL CALC-SCNC: 8 MMOL/L (ref 8–16)
AST SERPL-CCNC: 55 U/L (ref 10–40)
BASOPHILS # BLD AUTO: 0.02 K/UL (ref 0–0.2)
BASOPHILS NFR BLD: 0.4 % (ref 0–1.9)
BILIRUB SERPL-MCNC: 0.5 MG/DL (ref 0.1–1)
BUN SERPL-MCNC: 6 MG/DL (ref 6–20)
CALCIUM SERPL-MCNC: 9.8 MG/DL (ref 8.7–10.5)
CHLORIDE SERPL-SCNC: 98 MMOL/L (ref 95–110)
CO2 SERPL-SCNC: 33 MMOL/L (ref 23–29)
CREAT SERPL-MCNC: 0.8 MG/DL (ref 0.5–1.4)
DIFFERENTIAL METHOD: ABNORMAL
EOSINOPHIL # BLD AUTO: 0.2 K/UL (ref 0–0.5)
EOSINOPHIL NFR BLD: 5.3 % (ref 0–8)
ERYTHROCYTE [DISTWIDTH] IN BLOOD BY AUTOMATED COUNT: 13.9 % (ref 11.5–14.5)
EST. GFR  (NO RACE VARIABLE): >60 ML/MIN/1.73 M^2
GLUCOSE SERPL-MCNC: 103 MG/DL (ref 70–110)
HBV CORE AB SERPL QL IA: NORMAL
HBV SURFACE AB SER-ACNC: <3 MIU/ML
HBV SURFACE AB SER-ACNC: NORMAL M[IU]/ML
HBV SURFACE AG SERPL QL IA: NORMAL
HCT VFR BLD AUTO: 43.2 % (ref 37–48.5)
HGB BLD-MCNC: 13.4 G/DL (ref 12–16)
IMM GRANULOCYTES # BLD AUTO: 0.02 K/UL (ref 0–0.04)
IMM GRANULOCYTES NFR BLD AUTO: 0.4 % (ref 0–0.5)
LDH SERPL L TO P-CCNC: 272 U/L (ref 110–260)
LYMPHOCYTES # BLD AUTO: 0.4 K/UL (ref 1–4.8)
LYMPHOCYTES NFR BLD: 9.6 % (ref 18–48)
MCH RBC QN AUTO: 28.5 PG (ref 27–31)
MCHC RBC AUTO-ENTMCNC: 31 G/DL (ref 32–36)
MCV RBC AUTO: 92 FL (ref 82–98)
MONOCYTES # BLD AUTO: 0.4 K/UL (ref 0.3–1)
MONOCYTES NFR BLD: 8.5 % (ref 4–15)
NEUTROPHILS # BLD AUTO: 3.4 K/UL (ref 1.8–7.7)
NEUTROPHILS NFR BLD: 75.8 % (ref 38–73)
NRBC BLD-RTO: 0 /100 WBC
PLATELET # BLD AUTO: 184 K/UL (ref 150–450)
PMV BLD AUTO: 10.5 FL (ref 9.2–12.9)
POTASSIUM SERPL-SCNC: 4.3 MMOL/L (ref 3.5–5.1)
PROT SERPL-MCNC: 7.1 G/DL (ref 6–8.4)
RBC # BLD AUTO: 4.71 M/UL (ref 4–5.4)
SODIUM SERPL-SCNC: 139 MMOL/L (ref 136–145)
WBC # BLD AUTO: 4.49 K/UL (ref 3.9–12.7)

## 2023-05-23 PROCEDURE — 3008F PR BODY MASS INDEX (BMI) DOCUMENTED: ICD-10-PCS | Mod: CPTII,,, | Performed by: INTERNAL MEDICINE

## 2023-05-23 PROCEDURE — 99214 OFFICE O/P EST MOD 30 MIN: CPT | Mod: PBBFAC | Performed by: INTERNAL MEDICINE

## 2023-05-23 PROCEDURE — 87340 HEPATITIS B SURFACE AG IA: CPT | Performed by: INTERNAL MEDICINE

## 2023-05-23 PROCEDURE — 86706 HEP B SURFACE ANTIBODY: CPT | Mod: 91 | Performed by: INTERNAL MEDICINE

## 2023-05-23 PROCEDURE — 80053 COMPREHEN METABOLIC PANEL: CPT | Performed by: INTERNAL MEDICINE

## 2023-05-23 PROCEDURE — 99215 OFFICE O/P EST HI 40 MIN: CPT | Mod: S$PBB,,, | Performed by: INTERNAL MEDICINE

## 2023-05-23 PROCEDURE — 99999 PR PBB SHADOW E&M-EST. PATIENT-LVL IV: ICD-10-PCS | Mod: PBBFAC,,, | Performed by: INTERNAL MEDICINE

## 2023-05-23 PROCEDURE — 4010F ACE/ARB THERAPY RXD/TAKEN: CPT | Mod: CPTII,,, | Performed by: INTERNAL MEDICINE

## 2023-05-23 PROCEDURE — 85025 COMPLETE CBC W/AUTO DIFF WBC: CPT | Performed by: INTERNAL MEDICINE

## 2023-05-23 PROCEDURE — 1160F PR REVIEW ALL MEDS BY PRESCRIBER/CLIN PHARMACIST DOCUMENTED: ICD-10-PCS | Mod: CPTII,,, | Performed by: INTERNAL MEDICINE

## 2023-05-23 PROCEDURE — 1159F PR MEDICATION LIST DOCUMENTED IN MEDICAL RECORD: ICD-10-PCS | Mod: CPTII,,, | Performed by: INTERNAL MEDICINE

## 2023-05-23 PROCEDURE — 3075F PR MOST RECENT SYSTOLIC BLOOD PRESS GE 130-139MM HG: ICD-10-PCS | Mod: CPTII,,, | Performed by: INTERNAL MEDICINE

## 2023-05-23 PROCEDURE — 4010F PR ACE/ARB THEARPY RXD/TAKEN: ICD-10-PCS | Mod: CPTII,,, | Performed by: INTERNAL MEDICINE

## 2023-05-23 PROCEDURE — 83615 LACTATE (LD) (LDH) ENZYME: CPT | Performed by: INTERNAL MEDICINE

## 2023-05-23 PROCEDURE — 36415 COLL VENOUS BLD VENIPUNCTURE: CPT | Performed by: INTERNAL MEDICINE

## 2023-05-23 PROCEDURE — 99215 PR OFFICE/OUTPT VISIT, EST, LEVL V, 40-54 MIN: ICD-10-PCS | Mod: S$PBB,,, | Performed by: INTERNAL MEDICINE

## 2023-05-23 PROCEDURE — 99999 PR PBB SHADOW E&M-EST. PATIENT-LVL IV: CPT | Mod: PBBFAC,,, | Performed by: INTERNAL MEDICINE

## 2023-05-23 PROCEDURE — 3078F DIAST BP <80 MM HG: CPT | Mod: CPTII,,, | Performed by: INTERNAL MEDICINE

## 2023-05-23 PROCEDURE — 3008F BODY MASS INDEX DOCD: CPT | Mod: CPTII,,, | Performed by: INTERNAL MEDICINE

## 2023-05-23 PROCEDURE — 86704 HEP B CORE ANTIBODY TOTAL: CPT | Performed by: INTERNAL MEDICINE

## 2023-05-23 PROCEDURE — 3078F PR MOST RECENT DIASTOLIC BLOOD PRESSURE < 80 MM HG: ICD-10-PCS | Mod: CPTII,,, | Performed by: INTERNAL MEDICINE

## 2023-05-23 PROCEDURE — 3075F SYST BP GE 130 - 139MM HG: CPT | Mod: CPTII,,, | Performed by: INTERNAL MEDICINE

## 2023-05-23 PROCEDURE — 1160F RVW MEDS BY RX/DR IN RCRD: CPT | Mod: CPTII,,, | Performed by: INTERNAL MEDICINE

## 2023-05-23 PROCEDURE — 1159F MED LIST DOCD IN RCRD: CPT | Mod: CPTII,,, | Performed by: INTERNAL MEDICINE

## 2023-05-23 NOTE — PROGRESS NOTES
PATIENT: Gabi Newton  MRN: [id number]  DATE: 5/23/2023    Diagnosis:   1. 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 LBBB (left bundle branch block)    3. Ventral hernia without obstruction or gangrene            Ms. Newton is a 54 year old female with Stage IV Marginal Zone Lymphoma (Gastric, Pulmonary, and Marrow) s/p 4 cycles of single agent Rituximab induction therapy completed on 4/14/20 followed by PET CT on 4/29/20 consistent with partial treatment response. She is here for a follow up visit. She has been on maintenance therapy since July 7, 2020 and last cycle is schedule tomorrow 4/25. US of kidney 4/19 shows ongoing response of exophytic lesion- biopsied 6/2022 and suspected site of b cell lymphoma. Notes worsening lower extremity edema with history of PVD. Last ECHO in 2018.    Oncologic History  Ms. Newton is a 54-year-old female with a past medical history of abdominal pain for the last 3-4 years with unknown etiology, significant smoking history for 40 years, back pain, hypertension presented to the Hematology Clinic for newly diagnosed marginal zone B-cell lymphoma     Patient complained of chronic epigastric/periumbilical abdominal pain for the last 3 years and has been receiving multiple abdominal imaging at [facility].  In 2016 her abdominal pain was thought to be from her gallbladder and she had underwent a cholecystectomy however she continued to have abdominal pain. A CT abdomen done in June 11, 2018 revealed wall thickening of the anterior gastric body suspicious for infectious or inflammatory but is concerning for soft tissue mass and a EGD was a recommended. MRI on June 19, 2018 which revealed diffuse hepatic steatosis without evidence of suspicious focal lesions, splenomegaly but no ascites.  She has been following  Dr. Cedeno since 2018 for her abdominal pain, however liver was not suspected to be the cause for her abdominal pain. She had negative  workup for hepatitis-B, C, negative workup for autoimmune disease, negative for alpha 1 antitrypsin disease, negative for celiac sprue and her hepatitis panel has been negative so far.  MRI of the abdomen was done on September 24, 2019 which revealed multifocal areas of asymmetric gastric wall thickening.  Recommend further evaluation with direct visualization to exclude neoplasm.  EGD was done on 10/15/2019 and Gastric tumor in the gastric fundus was found however no specimen was collected as she was on anticoagulation.  The EUS done on 11/15/2019 revealed gastric tumor in the gastric fundus with many abnormal lymph nodes visualized in the lower paraesophageal mediastinum (level 8L) and gastrohepatic ligament (level 18). Fine needle biopsy performed and pathology revealed marginal zone B cell lymphoma.     She had chest pain in 2016 and the imaging done at that time revealed pulmonary nodule and has been following pulmonary, Dr. Yeung at [facility].  She has a currently daily smoker, has been smoking for almost 40 years.  On the CT chest done in November 2017 revealed 1.7 x 1.3 cm lesion in the right lower lobe and 2 x 1 cm lesion in the anterior portion of the right lower lobe.  A CT scan done on September 25, 2019 revealed a complex poorly defined mass in the right lower lobe measuring 2.78 x 2.64 cm, suspicious for primary lung carcinoma     She was diagnosed with right lower extremity DVT and has been on Xarelto.  As per the patient she was hospitalized at that time and underwent procedure for her right lower extremity for venous insufficiency.  It appears that the right lower extremity DVT is a provoked clot.  She had history of bilateral iliac vein stenting 10/16/18 for venous outflow obsturction.     Subjective:    Initial History: Ms. Newton is a 57 y.o. female who returns for follow up.     Doing well overall. Continues to have abdominal pain due to ventral hernia, no plans yet for surgical repair.      Discussed PET CT findings of new hypermetabolic 2.1 x 1.8 cm exophytic mass arising from the inferior pole of the right kidney with a maximum SUV of 7.8       Past Medical History:   Past Medical History:   Diagnosis Date    Abdominal pain 2018    Anemia     Anxiety     Back pain     COPD (chronic obstructive pulmonary disease) 6/10/2022    Deep vein thrombosis     Disorder of kidney and ureter     Fatty liver 2018    Gastric lymphoma 2019    Gastric tumor     GERD (gastroesophageal reflux disease)     Hyperlipidemia     Hypertension     Splenomegaly 2018       Past Surgical HIstory:   Past Surgical History:   Procedure Laterality Date    bilateral iliac vein stenosis with stenting      CARDIAC CATHETERIZATION      CARDIAC CATHETERIZATION  2018    had chest pains . states vessels at the bottom of heart collapsed     SECTION      x3    CHOLECYSTECTOMY      COLONOSCOPY      ENDOSCOPIC ULTRASOUND OF UPPER GASTROINTESTINAL TRACT Left 11/15/2019    Procedure: ULTRASOUND, UPPER GI TRACT, ENDOSCOPIC;  Surgeon: Mike Seay MD;  Location: [facility] ([address] [place] [address]);  Service: Endoscopy;  Laterality: Left;  Ok to hold xarelto per protocol per Dr. Lloyd see media file 10/25/19-tb    ESOPHAGOGASTRODUODENOSCOPY      ESOPHAGOGASTRODUODENOSCOPY N/A 2018    Procedure: EGD (ESOPHAGOGASTRODUODENOSCOPY);  Surgeon: Ant Camejo MD;  Location: [facility];  Service: Endoscopy;  Laterality: N/A;    ESOPHAGOGASTRODUODENOSCOPY N/A 10/15/2019    Procedure: EGD (ESOPHAGOGASTRODUODENOSCOPY);  Surgeon: Melanie Cedeno MD;  Location: [facility];  Service: Endoscopy;  Laterality: N/A;    ESOPHAGOGASTRODUODENOSCOPY N/A 11/15/2019    Procedure: EGD (ESOPHAGOGASTRODUODENOSCOPY);  Surgeon: Mike Seay MD;  Location: [facility] ([address] [place] [address]);  Service: Endoscopy;  Laterality: N/A;    EXCISION OF LESION OF LIP Left 2022    Procedure: EXCISION, LESION, LIP;  Surgeon: Glen Giang MD;  Location: [address]  [facility];  Service: ENT;  Laterality: Left;  Lip resection    HYSTERECTOMY      PHLEBOGRAPHY Bilateral 10/16/2018    Procedure: VENOGRAM;  Surgeon: Colin Mathis MD;  Location: [facility] CATH LAB;  Service: Cardiology;  Laterality: Bilateral;    WV RT/LT HEART CATHETERS Left     Coronary Arteriogram-2 Cath    RHINOPLASTY      SENTINEL LYMPH NODE BIOPSY  1/12/2022    Procedure: BIOPSY, LYMPH NODE, SENTINEL;  Surgeon: Glen Giang MD;  Location: [facility];  Service: ENT;;    TUBAL LIGATION      venogram Bilateral 10/16/2018       Family History:   Family History   Problem Relation Age of Onset    Breast cancer Maternal Grandfather     Dementia Mother     Atrial fibrillation Mother     Deep vein thrombosis Mother     Pulmonary embolism Mother     Stroke Mother     Aneurysm Father        Social History:  reports that she has been smoking cigarettes. She has a 20.00 pack-year smoking history. She has never used smokeless tobacco. She reports that she does not drink alcohol and does not use drugs.    Allergies:  Review of patient's allergies indicates:   Allergen Reactions    Azithromycin Anaphylaxis     Other reaction(s): swelling to entire body  Swelling (tongue / lips)^      Ciprofloxacin Swelling     Throat swells  Other reaction(s): Angioedema    Codeine      Swelling (throat)^    Tolerates Morphine      Codeine sulfate      Swelling (throat)^    Tolerates Morphine       Medications:  Current Outpatient Medications   Medication Sig Dispense Refill    ADVAIR DISKUS 250-50 mcg/dose diskus inhaler Inhale into the lungs 2 (two) times daily.      albuterol (PROAIR HFA) 90 mcg/actuation inhaler Inhale 2 puffs into the lungs every 6 (six) hours as needed for Wheezing. Rescue 18 g 0    allopurinoL (ZYLOPRIM) 300 MG tablet Take 300 mg by mouth.      aspirin (ECOTRIN) 81 MG EC tablet Take 1 tablet (81 mg total) by mouth every evening. Hold until PCP follow up  0    gemfibroziL (LOPID) 600 MG tablet Take 1 tablet by mouth  "2 (two) times daily.      hydroxyzine HCL (ATARAX) 25 MG tablet Take 25 mg by mouth every evening.      lisinopriL 10 MG tablet Take 10 mg by mouth once daily.      methadone (METHADOSE) 40 mg disintegrating tablet Take 40 mg by mouth once daily. PATIENT USUALLY TAKES AT 10AM      nitroGLYCERIN (NITROSTAT) 0.4 MG SL tablet Place 0.4 mg under the tongue every 5 (five) minutes as needed for Chest pain.      omeprazole (PRILOSEC) 40 MG capsule Take 1 capsule every day by oral route.      ondansetron (ZOFRAN-ODT) 4 MG TbDL Take 2 tablets (8 mg total) by mouth 2 (two) times daily. 60 tablet 0    simvastatin (ZOCOR) 40 MG tablet Take 40 mg by mouth every evening.       LORazepam (ATIVAN) 1 MG tablet Take 1 tablet (1 mg total) by mouth once. Prior to the pet scan for 1 dose 1 tablet 0     No current facility-administered medications for this visit.       Review of Systems   Constitutional:  Negative for appetite change, fatigue, fever and unexpected weight change.   HENT:  Negative for nosebleeds and sore throat.    Respiratory:  Positive for cough. Negative for shortness of breath.    Cardiovascular:  Positive for leg swelling. Negative for chest pain.   Gastrointestinal:  Positive for abdominal pain. Negative for blood in stool, diarrhea, nausea and vomiting.   Genitourinary:  Negative for dysuria, flank pain, hematuria, urgency and vaginal bleeding.   Musculoskeletal:  Positive for back pain.   Skin:  Negative for rash.   Neurological:  Negative for seizures and headaches.   Hematological:  Negative for adenopathy.   Psychiatric/Behavioral:  Negative for agitation.    ECOG Performance Status: 2   Objective:      Vitals:   Vitals:    05/23/23 1306   BP: 132/71   BP Location: Left arm   Patient Position: Sitting   Pulse: 66   Resp: 18   Temp: 98 °F (36.7 °C)   TempSrc: Oral   SpO2: 97%   Weight: 100.2 kg (220 lb 14.4 oz)   Height: 5' 1" (1.549 m)         BMI: Body mass index is 41.74 kg/m².    Physical " Exam  Constitutional:       General: She is not in acute distress.     Appearance: Normal appearance. She is obese. She is not toxic-appearing.   HENT:      Head: Normocephalic and atraumatic.      Nose: Nose normal. No rhinorrhea.      Mouth/Throat:      Mouth: Mucous membranes are moist.      Pharynx: Oropharynx is clear. No oropharyngeal exudate or posterior oropharyngeal erythema.   Eyes:      Pupils: Pupils are equal, round, and reactive to light.   Cardiovascular:      Rate and Rhythm: Normal rate and regular rhythm.      Heart sounds: No murmur heard.  Pulmonary:      Effort: Pulmonary effort is normal.      Breath sounds: Normal breath sounds.   Abdominal:      General: Abdomen is flat. There is no distension.      Palpations: Abdomen is soft.      Hernia: A hernia is present.   Musculoskeletal:         General: No swelling or tenderness. Normal range of motion.      Cervical back: Normal range of motion and neck supple.      Right lower leg: Edema present.      Left lower leg: Edema present.   Skin:     General: Skin is warm.      Capillary Refill: Capillary refill takes less than 2 seconds.      Findings: Rash (b/l LE due to lymphedema ) present.   Neurological:      General: No focal deficit present.      Mental Status: She is alert and oriented to person, place, and time.   Psychiatric:         Mood and Affect: Mood normal.         Behavior: Behavior normal.     Laboratory Data:  Lab Visit on 05/23/2023   Component Date Value Ref Range Status    WBC 05/23/2023 4.49  3.90 - 12.70 K/uL Final    RBC 05/23/2023 4.71  4.00 - 5.40 M/uL Final    Hemoglobin 05/23/2023 13.4  12.0 - 16.0 g/dL Final    Hematocrit 05/23/2023 43.2  37.0 - 48.5 % Final    MCV 05/23/2023 92  82 - 98 fL Final    MCH 05/23/2023 28.5  27.0 - 31.0 pg Final    MCHC 05/23/2023 31.0 (L)  32.0 - 36.0 g/dL Final    RDW 05/23/2023 13.9  11.5 - 14.5 % Final    Platelets 05/23/2023 184  150 - 450 K/uL Final    MPV 05/23/2023 10.5  9.2 - 12.9 fL  Final    Immature Granulocytes 05/23/2023 0.4  0.0 - 0.5 % Final    Gran # (ANC) 05/23/2023 3.4  1.8 - 7.7 K/uL Final    Immature Grans (Abs) 05/23/2023 0.02  0.00 - 0.04 K/uL Final    Comment: Mild elevation in immature granulocytes is non specific and   can be seen in a variety of conditions including stress response,   acute inflammation, trauma and pregnancy. Correlation with other   laboratory and clinical findings is essential.      Lymph # 05/23/2023 0.4 (L)  1.0 - 4.8 K/uL Final    Mono # 05/23/2023 0.4  0.3 - 1.0 K/uL Final    Eos # 05/23/2023 0.2  0.0 - 0.5 K/uL Final    Baso # 05/23/2023 0.02  0.00 - 0.20 K/uL Final    nRBC 05/23/2023 0  0 /100 WBC Final    Gran % 05/23/2023 75.8 (H)  38.0 - 73.0 % Final    Lymph % 05/23/2023 9.6 (L)  18.0 - 48.0 % Final    Mono % 05/23/2023 8.5  4.0 - 15.0 % Final    Eosinophil % 05/23/2023 5.3  0.0 - 8.0 % Final    Basophil % 05/23/2023 0.4  0.0 - 1.9 % Final    Differential Method 05/23/2023 Automated   Final    Sodium 05/23/2023 139  136 - 145 mmol/L Final    Potassium 05/23/2023 4.3  3.5 - 5.1 mmol/L Final    Chloride 05/23/2023 98  95 - 110 mmol/L Final    CO2 05/23/2023 33 (H)  23 - 29 mmol/L Final    Glucose 05/23/2023 103  70 - 110 mg/dL Final    BUN 05/23/2023 6  6 - 20 mg/dL Final    Creatinine 05/23/2023 0.8  0.5 - 1.4 mg/dL Final    Calcium 05/23/2023 9.8  8.7 - 10.5 mg/dL Final    Total Protein 05/23/2023 7.1  6.0 - 8.4 g/dL Final    Albumin 05/23/2023 3.8  3.5 - 5.2 g/dL Final    Total Bilirubin 05/23/2023 0.5  0.1 - 1.0 mg/dL Final    Comment: For infants and newborns, interpretation of results should be based  on gestational age, weight and in agreement with clinical  observations.    Premature Infant recommended reference ranges:  Up to 24 hours.............<8.0 mg/dL  Up to 48 hours............<12.0 mg/dL  3-5 days..................<15.0 mg/dL  6-29 days.................<15.0 mg/dL      Alkaline Phosphatase 05/23/2023 165 (H)  55 - 135 U/L Final    AST  05/23/2023 55 (H)  10 - 40 U/L Final    ALT 05/23/2023 46 (H)  10 - 44 U/L Final    Anion Gap 05/23/2023 8  8 - 16 mmol/L Final    eGFR 05/23/2023 >60.0  >60 mL/min/1.73 m^2 Final    LD 05/23/2023 272 (H)  110 - 260 U/L Final    Results are increased in hemolyzed samples.    Hep B Core Total Ab 05/23/2023 Non-reactive  Non-reactive Final    Hep B S Ab 05/23/2023 <3.00  mIU/mL Final    Hep B S Ab 05/23/2023 Non-reactive   Final    Individual is considered not immune to HBV infection.    Hepatitis B Surface Ag 05/23/2023 Non-reactive  Non-reactive Final   Hospital Outpatient Visit on 05/18/2023   Component Date Value Ref Range Status    POCT Glucose 05/18/2023 107  70 - 110 mg/dL Final       Imaging:     PET CT- 4/29/2020  1.  Findings consistent with partial treatment response with decreased size of the stomach wall and decreased SUV throughout the stomach.  Metabolic activity on the baseline scan was atypically prominent for marginal zone lymphoma.  If a Deauville score were to be assigned, the score would be 4.  2.  Decreased size and SUV of the patient's right lower lobe pulmonary biopsy-proven lymphoma.  3.  Questionable slight increase in size of the patient's right lower pole lesion again concerning for RCC or oncocytoma.  Extranodal lymphoma could have a similar appearance.    PET CT 8/2022:  In this patient marginal zone lymphoma, there are No FDG avid foci to indicate active metastasis.  Postoperative changes of right renal segmental artery embolization with evolving changes of the inferior pole hematoma.  There is a focal area of increased uptake within the hematoma, nonspecific.    Assessment and Plan:       1. 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 LBBB (left bundle branch block)    3. Ventral hernia without obstruction or gangrene          1.  Sha MZL with coexisting Gastric/pulmonary/diffuse marrow Marginal zone B-cell lymphoma. Stage IV disease s/p induction with Rituxan for  4 cycles 4/14/2020. PET CT 4/2020 showed WA. Started maintenance Rituxan 7/2020. Planned for 2 years. However her treatment was held between 12 and may due to cutaneous squamous cell carcinoma diagnosis and management. She resumed tx with cycle 7 in may 2022. She underwent renal mass bx in June 2022 and it was complicated with retroperitoneal hemorrhage requiring embolization.  PET CT in august 2022 showed no active disease. PET CT 5/2023 showed new hypermetabolic 2.1 x 1.8 cm exophytic mass arising from the inferior pole of the right kidney with a maximum SUV of 7.8   S/p Cycle 12 of rituxan 4/25  Plan for repeat imaging with CT A/P in 6 weeks   Follow-up after repeat imaging     2. Right lower extremity DVT, likely provoked: was on Xarelto for about 2 years. Stopped taking 8/21  3. Chronic active smoker. Educated patient regarding smoking cessations, she's cutting down. Referral for smoking cessation program.   4. squamous cell carcinoma of lower lip diagnosed 10/21 s/p Wedge resection of lower lip squamous cell carcinoma primary closure and Right submandibular sentinel lymph node biopsy 1/12/22. Margins neg, LN negative. Continue follow up with dermatology.   5. LE edema - ECHO showed LBBB, EF 50%, will refer to cardiology   6. Ventral hernia follows with general surgery. Advised patient to follow with general surgery.       Shukri Shea MD  Hematology and Medical Oncology fellow       Route Chart for Scheduling    BMT Chart Routing      Follow up with physician 6 weeks.   Follow up with GIRISH    Provider visit type    Infusion scheduling note    Injection scheduling note    Labs    Imaging   CT A/P in 6 weeks follow up appt after imaging is scheduled   Pharmacy appointment    Other referrals   Additional referrals needed  referral to cardiology         Treatment Plan Information   OP RITUXIMAB Q3M (MAINTENANCE)   Ale Velez MD   Upcoming Treatment Dates - OP RITUXIMAB Q3M (MAINTENANCE)    No  upcoming days in selected categories.    Supportive Plan Information  OP FERRIC CARBOXYMALTOSE Q2W   Marco Antonio Malloy MD   Upcoming Treatment Dates - OP FERRIC CARBOXYMALTOSE Q2W    No upcoming days in selected categories.

## 2023-05-29 ENCOUNTER — TELEPHONE (OUTPATIENT)
Dept: DERMATOLOGY | Facility: CLINIC | Age: 58
End: 2023-05-29
Payer: MEDICAID

## 2023-05-29 NOTE — TELEPHONE ENCOUNTER
Spoke with pt - scheduled appt. Pt thanked me for call    ----- Message -----  From: Holly Arpan  Sent: 5/29/2023   8:49 AM CDT  To: Megan Parekh Staff  Subject: Est Pt                                           Who called:Patient    What is the request in detail: Patient is requesting a follow up appointment with Dr. Guzman. She was last seen on 11/23/21. Please assist pt with scheduling follow up appt.    Can the clinic reply by [facility]? no    Would the patient rather a call back or a response via [facility]? Call back    Best call back number: .[phone] (home)      Additional Information:

## 2023-06-13 ENCOUNTER — OFFICE VISIT (OUTPATIENT)
Dept: DERMATOLOGY | Facility: CLINIC | Age: 58
End: 2023-06-13
Payer: MEDICAID

## 2023-06-13 DIAGNOSIS — L23.89 ALLERGIC CONTACT DERMATITIS DUE TO OTHER AGENTS: Primary | ICD-10-CM

## 2023-06-13 DIAGNOSIS — L82.0 INFLAMED SEBORRHEIC KERATOSIS: ICD-10-CM

## 2023-06-13 DIAGNOSIS — Z85.89 HISTORY OF SQUAMOUS CELL CARCINOMA: ICD-10-CM

## 2023-06-13 PROCEDURE — 1159F MED LIST DOCD IN RCRD: CPT | Mod: CPTII,,, | Performed by: DERMATOLOGY

## 2023-06-13 PROCEDURE — 17110 PR DESTRUCTION BENIGN LESIONS UP TO 14: ICD-10-PCS | Mod: S$PBB,,, | Performed by: DERMATOLOGY

## 2023-06-13 PROCEDURE — 99212 OFFICE O/P EST SF 10 MIN: CPT | Mod: PBBFAC

## 2023-06-13 PROCEDURE — 1159F PR MEDICATION LIST DOCUMENTED IN MEDICAL RECORD: ICD-10-PCS | Mod: CPTII,,, | Performed by: DERMATOLOGY

## 2023-06-13 PROCEDURE — 99214 OFFICE O/P EST MOD 30 MIN: CPT | Mod: 25,S$PBB,, | Performed by: DERMATOLOGY

## 2023-06-13 PROCEDURE — 4010F ACE/ARB THERAPY RXD/TAKEN: CPT | Mod: CPTII,,, | Performed by: DERMATOLOGY

## 2023-06-13 PROCEDURE — 99999 PR PBB SHADOW E&M-EST. PATIENT-LVL II: ICD-10-PCS | Mod: PBBFAC,,,

## 2023-06-13 PROCEDURE — 17110 DESTRUCTION B9 LES UP TO 14: CPT | Mod: S$PBB,,, | Performed by: DERMATOLOGY

## 2023-06-13 PROCEDURE — 1160F RVW MEDS BY RX/DR IN RCRD: CPT | Mod: CPTII,,, | Performed by: DERMATOLOGY

## 2023-06-13 PROCEDURE — 1160F PR REVIEW ALL MEDS BY PRESCRIBER/CLIN PHARMACIST DOCUMENTED: ICD-10-PCS | Mod: CPTII,,, | Performed by: DERMATOLOGY

## 2023-06-13 PROCEDURE — 17110 DESTRUCTION B9 LES UP TO 14: CPT | Mod: PBBFAC | Performed by: DERMATOLOGY

## 2023-06-13 PROCEDURE — 99214 PR OFFICE/OUTPT VISIT, EST, LEVL IV, 30-39 MIN: ICD-10-PCS | Mod: 25,S$PBB,, | Performed by: DERMATOLOGY

## 2023-06-13 PROCEDURE — 4010F PR ACE/ARB THEARPY RXD/TAKEN: ICD-10-PCS | Mod: CPTII,,, | Performed by: DERMATOLOGY

## 2023-06-13 PROCEDURE — 99999 PR PBB SHADOW E&M-EST. PATIENT-LVL II: CPT | Mod: PBBFAC,,,

## 2023-06-13 RX ORDER — PIMECROLIMUS 10 MG/G
CREAM TOPICAL 2 TIMES DAILY
Qty: 100 G | Refills: 3 | Status: SHIPPED | OUTPATIENT
Start: 2023-06-13

## 2023-06-13 NOTE — PROGRESS NOTES
Subjective:       Patient ID:  Gabi Newton is a 57 y.o. female who presents for   Chief Complaint   Patient presents with    Spot     Spot    57 yo female, PMH NHL on rituximab, SCC (lower lip s/p excision with ENT 1/2022) presenting today for acute concern of rash on the face. States that about a month ago she noticed increased unilateral swelling and rash around the mouth region. Endorses using starting Advair 4/2023 and started using a CPAP-like machine around the same time. Denies pruritus, pain to the region. Also concerned about spots on her face, one on her forehead near the hairline has been irritating and bled while she was brushing her hair.       Review of Systems   Skin:  Positive for rash. Negative for itching.      Objective:    Physical Exam   Skin:   Areas Examined (abnormalities noted in diagram):   Head / Face Inspection Performed            Diagram Legend     Erythematous scaling macule/papule c/w actinic keratosis       Vascular papule c/w angioma      Pigmented verrucoid papule/plaque c/w seborrheic keratosis      Yellow umbilicated papule c/w sebaceous hyperplasia      Irregularly shaped tan macule c/w lentigo     1-2 mm smooth white papules consistent with Milia      Movable subcutaneous cyst with punctum c/w epidermal inclusion cyst      Subcutaneous movable cyst c/w pilar cyst      Firm pink to brown papule c/w dermatofibroma      Pedunculated fleshy papule(s) c/w skin tag(s)      Evenly pigmented macule c/w junctional nevus     Mildly variegated pigmented, slightly irregular-bordered macule c/w mildly atypical nevus      Flesh colored to evenly pigmented papule c/w intradermal nevus       Pink pearly papule/plaque c/w basal cell carcinoma      Erythematous hyperkeratotic cursted plaque c/w SCC      Surgical scar with no sign of skin cancer recurrence      Open and closed comedones      Inflammatory papules and pustules      Verrucoid papule consistent consistent with wart      Erythematous eczematous patches and plaques     Dystrophic onycholytic nail with subungual debris c/w onychomycosis     Umbilicated papule    Erythematous-base heme-crusted tan verrucoid plaque consistent with inflamed seborrheic keratosis     Erythematous Silvery Scaling Plaque c/w Psoriasis     See annotation    Assessment / Plan:        History of squamous cell cancer of lip - lower lip poorly differentiated  S/p excision with ENT 1/2022, patient has not returned for a TBSE, we will schedule her in Dr. Guzman's clinic.     Allergic contact dermatitis  Given distribution and hx of unilateral facial swelling likely ACD to face mask worn at night. Discussed etiology, treatment options. Elected to start Elidel and provided patient with gentle cleanser samples.   - start Elidel BID until resolution     Inflamed seborrheic keratosis  Cryosurgery procedure note:    Verbal consent from the patient is obtained including, but not limited to, risk of hypopigmentation/hyperpigmentation, scar, recurrence of lesion. Liquid nitrogen cryosurgery is applied to 1 lesion to produce a freeze injury. The patient is aware that blisters may form and is instructed on wound care with gentle cleansing and use of vaseline ointment to keep moist until healed. The patient  is instructed to call if lesions do not completely resolve.    Odette Feliep MD   [facility], PGY-II

## 2023-07-05 ENCOUNTER — PATIENT MESSAGE (OUTPATIENT)
Dept: HEMATOLOGY/ONCOLOGY | Facility: CLINIC | Age: 58
End: 2023-07-05
Payer: MEDICAID

## 2023-07-10 NOTE — PROGRESS NOTES
PATIENT: Gabi Newton  MRN: [id number]  DATE: 7/11/2023    Diagnosis:   1. 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 Deep vein thrombosis (DVT) of right lower extremity, unspecified chronicity, unspecified vein    3. Heavy cigarette smoker    4. Squamous cell skin cancer    5. Leg edema    6. Ventral hernia without obstruction or gangrene      Ms. Newton is a 54 year old female with Stage IV Marginal Zone Lymphoma (Gastric, Pulmonary, and Marrow) s/p 4 cycles of single agent Rituximab induction therapy completed on 4/14/20 followed by PET CT on 4/29/20 consistent with partial treatment response. She is here for a follow up visit. She has been on maintenance therapy since July 7, 2020 and last cycle is schedule tomorrow 4/25. US of kidney 4/19 shows ongoing response of exophytic lesion- biopsied 6/2022 and suspected site of b cell lymphoma. Notes worsening lower extremity edema with history of PVD. Last ECHO in 2018.    Oncologic History  Ms. Newton is a 54-year-old female with a past medical history of abdominal pain for the last 3-4 years with unknown etiology, significant smoking history for 40 years, back pain, hypertension presented to the Hematology Clinic for newly diagnosed marginal zone B-cell lymphoma     Patient complained of chronic epigastric/periumbilical abdominal pain for the last 3 years and has been receiving multiple abdominal imaging at [facility].  In 2016 her abdominal pain was thought to be from her gallbladder and she had underwent a cholecystectomy however she continued to have abdominal pain. A CT abdomen done in June 11, 2018 revealed wall thickening of the anterior gastric body suspicious for infectious or inflammatory but is concerning for soft tissue mass and a EGD was a recommended. MRI on June 19, 2018 which revealed diffuse hepatic steatosis without evidence of suspicious focal lesions, splenomegaly but no ascites.  She has been following   Dr. Cedeno since 2018 for her abdominal pain, however liver was not suspected to be the cause for her abdominal pain. She had negative workup for hepatitis-B, C, negative workup for autoimmune disease, negative for alpha 1 antitrypsin disease, negative for celiac sprue and her hepatitis panel has been negative so far.  MRI of the abdomen was done on September 24, 2019 which revealed multifocal areas of asymmetric gastric wall thickening.  Recommend further evaluation with direct visualization to exclude neoplasm.  EGD was done on 10/15/2019 and Gastric tumor in the gastric fundus was found however no specimen was collected as she was on anticoagulation.  The EUS done on 11/15/2019 revealed gastric tumor in the gastric fundus with many abnormal lymph nodes visualized in the lower paraesophageal mediastinum (level 8L) and gastrohepatic ligament (level 18). Fine needle biopsy performed and pathology revealed marginal zone B cell lymphoma.     She had chest pain in 2016 and the imaging done at that time revealed pulmonary nodule and has been following pulmonary, Dr. Yeung at [facility].  She has a currently daily smoker, has been smoking for almost 40 years.  On the CT chest done in November 2017 revealed 1.7 x 1.3 cm lesion in the right lower lobe and 2 x 1 cm lesion in the anterior portion of the right lower lobe.  A CT scan done on September 25, 2019 revealed a complex poorly defined mass in the right lower lobe measuring 2.78 x 2.64 cm, suspicious for primary lung carcinoma     She was diagnosed with right lower extremity DVT and has been on Xarelto.  As per the patient she was hospitalized at that time and underwent procedure for her right lower extremity for venous insufficiency.  It appears that the right lower extremity DVT is a provoked clot.  She had history of bilateral iliac vein stenting 10/16/18 for venous outflow obsturction.     Subjective:    Initial History: Ms. Newton is a 57 y.o. female who returns for  follow up.     Doing well overall. Continues to have abdominal pain due to ventral hernia, no plans yet for surgical repair.     Medications:  Current Outpatient Medications   Medication Sig Dispense Refill    ADVAIR DISKUS 250-50 mcg/dose diskus inhaler Inhale into the lungs 2 (two) times daily.      allopurinoL (ZYLOPRIM) 300 MG tablet Take 300 mg by mouth.      aspirin (ECOTRIN) 81 MG EC tablet Take 1 tablet (81 mg total) by mouth every evening. Hold until PCP follow up  0    gemfibroziL (LOPID) 600 MG tablet Take 1 tablet by mouth 2 (two) times daily.      hydroxyzine HCL (ATARAX) 25 MG tablet Take 25 mg by mouth every evening.      lisinopriL 10 MG tablet Take 10 mg by mouth once daily.      methadone (METHADOSE) 40 mg disintegrating tablet Take 40 mg by mouth once daily. PATIENT USUALLY TAKES AT 10AM      nitroGLYCERIN (NITROSTAT) 0.4 MG SL tablet Place 0.4 mg under the tongue every 5 (five) minutes as needed for Chest pain.      omeprazole (PRILOSEC) 40 MG capsule Take 1 capsule every day by oral route.      ondansetron (ZOFRAN-ODT) 4 MG TbDL Take 2 tablets (8 mg total) by mouth 2 (two) times daily. 60 tablet 0    pimecrolimus (ELIDEL) 1 % cream Apply topically 2 (two) times daily. Use for rash on the face 100 g 3    simvastatin (ZOCOR) 40 MG tablet Take 40 mg by mouth every evening.       albuterol (PROAIR HFA) 90 mcg/actuation inhaler Inhale 2 puffs into the lungs every 6 (six) hours as needed for Wheezing. Rescue 18 g 0    LORazepam (ATIVAN) 1 MG tablet Take 1 tablet (1 mg total) by mouth once. Prior to the pet scan for 1 dose 1 tablet 0     No current facility-administered medications for this visit.     Review of Systems   Constitutional:  Negative for appetite change, fatigue, fever and unexpected weight change.   HENT:  Negative for nosebleeds and sore throat.    Respiratory:  Positive for cough. Negative for shortness of breath.    Cardiovascular:  Positive for leg swelling. Negative for chest pain.  "  Gastrointestinal:  Positive for abdominal pain. Negative for blood in stool, diarrhea, nausea and vomiting.   Genitourinary:  Negative for dysuria, flank pain, hematuria, urgency and vaginal bleeding.   Musculoskeletal:  Positive for back pain.   Skin:  Negative for rash.   Neurological:  Negative for seizures and headaches.   Hematological:  Negative for adenopathy.   Psychiatric/Behavioral:  Negative for agitation.    ECOG Performance Status: 2   Objective:      Vitals:   Vitals:    07/11/23 1413   BP: (!) 145/73   BP Location: Left arm   Patient Position: Sitting   BP Method: Medium (Automatic)   Pulse: 88   Resp: 20   Temp: 98.2 °F (36.8 °C)   TempSrc: Oral   SpO2: (!) 94%   Weight: 102.6 kg (226 lb 1.3 oz)   Height: 5' 1" (1.549 m)       BMI: Body mass index is 42.72 kg/m².    Physical Exam  Constitutional:       General: She is not in acute distress.     Appearance: Normal appearance. She is obese. She is not toxic-appearing.   HENT:      Head: Normocephalic and atraumatic.      Nose: Nose normal. No rhinorrhea.      Mouth/Throat:      Mouth: Mucous membranes are moist.      Pharynx: Oropharynx is clear. No oropharyngeal exudate or posterior oropharyngeal erythema.   Eyes:      Pupils: Pupils are equal, round, and reactive to light.   Cardiovascular:      Rate and Rhythm: Normal rate and regular rhythm.      Heart sounds: No murmur heard.  Pulmonary:      Effort: Pulmonary effort is normal.      Breath sounds: Normal breath sounds.   Abdominal:      General: Abdomen is flat. There is no distension.      Palpations: Abdomen is soft.      Hernia: A hernia is present.   Musculoskeletal:         General: No swelling or tenderness. Normal range of motion.      Cervical back: Normal range of motion and neck supple.      Right lower leg: Edema present.      Left lower leg: Edema present.   Skin:     General: Skin is warm.      Capillary Refill: Capillary refill takes less than 2 seconds.      Findings: Rash (b/l LE " due to lymphedema ) present.   Neurological:      General: No focal deficit present.      Mental Status: She is alert and oriented to person, place, and time.   Psychiatric:         Mood and Affect: Mood normal.         Behavior: Behavior normal.     Laboratory Data:  No visits with results within 1 Week(s) from this visit.   Latest known visit with results is:   Lab Visit on 05/23/2023   Component Date Value Ref Range Status    WBC 05/23/2023 4.49  3.90 - 12.70 K/uL Final    RBC 05/23/2023 4.71  4.00 - 5.40 M/uL Final    Hemoglobin 05/23/2023 13.4  12.0 - 16.0 g/dL Final    Hematocrit 05/23/2023 43.2  37.0 - 48.5 % Final    MCV 05/23/2023 92  82 - 98 fL Final    MCH 05/23/2023 28.5  27.0 - 31.0 pg Final    MCHC 05/23/2023 31.0 (L)  32.0 - 36.0 g/dL Final    RDW 05/23/2023 13.9  11.5 - 14.5 % Final    Platelets 05/23/2023 184  150 - 450 K/uL Final    MPV 05/23/2023 10.5  9.2 - 12.9 fL Final    Immature Granulocytes 05/23/2023 0.4  0.0 - 0.5 % Final    Gran # (ANC) 05/23/2023 3.4  1.8 - 7.7 K/uL Final    Immature Grans (Abs) 05/23/2023 0.02  0.00 - 0.04 K/uL Final    Comment: Mild elevation in immature granulocytes is non specific and   can be seen in a variety of conditions including stress response,   acute inflammation, trauma and pregnancy. Correlation with other   laboratory and clinical findings is essential.      Lymph # 05/23/2023 0.4 (L)  1.0 - 4.8 K/uL Final    Mono # 05/23/2023 0.4  0.3 - 1.0 K/uL Final    Eos # 05/23/2023 0.2  0.0 - 0.5 K/uL Final    Baso # 05/23/2023 0.02  0.00 - 0.20 K/uL Final    nRBC 05/23/2023 0  0 /100 WBC Final    Gran % 05/23/2023 75.8 (H)  38.0 - 73.0 % Final    Lymph % 05/23/2023 9.6 (L)  18.0 - 48.0 % Final    Mono % 05/23/2023 8.5  4.0 - 15.0 % Final    Eosinophil % 05/23/2023 5.3  0.0 - 8.0 % Final    Basophil % 05/23/2023 0.4  0.0 - 1.9 % Final    Differential Method 05/23/2023 Automated   Final    Sodium 05/23/2023 139  136 - 145 mmol/L Final    Potassium 05/23/2023 4.3   3.5 - 5.1 mmol/L Final    Chloride 05/23/2023 98  95 - 110 mmol/L Final    CO2 05/23/2023 33 (H)  23 - 29 mmol/L Final    Glucose 05/23/2023 103  70 - 110 mg/dL Final    BUN 05/23/2023 6  6 - 20 mg/dL Final    Creatinine 05/23/2023 0.8  0.5 - 1.4 mg/dL Final    Calcium 05/23/2023 9.8  8.7 - 10.5 mg/dL Final    Total Protein 05/23/2023 7.1  6.0 - 8.4 g/dL Final    Albumin 05/23/2023 3.8  3.5 - 5.2 g/dL Final    Total Bilirubin 05/23/2023 0.5  0.1 - 1.0 mg/dL Final    Comment: For infants and newborns, interpretation of results should be based  on gestational age, weight and in agreement with clinical  observations.    Premature Infant recommended reference ranges:  Up to 24 hours.............<8.0 mg/dL  Up to 48 hours............<12.0 mg/dL  3-5 days..................<15.0 mg/dL  6-29 days.................<15.0 mg/dL      Alkaline Phosphatase 05/23/2023 165 (H)  55 - 135 U/L Final    AST 05/23/2023 55 (H)  10 - 40 U/L Final    ALT 05/23/2023 46 (H)  10 - 44 U/L Final    Anion Gap 05/23/2023 8  8 - 16 mmol/L Final    eGFR 05/23/2023 >60.0  >60 mL/min/1.73 m^2 Final    LD 05/23/2023 272 (H)  110 - 260 U/L Final    Results are increased in hemolyzed samples.    Hep B Core Total Ab 05/23/2023 Non-reactive  Non-reactive Final    Hep B S Ab 05/23/2023 <3.00  mIU/mL Final    Hep B S Ab 05/23/2023 Non-reactive   Final    Individual is considered not immune to HBV infection.    Hepatitis B Surface Ag 05/23/2023 Non-reactive  Non-reactive Final     Assessment and Plan:       1. 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 Deep vein thrombosis (DVT) of right lower extremity, unspecified chronicity, unspecified vein    3. Heavy cigarette smoker    4. Squamous cell skin cancer    5. Leg edema    6. Ventral hernia without obstruction or gangrene      1.  Sha MZL with coexisting Gastric/pulmonary/renal and diffuse marrow Marginal zone B-cell lymphoma. Stage IV disease s/p induction with Rituxan for 4 cycles  end 4/14/2020.   PET CT 4/2020 showed NM. Started maintenance Rituxan 7/2020. Planned for 2 years. However her treatment was held between 12 and may due to cutaneous squamous cell carcinoma diagnosis and management. She resumed tx with cycle 7 in may 2022.   Patient had right kidney inferior pole mass suspicious for lymphoma at the time of diagnosis, it was monitored and due to increasing size, she underwent renal mass bx in June 2022 and it was complicated with retroperitoneal hemorrhage requiring embolization. Path was consistent with low grade b cell lymphoma.  PET CT in august 2022 showed inferior right kidney pole hematoma with some uptake within the hematoma.  PET CT 5/2023 showed hypermetabolic 2.1 x 1.8 cm exophytic mass arising from the inferior pole of the right kidney with a maximum SUV of 7.8 consistent with residual disease (prior to bx and resuming Rituxan, patient's mas was around 5.9 cm in April 2022)  CT A/P 7/2023: There is a small (2.3 cm) nodule just distal to the right lower pole, concerning for residual disease.    S/p Cycle 12 of rituxan 4/25  Based on imaging, patient has residual disease in her right kidney lower pole. Discussed with patient that MZL stage IV is not curable with conventional treatment. Repeated relapses are common. Treatment focuses on alleviation of symptoms and reversal of cytopenias and improving quality of life. Patient is asymptomatic. No evidence of cytopenias. Residual disease is extranodal in lower pole of kidney, size is less than 7 cm. Mild splenomegaly.   After thorough discussion of benefits vs risks. Plan to proceed with observation. Will be following patient every 3 months for development of symptoms and disease progression. Cbc/cmp/LDH every 3 months   CT CAP with contrast every 3 months for now.      2. hx of Right lower extremity DVT, likely provoked: was on Xarelto for about 2 years. Stopped taking 8/21  3. Chronic active smoker. Educated patient regarding  smoking cessations, she's cutting down. Referral for smoking cessation program.   4. squamous cell carcinoma of lower lip diagnosed 10/21 s/p Wedge resection of lower lip squamous cell carcinoma primary closure and Right submandibular sentinel lymph node biopsy 1/12/22. Margins neg, LN negative. Continue follow up with dermatology.   5. LE edema - ECHO showed LBBB, EF 50%, follow up with PCP and cardiology   6. Ventral hernia follows with general surgery. Advised patient to follow with general surgery.     Shukri Shea MD  Hematology and Medical Oncology fellow     Route Chart for Scheduling    BMT Chart Routing      Follow up with physician 3 months.   Follow up with GIRISH    Provider visit type    Infusion scheduling note    Injection scheduling note    Labs CMP, CBC and LDH   Scheduling:  Preferred lab:  Lab interval:     Imaging    Pharmacy appointment    Other referrals            Treatment Plan Information   OP RITUXIMAB Q3M (MAINTENANCE)   Ale Velez MD   Upcoming Treatment Dates - OP RITUXIMAB Q3M (MAINTENANCE)    No upcoming days in selected categories.    Supportive Plan Information  OP FERRIC CARBOXYMALTOSE Q2W   Marco Antonio Malloy MD   Upcoming Treatment Dates - OP FERRIC CARBOXYMALTOSE Q2W    No upcoming days in selected categories.

## 2023-07-11 ENCOUNTER — OFFICE VISIT (OUTPATIENT)
Dept: HEMATOLOGY/ONCOLOGY | Facility: CLINIC | Age: 58
End: 2023-07-11
Payer: MEDICAID

## 2023-07-11 VITALS
OXYGEN SATURATION: 94 % | BODY MASS INDEX: 42.68 KG/M2 | RESPIRATION RATE: 20 BRPM | WEIGHT: 226.06 LBS | HEIGHT: 61 IN | TEMPERATURE: 98 F | HEART RATE: 88 BPM | SYSTOLIC BLOOD PRESSURE: 145 MMHG | DIASTOLIC BLOOD PRESSURE: 73 MMHG

## 2023-07-11 DIAGNOSIS — F17.210 HEAVY CIGARETTE SMOKER: ICD-10-CM

## 2023-07-11 DIAGNOSIS — K43.9 VENTRAL HERNIA WITHOUT OBSTRUCTION OR GANGRENE: ICD-10-CM

## 2023-07-11 DIAGNOSIS — C85.89 MARGINAL ZONE LYMPHOMA OF EXTRANODAL AND SOLID ORGAN SITES: Primary | ICD-10-CM

## 2023-07-11 DIAGNOSIS — I82.401 DEEP VEIN THROMBOSIS (DVT) OF RIGHT LOWER EXTREMITY, UNSPECIFIED CHRONICITY, UNSPECIFIED VEIN: ICD-10-CM

## 2023-07-11 DIAGNOSIS — R60.0 LEG EDEMA: ICD-10-CM

## 2023-07-11 DIAGNOSIS — C44.92 SQUAMOUS CELL SKIN CANCER: ICD-10-CM

## 2023-07-11 PROCEDURE — 99999 PR PBB SHADOW E&M-EST. PATIENT-LVL III: CPT | Mod: PBBFAC,,, | Performed by: INTERNAL MEDICINE

## 2023-07-11 PROCEDURE — 3078F DIAST BP <80 MM HG: CPT | Mod: CPTII,,, | Performed by: INTERNAL MEDICINE

## 2023-07-11 PROCEDURE — 3008F BODY MASS INDEX DOCD: CPT | Mod: CPTII,,, | Performed by: INTERNAL MEDICINE

## 2023-07-11 PROCEDURE — 3077F SYST BP >= 140 MM HG: CPT | Mod: CPTII,,, | Performed by: INTERNAL MEDICINE

## 2023-07-11 PROCEDURE — 4010F PR ACE/ARB THEARPY RXD/TAKEN: ICD-10-PCS | Mod: CPTII,,, | Performed by: INTERNAL MEDICINE

## 2023-07-11 PROCEDURE — 3078F PR MOST RECENT DIASTOLIC BLOOD PRESSURE < 80 MM HG: ICD-10-PCS | Mod: CPTII,,, | Performed by: INTERNAL MEDICINE

## 2023-07-11 PROCEDURE — 99213 OFFICE O/P EST LOW 20 MIN: CPT | Mod: PBBFAC | Performed by: INTERNAL MEDICINE

## 2023-07-11 PROCEDURE — 3008F PR BODY MASS INDEX (BMI) DOCUMENTED: ICD-10-PCS | Mod: CPTII,,, | Performed by: INTERNAL MEDICINE

## 2023-07-11 PROCEDURE — 99214 PR OFFICE/OUTPT VISIT, EST, LEVL IV, 30-39 MIN: ICD-10-PCS | Mod: S$PBB,,, | Performed by: INTERNAL MEDICINE

## 2023-07-11 PROCEDURE — 99999 PR PBB SHADOW E&M-EST. PATIENT-LVL III: ICD-10-PCS | Mod: PBBFAC,,, | Performed by: INTERNAL MEDICINE

## 2023-07-11 PROCEDURE — 99214 OFFICE O/P EST MOD 30 MIN: CPT | Mod: S$PBB,,, | Performed by: INTERNAL MEDICINE

## 2023-07-11 PROCEDURE — 3077F PR MOST RECENT SYSTOLIC BLOOD PRESSURE >= 140 MM HG: ICD-10-PCS | Mod: CPTII,,, | Performed by: INTERNAL MEDICINE

## 2023-07-11 PROCEDURE — 4010F ACE/ARB THERAPY RXD/TAKEN: CPT | Mod: CPTII,,, | Performed by: INTERNAL MEDICINE

## 2023-07-11 PROCEDURE — 1159F MED LIST DOCD IN RCRD: CPT | Mod: CPTII,,, | Performed by: INTERNAL MEDICINE

## 2023-07-11 PROCEDURE — 1159F PR MEDICATION LIST DOCUMENTED IN MEDICAL RECORD: ICD-10-PCS | Mod: CPTII,,, | Performed by: INTERNAL MEDICINE

## 2023-10-03 NOTE — ASSESSMENT & PLAN NOTE
Resolved     Bactrim Pregnancy And Lactation Text: This medication is Pregnancy Category D and is known to cause fetal risk.  It is also excreted in breast milk.

## 2023-10-17 ENCOUNTER — LAB VISIT (OUTPATIENT)
Dept: LAB | Facility: HOSPITAL | Age: 58
End: 2023-10-17
Attending: INTERNAL MEDICINE
Payer: MEDICAID

## 2023-10-17 DIAGNOSIS — C85.89 MARGINAL ZONE LYMPHOMA OF EXTRANODAL AND SOLID ORGAN SITES: ICD-10-CM

## 2023-10-17 LAB
ALBUMIN SERPL BCP-MCNC: 3.9 G/DL (ref 3.5–5.2)
ALP SERPL-CCNC: 160 U/L (ref 55–135)
ALT SERPL W/O P-5'-P-CCNC: 45 U/L (ref 10–44)
ANION GAP SERPL CALC-SCNC: 11 MMOL/L (ref 8–16)
AST SERPL-CCNC: 72 U/L (ref 10–40)
BASOPHILS # BLD AUTO: 0.02 K/UL (ref 0–0.2)
BASOPHILS NFR BLD: 0.4 % (ref 0–1.9)
BILIRUB SERPL-MCNC: 0.5 MG/DL (ref 0.1–1)
BUN SERPL-MCNC: 7 MG/DL (ref 6–20)
CALCIUM SERPL-MCNC: 9.9 MG/DL (ref 8.7–10.5)
CHLORIDE SERPL-SCNC: 97 MMOL/L (ref 95–110)
CO2 SERPL-SCNC: 30 MMOL/L (ref 23–29)
CREAT SERPL-MCNC: 0.7 MG/DL (ref 0.5–1.4)
DIFFERENTIAL METHOD: ABNORMAL
EOSINOPHIL # BLD AUTO: 0.3 K/UL (ref 0–0.5)
EOSINOPHIL NFR BLD: 4.9 % (ref 0–8)
ERYTHROCYTE [DISTWIDTH] IN BLOOD BY AUTOMATED COUNT: 14.1 % (ref 11.5–14.5)
EST. GFR  (NO RACE VARIABLE): >60 ML/MIN/1.73 M^2
GLUCOSE SERPL-MCNC: 138 MG/DL (ref 70–110)
HCT VFR BLD AUTO: 46.5 % (ref 37–48.5)
HGB BLD-MCNC: 14.5 G/DL (ref 12–16)
IMM GRANULOCYTES # BLD AUTO: 0.03 K/UL (ref 0–0.04)
IMM GRANULOCYTES NFR BLD AUTO: 0.5 % (ref 0–0.5)
LDH SERPL L TO P-CCNC: 296 U/L (ref 110–260)
LYMPHOCYTES # BLD AUTO: 0.4 K/UL (ref 1–4.8)
LYMPHOCYTES NFR BLD: 7.5 % (ref 18–48)
MCH RBC QN AUTO: 28.5 PG (ref 27–31)
MCHC RBC AUTO-ENTMCNC: 31.2 G/DL (ref 32–36)
MCV RBC AUTO: 92 FL (ref 82–98)
MONOCYTES # BLD AUTO: 0.2 K/UL (ref 0.3–1)
MONOCYTES NFR BLD: 4.4 % (ref 4–15)
NEUTROPHILS # BLD AUTO: 4.5 K/UL (ref 1.8–7.7)
NEUTROPHILS NFR BLD: 82.3 % (ref 38–73)
NRBC BLD-RTO: 0 /100 WBC
PLATELET # BLD AUTO: 171 K/UL (ref 150–450)
PMV BLD AUTO: 10.4 FL (ref 9.2–12.9)
POTASSIUM SERPL-SCNC: 4.2 MMOL/L (ref 3.5–5.1)
PROT SERPL-MCNC: 7.6 G/DL (ref 6–8.4)
RBC # BLD AUTO: 5.08 M/UL (ref 4–5.4)
SODIUM SERPL-SCNC: 138 MMOL/L (ref 136–145)
WBC # BLD AUTO: 5.47 K/UL (ref 3.9–12.7)

## 2023-10-17 PROCEDURE — 36415 COLL VENOUS BLD VENIPUNCTURE: CPT | Performed by: INTERNAL MEDICINE

## 2023-10-17 PROCEDURE — 80053 COMPREHEN METABOLIC PANEL: CPT | Performed by: INTERNAL MEDICINE

## 2023-10-17 PROCEDURE — 83615 LACTATE (LD) (LDH) ENZYME: CPT | Performed by: INTERNAL MEDICINE

## 2023-10-17 PROCEDURE — 85025 COMPLETE CBC W/AUTO DIFF WBC: CPT | Performed by: INTERNAL MEDICINE

## 2023-11-06 NOTE — PROGRESS NOTES
PATIENT: Gabi Newton  MRN: [id number]  DATE: 11/7/2023    Diagnosis:   1. Marginal zone lymphoma of extranodal and solid organ sites        Ms. Newton is a 54 year old female with Stage IV Marginal Zone Lymphoma (Gastric, Pulmonary, and Marrow) s/p 4 cycles of single agent Rituximab induction therapy completed on 4/14/20 followed by PET CT on 4/29/20 consistent with partial treatment response. She is here for a follow up visit. She has been on maintenance therapy since July 7, 2020 and last cycle is schedule tomorrow 4/25. US of kidney 4/19 shows ongoing response of exophytic lesion- biopsied 6/2022 and suspected site of b cell lymphoma. Notes worsening lower extremity edema with history of PVD. Last ECHO in 2018.    Oncologic History  Ms. Newton is a 54-year-old female with a past medical history of abdominal pain for the last 3-4 years with unknown etiology, significant smoking history for 40 years, back pain, hypertension presented to the Hematology Clinic for newly diagnosed marginal zone B-cell lymphoma     Patient complained of chronic epigastric/periumbilical abdominal pain for the last 3 years and has been receiving multiple abdominal imaging at [facility].  In 2016 her abdominal pain was thought to be from her gallbladder and she had underwent a cholecystectomy however she continued to have abdominal pain. A CT abdomen done in June 11, 2018 revealed wall thickening of the anterior gastric body suspicious for infectious or inflammatory but is concerning for soft tissue mass and a EGD was a recommended. MRI on June 19, 2018 which revealed diffuse hepatic steatosis without evidence of suspicious focal lesions, splenomegaly but no ascites.  She has been following  Dr. Cedeno since 2018 for her abdominal pain, however liver was not suspected to be the cause for her abdominal pain. She had negative workup for hepatitis-B, C, negative workup for autoimmune disease, negative for alpha 1  antitrypsin disease, negative for celiac sprue and her hepatitis panel has been negative so far.  MRI of the abdomen was done on September 24, 2019 which revealed multifocal areas of asymmetric gastric wall thickening.  Recommend further evaluation with direct visualization to exclude neoplasm.  EGD was done on 10/15/2019 and Gastric tumor in the gastric fundus was found however no specimen was collected as she was on anticoagulation.  The EUS done on 11/15/2019 revealed gastric tumor in the gastric fundus with many abnormal lymph nodes visualized in the lower paraesophageal mediastinum (level 8L) and gastrohepatic ligament (level 18). Fine needle biopsy performed and pathology revealed marginal zone B cell lymphoma.     She had chest pain in 2016 and the imaging done at that time revealed pulmonary nodule and has been following pulmonary, Dr. Yeung at [facility].  She has a currently daily smoker, has been smoking for almost 40 years.  On the CT chest done in November 2017 revealed 1.7 x 1.3 cm lesion in the right lower lobe and 2 x 1 cm lesion in the anterior portion of the right lower lobe.  A CT scan done on September 25, 2019 revealed a complex poorly defined mass in the right lower lobe measuring 2.78 x 2.64 cm, suspicious for primary lung carcinoma     She was diagnosed with right lower extremity DVT and has been on Xarelto.  As per the patient she was hospitalized at that time and underwent procedure for her right lower extremity for venous insufficiency.  It appears that the right lower extremity DVT is a provoked clot.  She had history of bilateral iliac vein stenting 10/16/18 for venous outflow obsturction.     Subjective:    Initial History: Ms. Newton is a 58 y.o. female who returns for follow up. Doing well overall.     Continues to have abdominal pain due to ventral hernia, no plans yet for surgical repair. Has a lot of family issues, her mother has dementia. And she's her primary caretaker. She's  interested to speaking to social work.     Denies any night sweats, weight loss or any lymphadenopathy.     Medications:  Current Outpatient Medications   Medication Sig Dispense Refill    ADVAIR DISKUS 250-50 mcg/dose diskus inhaler Inhale into the lungs 2 (two) times daily.      albuterol (PROAIR HFA) 90 mcg/actuation inhaler Inhale 2 puffs into the lungs every 6 (six) hours as needed for Wheezing. Rescue 18 g 0    allopurinoL (ZYLOPRIM) 300 MG tablet Take 300 mg by mouth.      aspirin (ECOTRIN) 81 MG EC tablet Take 1 tablet (81 mg total) by mouth every evening. Hold until PCP follow up  0    gemfibroziL (LOPID) 600 MG tablet Take 1 tablet by mouth 2 (two) times daily.      hydroxyzine HCL (ATARAX) 25 MG tablet Take 25 mg by mouth every evening.      lisinopriL 10 MG tablet Take 10 mg by mouth once daily.      methadone (METHADOSE) 40 mg disintegrating tablet Take 40 mg by mouth once daily. PATIENT USUALLY TAKES AT 10AM      nitroGLYCERIN (NITROSTAT) 0.4 MG SL tablet Place 0.4 mg under the tongue every 5 (five) minutes as needed for Chest pain.      omeprazole (PRILOSEC) 40 MG capsule Take 1 capsule every day by oral route.      ondansetron (ZOFRAN-ODT) 4 MG TbDL Take 2 tablets (8 mg total) by mouth 2 (two) times daily. 60 tablet 0    pimecrolimus (ELIDEL) 1 % cream Apply topically 2 (two) times daily. Use for rash on the face 100 g 3    simvastatin (ZOCOR) 40 MG tablet Take 40 mg by mouth every evening.       varenicline (CHANTIX JERSON) 0.5 mg (11)- 1 mg (42) tablet Take by mouth.      LORazepam (ATIVAN) 1 MG tablet Take 1 tablet (1 mg total) by mouth once. Prior to the pet scan for 1 dose (Patient not taking: Reported on 11/7/2023) 1 tablet 0     No current facility-administered medications for this visit.     Review of Systems   Constitutional:  Negative for appetite change, fatigue, fever and unexpected weight change.   HENT:  Negative for nosebleeds and sore throat.    Respiratory:  Positive for cough. Negative  "for shortness of breath.    Cardiovascular:  Positive for leg swelling. Negative for chest pain.   Gastrointestinal:  Positive for abdominal pain. Negative for blood in stool, diarrhea, nausea and vomiting.   Genitourinary:  Negative for dysuria, flank pain, hematuria, urgency and vaginal bleeding.   Musculoskeletal:  Positive for back pain.   Skin:  Negative for rash.   Neurological:  Negative for seizures and headaches.   Hematological:  Negative for adenopathy.   Psychiatric/Behavioral:  Negative for agitation.      ECOG Performance Status: 2   Objective:      Vitals:   Vitals:    11/07/23 1541   BP: (!) 158/78   BP Location: Right arm   Patient Position: Sitting   BP Method: Medium (Automatic)   Pulse: 90   Temp: 98.4 °F (36.9 °C)   TempSrc: Oral   SpO2: 96%   Weight: 103.3 kg (227 lb 11.8 oz)   Height: 5' 1" (1.549 m)         BMI: Body mass index is 43.03 kg/m².    Physical Exam  Constitutional:       General: She is not in acute distress.     Appearance: Normal appearance. She is obese. She is not toxic-appearing.   HENT:      Head: Normocephalic and atraumatic.      Nose: Nose normal. No rhinorrhea.      Mouth/Throat:      Mouth: Mucous membranes are moist.      Pharynx: Oropharynx is clear. No oropharyngeal exudate or posterior oropharyngeal erythema.   Eyes:      Pupils: Pupils are equal, round, and reactive to light.   Cardiovascular:      Rate and Rhythm: Normal rate and regular rhythm.      Heart sounds: No murmur heard.  Pulmonary:      Effort: Pulmonary effort is normal.      Breath sounds: Normal breath sounds.   Abdominal:      General: Abdomen is flat. There is no distension.      Palpations: Abdomen is soft.      Hernia: A hernia is present.   Musculoskeletal:         General: No swelling or tenderness. Normal range of motion.      Cervical back: Normal range of motion and neck supple.      Right lower leg: Edema present.      Left lower leg: Edema present.   Skin:     General: Skin is warm.      " Capillary Refill: Capillary refill takes less than 2 seconds.      Findings: Rash (b/l LE due to lymphedema ) present.   Neurological:      General: No focal deficit present.      Mental Status: She is alert and oriented to person, place, and time.   Psychiatric:         Mood and Affect: Mood normal.         Behavior: Behavior normal.       Laboratory Data:  No visits with results within 1 Week(s) from this visit.   Latest known visit with results is:   Lab Visit on 10/17/2023   Component Date Value Ref Range Status    WBC 10/17/2023 5.47  3.90 - 12.70 K/uL Final    RBC 10/17/2023 5.08  4.00 - 5.40 M/uL Final    Hemoglobin 10/17/2023 14.5  12.0 - 16.0 g/dL Final    Hematocrit 10/17/2023 46.5  37.0 - 48.5 % Final    MCV 10/17/2023 92  82 - 98 fL Final    MCH 10/17/2023 28.5  27.0 - 31.0 pg Final    MCHC 10/17/2023 31.2 (L)  32.0 - 36.0 g/dL Final    RDW 10/17/2023 14.1  11.5 - 14.5 % Final    Platelets 10/17/2023 171  150 - 450 K/uL Final    MPV 10/17/2023 10.4  9.2 - 12.9 fL Final    Immature Granulocytes 10/17/2023 0.5  0.0 - 0.5 % Final    Gran # (ANC) 10/17/2023 4.5  1.8 - 7.7 K/uL Final    Immature Grans (Abs) 10/17/2023 0.03  0.00 - 0.04 K/uL Final    Comment: Mild elevation in immature granulocytes is non specific and   can be seen in a variety of conditions including stress response,   acute inflammation, trauma and pregnancy. Correlation with other   laboratory and clinical findings is essential.      Lymph # 10/17/2023 0.4 (L)  1.0 - 4.8 K/uL Final    Mono # 10/17/2023 0.2 (L)  0.3 - 1.0 K/uL Final    Eos # 10/17/2023 0.3  0.0 - 0.5 K/uL Final    Baso # 10/17/2023 0.02  0.00 - 0.20 K/uL Final    nRBC 10/17/2023 0  0 /100 WBC Final    Gran % 10/17/2023 82.3 (H)  38.0 - 73.0 % Final    Lymph % 10/17/2023 7.5 (L)  18.0 - 48.0 % Final    Mono % 10/17/2023 4.4  4.0 - 15.0 % Final    Eosinophil % 10/17/2023 4.9  0.0 - 8.0 % Final    Basophil % 10/17/2023 0.4  0.0 - 1.9 % Final    Differential Method 10/17/2023  Automated   Final    LD 10/17/2023 296 (H)  110 - 260 U/L Final    Results are increased in hemolyzed samples.    Sodium 10/17/2023 138  136 - 145 mmol/L Final    Potassium 10/17/2023 4.2  3.5 - 5.1 mmol/L Final    Chloride 10/17/2023 97  95 - 110 mmol/L Final    CO2 10/17/2023 30 (H)  23 - 29 mmol/L Final    Glucose 10/17/2023 138 (H)  70 - 110 mg/dL Final    BUN 10/17/2023 7  6 - 20 mg/dL Final    Creatinine 10/17/2023 0.7  0.5 - 1.4 mg/dL Final    Calcium 10/17/2023 9.9  8.7 - 10.5 mg/dL Final    Total Protein 10/17/2023 7.6  6.0 - 8.4 g/dL Final    Albumin 10/17/2023 3.9  3.5 - 5.2 g/dL Final    Total Bilirubin 10/17/2023 0.5  0.1 - 1.0 mg/dL Final    Comment: For infants and newborns, interpretation of results should be based  on gestational age, weight and in agreement with clinical  observations.    Premature Infant recommended reference ranges:  Up to 24 hours.............<8.0 mg/dL  Up to 48 hours............<12.0 mg/dL  3-5 days..................<15.0 mg/dL  6-29 days.................<15.0 mg/dL      Alkaline Phosphatase 10/17/2023 160 (H)  55 - 135 U/L Final    AST 10/17/2023 72 (H)  10 - 40 U/L Final    ALT 10/17/2023 45 (H)  10 - 44 U/L Final    eGFR 10/17/2023 >60.0  >60 mL/min/1.73 m^2 Final    Anion Gap 10/17/2023 11  8 - 16 mmol/L Final     Assessment and Plan:       1. Marginal zone lymphoma of extranodal and solid organ sites        1.  Sha MZL with coexisting Gastric/pulmonary/renal and diffuse marrow Marginal zone B-cell lymphoma. Stage IV disease s/p induction with Rituxan for 4 cycles end 4/14/2020.   PET CT 4/2020 showed NH. Started maintenance Rituxan 7/2020. Planned for 2 years. However her treatment was held between 12 and may due to cutaneous squamous cell carcinoma diagnosis and management. She resumed tx with cycle 7 in may 2022.   Patient had right kidney inferior pole mass suspicious for lymphoma at the time of diagnosis, it was monitored and due to increasing size, she underwent  renal mass bx in June 2022 and it was complicated with retroperitoneal hemorrhage requiring embolization. Path was consistent with low grade b cell lymphoma.  PET CT in august 2022 showed inferior right kidney pole hematoma with some uptake within the hematoma.  PET CT 5/2023 showed hypermetabolic 2.1 x 1.8 cm exophytic mass arising from the inferior pole of the right kidney with a maximum SUV of 7.8 consistent with residual disease (prior to bx and resuming Rituxan, patient's mas was around 5.9 cm in April 2022)  CT A/P 7/2023: There is a small (2.3 cm) nodule just distal to the right lower pole, concerning for residual disease.    S/p Cycle 12 of rituxan 4/25/2023  Based on imaging, patient has residual disease in her right kidney lower pole. Discussed with patient that MZL stage IV is not curable with conventional treatment. Repeated relapses are common. Treatment focuses on alleviation of symptoms and reversal of cytopenias and improving quality of life. Patient is asymptomatic. No evidence of cytopenias. Residual disease is extranodal in lower pole of kidney, size is less than 7 cm. Mild splenomegaly.   After thorough discussion of benefits vs risks. Plan to proceed with observation. Will be following patient every 3 months for development of symptoms and disease progression. Cbc/cmp/LDH every 3 months. Labs are stable.    CT CAP with contrast every 3 months for now. Due for new scan.      2. hx of Right lower extremity DVT, likely provoked: was on Xarelto for about 2 years. Stopped taking 8/21  3. Chronic active smoker. Educated patient regarding smoking cessations, she's cutting down. Referral for smoking cessation program.   4. squamous cell carcinoma of lower lip diagnosed 10/21 s/p Wedge resection of lower lip squamous cell carcinoma primary closure and Right submandibular sentinel lymph node biopsy 1/12/22. Margins neg, LN negative. Continue follow up with dermatology.   5. LE edema - ECHO showed LBBB, EF  50%, follow up with PCP and cardiology, patient is not complaint with lasix, advised follow up. Compression stockings ordered. Will refer to PT/OT.   6. Ventral hernia follows with general surgery. Advised patient to follow with general surgery.     Shukri Shea MD  Hematology and Medical Oncology fellow     Route Chart for Scheduling    BMT Chart Routing      Follow up with physician 3 months. lymphoma pod   Follow up with [facility]    Provider visit type    Infusion scheduling note    Injection scheduling note    Labs CMP, CBC and LDH   Scheduling:  Preferred lab:  Lab interval:     Imaging CT chest abdomen pelvis   in 1-2 weeks   Pharmacy appointment    Other referrals                  Treatment Plan Information   OP RITUXIMAB Q3M (MAINTENANCE)   Ale Velez MD   Upcoming Treatment Dates - OP RITUXIMAB Q3M (MAINTENANCE)    No upcoming days in selected categories.

## 2023-11-07 ENCOUNTER — OFFICE VISIT (OUTPATIENT)
Dept: HEMATOLOGY/ONCOLOGY | Facility: CLINIC | Age: 58
End: 2023-11-07
Payer: MEDICAID

## 2023-11-07 VITALS
TEMPERATURE: 98 F | HEART RATE: 90 BPM | OXYGEN SATURATION: 96 % | BODY MASS INDEX: 43 KG/M2 | HEIGHT: 61 IN | DIASTOLIC BLOOD PRESSURE: 78 MMHG | SYSTOLIC BLOOD PRESSURE: 158 MMHG | WEIGHT: 227.75 LBS

## 2023-11-07 DIAGNOSIS — C85.89 MARGINAL ZONE LYMPHOMA OF EXTRANODAL AND SOLID ORGAN SITES: Primary | ICD-10-CM

## 2023-11-07 DIAGNOSIS — N39.41 URGE INCONTINENCE OF URINE: ICD-10-CM

## 2023-11-07 DIAGNOSIS — R60.9 EDEMA, UNSPECIFIED TYPE: ICD-10-CM

## 2023-11-07 PROCEDURE — 1159F PR MEDICATION LIST DOCUMENTED IN MEDICAL RECORD: ICD-10-PCS | Mod: CPTII,,, | Performed by: INTERNAL MEDICINE

## 2023-11-07 PROCEDURE — 3008F BODY MASS INDEX DOCD: CPT | Mod: CPTII,,, | Performed by: INTERNAL MEDICINE

## 2023-11-07 PROCEDURE — 3077F SYST BP >= 140 MM HG: CPT | Mod: CPTII,,, | Performed by: INTERNAL MEDICINE

## 2023-11-07 PROCEDURE — 3078F PR MOST RECENT DIASTOLIC BLOOD PRESSURE < 80 MM HG: ICD-10-PCS | Mod: CPTII,,, | Performed by: INTERNAL MEDICINE

## 2023-11-07 PROCEDURE — 99214 OFFICE O/P EST MOD 30 MIN: CPT | Mod: S$PBB,,, | Performed by: INTERNAL MEDICINE

## 2023-11-07 PROCEDURE — 3077F PR MOST RECENT SYSTOLIC BLOOD PRESSURE >= 140 MM HG: ICD-10-PCS | Mod: CPTII,,, | Performed by: INTERNAL MEDICINE

## 2023-11-07 PROCEDURE — 3008F PR BODY MASS INDEX (BMI) DOCUMENTED: ICD-10-PCS | Mod: CPTII,,, | Performed by: INTERNAL MEDICINE

## 2023-11-07 PROCEDURE — 99215 OFFICE O/P EST HI 40 MIN: CPT | Mod: PBBFAC | Performed by: INTERNAL MEDICINE

## 2023-11-07 PROCEDURE — 3078F DIAST BP <80 MM HG: CPT | Mod: CPTII,,, | Performed by: INTERNAL MEDICINE

## 2023-11-07 PROCEDURE — 99214 PR OFFICE/OUTPT VISIT, EST, LEVL IV, 30-39 MIN: ICD-10-PCS | Mod: S$PBB,,, | Performed by: INTERNAL MEDICINE

## 2023-11-07 PROCEDURE — 1159F MED LIST DOCD IN RCRD: CPT | Mod: CPTII,,, | Performed by: INTERNAL MEDICINE

## 2023-11-07 PROCEDURE — 99999 PR PBB SHADOW E&M-EST. PATIENT-LVL V: ICD-10-PCS | Mod: PBBFAC,,, | Performed by: INTERNAL MEDICINE

## 2023-11-07 PROCEDURE — 99999 PR PBB SHADOW E&M-EST. PATIENT-LVL V: CPT | Mod: PBBFAC,,, | Performed by: INTERNAL MEDICINE

## 2023-11-07 PROCEDURE — 4010F PR ACE/ARB THEARPY RXD/TAKEN: ICD-10-PCS | Mod: CPTII,,, | Performed by: INTERNAL MEDICINE

## 2023-11-07 PROCEDURE — 4010F ACE/ARB THERAPY RXD/TAKEN: CPT | Mod: CPTII,,, | Performed by: INTERNAL MEDICINE

## 2023-11-07 RX ORDER — VARENICLINE TARTRATE 0.5 (11)-1
KIT ORAL
COMMUNITY
Start: 2023-07-23

## 2023-11-10 ENCOUNTER — TELEPHONE (OUTPATIENT)
Dept: UROLOGY | Facility: CLINIC | Age: 58
End: 2023-11-10
Payer: MEDICAID

## 2023-11-10 ENCOUNTER — DOCUMENTATION ONLY (OUTPATIENT)
Dept: HEMATOLOGY/ONCOLOGY | Facility: CLINIC | Age: 58
End: 2023-11-10
Payer: MEDICAID

## 2023-11-10 NOTE — TELEPHONE ENCOUNTER
----- Message from Ashleigh Ray sent at 11/9/2023  3:50 PM CST -----  Regarding: Appt Request  Hello Lour[facility]!    No avail appts, requesting an appt on behalf of est pt, referral created 11/7/23. Please assist pt with the scheduling process. Thanks.    Kind regards,  MARIETTA Ray  Physician Referral Specialist  [facility]   P [phone]

## 2023-11-10 NOTE — PROGRESS NOTES
received a referral from Dr. Shukri MD.   reached out to the patient by phone X 2 today, and provided the patient with identifying information with the 's call back information.

## 2024-05-13 ENCOUNTER — TELEPHONE (OUTPATIENT)
Dept: HEMATOLOGY/ONCOLOGY | Facility: CLINIC | Age: 59
End: 2024-05-13
Payer: MEDICAID

## 2024-05-14 ENCOUNTER — OFFICE VISIT (OUTPATIENT)
Dept: HEMATOLOGY/ONCOLOGY | Facility: CLINIC | Age: 59
End: 2024-05-14
Payer: MEDICAID

## 2024-05-14 ENCOUNTER — LAB VISIT (OUTPATIENT)
Dept: LAB | Facility: HOSPITAL | Age: 59
End: 2024-05-14
Payer: MEDICAID

## 2024-05-14 VITALS
BODY MASS INDEX: 42.46 KG/M2 | TEMPERATURE: 98 F | HEIGHT: 61 IN | RESPIRATION RATE: 18 BRPM | OXYGEN SATURATION: 92 % | SYSTOLIC BLOOD PRESSURE: 133 MMHG | DIASTOLIC BLOOD PRESSURE: 75 MMHG | WEIGHT: 224.88 LBS | HEART RATE: 96 BPM

## 2024-05-14 DIAGNOSIS — C85.89 MARGINAL ZONE LYMPHOMA OF EXTRANODAL AND SOLID ORGAN SITES: ICD-10-CM

## 2024-05-14 DIAGNOSIS — C85.89 MARGINAL ZONE LYMPHOMA OF EXTRANODAL AND SOLID ORGAN SITES: Primary | ICD-10-CM

## 2024-05-14 LAB
ALBUMIN SERPL BCP-MCNC: 3.4 G/DL (ref 3.5–5.2)
ALP SERPL-CCNC: 163 U/L (ref 55–135)
ALT SERPL W/O P-5'-P-CCNC: 25 U/L (ref 10–44)
ANION GAP SERPL CALC-SCNC: 9 MMOL/L (ref 8–16)
AST SERPL-CCNC: 43 U/L (ref 10–40)
BASOPHILS # BLD AUTO: 0.03 K/UL (ref 0–0.2)
BASOPHILS NFR BLD: 0.5 % (ref 0–1.9)
BILIRUB SERPL-MCNC: 0.5 MG/DL (ref 0.1–1)
BUN SERPL-MCNC: 8 MG/DL (ref 6–20)
CALCIUM SERPL-MCNC: 9.7 MG/DL (ref 8.7–10.5)
CHLORIDE SERPL-SCNC: 98 MMOL/L (ref 95–110)
CO2 SERPL-SCNC: 32 MMOL/L (ref 23–29)
CREAT SERPL-MCNC: 0.8 MG/DL (ref 0.5–1.4)
DIFFERENTIAL METHOD BLD: ABNORMAL
EOSINOPHIL # BLD AUTO: 0.4 K/UL (ref 0–0.5)
EOSINOPHIL NFR BLD: 6.1 % (ref 0–8)
ERYTHROCYTE [DISTWIDTH] IN BLOOD BY AUTOMATED COUNT: 14.6 % (ref 11.5–14.5)
EST. GFR  (NO RACE VARIABLE): >60 ML/MIN/1.73 M^2
GLUCOSE SERPL-MCNC: 106 MG/DL (ref 70–110)
HCT VFR BLD AUTO: 43.4 % (ref 37–48.5)
HGB BLD-MCNC: 12.9 G/DL (ref 12–16)
IMM GRANULOCYTES # BLD AUTO: 0.04 K/UL (ref 0–0.04)
IMM GRANULOCYTES NFR BLD AUTO: 0.7 % (ref 0–0.5)
LDH SERPL L TO P-CCNC: 275 U/L (ref 110–260)
LYMPHOCYTES # BLD AUTO: 0.6 K/UL (ref 1–4.8)
LYMPHOCYTES NFR BLD: 10.4 % (ref 18–48)
MCH RBC QN AUTO: 26.8 PG (ref 27–31)
MCHC RBC AUTO-ENTMCNC: 29.7 G/DL (ref 32–36)
MCV RBC AUTO: 90 FL (ref 82–98)
MONOCYTES # BLD AUTO: 0.4 K/UL (ref 0.3–1)
MONOCYTES NFR BLD: 6.1 % (ref 4–15)
NEUTROPHILS # BLD AUTO: 4.5 K/UL (ref 1.8–7.7)
NEUTROPHILS NFR BLD: 76.2 % (ref 38–73)
NRBC BLD-RTO: 0 /100 WBC
PLATELET # BLD AUTO: 205 K/UL (ref 150–450)
PMV BLD AUTO: 10.3 FL (ref 9.2–12.9)
POTASSIUM SERPL-SCNC: 4.4 MMOL/L (ref 3.5–5.1)
PROT SERPL-MCNC: 7.2 G/DL (ref 6–8.4)
RBC # BLD AUTO: 4.81 M/UL (ref 4–5.4)
SODIUM SERPL-SCNC: 139 MMOL/L (ref 136–145)
WBC # BLD AUTO: 5.88 K/UL (ref 3.9–12.7)

## 2024-05-14 PROCEDURE — 83615 LACTATE (LD) (LDH) ENZYME: CPT | Performed by: INTERNAL MEDICINE

## 2024-05-14 PROCEDURE — 1159F MED LIST DOCD IN RCRD: CPT | Mod: CPTII,,, | Performed by: INTERNAL MEDICINE

## 2024-05-14 PROCEDURE — 80053 COMPREHEN METABOLIC PANEL: CPT | Performed by: INTERNAL MEDICINE

## 2024-05-14 PROCEDURE — 36415 COLL VENOUS BLD VENIPUNCTURE: CPT | Performed by: INTERNAL MEDICINE

## 2024-05-14 PROCEDURE — 4010F ACE/ARB THERAPY RXD/TAKEN: CPT | Mod: CPTII,,, | Performed by: INTERNAL MEDICINE

## 2024-05-14 PROCEDURE — 85025 COMPLETE CBC W/AUTO DIFF WBC: CPT | Performed by: INTERNAL MEDICINE

## 2024-05-14 PROCEDURE — 3008F BODY MASS INDEX DOCD: CPT | Mod: CPTII,,, | Performed by: INTERNAL MEDICINE

## 2024-05-14 PROCEDURE — 99215 OFFICE O/P EST HI 40 MIN: CPT | Mod: S$PBB,,, | Performed by: INTERNAL MEDICINE

## 2024-05-14 PROCEDURE — 3078F DIAST BP <80 MM HG: CPT | Mod: CPTII,,, | Performed by: INTERNAL MEDICINE

## 2024-05-14 PROCEDURE — 3075F SYST BP GE 130 - 139MM HG: CPT | Mod: CPTII,,, | Performed by: INTERNAL MEDICINE

## 2024-05-14 PROCEDURE — 99999 PR PBB SHADOW E&M-EST. PATIENT-LVL IV: CPT | Mod: PBBFAC,,, | Performed by: INTERNAL MEDICINE

## 2024-05-14 PROCEDURE — 99214 OFFICE O/P EST MOD 30 MIN: CPT | Mod: PBBFAC | Performed by: INTERNAL MEDICINE

## 2024-05-14 RX ORDER — CITALOPRAM 40 MG/1
40 TABLET, FILM COATED ORAL
COMMUNITY
Start: 2024-03-27

## 2024-05-14 RX ORDER — VARENICLINE TARTRATE 1 MG/1
1 TABLET, FILM COATED ORAL 2 TIMES DAILY
COMMUNITY
Start: 2024-01-23

## 2024-05-14 NOTE — PROGRESS NOTES
PATIENT: Gabi Newton  MRN: [id number]  DATE: 5/14/2024    Diagnosis:   1. Marginal zone lymphoma of extranodal and solid organ sites          Ms. Newton is a 54 year old female with Stage IV Marginal Zone Lymphoma (Gastric, Pulmonary, and Marrow) s/p 4 cycles of single agent Rituximab induction therapy completed on 4/14/20 followed by PET CT on 4/29/20 consistent with partial treatment response. She completed maintenance Rituxan therapy 4/25/2023. Has an exophytic renal mass, biopsied 6/2022 as lappa light chain restricted lymphoma. Slightly larger on 2/20204 CT.  Ground glass pulmonary opacities (had URI at time of scan). Notes worsening lower extremity edema with history of PVD and active smoker; has wounds lower extremities. Seeing both wound care and lymphedema PT. Updating labs today. Here for follow-up.    Oncologic History  Ms. Newton is a 54-year-old female with a past medical history of abdominal pain for the last 3-4 years with unknown etiology, significant smoking history for 40 years, back pain, hypertension presented to the Hematology Clinic for newly diagnosed marginal zone B-cell lymphoma     Patient complained of chronic epigastric/periumbilical abdominal pain for the last 3 years and has been receiving multiple abdominal imaging at [facility].  In 2016 her abdominal pain was thought to be from her gallbladder and she had underwent a cholecystectomy however she continued to have abdominal pain. A CT abdomen done in June 11, 2018 revealed wall thickening of the anterior gastric body suspicious for infectious or inflammatory but is concerning for soft tissue mass and a EGD was a recommended. MRI on June 19, 2018 which revealed diffuse hepatic steatosis without evidence of suspicious focal lesions, splenomegaly but no ascites.  She has been following  Dr. Cedeno since 2018 for her abdominal pain, however liver was not suspected to be the cause for her abdominal pain. She had  negative workup for hepatitis-B, C, negative workup for autoimmune disease, negative for alpha 1 antitrypsin disease, negative for celiac sprue and her hepatitis panel has been negative so far.  MRI of the abdomen was done on September 24, 2019 which revealed multifocal areas of asymmetric gastric wall thickening.  Recommend further evaluation with direct visualization to exclude neoplasm.  EGD was done on 10/15/2019 and Gastric tumor in the gastric fundus was found however no specimen was collected as she was on anticoagulation.  The EUS done on 11/15/2019 revealed gastric tumor in the gastric fundus with many abnormal lymph nodes visualized in the lower paraesophageal mediastinum (level 8L) and gastrohepatic ligament (level 18). Fine needle biopsy performed and pathology revealed marginal zone B cell lymphoma.     She had chest pain in 2016 and the imaging done at that time revealed pulmonary nodule and has been following pulmonary, Dr. Yeung at [facility].  She has a currently daily smoker, has been smoking for almost 40 years.  On the CT chest done in November 2017 revealed 1.7 x 1.3 cm lesion in the right lower lobe and 2 x 1 cm lesion in the anterior portion of the right lower lobe.  A CT scan done on September 25, 2019 revealed a complex poorly defined mass in the right lower lobe measuring 2.78 x 2.64 cm, suspicious for primary lung carcinoma     She was diagnosed with right lower extremity DVT and has been on Xarelto.  As per the patient she was hospitalized at that time and underwent procedure for her right lower extremity for venous insufficiency.  It appears that the right lower extremity DVT is a provoked clot.  She had history of bilateral iliac vein stenting 10/16/18 for venous outflow obsturction.     Subjective:    Initial History: Ms. Newton is a 58 y.o. female who returns for follow up.     Past Medical History:   Past Medical History:   Diagnosis Date    Abdominal pain 2018    Anemia     Anxiety      Back pain     COPD (chronic obstructive pulmonary disease) 6/10/2022    Deep vein thrombosis     Disorder of kidney and ureter     Fatty liver 2018    Gastric lymphoma 2019    Gastric tumor     GERD (gastroesophageal reflux disease)     Hyperlipidemia     Hypertension     Splenomegaly 2018       Past Surgical HIstory:   Past Surgical History:   Procedure Laterality Date    bilateral iliac vein stenosis with stenting      CARDIAC CATHETERIZATION      CARDIAC CATHETERIZATION  2018    had chest pains . states vessels at the bottom of heart collapsed     SECTION      x3    CHOLECYSTECTOMY      COLONOSCOPY      ENDOSCOPIC ULTRASOUND OF UPPER GASTROINTESTINAL TRACT Left 11/15/2019    Procedure: ULTRASOUND, UPPER GI TRACT, ENDOSCOPIC;  Surgeon: Mike Seay MD;  Location: [facility] (2ND FLR);  Service: Endoscopy;  Laterality: Left;  Ok to hold xarelto per protocol per Dr. Lloyd see media file 10/25/19-tb    ESOPHAGOGASTRODUODENOSCOPY      ESOPHAGOGASTRODUODENOSCOPY N/A 2018    Procedure: EGD (ESOPHAGOGASTRODUODENOSCOPY);  Surgeon: Ant Camejo MD;  Location: [facility];  Service: Endoscopy;  Laterality: N/A;    ESOPHAGOGASTRODUODENOSCOPY N/A 10/15/2019    Procedure: EGD (ESOPHAGOGASTRODUODENOSCOPY);  Surgeon: Melanie Cedeno MD;  Location: [facility];  Service: Endoscopy;  Laterality: N/A;    ESOPHAGOGASTRODUODENOSCOPY N/A 11/15/2019    Procedure: EGD (ESOPHAGOGASTRODUODENOSCOPY);  Surgeon: Mike Seay MD;  [facility]  Service: Endoscopy;  Laterality: N/A;    EXCISION OF LESION OF LIP Left 2022    Procedure: EXCISION, LESION, LIP;  Surgeon: Glen Giang MD;  Location: [facility] OR 2ND FLR;  Service: ENT;  Laterality: Left;  Lip resection    HYSTERECTOMY      PHLEBOGRAPHY Bilateral 10/16/2018    Procedure: VENOGRAM;  Surgeon: Colin Mathis MD;  Location: [facility] CATH LAB;  Service: Cardiology;  Laterality: Bilateral;    OH RT/LT HEART CATHETERS Left      Coronary Arteriogram-2 Cath    RHINOPLASTY      SENTINEL LYMPH NODE BIOPSY  1/12/2022    Procedure: BIOPSY, LYMPH NODE, SENTINEL;  Surgeon: Glen Giang MD;  Location: [facility] OR [address] [place] [address];  Service: ENT;;    TUBAL LIGATION      venogram Bilateral 10/16/2018       Family History:   Family History   Problem Relation Name Age of Onset    Breast cancer Maternal Grandfather      Dementia Mother      Atrial fibrillation Mother      Deep vein thrombosis Mother      Pulmonary embolism Mother      Stroke Mother      Aneurysm Father         Social History:  reports that she has been smoking cigarettes. She has a 20 pack-year smoking history. She has never used smokeless tobacco. She reports that she does not drink alcohol and does not use drugs.    Allergies:  Review of patient's allergies indicates:   Allergen Reactions    Azithromycin Anaphylaxis and Other (See Comments)     Other reaction(s): swelling to entire body    Swelling (tongue / lips)^    Ciprofloxacin Swelling     Throat swells    Other reaction(s): Angioedema    Other reaction(s): angioedema    Codeine Other (See Comments)     Swelling (throat)^    Tolerates Morphine    Codeine sulfate      Swelling (throat)^    Tolerates Morphine       Medications:  Current Outpatient Medications   Medication Sig Dispense Refill    ADVAIR DISKUS 250-50 mcg/dose diskus inhaler Inhale into the lungs 2 (two) times daily.      allopurinoL (ZYLOPRIM) 300 MG tablet Take 300 mg by mouth.      aspirin (ECOTRIN) 81 MG EC tablet Take 1 tablet (81 mg total) by mouth every evening. Hold until PCP follow up  0    gemfibroziL (LOPID) 600 MG tablet Take 1 tablet by mouth 2 (two) times daily.      hydroxyzine HCL (ATARAX) 25 MG tablet Take 25 mg by mouth every evening.      lisinopriL 10 MG tablet Take 10 mg by mouth once daily.      methadone (METHADOSE) 40 mg disintegrating tablet Take 40 mg by mouth once daily. PATIENT USUALLY TAKES AT 10AM      nitroGLYCERIN (NITROSTAT) 0.4 MG SL  tablet Place 0.4 mg under the tongue every 5 (five) minutes as needed for Chest pain.      omeprazole (PRILOSEC) 40 MG capsule Take 1 capsule every day by oral route.      ondansetron (ZOFRAN-ODT) 4 MG TbDL Take 2 tablets (8 mg total) by mouth 2 (two) times daily. 60 tablet 0    pimecrolimus (ELIDEL) 1 % cream Apply topically 2 (two) times daily. Use for rash on the face 100 g 3    simvastatin (ZOCOR) 40 MG tablet Take 40 mg by mouth every evening.       varenicline (CHANTIX) 1 mg Tab Take 1 tablet by mouth 2 (two) times daily.      albuterol (PROAIR HFA) 90 mcg/actuation inhaler Inhale 2 puffs into the lungs every 6 (six) hours as needed for Wheezing. Rescue 18 g 0    citalopram (CELEXA) 40 MG tablet Take 40 mg by mouth. (Patient not taking: Reported on 5/14/2024)      LORazepam (ATIVAN) 1 MG tablet Take 1 tablet (1 mg total) by mouth once. Prior to the pet scan for 1 dose (Patient not taking: Reported on 11/7/2023) 1 tablet 0     No current facility-administered medications for this visit.       Review of Systems   Constitutional:  Negative for appetite change, fatigue, fever and unexpected weight change.   HENT:  Negative for nosebleeds and sore throat.    Respiratory:  Positive for cough. Negative for shortness of breath.    Cardiovascular:  Positive for leg swelling. Negative for chest pain.   Gastrointestinal:  Positive for abdominal pain. Negative for blood in stool, diarrhea, nausea and vomiting.   Genitourinary:  Negative for dysuria, flank pain, hematuria, urgency and vaginal bleeding.   Musculoskeletal:  Positive for back pain.   Skin:  Positive for wound. Negative for rash.   Neurological:  Negative for seizures and headaches.   Hematological:  Negative for adenopathy.   Psychiatric/Behavioral:  Negative for agitation.      ECOG Performance Status: 2   Objective:      Vitals:   Vitals:    05/14/24 1358   BP: 133/75   BP Location: Right arm   Patient Position: Sitting   Pulse: 96   Resp: 18   Temp: 98.3  "°F (36.8 °C)   TempSrc: Oral   SpO2: (!) 92%   Weight: 102 kg (224 lb 13.9 oz)   Height: 5' 1" (1.549 m)         BMI: Body mass index is 42.49 kg/m².    Physical Exam  Constitutional:       General: She is not in acute distress.     Appearance: Normal appearance. She is obese. She is not toxic-appearing.   HENT:      Head: Normocephalic and atraumatic.      Nose: Nose normal. No rhinorrhea.      Mouth/Throat:      Mouth: Mucous membranes are moist.      Pharynx: Oropharynx is clear. No oropharyngeal exudate or posterior oropharyngeal erythema.   Eyes:      Pupils: Pupils are equal, round, and reactive to light.   Cardiovascular:      Rate and Rhythm: Normal rate and regular rhythm.      Heart sounds: No murmur heard.  Pulmonary:      Effort: Pulmonary effort is normal.      Breath sounds: Normal breath sounds.   Abdominal:      General: Abdomen is flat. There is no distension.      Palpations: Abdomen is soft.      Hernia: A hernia is present.   Musculoskeletal:         General: No swelling or tenderness. Normal range of motion.      Cervical back: Normal range of motion and neck supple.      Right lower leg: Edema present.      Left lower leg: Edema present.   Skin:     General: Skin is warm.      Capillary Refill: Capillary refill takes less than 2 seconds.      Findings: Rash (b/l LE due to lymphedema ) present.   Neurological:      General: No focal deficit present.      Mental Status: She is alert and oriented to person, place, and time.   Psychiatric:         Mood and Affect: Mood normal.         Behavior: Behavior normal.       Laboratory Data:  No visits with results within 1 Week(s) from this visit.   Latest known visit with results is:   Lab Visit on 10/17/2023   Component Date Value Ref Range Status    WBC 10/17/2023 5.47  3.90 - 12.70 K/uL Final    RBC 10/17/2023 5.08  4.00 - 5.40 M/uL Final    Hemoglobin 10/17/2023 14.5  12.0 - 16.0 g/dL Final    Hematocrit 10/17/2023 46.5  37.0 - 48.5 % Final    MCV " 10/17/2023 92  82 - 98 fL Final    MCH 10/17/2023 28.5  27.0 - 31.0 pg Final    MCHC 10/17/2023 31.2 (L)  32.0 - 36.0 g/dL Final    RDW 10/17/2023 14.1  11.5 - 14.5 % Final    Platelets 10/17/2023 171  150 - 450 K/uL Final    MPV 10/17/2023 10.4  9.2 - 12.9 fL Final    Immature Granulocytes 10/17/2023 0.5  0.0 - 0.5 % Final    Gran # (ANC) 10/17/2023 4.5  1.8 - 7.7 K/uL Final    Immature Grans (Abs) 10/17/2023 0.03  0.00 - 0.04 K/uL Final    Comment: Mild elevation in immature granulocytes is non specific and   can be seen in a variety of conditions including stress response,   acute inflammation, trauma and pregnancy. Correlation with other   laboratory and clinical findings is essential.      Lymph # 10/17/2023 0.4 (L)  1.0 - 4.8 K/uL Final    Mono # 10/17/2023 0.2 (L)  0.3 - 1.0 K/uL Final    Eos # 10/17/2023 0.3  0.0 - 0.5 K/uL Final    Baso # 10/17/2023 0.02  0.00 - 0.20 K/uL Final    nRBC 10/17/2023 0  0 /100 WBC Final    Gran % 10/17/2023 82.3 (H)  38.0 - 73.0 % Final    Lymph % 10/17/2023 7.5 (L)  18.0 - 48.0 % Final    Mono % 10/17/2023 4.4  4.0 - 15.0 % Final    Eosinophil % 10/17/2023 4.9  0.0 - 8.0 % Final    Basophil % 10/17/2023 0.4  0.0 - 1.9 % Final    Differential Method 10/17/2023 Automated   Final    LD 10/17/2023 296 (H)  110 - 260 U/L Final    Results are increased in hemolyzed samples.    Sodium 10/17/2023 138  136 - 145 mmol/L Final    Potassium 10/17/2023 4.2  3.5 - 5.1 mmol/L Final    Chloride 10/17/2023 97  95 - 110 mmol/L Final    CO2 10/17/2023 30 (H)  23 - 29 mmol/L Final    Glucose 10/17/2023 138 (H)  70 - 110 mg/dL Final    BUN 10/17/2023 7  6 - 20 mg/dL Final    Creatinine 10/17/2023 0.7  0.5 - 1.4 mg/dL Final    Calcium 10/17/2023 9.9  8.7 - 10.5 mg/dL Final    Total Protein 10/17/2023 7.6  6.0 - 8.4 g/dL Final    Albumin 10/17/2023 3.9  3.5 - 5.2 g/dL Final    Total Bilirubin 10/17/2023 0.5  0.1 - 1.0 mg/dL Final    Comment: For infants and newborns, interpretation of results should  be based  on gestational age, weight and in agreement with clinical  observations.    Premature Infant recommended reference ranges:  Up to 24 hours.............<8.0 mg/dL  Up to 48 hours............<12.0 mg/dL  3-5 days..................<15.0 mg/dL  6-29 days.................<15.0 mg/dL      Alkaline Phosphatase 10/17/2023 160 (H)  55 - 135 U/L Final    AST 10/17/2023 72 (H)  10 - 40 U/L Final    ALT 10/17/2023 45 (H)  10 - 44 U/L Final    eGFR 10/17/2023 >60.0  >60 mL/min/1.73 m^2 Final    Anion Gap 10/17/2023 11  8 - 16 mmol/L Final       Imaging:     PET CT- 4/29/2020  1.  Findings consistent with partial treatment response with decreased size of the stomach wall and decreased SUV throughout the stomach.  Metabolic activity on the baseline scan was atypically prominent for marginal zone lymphoma.  If a Deauville score were to be assigned, the score would be 4.  2.  Decreased size and SUV of the patient's right lower lobe pulmonary biopsy-proven lymphoma.  3.  Questionable slight increase in size of the patient's right lower pole lesion again concerning for RCC or oncocytoma.  Extranodal lymphoma could have a similar appearance.    PET CT 8/2022:  In this patient marginal zone lymphoma, there are No FDG avid foci to indicate active metastasis.  Postoperative changes of right renal segmental artery embolization with evolving changes of the inferior pole hematoma.  There is a focal area of increased uptake within the hematoma, nonspecific.    Assessment and Plan:       1. Marginal zone lymphoma of extranodal and solid organ sites        1.  Sha MZL with coexisting Gastric/pulmonary/diffuse marrow Marginal zone B-cell lymphoma. Stage IV disease s/p induction with Rituxan for 4 cycles 4/14/2020. PET CT 4/2020 showed [place]. Started maintenance Rituxan 7/2020. Planned for 2 years. However her treatment was held between December and May 2022 due to cutaneous squamous cell carcinoma diagnosis and management. She resumed  tx with cycle 7 in may 2022. She underwent renal mass bx in June 2022 (+ MZL) and it was complicated with retroperitoneal hemorrhage requiring embolization.  CT in February 2024 showed no new sites of disease; larger renal mass.   Follow-up every 3 months  Repeat ct imaging August 2024 (6 months from scan to follow renal lesions and lung GGOs)     2. Right lower extremity DVT, likely provoked: was on Xarelto for about 2 years. Stopped taking 8/21  3. Chronic active smoker. Educated patient regarding smoking cessations, she's cutting down. Referred for smoking cessation program.   4. squamous cell carcinoma of lower lip diagnosed 10/21 s/p Wedge resection of lower lip squamous cell carcinoma primary closure and Right submandibular sentinel lymph node biopsy 1/12/22. Margins neg, LN negative. Continue follow up with dermatology.   5. LE edema - ECHO ordered 4/24/2023 to evaluation for heart failure in setting of peripheral vascular disease; EF 55%. Seeing wound care and lymphedema clinic for chronic LE vascular changes.  6. Ventral hernia follows with general surgery       A total of 20 minutes was spent in pre-visit chart review, personal interpretation of labs and imaging, and medication review. Total visit time 40 minutes, >50 % counseling.         BMT Chart Routing      Follow up with physician . August with lymphoma pod (renee wilkinson davis); preference for [facility] with extended visit time   Follow up with [facility]    Provider visit type    Infusion scheduling note    Injection scheduling note    Labs CBC, CMP and LDH   Scheduling:  Preferred lab:  Lab interval:  1 week before return visit   Imaging CT chest abdomen pelvis   1 week before return visit   Pharmacy appointment    Other referrals

## 2024-06-03 ENCOUNTER — CLINICAL SUPPORT (OUTPATIENT)
Dept: REHABILITATION | Facility: HOSPITAL | Age: 59
End: 2024-06-03
Payer: MEDICAID

## 2024-06-03 DIAGNOSIS — I87.2 VENOUS INSUFFICIENCY (CHRONIC) (PERIPHERAL): Primary | ICD-10-CM

## 2024-06-03 DIAGNOSIS — M79.89 LEG SWELLING: ICD-10-CM

## 2024-06-03 DIAGNOSIS — R26.89 DECREASED MOBILITY: ICD-10-CM

## 2024-06-03 PROCEDURE — 97163 PT EVAL HIGH COMPLEX 45 MIN: CPT

## 2024-06-03 PROCEDURE — 97110 THERAPEUTIC EXERCISES: CPT

## 2024-06-03 NOTE — PROGRESS NOTES
See evaluation in POC for goals and assessment     Eval Date: 06/17/2024    Angie Hdez, PT, DPT, CLT

## 2024-06-17 PROBLEM — R26.89 DECREASED MOBILITY: Status: ACTIVE | Noted: 2024-06-17

## 2024-06-17 PROBLEM — M79.89 LEG SWELLING: Status: ACTIVE | Noted: 2024-06-17

## 2024-06-17 NOTE — PLAN OF CARE
[facility]  Physical Therapy Initial Evaluation    Name: Gabi DOAN [facility] Number: [id number]    Therapy Diagnosis:   Encounter Diagnoses   Name Primary?    Venous insufficiency (chronic) (peripheral) Yes    Leg swelling     Decreased mobility      Physician: Yanet Yeung MD    Physician Orders: PT Eval and Treat - lymphedema  Medical Diagnosis from Referral: I87.2 (ICD-10-CM) - Stasis dermatitis  Evaluation Date: 6/3/2024  Authorization Period Expiration: 4/4/2025  Plan of Care Expiration: 8/3/2024  Visit # / Visits authorized: 1/ 1    Time In: 3:30pm   Time Out: 4:30pm   Total Billable Time: 60 minutes    Precautions: Standard and COPD, DVT, HTN, cancer     Subjective   Date of onset: it has been years   History of current condition - Gabi reports: she has a stent in each leg and an ablation in each legs. When they were doing the ablation on the RLE they found a blood clot so she is not sure how complete the ablation. She has had leg swelling for a very long time. She has an abdominal hernia, has had for about 5 years, and it makes it hard for her to bend down and tend to her legs. The hernia makes her nauseated.      She was diagnosed with lymphoma around 2021  She had chemo, no radiation  She has had no surgeries for her cancer besides her biopsies   She is alternating between scans and blood work, she still has cancer on her kidney that they are watching  She has also had oral cancer and had surgery for that  She is not doing any active cancer treatment currently    She is also going to wound care, she had her first or second appointment at a place in [facility]. Her wound care team told her that she should get her arteries checked so that they can make sure she is safe to have her legs wrapped. Wound care bandages her legs, she goes back Thursday.     Pt denies CHF, KF, DM   Fluid pill: She is prescribed, doesn't take them, her care team is aware  Blood thinner:  She takes an aspirin      Medical History:   Past Medical History:   Diagnosis Date    Abdominal pain     Anemia     Anxiety     Back pain     COPD (chronic obstructive pulmonary disease) 6/10/2022    Deep vein thrombosis     Disorder of kidney and ureter     Fatty liver 2018    Gastric lymphoma 2019    Gastric tumor     GERD (gastroesophageal reflux disease)     Hyperlipidemia     Hypertension     Splenomegaly 2018       Surgical History:   Gabi Newton  has a past surgical history that includes Hysterectomy; Cholecystectomy;  section; Esophagogastroduodenoscopy; Colonoscopy; Tubal ligation; Esophagogastroduodenoscopy (N/A, 2018); Cardiac catheterization; Cardiac catheterization (2018); Rhinoplasty; venogram (Bilateral, 10/16/2018); Phlebography (Bilateral, 10/16/2018); *pr rt/lt heart catheters (Left); Esophagogastroduodenoscopy (N/A, 10/15/2019); Endoscopic ultrasound of upper gastrointestinal tract (Left, 11/15/2019); Esophagogastroduodenoscopy (N/A, 11/15/2019); Excision of lesion of lip (Left, 2022); Rohwer lymph node biopsy (2022); and bilateral iliac vein stenosis with stenting.    Medications:   Gabi has a current medication list which includes the following prescription(s): advair diskus, albuterol, allopurinol, aspirin, citalopram, gemfibrozil, hydroxyzine hcl, lisinopril, lorazepam, methadone, nitroglycerin, omeprazole, ondansetron, pimecrolimus, simvastatin, varenicline, [DISCONTINUED] amlodipine, and [DISCONTINUED] isosorbide mononitrate.    Allergies:   Review of patient's allergies indicates:   Allergen Reactions    Azithromycin Anaphylaxis and Other (See Comments)     Other reaction(s): swelling to entire body    Swelling (tongue / lips)^    Ciprofloxacin Swelling     Throat swells    Other reaction(s): Angioedema    Other reaction(s): angioedema    Codeine Other (See Comments)     Swelling (throat)^    Tolerates Morphine    Codeine sulfate      Swelling  (throat)^    Tolerates Morphine        Imaging,  None recent     Surgery: B ablations and stents   Radiation:  none   Chemotherapy: yes    Previous Lymphedema Treatment: yes- one visit only   Prior Therapy: none   Social History: lives with son    Environmental barriers: stairs going up to home    Abuse/Neglect: Pt shows no visible signs of abuse/neglect   Nutritional status: Pt reports no nutritional needs    Educational needs: Pt reports no educational needs    Spiritual/Cultural: Pt reports no spiritual/ cultural needs   Fall risk:  most recent was 3 days ago, did not hit her head but did hurt her leg, was able to get up by herself   Occupation: not working   Prior Level of Function: independent   Current Level of Function: Stairs, dressing, bathing, can't fit in shoes   Gait: Uses no AD    Transfers: independent    Bed Mobility: independent      Pain and Swelling:  Current 2/10, worst 5/10, best 0/10   Location: left knee   Description: pressure, sore from where she fell on her knee   Aggravating Factors: increased swelling   Easing Factors: decreased swelling     Pts goals: would like to fit in shoes, would like her activity back     Objective       Amount of Swelling/Location of Swelling: Moderate swelling BLEs   Skin Integrity: Venous stasis dermatitis BLEs with scabbing    Palpation/Texture: hyperkeratosis B lower legs    + B Stemmer Sign  - B Álvaro's Sign  Circulation: intact      Posture: FHP      Strength: functional screen of AROM against gravity and sit to stand transfers       Sensation:  intact to lt touch B LE    Girth Measurements (in centimeters)                    CMS Impairment/Limitation/Restriction for FOTO  Survey  Limitation: not performed     Therapist reviewed FOTO scores for Gabi Newton on 6/3/2024.   FOTO documents entered into [employer] - see Media section.       TREATMENT     Total Treatment time separate from Evaluation: 30 minutes  Self Care/Home Management training for 30 minutes  including:    Pt was educated in potential compression needs.  Demo of products including socks, garments, and Inelastic Velcro wraps.   Discussed cost/coverage and authorization per insurance with Durable Medical Equipment(DME) provider.  Compression require orders from referring provider and coverage or purchase of products from DME or self order.      Discussed wear schedule, don/doff, wash and management of products.  Size and compression class and AM/PM needs.    Consideration for tubigrip as form of temporary compression use until securing compression needs.   Consideration for shorts/tights or capris style compression to compliment lower leg compression to support size/shape of LE.       Informed insurance coverage of compression is per DME provider and typically Medicare and Medicare group plans may not cover cost beyond pair of standard sized knee high garments.   Commitment to attendance as well as commitment to securing compression needs is critical to edema management.      Home Exercises and Patient Education Provided  Education provided:   - lymph booklet   - Pt was educated in lymphedema etiology and management plans.  Pt was provided with written risk reductions and precautions for managing lymphedema.      This patient is in agreement to participate in Lymphedema treatment.    Written Home Exercises Provided: yes.  Exercises were reviewed and Gabi was able to demonstrate them prior to the end of the session.  Gabi demonstrated good  understanding of the education provided.     See EMR under Patient Instructions for exercises provided 6/3/2024.    Assessment   Gabi is a 58 y.o. female referred to outpatient Physical Therapy with a medical diagnosis of I87.2 (ICD-10-CM) - Stasis dermatitis. This patient presents s/p Lymphoma   COPD  DVT  HTN   resulting in: lymphedema of the BLEs, increased pain, as well as difficulty performing Stairs, dressing, bathing, can't fit in shoes  , compression needs,  and placing the pt at higher risk of infection.     Patient presents with signifcant swellning/ hyperkeratosis of the B lower legs with B + stemmer sign. Pt's edema is non pitting and skin shows signs of chronic inflammation with scabs diffuse over skin. Pt is currently undergoing wound care and was advised to complete treatment before beginning lymph PT to allow for optimal skin health. Pt also reports she will be getting an ANALISA soon as well to check arterial supply, so compression was not issued today. Pt was educated on lymphedema causes and physiology, infection signs and symptoms, complete decongestive therapy and its components, and expectations for therapy. Pt was also educated on the need for some type of compression.       Pt prognosis is Fair.   Pt will benefit from skilled outpatient Physical Therapy to address the deficits stated above and in the chart below, provide pt/family education, and to maximize pt's level of independence.     Plan of care discussed with patient: Yes  Pt's spiritual, cultural and educational needs considered and patient is agreeable to the plan of care and goals as stated below:     Medical Necessity is demonstrated by the following  History  Co-morbidities and personal factors that may impact the plan of care [] LOW: no personal factors / co-morbidities  [] MODERATE: 1-2 personal factors / co-morbidities  [x] HIGH: 3+ personal factors / co-morbidities    Moderate / High Support Documentation:   Lymphoma   COPD  DVT  HTN       Examination  Body Structures and Functions, activity limitations and participation restrictions that may impact the plan of care [] LOW: addressing 1-2 elements  [] MODERATE: 3+ elements  [x] HIGH: 4+ elements (please support below)    Moderate / High Support Documentation: leg pain, Stairs, dressing, bathing, can't fit in shoes, leg swelling, skin integrity      Clinical Presentation [] LOW: stable  [] MODERATE: Evolving  [x] HIGH: Unstable     Decision  Making/ Complexity Score: high       Anticipated Barriers for therapy: skin integrity     The following goals were discussed with the patient and patient is in agreement with them as to be addressed in the treatment plan.     Short Term Goals: (6 weeks)  1. Patient will show decreased girth in B LE by up to 1 cm to allow for LE symmetry, shoe and clothing choice, and ability to apply needed compression.  (progressing, not met)   2. Patient will demonstrate 100% knowledge of lymphedema precautions and signs of infection to allow for reduced lymphedema risk, infection risk, and/or exacerbation of condition.  (progressing, not met)  3. Patient or caregiver will perform self-bandaging techniques and/or wearing of compression garments to allow for lymphatic drainage support, skin elasticity, and reduction in shape and size of limb. (progressing, not met)  4. Patient will perform self lymph drainage techniques to areas within reach to enhance lymphatic drainage and skin condition.  (progressing, not met)  5. Patient will tolerate daily activities with multilayered bandaging to allow for lymphatic and venous support.  (progressing, not met)    Long Term Goals: (12  weeks)  1. Patient will show decreased girth in B LE by up to 2 cm  to allow for LE symmetry, shoe and clothing choice, and ability to apply needed compression daily.  (progressing, not met)  2. Patient will show reduction in density to mild or less with improved contour of limb to allow for cosmesis, LE symmetry, infection risk reduction, and clothing and compression choice.   (progressing, not met)  3. Patient to julian/doff compression garment with daily compliance to assist in lymphedema management, skin elasticity, and tissue density.  (progressing, not met)  4. Pt to show improved postural awareness and alignment.  (progressing, not met)  5. Pt to be I and compliant with HEP to allow for increased function in affected limb.   (progressing, not met)  Plan    Plan of care Certification: 6/3/2024 to 8/3/2024.    Outpatient Physical Therapy 3 times weekly for 8 weeks to include the following interventions: Gait Training, Manual Therapy, Neuromuscular Re-ed, Patient Education, Self Care, Therapeutic Activities, and Therapeutic Exercise. Complete Decongestive Therapy- compression and home equipment needs to be addressed and assisted.    Pt may be seen by a PTA as part of the Rehab treatment team.  Plan of Care was discussed with LUCILLE Fagan, PT

## 2024-06-17 NOTE — PATIENT INSTRUCTIONS
LYMPHEDEMA    What is Lymphedema  Swelling in an arm, leg, the neck, the face, genitals and/or abdomen caused by a blockage in the lymphatic system. This type of swelling can decrease the amount of oxygen that reaches tissues and can also promote infection by serving as a medium in which bacteria may grow. An example of an infection seen commonly in those with Lymphedema is Cellulitis.  It is important to know that lymphedema is progressive and can lead to long-term, chronic conditions. Therefore, early and careful management is needed to reduce symptoms.    The Lymphatic System  A network of tissues and organs that help rid the body of toxins, waste and other unwanted materials. The lymphatic system returns fluid and protein to the circulatory system from the spaces in the body's tissues. Tiny lymph vessels of the lymphatic system  materials and waste products, along with foreign materials (bacteria) and transport them to larger lymph vessels. This fluid inside the vessels is called lymph. Surrounding muscles help move the lymph along to the larger vessels until it is returned to the circulatory system. Lymph nodes help filter the lymph fluid and break down foreign substances and help fight infection.                                                          What Causes Lymphedema?  Some people are born with an underdeveloped lymph system. Swelling may present at birth or develop during adolescence or adulthood. This is known as primary lymphedema. Secondary lymphedema may occur after lymph tissue and lymph nodes are injured or removed. This swelling may begin immediately or may not occur for several months or years. A common cause of secondary lymphedema is radiation treatment. Other causes of secondary lymphedema are surgery (removal of lymph nodes), cancer, infection and trauma. While there is presently no cure for lymphedema, it can be managed with diligent care. The earlier the stage, the easier the  management of the affected area.    Stages of Lymphedema:  Stage 0 (pre-stage): A subclinical state where swelling is not obvious. You may have complaints such as heaviness in the limb or mild aching or tightness  Stage 1: Skin of the limb is typically soft with pitting edema. Elevation of the limb may improve the swelling  Stage 2: Skin of the limb is more fibrotic. The skin may no longer pit when pressed. Limb swelling does not improve with elevation.   Stage 3: Severe stage also known as elephantiasis. Skin is fibrotic with deep skin creases which are prone to infection. Trophic skin changes, such as papillomatosis and hyperkeratosis also occur in this stage.                                                                             Precautions:  It is important to prevent skin irritation, cuts, scrapes and needle sticks in the skin to decrease risk of infection  Wear gloves for gardening and housework  Use an electric razor rather than a blade razor  Have injections and blood draws from the uninvolved side  Use a thimble when sewing  Avoid rubbing, scrubbing, waxing of involved limb  Take care to avoid bites and scratches from bugs and pets  Avoid walking barefoot  Use extreme caution when peeling shrimp, crawfish, crabs      It is important to avoid extreme heat exposure. When you increase circulation to a swollen limb, the chance of additional fluid leaking into the spaces of your body's tissue increases, which causes more swelling.  Avoid prolonged exposure to sunlight  Use sunscreen when outdoors  Use oven mitts when reaching into an oven  Avoid hot tubs and saunas  Avoid spending prolonged time under a hot hair dryer  Look for clothing made of breathable fabrics for hot weather      It is important to avoid anything that will reduce circulation of blood flow, since reduction of blood flow can lead to a restriction of lymph flow.  Have blood pressure measured in the uninvolved limb  Avoid tight or  constricting clothing such as tight sleeves and waistbands  Avoid tight watches and jewelry  Avoid sitting with your legs crossed        In general, practice healthy living habits:  Avoid alcohol and smoking  Maintain your ideal weight  Keep sodium intake at a moderate level (less than 2,400 mg/day)  Eat moderate amount of protein to maintain tissue health. Contact your primary doctor for a referral to [facility]  When you travel by air, wear your compression garment during the flight  Wash hands frequently and dry thoroughly  Keep skin moisturized and free from cracking or peeling. For sensitive skin, Eucerin lotion is a good option.   Avoid hangnails and do not pull or bite cuticles  Take care of all scratches, cuts, bites, immediately with an antibiotic cream or ointment (Neosporin, Bacitracin, Triple ABX) and cover with a clean bandage.       Call your doctor as soon as you suspect infection. Be aware of the signs of infection:  Increased swelling  Redness (generalized or localized small, red dots)  Heat (arm/leg feels warm, or you have fever)  Pain       MANAGING YOUR BANDAGES:  Although your bandages are inconvenient, they are an important part of the lymphedema program. You will receive one set of bandages for participating in [facility]. It is important to take care of these bandages during your treatment. You should not get your bandages wet! For bandages on the arm, it is possible to protect the arm bandages with a plastic bag when in a tub. No Showers with bandages on arm or leg!          WHAT TO EXPECT DURING LYMPHEDEMA TREATMENT:  Wear loose short sleeves for arm treatment. Sleeveless tops work well too. Wear skirts or baggy shorts or loose fitting/baggy pants for leg treatment.  If your leg(s) are being bandaged, find the widest shoe(s) possible. Wide slip-on shoes like crocks usually work. If you do not have a shoe that will fit over the bandages, a  disposable shoe cover will be provided to you.  Treatment sessions will last 45 minutes to 60 minutes and will consist of Manual Lymphatic Drainage (MLD), MLD training, vasopneumatic compression of the extremity as needed and bandaging of the swollen extremity.   You may be asked to remove clothes so that treatments can be performed efficiently. You will be provided with a sheet to drape yourself.   Once the compression bandage is applied, you will be expected to wear the bandage until the next visit. The bandage will loosen as the lymph fluid is pushed out of the limb. Do not attempt to unwrap and re-wrap the limb unless you have been trained to do so. In the event that the bandages are so loose that they fall off, remove them and put everything in a bag and bring it to the next therapy session.  While wearing the bandages on the arm or leg, keep fingers and toes moving, bend your elbow/knee, wrist/ankle frequently. Elevate the limb above the heart to relieve any throbbing or discomfort.  If the discomfort is unbearable, you may try to remove the outermost bandage. If this does not work, remove all bandages and bring them with you to the next therapy session.   Depending on severity of the swelling, expect weekly treatment for 2-5 days per week x 4-6 weeks.  An exercise program will be created based on individual need.  You will be fitted for a permanent compression garment (sleeve for arm/stocking for leg) prior to discharge from therapy. This permanent compression garment will take the place of the bandages and need to be worn as prescribed by your therapist.    CARE OF YOUR COMPRESSION GARMENT  Wear the compression garment daily as prescribed by your therapist. You will not wear the compression garment when sleeping. Put on the garment soon after you wake up and remove it before going to bed. This will decrease the risk of the lymph fluid returning to the extremity.   Use donning gloves/garden gloves to  the  compression garment. This will decrease tearing the material and make it easier to  the material.  If you feel the garment is too tight, notify your therapist.  It is best to have a wear one, wash one garment if possible. Check with your insurance provided to see what they will cover for lymphedema supplies.  Compression garments can be expected to last 3-6 months before they need to be replaced. When the compression garment loses its compressive ability, swelling from lymph fluid can return in the limb even if you are wearing the garment daily.   Consult your therapist or compression representative in the event you need to be re-measured.     Exercise:    Your therapist will instruct you in an exercise program tailored to you. Muscle contractions help promote the flow of lymph out of the swollen limb.  To prevent an increase in swelling of the limb, it is always best practice to wear your compression garment on the affected extremity when exercising.    **IF YOUR ARM IS BANDAGED, OPEN/CLOSE YOUR HAND AND PERFORM WRIST CIRCLES THROUGHOUT THE DAY.    Perform all exercises below with good, erect posture. Stand with feet shoulder width apart, with your knees slightly bent. Repeat each exercise 10-20 reps up to 3x/week               Shoulder Blade Squeeze      Backwards Shoulder Roll  Neck Rotation    Neck Side Bends    Chest Press with Stick    Elbow Bends with Stick      Ankle Pumps (and wiggle toes; do frequently)    Hamstring Curls    Heel Raises  Knee Extension    Knee Bends    Lunges  Knee Raises    Trunk Rotation    The following are a list of resources that may be helpful for you.  Lymphnotes.com: online information source for those having or are at risk of developing Lymphedema. This site is also for family and friend's who care for those with lymphedema  NLN (National Lymphedema Network): an organization dedicated to promoting the awareness of lymphedema and empowering people with lymphedema to live life  to the fullest. Lymphnet.org  LEARN (Lymphedema Education and Research Network): answers to frequently asked questions about Lymphedema, Lipedema, and the lymphatic system. Lymphaticnetwork.org  How Kera Got her Rack Back, A Breast Cancer Memoir by Jolie Walker - a book that gives a laugh out loud humor to her adventure, along with poignant moments of self-discovery as she blogs her way to good health. (available on [employer] and paperback)  100 Questions and Answers About Lymphedema by Dyan Tran, Chitra Montoya, and Dottie Dwyer - provides clear, straightforward answers to your questions about lymphedema. (available on paperback)  Podcasts: Lymphedema Podcast, Livedema Podcast, Lymph Logic to name a few      If you need further assistance or have questions concerning your treatment, please do not hesitate to call Angie Hdez (therapist) at  [phone] (phone number).

## 2024-07-31 ENCOUNTER — TELEPHONE (OUTPATIENT)
Dept: HEMATOLOGY/ONCOLOGY | Facility: CLINIC | Age: 59
End: 2024-07-31
Payer: MEDICAID

## 2024-07-31 NOTE — TELEPHONE ENCOUNTER
Called to confirm pts appt for 8/2/24 but pt stated that she never got her CT scan done and needed to r/s the appt to a different date. Appt was moved to Dr. Simmons next available on on 8/26/24. CT scan was scheduled for 8/8/24.

## 2024-09-09 DIAGNOSIS — I89.0 LYMPHEDEMA, NOT ELSEWHERE CLASSIFIED: Primary | ICD-10-CM

## 2024-09-10 ENCOUNTER — PATIENT MESSAGE (OUTPATIENT)
Dept: CARDIOLOGY | Facility: CLINIC | Age: 59
End: 2024-09-10
Payer: MEDICAID

## 2024-09-18 ENCOUNTER — PATIENT MESSAGE (OUTPATIENT)
Dept: HEMATOLOGY/ONCOLOGY | Facility: CLINIC | Age: 59
End: 2024-09-18
Payer: MEDICAID

## 2024-09-19 ENCOUNTER — OFFICE VISIT (OUTPATIENT)
Dept: CARDIOLOGY | Facility: CLINIC | Age: 59
End: 2024-09-19
Payer: MEDICAID

## 2024-09-19 VITALS
BODY MASS INDEX: 44.8 KG/M2 | OXYGEN SATURATION: 94 % | DIASTOLIC BLOOD PRESSURE: 78 MMHG | SYSTOLIC BLOOD PRESSURE: 156 MMHG | WEIGHT: 237.31 LBS | HEIGHT: 61 IN | HEART RATE: 94 BPM

## 2024-09-19 DIAGNOSIS — G47.33 OSA (OBSTRUCTIVE SLEEP APNEA): Chronic | ICD-10-CM

## 2024-09-19 DIAGNOSIS — I20.89 STABLE ANGINA: ICD-10-CM

## 2024-09-19 DIAGNOSIS — M79.89 LEG SWELLING: ICD-10-CM

## 2024-09-19 DIAGNOSIS — I87.2 VENOUS INSUFFICIENCY (CHRONIC) (PERIPHERAL): Primary | ICD-10-CM

## 2024-09-19 DIAGNOSIS — R06.02 SHORTNESS OF BREATH: ICD-10-CM

## 2024-09-19 DIAGNOSIS — I25.10 CORONARY ARTERY DISEASE INVOLVING NATIVE CORONARY ARTERY OF NATIVE HEART WITHOUT ANGINA PECTORIS: ICD-10-CM

## 2024-09-19 DIAGNOSIS — I10 ESSENTIAL HYPERTENSION: ICD-10-CM

## 2024-09-19 DIAGNOSIS — Z72.0 TOBACCO ABUSE: ICD-10-CM

## 2024-09-19 DIAGNOSIS — E78.5 HYPERLIPIDEMIA, UNSPECIFIED HYPERLIPIDEMIA TYPE: ICD-10-CM

## 2024-09-19 PROCEDURE — 99215 OFFICE O/P EST HI 40 MIN: CPT | Mod: PBBFAC,PN

## 2024-09-19 PROCEDURE — 99999 PR PBB SHADOW E&M-EST. PATIENT-LVL V: CPT | Mod: PBBFAC,,,

## 2024-09-19 RX ORDER — NITROGLYCERIN 0.4 MG/1
0.4 TABLET SUBLINGUAL EVERY 5 MIN PRN
Qty: 90 TABLET | Refills: 3 | Status: SHIPPED | OUTPATIENT
Start: 2024-09-19

## 2024-09-19 RX ORDER — TRIAMCINOLONE ACETONIDE 1 MG/G
OINTMENT TOPICAL
COMMUNITY
Start: 2024-05-30

## 2024-09-19 RX ORDER — IBUPROFEN 200 MG
1 TABLET ORAL DAILY
COMMUNITY

## 2024-09-19 RX ORDER — ONDANSETRON HYDROCHLORIDE 8 MG/1
1 TABLET, FILM COATED ORAL EVERY 8 HOURS PRN
COMMUNITY

## 2024-09-19 RX ORDER — LISINOPRIL AND HYDROCHLOROTHIAZIDE 20; 25 MG/1; MG/1
1 TABLET ORAL DAILY
Qty: 90 TABLET | Refills: 3 | Status: SHIPPED | OUTPATIENT
Start: 2024-09-19 | End: 2025-09-19

## 2024-09-19 NOTE — PROGRESS NOTES
[facility] - Cardiology [address]  Cardiology Clinic Note      Chief Complaint  Chief Complaint   Patient presents with    Coronary Artery Disease    Hypertension    Hyperlipidemia       HPI:  Ms. Newton is a 58 year old female with a past medical history of lichenification, chronic low back pain, left shoulder pain, gout?, severe cervical dysplasia, tobacco dependency, MOIZ, retroperitoneal bleeding, GERD, nonmalignant GIST, lip SCC, right renal mass, CHLOÉ, COPD (PRN home oxygen 2L), amilcar, nodule,  chronic venous insufficiency (history of ablation), DVT, nonhodgkin's lymphoma (chemo)?, hypertension, hyperlipidemia, CAD (10/16/2018 cardiac catheterization PT and stenting to right external iliac vein, left common iliac artery, and left external iliac artery; ASA and statin), stable angina.    Seen in the past by Dr. Ortiz  She is new to me and here to establish care  Reports reproducible chest discomfort that waxes and wanes  Endorses SOB and GAITAN that she contributes to weight gain  States she is unable to tolerate exercise or increased activity  Right lower leg wounds; cannot use compression stockings and doesn't elevate legs  Reports orthopnea and PND  Reports taking Lasix but unable due to cramping and dehydration  Upcoming appointment to work with   Does not wear CPAP due to unable to wear mask at night  States she doesn't use home oxygen  Umbilical hernia noted; states she and PCP thinks its best hold off on seeing general surgery for interventions until medically managed  Reports tobacco 1/2PPD, but denies alcohol use  Son lives her and recently contracted Covid    Pertinent cardiac studies:  5/12/2023 TTE  The left ventricle is normal in size with concentric hypertrophy and normal systolic function.  There is abnormal septal wall motion consistent with left bundle branch block.  The estimated ejection fraction is 55%.  Normal left ventricular diastolic function.  Normal right ventricular size with normal right  ventricular systolic function.  Normal central venous pressure (3 mmHg).  There is mild aortic valve stenosis.  Aortic valve area is 1.32 cm2; peak velocity is 2.3 m/s; mean gradient is 13 mmHg.    BNP and Echo?  CHLOÉ...CPAP?  Why did you stop amlodipine and Imdur?  Ask if still smoking?  Lasix for LE edema?    Medications  Current Outpatient Medications   Medication Sig Dispense Refill    ADVAIR DISKUS 250-50 mcg/dose diskus inhaler Inhale into the lungs 2 (two) times daily. Only using once a day      albuterol (PROAIR HFA) 90 mcg/actuation inhaler Inhale 2 puffs into the lungs every 6 (six) hours as needed for Wheezing. Rescue 18 g 0    allopurinoL (ZYLOPRIM) 300 MG tablet Take 300 mg by mouth nightly.      aspirin (ECOTRIN) 81 MG EC tablet Take 1 tablet (81 mg total) by mouth every evening. Hold until PCP follow up  0    gemfibroziL (LOPID) 600 MG tablet Take 1 tablet by mouth 2 (two) times daily. Only taking once a day      hydroxyzine HCL (ATARAX) 25 MG tablet Take 25 mg by mouth every evening.      lisinopriL 10 MG tablet Take 10 mg by mouth once daily.      methadone (METHADOSE) 40 mg disintegrating tablet Take 40 mg by mouth once daily. PATIENT USUALLY TAKES AT 10AM      ondansetron (ZOFRAN) 8 MG tablet Take 1 tablet by mouth every 8 (eight) hours as needed.      ondansetron (ZOFRAN-ODT) 4 MG TbDL Take 2 tablets (8 mg total) by mouth 2 (two) times daily. (Patient taking differently: Take 8 mg by mouth as needed.) 60 tablet 0    pimecrolimus (ELIDEL) 1 % cream Apply topically 2 (two) times daily. Use for rash on the face 100 g 3    simvastatin (ZOCOR) 40 MG tablet Take 40 mg by mouth every evening.       triamcinolone acetonide 0.1% (KENALOG) 0.1 % ointment SMARTSIG:Topical 3 Times a Week PRN      citalopram (CELEXA) 40 MG tablet Take 40 mg by mouth. (Patient not taking: Reported on 5/14/2024)      lisinopriL-hydrochlorothiazide (PRINZIDE,ZESTORETIC) 20-25 mg Tab Take 1 tablet by mouth once daily. 90 tablet 3     LORazepam (ATIVAN) 1 MG tablet Take 1 tablet (1 mg total) by mouth once. Prior to the pet scan for 1 dose (Patient not taking: Reported on 2024) 1 tablet 0    nicotine (NICODERM CQ) 21 mg/24 hr Apply 1 patch topically once daily.      nitroGLYCERIN (NITROSTAT) 0.4 MG SL tablet Place 1 tablet (0.4 mg total) under the tongue every 5 (five) minutes as needed for Chest pain. 90 tablet 3    omeprazole (PRILOSEC) 40 MG capsule Take 1 capsule every day by oral route.      varenicline (CHANTIX) 1 mg Tab Take 1 tablet by mouth 2 (two) times daily. (Patient not taking: Reported on 2024)       No current facility-administered medications for this visit.        History  Past Medical History:   Diagnosis Date    Abdominal pain     Anemia     Anxiety     Back pain     COPD (chronic obstructive pulmonary disease) 6/10/2022    Deep vein thrombosis     Disorder of kidney and ureter     Fatty liver 2018    Gastric lymphoma 2019    Gastric tumor     GERD (gastroesophageal reflux disease)     Hyperlipidemia     Hypertension     Splenomegaly 2018     Past Surgical History:   Procedure Laterality Date    bilateral iliac vein stenosis with stenting      CARDIAC CATHETERIZATION      CARDIAC CATHETERIZATION  2018    had chest pains . states vessels at the bottom of heart collapsed     SECTION      x3    CHOLECYSTECTOMY      COLONOSCOPY      ENDOSCOPIC ULTRASOUND OF UPPER GASTROINTESTINAL TRACT Left 11/15/2019    Procedure: ULTRASOUND, UPPER GI TRACT, ENDOSCOPIC;  Surgeon: Mike Seay MD;  Location: [facility] ([address] [place] [address]);  Service: Endoscopy;  Laterality: Left;  Ok to hold xarelto per protocol per Dr. Lloyd see media file 10/25/19-tb    ESOPHAGOGASTRODUODENOSCOPY      ESOPHAGOGASTRODUODENOSCOPY N/A 2018    Procedure: EGD (ESOPHAGOGASTRODUODENOSCOPY);  Surgeon: Ant Camejo MD;  Location: [facility];  Service: Endoscopy;  Laterality: N/A;    ESOPHAGOGASTRODUODENOSCOPY N/A 10/15/2019     Procedure: EGD (ESOPHAGOGASTRODUODENOSCOPY);  Surgeon: Melanie Cedeno MD;  Location: [facility] ENDO;  Service: Endoscopy;  Laterality: N/A;    ESOPHAGOGASTRODUODENOSCOPY N/A 11/15/2019    Procedure: EGD (ESOPHAGOGASTRODUODENOSCOPY);  Surgeon: Mike Seay MD;  Location: [facility] ENDO (2ND FLR);  Service: Endoscopy;  Laterality: N/A;    EXCISION OF LESION OF LIP Left 1/12/2022    Procedure: EXCISION, LESION, LIP;  Surgeon: Glen Giang MD;  Location: [facility] OR 2ND FLR;  Service: ENT;  Laterality: Left;  Lip resection    HYSTERECTOMY      PHLEBOGRAPHY Bilateral 10/16/2018    Procedure: VENOGRAM;  Surgeon: Colin Mathis MD;  Location: [facility] CATH LAB;  Service: Cardiology;  Laterality: Bilateral;    DC RT/LT HEART CATHETERS Left     Coronary Arteriogram-2 Cath    RHINOPLASTY      SENTINEL LYMPH NODE BIOPSY  1/12/2022    Procedure: BIOPSY, LYMPH NODE, SENTINEL;  Surgeon: Glen Giang MD;  [facility];  Service: ENT;;    TUBAL LIGATION      venogram Bilateral 10/16/2018     Social History     Socioeconomic History    Marital status:     Number of children: 3   Occupational History     Comment: cleans   Tobacco Use    Smoking status: Every Day     Current packs/day: 0.50     Average packs/day: 0.5 packs/day for 40.0 years (20.0 ttl pk-yrs)     Types: Cigarettes    Smokeless tobacco: Never    Tobacco comments:     trying to quit; in a program; at 1/2 pack per day   Substance and Sexual Activity    Alcohol use: No    Drug use: No     Comment: former opioid addiction  quit 8 yrs ago- pain management    Sexual activity: Not Currently     Partners: Male   Social History Narrative    Stairs- 5 to enter house     Social Determinants of Health     Financial Resource Strain: Medium Risk (9/18/2024)    Overall Financial Resource Strain (CARDIA)     Difficulty of Paying Living Expenses: Somewhat hard   Food Insecurity: Food Insecurity Present (9/18/2024)    Hunger Vital Sign     Worried About Running  Out of Food in the Last Year: Sometimes true     Ran Out of Food in the Last Year: Often true   Physical Activity: Inactive (9/18/2024)    Exercise Vital Sign     Days of Exercise per Week: 0 days     Minutes of Exercise per Session: 0 min   Stress: Stress Concern Present (9/18/2024)    [facility] - Occupational Stress Questionnaire     Feeling of Stress : To some extent   Housing Stability: Unknown (9/18/2024)    Housing Stability Vital Sign     Unable to Pay for Housing in the Last Year: No     Family History   Problem Relation Name Age of Onset    Breast cancer Maternal Grandfather      Dementia Mother      Atrial fibrillation Mother      Deep vein thrombosis Mother      Pulmonary embolism Mother      Stroke Mother      Aneurysm Father          Allergies  Review of patient's allergies indicates:   Allergen Reactions    Azithromycin Anaphylaxis and Other (See Comments)     Other reaction(s): swelling to entire body    Swelling (tongue / lips)^    Ciprofloxacin Swelling     Throat swells    Other reaction(s): Angioedema    Other reaction(s): angioedema    Codeine Other (See Comments)     Swelling (throat)^    Tolerates Morphine    Codeine sulfate      Swelling (throat)^    Tolerates Morphine       Review of Systems   Review of Systems   Constitutional: Positive for weight gain. Negative for chills, decreased appetite, diaphoresis, fever, malaise/fatigue and weight loss.   Eyes:  Negative for blurred vision.   Cardiovascular:  Positive for dyspnea on exertion, leg swelling, near-syncope, orthopnea and paroxysmal nocturnal dyspnea. Negative for chest pain, claudication, irregular heartbeat, palpitations and syncope.   Respiratory:  Negative for cough, shortness of breath, snoring, sputum production and wheezing.    Endocrine: Negative for cold intolerance, heat intolerance, polydipsia, polyphagia and polyuria.   Skin:  Negative for color change, dry skin, itching, nail changes and poor  wound healing.   Musculoskeletal:  Negative for back pain, gout, joint pain and joint swelling.   Gastrointestinal:  Positive for bloating. Negative for abdominal pain, constipation, diarrhea, hematemesis, hematochezia, melena, nausea and vomiting.   Genitourinary:  Negative for dysuria and hematuria.   Neurological:  Negative for dizziness, headaches, light-headedness, numbness, paresthesias and weakness.   Psychiatric/Behavioral:  Negative for altered mental status, depression and memory loss.        Physical Exam  Vitals:    09/19/24 1408   BP: (!) 156/78   Pulse: 94     Wt Readings from Last 1 Encounters:   09/19/24 107.6 kg (237 lb 5.2 oz)     Physical Exam  Constitutional:       General: She is not in acute distress.     Appearance: She is obese.   HENT:      Head: Normocephalic and atraumatic.      Mouth/Throat:      Mouth: Mucous membranes are moist.   Neck:      Vascular: No carotid bruit or JVD.   Cardiovascular:      Rate and Rhythm: Normal rate and regular rhythm.      Pulses: Normal pulses and intact distal pulses.      Heart sounds: No murmur heard.     No gallop.   Pulmonary:      Effort: Pulmonary effort is normal. No respiratory distress.      Breath sounds: Normal breath sounds. No wheezing.   Abdominal:      General: Bowel sounds are normal. There is no distension.      Palpations: Abdomen is soft.      Tenderness: There is no abdominal tenderness.   Musculoskeletal:      Right lower leg: Edema present.      Left lower leg: Edema present.   Skin:     General: Skin is warm and dry.      Findings: Wound present.      Comments: Bilateral lower extrems   Neurological:      Mental Status: She is alert and oriented to person, place, and time.         Labs  Lab Visit on 05/14/2024   Component Date Value Ref Range Status    WBC 05/14/2024 5.88  3.90 - 12.70 K/uL Final    RBC 05/14/2024 4.81  4.00 - 5.40 M/uL Final    Hemoglobin 05/14/2024 12.9  12.0 - 16.0 g/dL Final    Hematocrit 05/14/2024 43.4  37.0 -  48.5 % Final    MCV 05/14/2024 90  82 - 98 fL Final    MCH 05/14/2024 26.8 (L)  27.0 - 31.0 pg Final    MCHC 05/14/2024 29.7 (L)  32.0 - 36.0 g/dL Final    RDW 05/14/2024 14.6 (H)  11.5 - 14.5 % Final    Platelets 05/14/2024 205  150 - 450 K/uL Final    MPV 05/14/2024 10.3  9.2 - 12.9 fL Final    Immature Granulocytes 05/14/2024 0.7 (H)  0.0 - 0.5 % Final    Gran # (ANC) 05/14/2024 4.5  1.8 - 7.7 K/uL Final    Immature Grans (Abs) 05/14/2024 0.04  0.00 - 0.04 K/uL Final    Comment: Mild elevation in immature granulocytes is non specific and   can be seen in a variety of conditions including stress response,   acute inflammation, trauma and pregnancy. Correlation with other   laboratory and clinical findings is essential.      Lymph # 05/14/2024 0.6 (L)  1.0 - 4.8 K/uL Final    Mono # 05/14/2024 0.4  0.3 - 1.0 K/uL Final    Eos # 05/14/2024 0.4  0.0 - 0.5 K/uL Final    Baso # 05/14/2024 0.03  0.00 - 0.20 K/uL Final    nRBC 05/14/2024 0  0 /100 WBC Final    Gran % 05/14/2024 76.2 (H)  38.0 - 73.0 % Final    Lymph % 05/14/2024 10.4 (L)  18.0 - 48.0 % Final    Mono % 05/14/2024 6.1  4.0 - 15.0 % Final    Eosinophil % 05/14/2024 6.1  0.0 - 8.0 % Final    Basophil % 05/14/2024 0.5  0.0 - 1.9 % Final    Differential Method 05/14/2024 Automated   Final    Sodium 05/14/2024 139  136 - 145 mmol/L Final    Potassium 05/14/2024 4.4  3.5 - 5.1 mmol/L Final    Chloride 05/14/2024 98  95 - 110 mmol/L Final    CO2 05/14/2024 32 (H)  23 - 29 mmol/L Final    Glucose 05/14/2024 106  70 - 110 mg/dL Final    BUN 05/14/2024 8  6 - 20 mg/dL Final    Creatinine 05/14/2024 0.8  0.5 - 1.4 mg/dL Final    Calcium 05/14/2024 9.7  8.7 - 10.5 mg/dL Final    Total Protein 05/14/2024 7.2  6.0 - 8.4 g/dL Final    Albumin 05/14/2024 3.4 (L)  3.5 - 5.2 g/dL Final    Total Bilirubin 05/14/2024 0.5  0.1 - 1.0 mg/dL Final    Comment: For infants and newborns, interpretation of results should be based  on gestational age, weight and in agreement with  clinical  observations.    Premature Infant recommended reference ranges:  Up to 24 hours.............<8.0 mg/dL  Up to 48 hours............<12.0 mg/dL  3-5 days..................<15.0 mg/dL  6-29 days.................<15.0 mg/dL      Alkaline Phosphatase 05/14/2024 163 (H)  55 - 135 U/L Final    AST 05/14/2024 43 (H)  10 - 40 U/L Final    ALT 05/14/2024 25  10 - 44 U/L Final    eGFR 05/14/2024 >60.0  >60 mL/min/1.73 m^2 Final    Anion Gap 05/14/2024 9  8 - 16 mmol/L Final    LD 05/14/2024 275 (H)  110 - 260 U/L Final    Results are increased in hemolyzed samples.       EKG  Reviewed    Echo   Results for orders placed or performed during the hospital encounter of 05/12/23   Echo   Result Value Ref Range    BSA 2.09 m2    TDI SEPTAL 0.09 m/s    LV LATERAL E/E' RATIO 8.46 m/s    LV SEPTAL E/E' RATIO 12.22 m/s    Left Ventricular Outflow Tract Mean Velocity 0.93 cm/s    Left Ventricular Outflow Tract Mean Gradient 3.93 mmHg    AORTIC VALVE CUSP SEPERATION 1.64 cm    TDI LATERAL 0.13 m/s    PV PEAK VELOCITY 1.12 cm/s    LVIDd 4.88 3.5 - 6.0 cm    IVS 1.18 (A) 0.6 - 1.1 cm    Posterior Wall 1.43 (A) 0.6 - 1.1 cm    Ao root annulus 2.51 cm    LVIDs 3.77 2.1 - 4.0 cm    FS 23 28 - 44 %    LV mass 253.51 g    RVDD 2.45 cm    Left Ventricle Relative Wall Thickness 0.59 cm    AV mean gradient 13 mmHg    AV valve area 1.32 cm2    AV Velocity Ratio 0.57     AV index (prosthetic) 0.59     MV valve area p 1/2 method 4.95 cm2    E/A ratio 0.85     Mean e' 0.11 m/s    E wave deceleration time 153.31 msec    LVOT diameter 1.68 cm    LVOT area 2.2 cm2    LVOT peak víctor 1.32 m/s    LVOT peak VTI 29.50 cm    Ao peak víctor 2.3 m/s    Ao VTI 49.7 cm    LVOT stroke volume 65.36 cm3    AV peak gradient 21 mmHg    E/E' ratio 10.00 m/s    MV Peak E Víctor 1.10 m/s    MV stenosis pressure 1/2 time 44.46 ms    MV Peak A Víctor 1.29 m/s    LV Systolic Volume 60.71 mL    LV Systolic Volume Index 30.7 mL/m2    LV Diastolic Volume 111.72 mL    LV  Diastolic Volume Index 56.42 mL/m2    LV Mass Index 128 g/m2    Right Atrial Pressure (from IVC) 3 mmHg    EF 55 %    Narrative    · The left ventricle is normal in size with concentric hypertrophy and   normal systolic function.  · There is abnormal septal wall motion consistent with left bundle branch   block.  · The estimated ejection fraction is 55%.  · Normal left ventricular diastolic function.  · Normal right ventricular size with normal right ventricular systolic   function.  · Normal central venous pressure (3 mmHg).  · There is mild aortic valve stenosis.  · Aortic valve area is 1.32 cm2; peak velocity is 2.3 m/s; mean gradient   is 13 mmHg.          Imaging  No results found.    Prior coronary angiogram / intervention:  As above     Assessment and Plan  1. Venous insufficiency (chronic) (peripheral)  Encourage compressing stocking as tolerated  Leg elevation while sitting and sitting    10. Leg swelling  As above  Echo and check BNP    2. Coronary artery disease involving native coronary artery of native heart without angina pectoris  Continue ASA 81mg and statin  PRN nitro sublingual tabs  - IN OFFICE EKG 12-LEAD (to Muse)    3. Stable angina  PRN nitro sublingual tabs  Refused exercise/pharm stress test due to activity intolerance  Surveillance    4. Hyperlipidemia, unspecified hyperlipidemia type  Continue statin   - LIPID PANEL; Future    5. Essential hypertension  BP elevated today  Changed lisinopril 10mg to lisinopril-HCTZ 20-25mg  Recheck BP and labs in one week  Recheck uric acid, potassium with medication management    6. BMI 40.0-44.9, adult  Educated and encouraged on DASH Diet and decreasing sodium intake  Activities as tolerated    7. CHLOÉ (obstructive sleep apnea)  Encoraged CPAP at night    8. Tobacco abuse  Counseled smoking cessation    9. Shortness of breath  As above  Echo and BNP evaluation for appropriate medical management  - B-TYPE NATRIURETIC PEPTIDE; Future  - Echo;  Future        Follow Up  Follow up in about 4 weeks (around 10/17/2024).       Brandi R. Carter, FNP-C [facility] - Cardiology    Total professional time spent for the encounter: 45 minutes  Time was spent preparing to see the patient, reviewing results of prior testing, obtaining and/or reviewing separately obtained history, performing a medically appropriate examination and interview, counseling and educating the patient/family, ordering medications/tests/procedures, referring and communicating with other health care professionals, documenting clinical information in the electronic health record, and independently interpreting results.

## 2024-09-20 LAB
OHS QRS DURATION: 168 MS
OHS QTC CALCULATION: 539 MS

## 2024-09-25 ENCOUNTER — OFFICE VISIT (OUTPATIENT)
Dept: HEMATOLOGY/ONCOLOGY | Facility: CLINIC | Age: 59
End: 2024-09-25
Payer: MEDICAID

## 2024-09-25 VITALS
RESPIRATION RATE: 18 BRPM | SYSTOLIC BLOOD PRESSURE: 163 MMHG | HEART RATE: 94 BPM | DIASTOLIC BLOOD PRESSURE: 77 MMHG | OXYGEN SATURATION: 90 % | HEIGHT: 61 IN | BODY MASS INDEX: 44.39 KG/M2 | WEIGHT: 235.13 LBS

## 2024-09-25 DIAGNOSIS — C85.80 MARGINAL ZONE LYMPHOMA: Primary | ICD-10-CM

## 2024-09-25 DIAGNOSIS — C85.89 MARGINAL ZONE LYMPHOMA OF EXTRANODAL AND SOLID ORGAN SITES: ICD-10-CM

## 2024-09-25 DIAGNOSIS — N28.89 RIGHT RENAL MASS: ICD-10-CM

## 2024-09-25 PROCEDURE — 99215 OFFICE O/P EST HI 40 MIN: CPT | Mod: S$PBB,,, | Performed by: INTERNAL MEDICINE

## 2024-09-25 PROCEDURE — 3008F BODY MASS INDEX DOCD: CPT | Mod: CPTII,,, | Performed by: INTERNAL MEDICINE

## 2024-09-25 PROCEDURE — 99999 PR PBB SHADOW E&M-EST. PATIENT-LVL IV: CPT | Mod: PBBFAC,,, | Performed by: INTERNAL MEDICINE

## 2024-09-25 PROCEDURE — 4010F ACE/ARB THERAPY RXD/TAKEN: CPT | Mod: CPTII,,, | Performed by: INTERNAL MEDICINE

## 2024-09-25 PROCEDURE — 3078F DIAST BP <80 MM HG: CPT | Mod: CPTII,,, | Performed by: INTERNAL MEDICINE

## 2024-09-25 PROCEDURE — 1159F MED LIST DOCD IN RCRD: CPT | Mod: CPTII,,, | Performed by: INTERNAL MEDICINE

## 2024-09-25 PROCEDURE — G2211 COMPLEX E/M VISIT ADD ON: HCPCS | Mod: S$PBB,,, | Performed by: INTERNAL MEDICINE

## 2024-09-25 PROCEDURE — 99214 OFFICE O/P EST MOD 30 MIN: CPT | Mod: PBBFAC | Performed by: INTERNAL MEDICINE

## 2024-09-25 PROCEDURE — 3077F SYST BP >= 140 MM HG: CPT | Mod: CPTII,,, | Performed by: INTERNAL MEDICINE

## 2024-09-25 NOTE — PROGRESS NOTES
Hematology and Medical Oncology   Follow Up Note     09/25/2024    Primary Oncologic Diagnosis: Marginal Zone Lymphoma      History of Present Ilness:   Gabi Newton (Gabi) is a pleasant 58 y.o.female who presents for ongoing surveillance of her marginal zone lymphoma.    Oncology History:   --Stage IV Marginal Zone Lymphoma (Gastric, Pulmonary, and Marrow) --s/p 4 cycles of single agent Rituximab induction therapy completed on 4/14/20   --PET CT on 4/29/20 consistent with partial treatment response.   --She completed maintenance Rituxan therapy 4/25/2023. 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 Slightly larger on 2/20204 CT.         Oncologic History  Ms. Newton is a 54-year-old female with a past medical history of abdominal pain for the last 3-4 years with unknown etiology, significant smoking history for 40 years, back pain, hypertension presented to the Hematology Clinic for newly diagnosed marginal zone B-cell lymphoma       She was diagnosed with right lower extremity DVT and has been on Xarelto.  As per the patient she was hospitalized at that time and underwent procedure for her right lower extremity for venous insufficiency.  It appears that the right lower extremity DVT is a provoked clot.  She had history of bilateral iliac vein stenting 10/16/18 for venous outflow obsturction.     --CT Chest/Abd/Pelvis 9/20/24  Patient with a reported history of marginal lymphoma.  Interval detrimental change as follows:     *Increased size of a solid lesion at the inferior pole of the right kidney now measuring 3.7 x 4.3 cm (previously 3.4 x 3.4 cm).  *Increased size and number of multiple bilateral pulmonary lesions.Hepatomegaly with steatosis.    Interval History:   Ms. Newton is doing well from the lymphoma perspective. No B symptoms.     Strongly considering weight loss options.    Past Medical History:   Past Medical History:   Diagnosis Date    Abdominal pain 2018     Anemia     Anxiety     Back pain     COPD (chronic obstructive pulmonary disease) 6/10/2022    Deep vein thrombosis     Disorder of kidney and ureter     Fatty liver 7/24/2018    Gastric lymphoma 12/8/2019    Gastric tumor     GERD (gastroesophageal reflux disease)     Hyperlipidemia     Hypertension     Splenomegaly 7/24/2018       Current Medications:   Current Outpatient Medications   Medication Sig    ADVAIR DISKUS 250-50 mcg/dose diskus inhaler Inhale into the lungs 2 (two) times daily. Only using once a day    albuterol (PROAIR HFA) 90 mcg/actuation inhaler Inhale 2 puffs into the lungs every 6 (six) hours as needed for Wheezing. Rescue    allopurinoL (ZYLOPRIM) 300 MG tablet Take 300 mg by mouth nightly.    aspirin (ECOTRIN) 81 MG EC tablet Take 1 tablet (81 mg total) by mouth every evening. Hold until PCP follow up    gemfibroziL (LOPID) 600 MG tablet Take 1 tablet by mouth 2 (two) times daily. Only taking once a day    hydroxyzine HCL (ATARAX) 25 MG tablet Take 25 mg by mouth every evening.    lisinopriL-hydrochlorothiazide (PRINZIDE,ZESTORETIC) 20-25 mg Tab Take 1 tablet by mouth once daily.    methadone (METHADOSE) 40 mg disintegrating tablet Take 40 mg by mouth once daily. PATIENT USUALLY TAKES AT 10AM    nicotine (NICODERM CQ) 21 mg/24 hr Apply 1 patch topically once daily.    nitroGLYCERIN (NITROSTAT) 0.4 MG SL tablet Place 1 tablet (0.4 mg total) under the tongue every 5 (five) minutes as needed for Chest pain.    ondansetron (ZOFRAN) 8 MG tablet Take 1 tablet by mouth every 8 (eight) hours as needed.    ondansetron (ZOFRAN-ODT) 4 MG TbDL Take 2 tablets (8 mg total) by mouth 2 (two) times daily. (Patient taking differently: Take 8 mg by mouth as needed.)    pimecrolimus (ELIDEL) 1 % cream Apply topically 2 (two) times daily. Use for rash on the face    simvastatin (ZOCOR) 40 MG tablet Take 40 mg by mouth every evening.     triamcinolone acetonide 0.1% (KENALOG) 0.1 % ointment SMARTSIG:Topical 3 Times  a Week PRN    citalopram (CELEXA) 40 MG tablet Take 40 mg by mouth. (Patient not taking: Reported on 5/14/2024)    LORazepam (ATIVAN) 1 MG tablet Take 1 tablet (1 mg total) by mouth once. Prior to the pet scan for 1 dose (Patient not taking: Reported on 9/19/2024)    omeprazole (PRILOSEC) 40 MG capsule Take 1 capsule every day by oral route.    varenicline (CHANTIX) 1 mg Tab Take 1 tablet by mouth 2 (two) times daily. (Patient not taking: Reported on 9/19/2024)     No current facility-administered medications for this visit.     ALLERGIES:   Review of patient's allergies indicates:   Allergen Reactions    Azithromycin Anaphylaxis and Other (See Comments)     Other reaction(s): swelling to entire body    Swelling (tongue / lips)^    Ciprofloxacin Swelling     Throat swells    Other reaction(s): Angioedema    Other reaction(s): angioedema    Codeine Other (See Comments)     Swelling (throat)^    Tolerates Morphine    Codeine sulfate      Swelling (throat)^    Tolerates Morphine       Review of Systems:     Review of Systems   Constitutional:  Positive for appetite change and fatigue. Negative for chills, diaphoresis, fever and unexpected weight change.   HENT:   Negative for hearing loss, mouth sores, nosebleeds, sore throat, trouble swallowing and voice change.    Eyes:  Negative for eye problems and icterus.   Respiratory:  Negative for chest tightness, cough, hemoptysis, shortness of breath and wheezing.    Cardiovascular:  Positive for leg swelling. Negative for chest pain and palpitations.   Gastrointestinal:  Negative for abdominal distention, abdominal pain, blood in stool, diarrhea, nausea and vomiting.   Endocrine: Negative for hot flashes.   Genitourinary:  Negative for bladder incontinence, difficulty urinating, dysuria and hematuria.    Musculoskeletal:  Negative for arthralgias, back pain, flank pain, gait problem, myalgias, neck pain and neck stiffness.   Skin:  Negative for itching, rash and wound.    Neurological:  Negative for dizziness, extremity weakness, gait problem, headaches, numbness, seizures and speech difficulty.   Hematological:  Negative for adenopathy. Does not bruise/bleed easily.   Psychiatric/Behavioral:  Negative for confusion, depression and sleep disturbance. The patient is not nervous/anxious.           Physical Exam:     Vitals:    09/25/24 1431   BP: (!) 163/77   Pulse: 94   Resp: 18     Physical Exam  Constitutional:       General: She is not in acute distress.     Appearance: She is well-developed. She is not diaphoretic.   HENT:      Head: Normocephalic and atraumatic.      Mouth/Throat:      Pharynx: No oropharyngeal exudate.   Eyes:      Conjunctiva/sclera: Conjunctivae normal.      Pupils: Pupils are equal, round, and reactive to light.   Neck:      Thyroid: No thyromegaly.      Vascular: No JVD.      Trachea: No tracheal deviation.   Cardiovascular:      Rate and Rhythm: Normal rate and regular rhythm.      Heart sounds: Normal heart sounds. No murmur heard.     No friction rub.   Pulmonary:      Effort: Pulmonary effort is normal. No respiratory distress.      Breath sounds: Normal breath sounds. No stridor. No wheezing or rales.   Chest:      Chest wall: No tenderness.   Abdominal:      General: Bowel sounds are normal. There is no distension.      Palpations: Abdomen is soft.      Tenderness: There is no abdominal tenderness. There is no guarding or rebound.   Musculoskeletal:         General: No tenderness or deformity. Normal range of motion.      Cervical back: Normal range of motion and neck supple.      Comments: + central large hernia present   Skin:     General: Skin is warm and dry.      Capillary Refill: Capillary refill takes less than 2 seconds.      Coloration: Skin is not pale.      Findings: No erythema or rash.   Neurological:      Mental Status: She is alert and oriented to person, place, and time.      Cranial Nerves: No cranial nerve deficit.      Sensory: No  sensory deficit.      Motor: No abnormal muscle tone.      Coordination: Coordination normal.      Deep Tendon Reflexes: Reflexes normal.   Psychiatric:         Behavior: Behavior normal.         Thought Content: Thought content normal.         Judgment: Judgment normal.         ECOG Performance Status: (foot note - ECOG PS provided by Eastern Cooperative Oncology Group) 1 - Symptomatic but completely ambulatory    Karnofsky Performance Score:  90%- Able to Carry on Normal Activity: Minor Symptoms of Disease    Labs:   Lab Results   Component Value Date    WBC 4.72 09/20/2024    HGB 12.8 09/20/2024    HCT 42.1 09/20/2024     09/20/2024    ALT 32 09/20/2024    AST 40 09/20/2024     09/20/2024    K 4.1 09/20/2024    CL 98 09/20/2024    CREATININE 0.9 09/20/2024    BUN 8 09/20/2024    CO2 28 09/20/2024    INR 1.0 05/10/2022     LDH: 235      Imaging: Previous imaging      Assessment and Plan:     Ms. Newton is a pleansant 58 year old female with marginal zone lymphoma of extranodal and solid organ sites        1.  Sha MZL with coexisting Gastric/pulmonary/diffuse marrow Marginal zone B-cell lymphoma. Stage IV disease s/p induction with Rituxan for 4 cycles 4/14/2020. PET CT 4/2020 showed [place]. Started maintenance Rituxan 7/2020. Planned for 2 years. However her treatment was held between December and May 2022 due to cutaneous squamous cell carcinoma diagnosis and management. She resumed tx with cycle 7 in may 2022. She underwent renal mass bx in June 2022 (+ MZL) and it was complicated with retroperitoneal hemorrhage requiring embolization.  CT in February 2024 showed no new sites of disease; larger renal mass.   Follow-up every 6 months  Repeat ct imaging August 2024 (6 months from scan to follow renal lesions and lung GGOs). Will place referral to urology as mass has grown.     2. Right lower extremity DVT, likely provoked: was on Xarelto for about 2 years. Stopped taking 8/21. Currently no signs of  recurrence    3. Chronic active smoker. Educated patient regarding smoking cessations, she's cutting down. Referred for smoking cessation program.     4. squamous cell carcinoma of lower lip diagnosed 10/21 s/p Wedge resection of lower lip squamous cell carcinoma primary closure and Right submandibular sentinel lymph node biopsy 1/12/22. Margins neg, LN negative. Continue follow up with dermatology.     5. LE edema - ECHO ordered 4/24/2023 to evaluation for heart failure in setting of peripheral vascular disease; EF 55%. Seeing wound care and lymphedema clinic for chronic LE vascular changes.    6. Ventral hernia follows with general surgery           40 minutes in total were spent on this enounter of which 30 minutes were spent face to face with the patient and her  family to discuss the disease, natural history, treatment options and survival statistics. I have provided the patient with an opportunity to ask questions and have all questions answered to his satisfaction.     Visit today included increased complexity associated with the care of the episodic problem marginal zone lymphoma addressed and managing the longitudinal care of the patient due to the serious and/or complex managed problem(s) marginal zone lymphoma    she will return to clinic in 6 months, but knows to call in the interim if symptoms change or should a problem arise.        Holly Mae MD  Hematology and Medical Oncology  Bone Marrow Transplant  [facility]      BMT Chart Routing      Follow up with physician . 1. referral to urology for enlarging renal mass 2. see me in 6 months with cbc,cmp,ldh   Follow up with GIRISH    Provider visit type    Infusion scheduling note    Injection scheduling note    Labs    Imaging    Pharmacy appointment    Other referrals

## 2024-09-26 ENCOUNTER — PATIENT MESSAGE (OUTPATIENT)
Dept: REHABILITATION | Facility: HOSPITAL | Age: 59
End: 2024-09-26
Payer: MEDICAID

## 2024-09-27 ENCOUNTER — CLINICAL SUPPORT (OUTPATIENT)
Dept: CARDIOLOGY | Facility: CLINIC | Age: 59
End: 2024-09-27
Payer: MEDICAID

## 2024-09-27 VITALS — HEART RATE: 82 BPM | OXYGEN SATURATION: 94 % | SYSTOLIC BLOOD PRESSURE: 136 MMHG | DIASTOLIC BLOOD PRESSURE: 65 MMHG

## 2024-09-27 DIAGNOSIS — Z01.30 BP CHECK: Primary | ICD-10-CM

## 2024-09-27 PROCEDURE — 99212 OFFICE O/P EST SF 10 MIN: CPT | Mod: PBBFAC,PN

## 2024-09-27 PROCEDURE — 99999 PR PBB SHADOW E&M-EST. PATIENT-LVL II: CPT | Mod: PBBFAC,,,

## 2024-09-27 NOTE — PROGRESS NOTES
Gabi Denneyno 58 y.o. female is here today for Blood Pressure check.     Review of patient's allergies indicates:   Allergen Reactions    Azithromycin Anaphylaxis and Other (See Comments)     Other reaction(s): swelling to entire body    Swelling (tongue / lips)^    Ciprofloxacin Swelling     Throat swells    Other reaction(s): Angioedema    Other reaction(s): angioedema    Codeine Other (See Comments)     Swelling (throat)^    Tolerates Morphine    Codeine sulfate      Swelling (throat)^    Tolerates Morphine     Creatinine   Date Value Ref Range Status   09/20/2024 0.9 0.5 - 1.4 mg/dL Final     Sodium   Date Value Ref Range Status   09/20/2024 137 136 - 145 mmol/L Final     Potassium   Date Value Ref Range Status   09/20/2024 4.1 3.5 - 5.1 mmol/L Final   ]  Patient verifies taking blood pressure medications on a regular basis at the same time of the day.     Current Outpatient Medications:     ADVAIR DISKUS 250-50 mcg/dose diskus inhaler, Inhale into the lungs 2 (two) times daily. Only using once a day, Disp: , Rfl:     albuterol (PROAIR HFA) 90 mcg/actuation inhaler, Inhale 2 puffs into the lungs every 6 (six) hours as needed for Wheezing. Rescue, Disp: 18 g, Rfl: 0    allopurinoL (ZYLOPRIM) 300 MG tablet, Take 300 mg by mouth nightly., Disp: , Rfl:     aspirin (ECOTRIN) 81 MG EC tablet, Take 1 tablet (81 mg total) by mouth every evening. Hold until PCP follow up, Disp: , Rfl: 0    citalopram (CELEXA) 40 MG tablet, Take 40 mg by mouth. (Patient not taking: Reported on 5/14/2024), Disp: , Rfl:     gemfibroziL (LOPID) 600 MG tablet, Take 1 tablet by mouth 2 (two) times daily. Only taking once a day, Disp: , Rfl:     hydroxyzine HCL (ATARAX) 25 MG tablet, Take 25 mg by mouth every evening., Disp: , Rfl:     lisinopriL-hydrochlorothiazide (PRINZIDE,ZESTORETIC) 20-25 mg Tab, Take 1 tablet by mouth once daily., Disp: 90 tablet, Rfl: 3    LORazepam (ATIVAN) 1 MG tablet, Take 1 tablet (1 mg total) by mouth once. Prior  to the pet scan for 1 dose (Patient not taking: Reported on 9/19/2024), Disp: 1 tablet, Rfl: 0    methadone (METHADOSE) 40 mg disintegrating tablet, Take 40 mg by mouth once daily. PATIENT USUALLY TAKES AT 10AM, Disp: , Rfl:     nicotine (NICODERM CQ) 21 mg/24 hr, Apply 1 patch topically once daily., Disp: , Rfl:     nitroGLYCERIN (NITROSTAT) 0.4 MG SL tablet, Place 1 tablet (0.4 mg total) under the tongue every 5 (five) minutes as needed for Chest pain., Disp: 90 tablet, Rfl: 3    omeprazole (PRILOSEC) 40 MG capsule, Take 1 capsule every day by oral route., Disp: , Rfl:     ondansetron (ZOFRAN) 8 MG tablet, Take 1 tablet by mouth every 8 (eight) hours as needed., Disp: , Rfl:     ondansetron (ZOFRAN-ODT) 4 MG TbDL, Take 2 tablets (8 mg total) by mouth 2 (two) times daily. (Patient taking differently: Take 8 mg by mouth as needed.), Disp: 60 tablet, Rfl: 0    pimecrolimus (ELIDEL) 1 % cream, Apply topically 2 (two) times daily. Use for rash on the face, Disp: 100 g, Rfl: 3    simvastatin (ZOCOR) 40 MG tablet, Take 40 mg by mouth every evening. , Disp: , Rfl:     triamcinolone acetonide 0.1% (KENALOG) 0.1 % ointment, SMARTSIG:Topical 3 Times a Week PRN, Disp: , Rfl:     varenicline (CHANTIX) 1 mg Tab, Take 1 tablet by mouth 2 (two) times daily. (Patient not taking: Reported on 9/19/2024), Disp: , Rfl:   Does patient have record of home blood pressure readings no.   Last dose of blood pressure medication was taken at yesterday.  Patient is asymptomatic.   Complains of 2/10 pain left toe, hurts to walk.    BP: 136/65 , Pulse: 82 .      GLADYS Beltrán  notified by message and secure chat.

## 2024-10-02 ENCOUNTER — TELEPHONE (OUTPATIENT)
Dept: HEMATOLOGY/ONCOLOGY | Facility: CLINIC | Age: 59
End: 2024-10-02
Payer: MEDICAID

## 2024-10-02 ENCOUNTER — PATIENT MESSAGE (OUTPATIENT)
Dept: HEMATOLOGY/ONCOLOGY | Facility: CLINIC | Age: 59
End: 2024-10-02
Payer: MEDICAID

## 2024-10-02 DIAGNOSIS — N28.89 RIGHT RENAL MASS: Primary | ICD-10-CM

## 2024-10-08 ENCOUNTER — PATIENT MESSAGE (OUTPATIENT)
Dept: REHABILITATION | Facility: HOSPITAL | Age: 59
End: 2024-10-08
Payer: COMMERCIAL

## 2024-11-05 ENCOUNTER — TELEPHONE (OUTPATIENT)
Dept: UROLOGY | Facility: CLINIC | Age: 59
End: 2024-11-05
Payer: MEDICAID

## 2024-11-05 NOTE — TELEPHONE ENCOUNTER
----- Message from Zena sent at 11/5/2024  9:36 AM CST -----  Regarding: FW: reschedule appt 11/05  Contact: pt @ [phone]    ----- Message -----  From: Grace Guzman  Sent: 11/5/2024   8:28 AM CST  To: [facility] Staff  Subject: reschedule appt 11/05                            Pt is calling to advise that she is needing to be rescheduled from 11/05 as she is not well. Pt is also requesting an appt at [facility] if possible. Please call to reschedule as not dates were avail to me. Thank you for all you are doing.

## 2024-12-03 ENCOUNTER — TELEPHONE (OUTPATIENT)
Dept: UROLOGY | Facility: CLINIC | Age: 59
End: 2024-12-03
Payer: MEDICAID

## 2024-12-03 ENCOUNTER — TELEPHONE (OUTPATIENT)
Dept: UROLOGY | Facility: HOSPITAL | Age: 59
End: 2024-12-03
Payer: MEDICAID

## 2024-12-03 ENCOUNTER — PATIENT MESSAGE (OUTPATIENT)
Dept: UROLOGY | Facility: CLINIC | Age: 59
End: 2024-12-03

## 2024-12-03 NOTE — TELEPHONE ENCOUNTER
Gabi Newton is a 59 y.o. female who returns today regarding her     No show for appt  Changed to VV and no showed again    I called patient and got her voicemail    The following portions of the patient's history were reviewed and updated as appropriate: allergies, current medications, past family history, past medical history, past social history, past surgical history and problem list.    Review of Systems      Past Medical History:   Diagnosis Date    Abdominal pain     Anemia     Anxiety     Back pain     COPD (chronic obstructive pulmonary disease) 6/10/2022    Deep vein thrombosis     Disorder of kidney and ureter     Fatty liver 2018    Gastric lymphoma 2019    Gastric tumor     GERD (gastroesophageal reflux disease)     Hyperlipidemia     Hypertension     Splenomegaly 2018     Past Surgical History:   Procedure Laterality Date    bilateral iliac vein stenosis with stenting      CARDIAC CATHETERIZATION      CARDIAC CATHETERIZATION  2018    had chest pains . states vessels at the bottom of heart collapsed     SECTION      x3    CHOLECYSTECTOMY      COLONOSCOPY      ENDOSCOPIC ULTRASOUND OF UPPER GASTROINTESTINAL TRACT Left 11/15/2019    Procedure: ULTRASOUND, UPPER GI TRACT, ENDOSCOPIC;  Surgeon: Mike Seay MD;  Location: [address]);  Service: Endoscopy;  Laterality: Left;  Ok to hold xarelto per protocol per Dr. Lloyd see media file 10/25/19-tb    ESOPHAGOGASTRODUODENOSCOPY      ESOPHAGOGASTRODUODENOSCOPY N/A 2018    Procedure: EGD (ESOPHAGOGASTRODUODENOSCOPY);  Surgeon: Ant Camejo MD;  Location: [facility];  Service: Endoscopy;  Laterality: N/A;    ESOPHAGOGASTRODUODENOSCOPY N/A 10/15/2019    Procedure: EGD (ESOPHAGOGASTRODUODENOSCOPY);  Surgeon: Melanie Cedeno MD;  Location: [facility];  Service: Endoscopy;  Laterality: N/A;    ESOPHAGOGASTRODUODENOSCOPY N/A 11/15/2019    Procedure: EGD (ESOPHAGOGASTRODUODENOSCOPY);  Surgeon: Mike Seay MD;   Location: [facility] ENDO (2ND FLR);  Service: Endoscopy;  Laterality: N/A;    EXCISION OF LESION OF LIP Left 1/12/2022    Procedure: EXCISION, LESION, LIP;  Surgeon: Glen Giang MD;  [facility];  Service: ENT;  Laterality: Left;  Lip resection    HYSTERECTOMY      PHLEBOGRAPHY Bilateral 10/16/2018    Procedure: VENOGRAM;  Surgeon: Colin Mathis MD;  Location: [facility] CATH LAB;  Service: Cardiology;  Laterality: Bilateral;    [place] RT/LT HEART CATHETERS Left     Coronary Arteriogram-2 Cath    RHINOPLASTY      SENTINEL LYMPH NODE BIOPSY  1/12/2022    Procedure: BIOPSY, LYMPH NODE, SENTINEL;  Surgeon: Glen Giang MD;  [facility];  Service: ENT;;    TUBAL LIGATION      venogram Bilateral 10/16/2018       Review of patient's allergies indicates:   Allergen Reactions    Azithromycin Anaphylaxis and Other (See Comments)     Other reaction(s): swelling to entire body    Swelling (tongue / lips)^    Ciprofloxacin Swelling     Throat swells    Other reaction(s): Angioedema    Other reaction(s): angioedema    Codeine Other (See Comments)     Swelling (throat)^    Tolerates Morphine    Codeine sulfate      Swelling (throat)^    Tolerates Morphine          Objective:   Vitals: LMP 02/11/2015 (Approximate)     Physical Exam   No exam    Physical Exam    Lab Review   Urinalysis demonstrates   Lab Results   Component Value Date    WBC 4.72 09/20/2024    HGB 12.8 09/20/2024    HCT 42.1 09/20/2024    MCV 92 09/20/2024     09/20/2024     Lab Results   Component Value Date    CREATININE 0.9 09/20/2024    BUN 8 09/20/2024       Imaging  CT CHEST ABDOMEN PELVIS WITH IV CONTRAST (XPD)     CLINICAL HISTORY:  Hematologic malignancy, monitor;     TECHNIQUE:  5 mm axial images were obtained through the chest, abdomen and pelvis following administration of   cc of Omnipaque 350 IV contrast.  Coronal and sagittal reformats were performed.     COMPARISON:  CT 02/09/2024, 07/07/2023, 03/20/2023, PET-CT  05/18/2023.     FINDINGS:  Base of the neck: Unremarkable     Thoracic Aorta: Left-sided aortic arch.  No aneurysm.  No dissection.     Pulmonary Arteries: Distribute normally.  No filling defects to suggest a thromboembolus.     Heart: Normal in size.  Mild coronary artery atherosclerosis.  No pericardial effusion.  Pulmonary veins appear unremarkable.     Thoracic lymph nodes: Stable prominent mediastinal and hilar lymph nodes.  No axillary or supraclavicular lymphadenopathy.     Esophagus: Normal in caliber and course.     Trachea and proximal airways: Patent.     Lungs: Volumes are normal and symmetric.  Increased size of the previously described lung opacities with index measurements as follows:     *Left upper lobe: 2.8 x 3.1 cm, series 7, image 83, previously 2.7 x 2.6 cm.  *Right lower lobe: 2.8 x 3.1 cm, series 7, image 157, previously 1.9 x 2.3 cm.  Multiple bilateral new ground-glass lesions.  No pneumothorax or pleural effusion.  Upper lobe predominant emphysema.     Liver: Enlarged.  Diffuse hepatic parenchymal hypoattenuation consistent with steatosis.  Calcified lesion within the anterior-superior aspect of the left lobe, unchanged.  No new hepatic lesions.  No intrahepatic bile duct dilatation.Portal vein, splenic vein and SMV are patent.     Gallbladder: Surgically absent.  Stable dilatation of the extrahepatic bile ducts with tapering at the level of the ampulla.     Stomach: Stable lipoma at the level of the pylorus/duodenal bowel.     Duodenum: Unremarkable.     Spleen: Enlarged measuring 14 cm.  0.7 cm hypoenhancing parenchymal lesion adjacent to the hilum, unchanged and overall indeterminate.     Pancreas: Unremarkable.     Adrenal glands: Unremarkable     Kidneys: Normal in size and location.  Increased size of a high attenuation, exophytic lesion at the inferior pole of the right kidney now measuring 3.7 x 4.3 cm (series 9, image 102, previously 3.4 x 3.4 cm).  Embolization clips adjacent to  the right renal hilum.  No nephrolithiasis.  No hydronephrosis or hydroureter.  Bladder is mostly decompressed and not well evaluated.     Genital organs: Hysterectomy.  Ovaries are not definitely seen.No pelvic free fluid.     Bowel: Small bowel is normal in caliber.  Colon is unremarkable.  Appendix is normal.  No obstruction or inflammatory changes.     Abdominal aorta: Moderate atherosclerotic calcification.  Celiac artery, SMA, bilateral renal arteries and DANIEL are patent.  No aneurysm.  No dissection.     IVC and iliac veins: IVC is patent.  Patent stents within the bilateral common iliac veins.     Peritoneum/mesentery: No ascites or intra-abdominal free air.  No focal mesenteric masses.     Lymph nodes: No lymphadenopathy.     Subcutaneous soft tissues: Stable 1.1 cm soft tissue attenuation nodule within the lateral aspect of the left breast.  Stable fat containing anterior abdominal wall hernias.     Osseous structures: Degenerative changes of the spine.  No acute fractures. No suspicious lytic or blastic lesions.     Impression:     Patient with a reported history of marginal lymphoma.  Interval detrimental change as follows:     *Increased size of a solid lesion at the inferior pole of the right kidney now measuring 3.7 x 4.3 cm (previously 3.4 x 3.4 cm).  *Increased size and number of multiple bilateral pulmonary lesions.  Hepatomegaly with steatosis.     Splenomegaly.     Additional stable findings as detailed above.        Electronically signed by:Tony Gupta MD  Date:                                            09/20/2024  Time:                                           12:08    Assessment and Plan:   Right renal mass; lymphoma     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)        Dr Calero called pt and left a voicemail for her to follow up with heme/onc    No indication for renal surgery    Follow up with heme onc; will defer management to heme onc  RTC  prn         NO CHARGE FOR TODAY'S ENCOUNTER  PT WAS NO SHOW FOR IN PERSON VISIT  CHANGED TO VV AND NO SHOWED AGAIN  DR GOLDMAN CALLED PT AND LEFT A VOICEMAIL

## 2025-04-23 ENCOUNTER — OFFICE VISIT (OUTPATIENT)
Dept: HEMATOLOGY/ONCOLOGY | Facility: CLINIC | Age: 60
End: 2025-04-23
Payer: MEDICARE

## 2025-04-23 ENCOUNTER — LAB VISIT (OUTPATIENT)
Dept: LAB | Facility: HOSPITAL | Age: 60
End: 2025-04-23
Attending: INTERNAL MEDICINE
Payer: MEDICARE

## 2025-04-23 VITALS
HEART RATE: 76 BPM | RESPIRATION RATE: 18 BRPM | BODY MASS INDEX: 43.77 KG/M2 | WEIGHT: 231.81 LBS | SYSTOLIC BLOOD PRESSURE: 117 MMHG | HEIGHT: 61 IN | DIASTOLIC BLOOD PRESSURE: 56 MMHG | TEMPERATURE: 97 F | OXYGEN SATURATION: 96 %

## 2025-04-23 DIAGNOSIS — C88.40 EXTRANODAL MARGINAL ZONE B-CELL LYMPHOMA OF MUCOSA-ASSOCIATED LYMPHOID TISSUE (MALT): ICD-10-CM

## 2025-04-23 DIAGNOSIS — C85.80 MARGINAL ZONE LYMPHOMA: Primary | ICD-10-CM

## 2025-04-23 DIAGNOSIS — C85.80 MARGINAL ZONE LYMPHOMA: ICD-10-CM

## 2025-04-23 LAB
ABSOLUTE EOSINOPHIL (OHS): 0.41 K/UL
ABSOLUTE MONOCYTE (OHS): 0.32 K/UL (ref 0.3–1)
ABSOLUTE NEUTROPHIL COUNT (OHS): 4.39 K/UL (ref 1.8–7.7)
ALBUMIN SERPL BCP-MCNC: 3.6 G/DL (ref 3.5–5.2)
ALP SERPL-CCNC: 170 UNIT/L (ref 40–150)
ALT SERPL W/O P-5'-P-CCNC: 27 UNIT/L (ref 10–44)
ANION GAP (OHS): 6 MMOL/L (ref 8–16)
AST SERPL-CCNC: 38 UNIT/L (ref 11–45)
BASOPHILS # BLD AUTO: 0.02 K/UL
BASOPHILS NFR BLD AUTO: 0.3 %
BILIRUB SERPL-MCNC: 0.5 MG/DL (ref 0.1–1)
BUN SERPL-MCNC: 16 MG/DL (ref 6–20)
CALCIUM SERPL-MCNC: 9.5 MG/DL (ref 8.7–10.5)
CHLORIDE SERPL-SCNC: 100 MMOL/L (ref 95–110)
CO2 SERPL-SCNC: 32 MMOL/L (ref 23–29)
CREAT SERPL-MCNC: 0.8 MG/DL (ref 0.5–1.4)
ERYTHROCYTE [DISTWIDTH] IN BLOOD BY AUTOMATED COUNT: 15.6 % (ref 11.5–14.5)
GFR SERPLBLD CREATININE-BSD FMLA CKD-EPI: >60 ML/MIN/1.73/M2
GLUCOSE SERPL-MCNC: 118 MG/DL (ref 70–110)
HCT VFR BLD AUTO: 41.7 % (ref 37–48.5)
HGB BLD-MCNC: 12.5 GM/DL (ref 12–16)
IMM GRANULOCYTES # BLD AUTO: 0.03 K/UL (ref 0–0.04)
IMM GRANULOCYTES NFR BLD AUTO: 0.5 % (ref 0–0.5)
LDH SERPL-CCNC: 259 U/L (ref 110–260)
LYMPHOCYTES # BLD AUTO: 0.69 K/UL (ref 1–4.8)
MCH RBC QN AUTO: 27.5 PG (ref 27–31)
MCHC RBC AUTO-ENTMCNC: 30 G/DL (ref 32–36)
MCV RBC AUTO: 92 FL (ref 82–98)
NUCLEATED RBC (/100WBC) (OHS): 0 /100 WBC
PLATELET # BLD AUTO: 170 K/UL (ref 150–450)
PMV BLD AUTO: 11.2 FL (ref 9.2–12.9)
POTASSIUM SERPL-SCNC: 4.8 MMOL/L (ref 3.5–5.1)
PROT SERPL-MCNC: 7.3 GM/DL (ref 6–8.4)
RBC # BLD AUTO: 4.54 M/UL (ref 4–5.4)
RELATIVE EOSINOPHIL (OHS): 7 %
RELATIVE LYMPHOCYTE (OHS): 11.8 % (ref 18–48)
RELATIVE MONOCYTE (OHS): 5.5 % (ref 4–15)
RELATIVE NEUTROPHIL (OHS): 74.9 % (ref 38–73)
SODIUM SERPL-SCNC: 138 MMOL/L (ref 136–145)
WBC # BLD AUTO: 5.86 K/UL (ref 3.9–12.7)

## 2025-04-23 PROCEDURE — 85025 COMPLETE CBC W/AUTO DIFF WBC: CPT

## 2025-04-23 PROCEDURE — 80053 COMPREHEN METABOLIC PANEL: CPT

## 2025-04-23 PROCEDURE — 36415 COLL VENOUS BLD VENIPUNCTURE: CPT

## 2025-04-23 PROCEDURE — 83615 LACTATE (LD) (LDH) ENZYME: CPT

## 2025-04-23 PROCEDURE — 99999 PR PBB SHADOW E&M-EST. PATIENT-LVL IV: CPT | Mod: PBBFAC,,, | Performed by: INTERNAL MEDICINE

## 2025-04-23 PROCEDURE — 99214 OFFICE O/P EST MOD 30 MIN: CPT | Mod: PBBFAC | Performed by: INTERNAL MEDICINE

## 2025-04-23 RX ORDER — ORAL SEMAGLUTIDE 7 MG/1
7 TABLET ORAL
COMMUNITY
Start: 2025-04-07

## 2025-04-23 RX ORDER — LISINOPRIL 40 MG/1
40 TABLET ORAL
COMMUNITY

## 2025-04-23 RX ORDER — POLYETHYLENE GLYCOL 3350 17 G/17G
17 POWDER, FOR SOLUTION ORAL 2 TIMES DAILY
COMMUNITY
Start: 2025-03-22

## 2025-04-23 NOTE — PROGRESS NOTES
Hematology and Medical Oncology   Follow Up Note     04/23/2025    Primary Oncologic Diagnosis: Marginal Zone Lymphoma    History of Present Ilness:   Gabi Newton (Gabi) is a pleasant 59 y.o.female who presents for ongoing surveillance of her marginal zone lymphoma.    Oncology History:   --Stage IV Marginal Zone Lymphoma (Gastric, Pulmonary, and Marrow) --s/p 4 cycles of single agent Rituximab induction therapy completed on 4/14/20   --PET CT on 4/29/20 consistent with partial treatment response.   --She completed maintenance Rituxan therapy 4/25/2023. 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 Slightly larger on 2/20204 CT.         Oncologic History  Ms. Newton is a 54-year-old female with a past medical history of abdominal pain for the last 3-4 years with unknown etiology, significant smoking history for 40 years, back pain, hypertension presented to the Hematology Clinic for newly diagnosed marginal zone B-cell lymphoma       She was diagnosed with right lower extremity DVT and has been on Xarelto.  As per the patient she was hospitalized at that time and underwent procedure for her right lower extremity for venous insufficiency.  It appears that the right lower extremity DVT is a provoked clot.  She had history of bilateral iliac vein stenting 10/16/18 for venous outflow obsturction.     --CT Chest/Abd/Pelvis 9/20/24  Patient with a reported history of marginal lymphoma.  Interval detrimental change as follows:     *Increased size of a solid lesion at the inferior pole of the right kidney now measuring 3.7 x 4.3 cm (previously 3.4 x 3.4 cm).  *Increased size and number of multiple bilateral pulmonary lesions.Hepatomegaly with steatosis.    Interval History:   Ms. Newton is doing well from the lymphoma perspective. No B symptoms.     Newly diagnosed with diabetes. Continues to have issues with leg swellings. Has ongoing issues with abdominal discomfort from hernia.        Strongly considering weight loss options.      Past Medical History:   Past Medical History:   Diagnosis Date    Abdominal pain 2018    Anemia     Anxiety     Back pain     COPD (chronic obstructive pulmonary disease) 6/10/2022    Deep vein thrombosis     Disorder of kidney and ureter     Fatty liver 7/24/2018    Gastric lymphoma 12/8/2019    Gastric tumor     GERD (gastroesophageal reflux disease)     Hyperlipidemia     Hypertension     Splenomegaly 7/24/2018       Current Medications:   Current Outpatient Medications   Medication Sig    ADVAIR DISKUS 250-50 mcg/dose diskus inhaler Inhale into the lungs 2 (two) times daily. Only using once a day    allopurinoL (ZYLOPRIM) 300 MG tablet Take 300 mg by mouth nightly.    aspirin (ECOTRIN) 81 MG EC tablet Take 1 tablet (81 mg total) by mouth every evening. Hold until PCP follow up    gemfibroziL (LOPID) 600 MG tablet Take 1 tablet by mouth 2 (two) times daily. Only taking once a day    hydroxyzine HCL (ATARAX) 25 MG tablet Take 25 mg by mouth every evening.    lisinopriL (PRINIVIL,ZESTRIL) 40 MG tablet Take 40 mg by mouth.    lisinopriL-hydrochlorothiazide (PRINZIDE,ZESTORETIC) 20-25 mg Tab Take 1 tablet by mouth once daily.    methadone (METHADOSE) 40 mg disintegrating tablet Take 40 mg by mouth once daily. PATIENT USUALLY TAKES AT 10AM    nicotine (NICODERM CQ) 21 mg/24 hr Apply 1 patch topically once daily.    nitroGLYCERIN (NITROSTAT) 0.4 MG SL tablet Place 1 tablet (0.4 mg total) under the tongue every 5 (five) minutes as needed for Chest pain.    ondansetron (ZOFRAN) 8 MG tablet Take 1 tablet by mouth every 8 (eight) hours as needed.    ondansetron (ZOFRAN-ODT) 4 MG TbDL Take 2 tablets (8 mg total) by mouth 2 (two) times daily. (Patient taking differently: Take 8 mg by mouth as needed.)    pimecrolimus (ELIDEL) 1 % cream Apply topically 2 (two) times daily. Use for rash on the face    polyethylene glycol (GLYCOLAX) 17 gram PwPk Take 17 g by mouth 2 (two)  times daily.    RYBELSUS 7 mg tablet Take 7 mg by mouth.    simvastatin (ZOCOR) 40 MG tablet Take 40 mg by mouth every evening.     triamcinolone acetonide 0.1% (KENALOG) 0.1 % ointment SMARTSIG:Topical 3 Times a Week PRN    albuterol (PROAIR HFA) 90 mcg/actuation inhaler Inhale 2 puffs into the lungs every 6 (six) hours as needed for Wheezing. Rescue    citalopram (CELEXA) 40 MG tablet Take 40 mg by mouth. (Patient not taking: Reported on 4/23/2025)    LORazepam (ATIVAN) 1 MG tablet Take 1 tablet (1 mg total) by mouth once. Prior to the pet scan for 1 dose (Patient not taking: Reported on 4/23/2025)    varenicline (CHANTIX) 1 mg Tab Take 1 tablet by mouth 2 (two) times daily. (Patient not taking: Reported on 4/23/2025)     No current facility-administered medications for this visit.     ALLERGIES:   Review of patient's allergies indicates:   Allergen Reactions    Azithromycin Anaphylaxis and Other (See Comments)     Other reaction(s): swelling to entire body    Swelling (tongue / lips)^    Ciprofloxacin Swelling     Throat swells    Other reaction(s): Angioedema    Other reaction(s): angioedema    Codeine Other (See Comments)     Swelling (throat)^    Tolerates Morphine    Codeine sulfate      Swelling (throat)^    Tolerates Morphine       Review of Systems:     Review of Systems   Constitutional:  Positive for appetite change and fatigue. Negative for chills, diaphoresis, fever and unexpected weight change.   HENT:   Negative for hearing loss, mouth sores, nosebleeds, sore throat, trouble swallowing and voice change.    Eyes:  Negative for eye problems and icterus.   Respiratory:  Negative for chest tightness, cough, hemoptysis, shortness of breath and wheezing.    Cardiovascular:  Positive for leg swelling. Negative for chest pain and palpitations.   Gastrointestinal:  Positive for abdominal distention and abdominal pain. Negative for blood in stool, diarrhea, nausea and vomiting.   Endocrine: Negative for hot  flashes.   Genitourinary:  Negative for bladder incontinence, difficulty urinating, dysuria and hematuria.    Musculoskeletal:  Negative for arthralgias, back pain, flank pain, gait problem, myalgias, neck pain and neck stiffness.   Skin:  Negative for itching, rash and wound.   Neurological:  Negative for dizziness, extremity weakness, gait problem, headaches, numbness, seizures and speech difficulty.   Hematological:  Negative for adenopathy. Does not bruise/bleed easily.   Psychiatric/Behavioral:  Negative for confusion, depression and sleep disturbance. The patient is not nervous/anxious.           Physical Exam:     Vitals:    04/23/25 1427   BP: (!) 117/56   Pulse: 76   Resp: 18   Temp: 97.4 °F (36.3 °C)     Physical Exam  Constitutional:       General: She is not in acute distress.     Appearance: She is well-developed. She is not diaphoretic.   HENT:      Head: Normocephalic and atraumatic.      Mouth/Throat:      Pharynx: No oropharyngeal exudate.   Eyes:      Conjunctiva/sclera: Conjunctivae normal.      Pupils: Pupils are equal, round, and reactive to light.   Neck:      Thyroid: No thyromegaly.      Vascular: No JVD.      Trachea: No tracheal deviation.   Cardiovascular:      Rate and Rhythm: Normal rate and regular rhythm.      Heart sounds: Normal heart sounds. No murmur heard.     No friction rub.   Pulmonary:      Effort: Pulmonary effort is normal. No respiratory distress.      Breath sounds: Normal breath sounds. No stridor. No wheezing or rales.   Chest:      Chest wall: No tenderness.   Abdominal:      General: Bowel sounds are normal. There is no distension.      Palpations: Abdomen is soft.      Tenderness: There is no abdominal tenderness. There is no guarding or rebound.   Musculoskeletal:         General: No tenderness or deformity. Normal range of motion.      Cervical back: Normal range of motion and neck supple.      Comments: + central large hernia present   Skin:     General: Skin is  warm and dry.      Capillary Refill: Capillary refill takes less than 2 seconds.      Coloration: Skin is not pale.      Findings: No erythema or rash.   Neurological:      Mental Status: She is alert and oriented to person, place, and time.      Cranial Nerves: No cranial nerve deficit.      Sensory: No sensory deficit.      Motor: No abnormal muscle tone.      Coordination: Coordination normal.      Deep Tendon Reflexes: Reflexes normal.   Psychiatric:         Behavior: Behavior normal.         Thought Content: Thought content normal.         Judgment: Judgment normal.         ECOG Performance Status: (foot note - ECOG PS provided by Eastern Cooperative Oncology Group) 1 - Symptomatic but completely ambulatory    Karnofsky Performance Score:  90%- Able to Carry on Normal Activity: Minor Symptoms of Disease    Labs:   Lab Results   Component Value Date    WBC 5.86 04/23/2025    HGB 12.5 04/23/2025    HCT 41.7 04/23/2025     04/23/2025    ALT 27 04/23/2025    AST 38 04/23/2025     04/23/2025    K 4.8 04/23/2025     04/23/2025    CREATININE 0.8 04/23/2025    BUN 16 04/23/2025    CO2 32 (H) 04/23/2025    INR 1.0 05/10/2022     LDH: 259      Imaging: Previous imaging      Assessment and Plan:     Ms. Newton is a pleansant 59 year old female with marginal zone lymphoma of extranodal and solid organ sites        1.  Sha MZL with coexisting Gastric/pulmonary/diffuse marrow Marginal zone B-cell lymphoma. Stage IV disease s/p induction with Rituxan for 4 cycles 4/14/2020. PET CT 4/2020 showed IL. Started maintenance Rituxan 7/2020. Planned for 2 years. However her treatment was held between December and May 2022 due to cutaneous squamous cell carcinoma diagnosis and management. She resumed tx with cycle 7 in may 2022. She underwent renal mass bx in June 2022 (+ MZL) and it was complicated with retroperitoneal hemorrhage requiring embolization.  CT in February 2024 showed no new sites of disease; larger  renal mass.   Follow-up every 6 months  Repeat ct imaging August 2024 (6 months from scan to follow renal lesions and lung GGOs). Urology reviewed in December and discharged from clinic.  Plan to repeat imaging in 6 months     2. Right lower extremity DVT, likely provoked: was on Xarelto for about 2 years. Stopped taking 8/21. Currently no signs of recurrence    3. Chronic active smoker. Educated patient regarding smoking cessations, she's cutting down. Referred for smoking cessation program.     4. squamous cell carcinoma of lower lip diagnosed 10/21 s/p Wedge resection of lower lip squamous cell carcinoma primary closure and Right submandibular sentinel lymph node biopsy 1/12/22. Margins neg, LN negative. Continue follow up with dermatology.     5. LE edema - ECHO ordered 4/24/2023 to evaluation for heart failure in setting of peripheral vascular disease; EF 55%. Seeing wound care and lymphedema clinic for chronic LE vascular changes.    6. Ventral hernia follows with general surgery           40 minutes in total were spent on this enounter of which 30 minutes were spent face to face with the patient and her  family to discuss the disease, natural history, treatment options and survival statistics. I have provided the patient with an opportunity to ask questions and have all questions answered to his satisfaction.     Visit today included increased complexity associated with the care of the episodic problem marginal zone lymphoma addressed and managing the longitudinal care of the patient due to the serious and/or complex managed problem(s) marginal zone lymphoma    she will return to clinic in 6 months, but knows to call in the interim if symptoms change or should a problem arise.        Holly Mae MD  Hematology and Medical Oncology  Bone Marrow Transplant  [facility]      BMT Chart Routing      Follow up with physician 6 months. 1. see me in 6 months iwht cbc,cmp,ldh 2. ct abd/pelvis iseveral  days before imaging   Follow up with GIRISH    Provider visit type    Infusion scheduling note    Injection scheduling note    Labs    Imaging    Pharmacy appointment    Other referrals

## 2025-08-12 ENCOUNTER — OFFICE VISIT (OUTPATIENT)
Dept: UROLOGY | Facility: CLINIC | Age: 60
End: 2025-08-12
Payer: MEDICARE

## 2025-08-12 DIAGNOSIS — R30.0 DYSURIA: Primary | ICD-10-CM

## 2025-08-12 DIAGNOSIS — R35.0 URINARY FREQUENCY: ICD-10-CM

## 2025-08-12 PROCEDURE — 98005 SYNCH AUDIO-VIDEO EST LOW 20: CPT | Mod: 95,,, | Performed by: NURSE PRACTITIONER

## (undated) DEVICE — SUT 3-0 12-18IN SILK

## (undated) DEVICE — PENCIL ROCKER SWITCH 10FT CORD

## (undated) DEVICE — SUT LIGACLIP SMALL XTRA

## (undated) DEVICE — SEE MEDLINE ITEM 157128

## (undated) DEVICE — SUT 2-0 12-18IN SILK

## (undated) DEVICE — NDL HYPO REG 25G X 1 1/2

## (undated) DEVICE — SEE MEDLINE ITEM 154981

## (undated) DEVICE — ELECTRODE BLADE INSULATED 1 IN

## (undated) DEVICE — CLIP MED TICALL

## (undated) DEVICE — DRAPE STERI INSTRUMENT 1018

## (undated) DEVICE — ADHESIVE DERMABOND ADVANCED

## (undated) DEVICE — SPONGE GAUZE 16PLY 4X4

## (undated) DEVICE — ELECTRODE REM PLYHSV RETURN 9

## (undated) DEVICE — LINER GLOVE POWDERFREE SZ 7.5

## (undated) DEVICE — GAUZE SPONGE PEANUT STRL

## (undated) DEVICE — TOWEL OR DISP STRL BLUE 4/PK

## (undated) DEVICE — SUT VICRYL 3-0 27 SH

## (undated) DEVICE — GOWN SURGICAL X-LARGE

## (undated) DEVICE — SUT COATED VICRYL 4/0 27IN

## (undated) DEVICE — FIBRILLAR ABS HEMOSTAT 4X4

## (undated) DEVICE — TRAY MINOR GEN SURG

## (undated) DEVICE — CORD BIPOLAR 12 FOOT

## (undated) DEVICE — COVER LIGHT HANDLE 80/CA

## (undated) DEVICE — SKINMARKER & RULER REGULAR X-F

## (undated) DEVICE — SEE MEDLINE ITEM 157194

## (undated) DEVICE — SHEET EENT SPLIT

## (undated) DEVICE — GLOVE SURGICAL LATEX SZ 7

## (undated) DEVICE — CONTAINER SPECIMEN STRL 4OZ

## (undated) DEVICE — TRAY ENT 4/CS

## (undated) DEVICE — SUT SILK 3-0 BLK PS-2 18IN